# Patient Record
Sex: FEMALE | Race: WHITE | NOT HISPANIC OR LATINO | Employment: UNEMPLOYED | URBAN - METROPOLITAN AREA
[De-identification: names, ages, dates, MRNs, and addresses within clinical notes are randomized per-mention and may not be internally consistent; named-entity substitution may affect disease eponyms.]

---

## 2017-01-24 ENCOUNTER — RADIANT APPOINTMENT (OUTPATIENT)
Dept: GENERAL RADIOLOGY | Facility: CLINIC | Age: 57
End: 2017-01-24
Payer: OTHER MISCELLANEOUS

## 2017-01-24 ENCOUNTER — OFFICE VISIT (OUTPATIENT)
Dept: FAMILY MEDICINE | Facility: CLINIC | Age: 57
End: 2017-01-24
Payer: OTHER MISCELLANEOUS

## 2017-01-24 VITALS
SYSTOLIC BLOOD PRESSURE: 124 MMHG | OXYGEN SATURATION: 100 % | TEMPERATURE: 97.6 F | DIASTOLIC BLOOD PRESSURE: 80 MMHG | WEIGHT: 178 LBS | HEIGHT: 64 IN | HEART RATE: 78 BPM | BODY MASS INDEX: 30.39 KG/M2

## 2017-01-24 DIAGNOSIS — S89.91XA RIGHT KNEE INJURY, INITIAL ENCOUNTER: Primary | ICD-10-CM

## 2017-01-24 DIAGNOSIS — S89.91XA RIGHT KNEE INJURY, INITIAL ENCOUNTER: ICD-10-CM

## 2017-01-24 PROCEDURE — 99213 OFFICE O/P EST LOW 20 MIN: CPT | Performed by: PHYSICIAN ASSISTANT

## 2017-01-24 PROCEDURE — 73562 X-RAY EXAM OF KNEE 3: CPT | Mod: RT

## 2017-01-24 NOTE — Clinical Note
[address] [address]  [phone]    01/24/2017    Lilly Barnes,  1960  [address] [facility] [address]      Employer:  [employer]    Date of Treatment: 01/24/2017    Date of Incident/Injury: 01.24.2017    Diagnosis:   1. Right knee injury, initial encounter        Work Related:  yes      The employee is UNABLE to return to work until 01.31.2017      FOLLOW-UP  in 1 week           Bre Salinas PA-C

## 2017-01-24 NOTE — PROGRESS NOTES
"  SUBJECTIVE:                                                    Lilly Barnes is a 56 year old female who presents to clinic today for the following health issues:      Joint Pain     Onset: 8am today     Description:   Location: right knee - back  Character: Sharp    Intensity: 8/10 at worst -- sits with elevated 4/10    Progression of Symptoms: same    Accompanying Signs & Symptoms:  Other symptoms: radiation of pain down leg, weakness of leg and swelling   History:   Previous similar pain: no       Precipitating factors:   Trauma or overuse: YES - lifting shelves at work stepped backward with all weight, planted and turned at same time and twisted knee    Alleviating factors:  Improved by: elevated - did relieve some pain       Therapies Tried and outcome: above    Patient reports that she was moving a shelf at work and took the shelf and rotated to the side while keeping the foot planted and she felt instant, sudden sharp pain.      Problem list and histories reviewed & adjusted, as indicated.  Additional history: as documented      ROS:  Constitutional, HEENT, cardiovascular, pulmonary, GI, , musculoskeletal, neuro, skin, endocrine and psych systems are negative, except as otherwise noted.    OBJECTIVE:                                                    /80 mmHg  Pulse 78  Temp(Src) 97.6  F (36.4  C) (Oral)  Ht 5' 4.2\" (1.631 m)  Wt 178 lb (80.74 kg)  BMI 30.35 kg/m2  SpO2 100%  LMP 09/15/2011  Body mass index is 30.35 kg/(m^2).  GENERAL: healthy, alert and no distress  EYES: Eyes grossly normal to inspection, PERRL and conjunctivae and sclerae normal  MS: no gross musculoskeletal defects noted, no edema, Pain with weight bearing activity and varus and valgus stress   SKIN: no suspicious lesions or rashes  NEURO: Normal strength and tone, mentation intact and speech normal  PSYCH: mentation appears normal, affect normal/bright    Diagnostic Test Results:  Xray - Unremarkable "     ASSESSMENT/PLAN:                                                      [place] was seen today for musculoskeletal problem.    Diagnoses and all orders for this visit:    Right knee injury, initial encounter  -     XR Knee Right 3 Views; Future    - Xray was unremarkable.  - Knee immobilizer given to patient today, and patient advised to rest and stay out of work for one week.  She was instructed to followup in one week for re-assessment, or sooner if needed.   - Rest, elevation, ice and pain management of tylenol and ibuprofen advised today.     - Plan discussed with Dr. Calle today.      See Patient Instructions        Bre Salinas PA-C    [facility] [place]

## 2017-01-24 NOTE — NURSING NOTE
"Chief Complaint   Patient presents with     Musculoskeletal Problem       Initial /80 mmHg  Pulse 78  Temp(Src) 97.6  F (36.4  C) (Oral)  Ht 5' 4.2\" (1.631 m)  Wt 178 lb (80.74 kg)  BMI 30.35 kg/m2  SpO2 100%  LMP 09/15/2011 Estimated body mass index is 30.35 kg/(m^2) as calculated from the following:    Height as of this encounter: 5' 4.2\" (1.631 m).    Weight as of this encounter: 178 lb (80.74 kg).  BP completed using cuff size: large  Csaba Mlnarik CMA    "

## 2017-01-28 DIAGNOSIS — E03.9 ACQUIRED HYPOTHYROIDISM: Primary | ICD-10-CM

## 2017-01-30 ENCOUNTER — OFFICE VISIT (OUTPATIENT)
Dept: FAMILY MEDICINE | Facility: CLINIC | Age: 57
End: 2017-01-30
Payer: OTHER MISCELLANEOUS

## 2017-01-30 VITALS
SYSTOLIC BLOOD PRESSURE: 110 MMHG | TEMPERATURE: 97.8 F | DIASTOLIC BLOOD PRESSURE: 68 MMHG | HEIGHT: 64 IN | HEART RATE: 67 BPM | WEIGHT: 182 LBS | OXYGEN SATURATION: 100 % | BODY MASS INDEX: 31.07 KG/M2

## 2017-01-30 DIAGNOSIS — S89.91XD KNEE INJURY, RIGHT, SUBSEQUENT ENCOUNTER: Primary | ICD-10-CM

## 2017-01-30 PROCEDURE — 99213 OFFICE O/P EST LOW 20 MIN: CPT | Performed by: PHYSICIAN ASSISTANT

## 2017-01-30 RX ORDER — LEVOTHYROXINE SODIUM 75 UG/1
TABLET ORAL
Qty: 30 TABLET | Refills: 0 | Status: SHIPPED | OUTPATIENT
Start: 2017-01-30 | End: 2017-02-03

## 2017-01-30 NOTE — PROGRESS NOTES
"  SUBJECTIVE:                                                    Lilly Barnes is a 56 year old female who presents to clinic today for the following health issues:      Workers Comp - Recheck R knee injury on 01/24/2017. Still sharp pain worst is 6/10. More pain when locking knee - brace causes more pain. Pain in now outer side - heard a pop this morning - stepped forward with R leg and bared all weight.   Is better since last visit.  Tylenol - 2 extra strength,  takes edge off    Patient reports that the pain is better.  She reports that it is less often.  She still is having a hard time bearing weight.    She reports that the knee immobilizer makes symptoms a little worse.  She is not using the brace.      Problem list and histories reviewed & adjusted, as indicated.  Additional history: as documented      ROS:  Constitutional, HEENT, cardiovascular, pulmonary, GI, , musculoskeletal, neuro, skin, endocrine and psych systems are negative, except as otherwise noted.    OBJECTIVE:                                                    /68 mmHg  Pulse 67  Temp(Src) 97.8  F (36.6  C) (Oral)  Ht 5' 4.2\" (1.631 m)  Wt 182 lb (82.555 kg)  BMI 31.03 kg/m2  SpO2 100%  LMP 09/15/2011  Breastfeeding? No  Body mass index is 31.03 kg/(m^2).  GENERAL: healthy, alert and no distress  MS: no gross musculoskeletal defects noted, no edema  SKIN: no suspicious lesions or rashes  NEURO: Normal strength and tone, mentation intact and speech normal  PSYCH: mentation appears normal, affect normal/bright    Diagnostic Test Results:  none      ASSESSMENT/PLAN:                                                      [place] was seen today for recheck.    Diagnoses and all orders for this visit:    Knee injury, right, subsequent encounter  -     PHYSICAL THERAPY REFERRAL    - Work comp letter done today for patient to go back to work with restrictions.  She should be allowed to sit and stand as tolerated.     - Exam also " performed by Dr. Calle who agrees with assessment and plan.     - No further imaging advised at this time.     - Patient instructed to followup as needed.     See Patient Instructions        Bre Salinas PA-C    [facility] [place]

## 2017-01-30 NOTE — Clinical Note
[address] [address]  [phone]    01/30/2017    Lilly Barnes,  1960  [address] [facility] [address]      Employer:  [employer]    Date of Treatment: 01/30/2017    Date of Incident/Injury: 01.24.2017    Diagnosis:   1. Knee injury, right, subsequent encounter        Work Related:  yes      The employee is ABLE to return to work today.    When the patient returns to work, the following restrictions apply for the next 10 days:  *  Bend: Occasionally (1-3 hours)  *  Squat: Occasionally (1-3 hours)  *  Walk/Stand: Occasionally (1-3 hours)  *  Reach Above Shoulders: Occasionally (1-3 hours)  *  Lift, carry, push, and pull no more than:  0-10 lbs.    No lifting over 10-15 lbs.  Standing and sitting as tolerated.  Light movement.  Physical therapy recommended today.      FOLLOW-UP  With physical therapy and as needed in clinic.               Bre Salinas PA-C

## 2017-01-30 NOTE — TELEPHONE ENCOUNTER
levothyroxine (SYNTHROID, LEVOTHROID) 75 MCG tablet  Last Written Prescription Date: 3/3/2016  Last Quantity: 90, # refills: 3  Last Office Visit with G, P or [facility] prescribing provider: 1/30/2017   Next 5 appointments (look out 90 days)     Feb 03, 2017  8:00 AM   PHYSICAL with Shelton Castellanos MD   [facility] ([facility])    [address] [place] [id number] [phone]                   TSH   Date Value Ref Range Status   12/18/2015 2.25 0.40 - 4.00 mU/L Final     Due for an Office visit for further refills, only fill for 30 days     Delia Bowden RN, BSN  [facility]

## 2017-01-30 NOTE — NURSING NOTE
"Chief Complaint   Patient presents with     RECHECK     Work Comp - R knee injury       Initial /68 mmHg  Pulse 67  Temp(Src) 97.8  F (36.6  C) (Oral)  Ht 5' 4.2\" (1.631 m)  Wt 182 lb (82.555 kg)  BMI 31.03 kg/m2  SpO2 100%  LMP 09/15/2011  Breastfeeding? No Estimated body mass index is 31.03 kg/(m^2) as calculated from the following:    Height as of this encounter: 5' 4.2\" (1.631 m).    Weight as of this encounter: 182 lb (82.555 kg).  BP completed using cuff size: large  Csaba Mlnarik CMA    "

## 2017-01-31 ENCOUNTER — TELEPHONE (OUTPATIENT)
Dept: FAMILY MEDICINE | Facility: CLINIC | Age: 57
End: 2017-01-31

## 2017-01-31 NOTE — TELEPHONE ENCOUNTER
Date Forms was received: January 31, 2017    Forms received by: Fax    Last office visit: 01/30/2017    Purpose of Form:  [employer] Forms    When the form is due:  asap    How the form needs to be returned for patient:  Fax - [phone]    Form currently placed  [facility] -- Bre's Desk  Rah Mlnarik CMA

## 2017-02-01 ENCOUNTER — THERAPY VISIT (OUTPATIENT)
Dept: PHYSICAL THERAPY | Facility: CLINIC | Age: 57
End: 2017-02-01
Payer: OTHER MISCELLANEOUS

## 2017-02-01 DIAGNOSIS — M25.561 ACUTE PAIN OF RIGHT KNEE: Primary | ICD-10-CM

## 2017-02-01 PROCEDURE — 97110 THERAPEUTIC EXERCISES: CPT | Mod: GP | Performed by: PHYSICAL THERAPIST

## 2017-02-01 PROCEDURE — 97161 PT EVAL LOW COMPLEX 20 MIN: CPT | Mod: GP | Performed by: PHYSICAL THERAPIST

## 2017-02-01 ASSESSMENT — ACTIVITIES OF DAILY LIVING (ADL)
KNEE_ACTIVITY_OF_DAILY_LIVING_SUM: 39
HOW_WOULD_YOU_RATE_THE_OVERALL_FUNCTION_OF_YOUR_KNEE_DURING_YOUR_USUAL_DAILY_ACTIVITIES?: ABNORMAL
SIT WITH YOUR KNEE BENT: ACTIVITY IS SOMEWHAT DIFFICULT
KNEE_ACTIVITY_OF_DAILY_LIVING_SCORE: 55.71
GIVING WAY, BUCKLING OR SHIFTING OF KNEE: I DO NOT HAVE THE SYMPTOM
WEAKNESS: THE SYMPTOM AFFECTS MY ACTIVITY SLIGHTLY
PAIN: THE SYMPTOM AFFECTS MY ACTIVITY MODERATELY
RISE FROM A CHAIR: ACTIVITY IS SOMEWHAT DIFFICULT
SQUAT: ACTIVITY IS FAIRLY DIFFICULT
LIMPING: THE SYMPTOM AFFECTS MY ACTIVITY SEVERELY
GO UP STAIRS: ACTIVITY IS SOMEWHAT DIFFICULT
HOW_WOULD_YOU_RATE_THE_CURRENT_FUNCTION_OF_YOUR_KNEE_DURING_YOUR_USUAL_DAILY_ACTIVITIES_ON_A_SCALE_FROM_0_TO_100_WITH_100_BEING_YOUR_LEVEL_OF_KNEE_FUNCTION_PRIOR_TO_YOUR_INJURY_AND_0_BEING_THE_INABILITY_TO_PERFORM_ANY_OF_YOUR_USUAL_DAILY_ACTIVITIES?: 45
SWELLING: THE SYMPTOM AFFECTS MY ACTIVITY SLIGHTLY
RAW_SCORE: 39
STAND: ACTIVITY IS FAIRLY DIFFICULT
WALK: ACTIVITY IS SOMEWHAT DIFFICULT
KNEEL ON THE FRONT OF YOUR KNEE: ACTIVITY IS MINIMALLY DIFFICULT
GO DOWN STAIRS: ACTIVITY IS FAIRLY DIFFICULT
AS_A_RESULT_OF_YOUR_KNEE_INJURY,_HOW_WOULD_YOU_RATE_YOUR_CURRENT_LEVEL_OF_DAILY_ACTIVITY?: ABNORMAL
STIFFNESS: THE SYMPTOM AFFECTS MY ACTIVITY SLIGHTLY

## 2017-02-01 NOTE — PROGRESS NOTES
Subjective:    Lilly Barnes is a 56 year old female with a right knee condition.  Condition occurred with:  A twist.  Condition occurred: at work.  This is a new condition  Pt twisted her right knee as she was lifting and turning with a shelf.  This occurred on 1/24/17 at work.  Pt felt immediate sharp posterior knee pain.  Continues to have pain in the posterior knee and along the lateral knee.  Has improved, but is still limping slightly and has pain when pivoting on the right knee.  .    Patient reports pain:  Posterior and lateral.    Pain is described as sharp and stabbing and is intermittent and reported as 6/10.   Pain is the same all the time.  Symptoms are exacerbated by weight bearing, activity, ascending stairs, descending stairs and bending/squatting and relieved by rest.  Since onset symptoms are gradually improving.  Special tests:  X-ray (normal).      General health as reported by patient is good.                  Barriers include:  None as reported by the patient.    Red flags:  None as reported by the patient.                      Objective:      Gait:    Gait Type:  Antalgic                                                           Knee Evaluation:  ROM:  Strength:  Normal    PROM    Hyperextension: Left: Full    Right:  Slight loss with ERP    Flexion: Left: Full    Right:  Slight loss with ERP        Ligament Testing:  Normal                Special Tests:     Right knee positive for the following tests:  Meniscal  Palpation:      Right knee tenderness present at:  Medial Joint Line and Lateral Joint Line  Edema:  Edema of the knee: Slight edema of the right knee.            General     ROS    Assessment/Plan:      Patient is a 56 year old female with right side knee complaints.    Patient has the following significant findings with corresponding treatment plan.                Diagnosis 1:  Right knee sprain  Pain -  hot/cold therapy, education and home program  Decreased ROM/flexibility -  manual therapy, therapeutic exercise and home program  Decreased strength - therapeutic exercise, therapeutic activities and home program    Therapy Evaluation Codes:   1) History comprised of:   Personal factors that impact the plan of care:      None.    Comorbidity factors that impact the plan of care are:      None.     Medications impacting care: None.  2) Examination of Body Systems comprised of:   Body structures and functions that impact the plan of care:      Knee.   Activity limitations that impact the plan of care are:      Bending, Squatting/kneeling, Stairs and Walking.  3) Clinical presentation characteristics are:   Stable/Uncomplicated.  4) Decision-Making    Low complexity using standardized patient assessment instrument and/or measureable assessment of functional outcome.  Cumulative Therapy Evaluation is: Low complexity.    Previous and current functional limitations:  (See Goal Flow Sheet for this information)    Short term and Long term goals: (See Goal Flow Sheet for this information)     Communication ability:  Patient appears to be able to clearly communicate and understand verbal and written communication and follow directions correctly.  Treatment Explanation - The following has been discussed with the patient:   RX ordered/plan of care  Anticipated outcomes  Possible risks and side effects  This patient would benefit from PT intervention to resume normal activities.   Rehab potential is good.    Frequency:  2 X week, once daily  Duration:  for 2 weeks tapering to 1 X a week over 4 weeks  Discharge Plan:  Achieve all LTG.  Independent in home treatment program.  Reach maximal therapeutic benefit.    Please refer to the daily flowsheet for treatment today, total treatment time and time spent performing 1:1 timed codes.

## 2017-02-01 NOTE — TELEPHONE ENCOUNTER
Forms are a job description for pt -- sent to scan.    Faxed work status letter written on 01/30/2017 at pt's appt to [phone]    Rah Miller CMA

## 2017-02-02 NOTE — PROGRESS NOTES
Subjective:                                       Pertinent medical history includes:  High blood pressure and thyroid problems.      Current medications:  Thyroid medication and high blood pressure medication.  Current occupation is .    Primary job tasks include:  Prolonged standing, repetitive tasks, operating a machine and lifting (carrying, pushing, pulling).                              Objective:    System    Physical Exam    General     ROS    Assessment/Plan:

## 2017-02-03 ENCOUNTER — THERAPY VISIT (OUTPATIENT)
Dept: PHYSICAL THERAPY | Facility: CLINIC | Age: 57
End: 2017-02-03
Payer: OTHER MISCELLANEOUS

## 2017-02-03 ENCOUNTER — OFFICE VISIT (OUTPATIENT)
Dept: FAMILY MEDICINE | Facility: CLINIC | Age: 57
End: 2017-02-03
Payer: COMMERCIAL

## 2017-02-03 VITALS
HEIGHT: 64 IN | WEIGHT: 181 LBS | BODY MASS INDEX: 30.9 KG/M2 | OXYGEN SATURATION: 100 % | TEMPERATURE: 97.8 F | HEART RATE: 73 BPM | DIASTOLIC BLOOD PRESSURE: 68 MMHG | SYSTOLIC BLOOD PRESSURE: 112 MMHG

## 2017-02-03 DIAGNOSIS — N18.30 CKD (CHRONIC KIDNEY DISEASE) STAGE 3, GFR 30-59 ML/MIN (H): ICD-10-CM

## 2017-02-03 DIAGNOSIS — E78.5 HYPERLIPIDEMIA WITH TARGET LDL LESS THAN 130: ICD-10-CM

## 2017-02-03 DIAGNOSIS — M85.80 OSTEOPENIA: ICD-10-CM

## 2017-02-03 DIAGNOSIS — M25.561 ACUTE PAIN OF RIGHT KNEE: Primary | ICD-10-CM

## 2017-02-03 DIAGNOSIS — E03.9 ACQUIRED HYPOTHYROIDISM: ICD-10-CM

## 2017-02-03 DIAGNOSIS — Z23 NEED FOR PROPHYLACTIC VACCINATION AND INOCULATION AGAINST INFLUENZA: ICD-10-CM

## 2017-02-03 DIAGNOSIS — Z86.0100 HISTORY OF COLONIC POLYPS: ICD-10-CM

## 2017-02-03 DIAGNOSIS — I10 HYPERTENSION GOAL BP (BLOOD PRESSURE) < 140/90: ICD-10-CM

## 2017-02-03 DIAGNOSIS — N39.46 MIXED INCONTINENCE: ICD-10-CM

## 2017-02-03 DIAGNOSIS — Z11.59 NEED FOR HEPATITIS C SCREENING TEST: ICD-10-CM

## 2017-02-03 DIAGNOSIS — J31.0 CHRONIC RHINITIS: ICD-10-CM

## 2017-02-03 DIAGNOSIS — Z12.4 SCREENING FOR MALIGNANT NEOPLASM OF CERVIX: ICD-10-CM

## 2017-02-03 DIAGNOSIS — Z12.39 SCREENING FOR BREAST CANCER: ICD-10-CM

## 2017-02-03 DIAGNOSIS — Z51.81 MEDICATION MONITORING ENCOUNTER: ICD-10-CM

## 2017-02-03 DIAGNOSIS — Z23 NEED FOR PROPHYLACTIC VACCINATION AGAINST STREPTOCOCCUS PNEUMONIAE (PNEUMOCOCCUS): ICD-10-CM

## 2017-02-03 DIAGNOSIS — Z13.820 SCREENING FOR OSTEOPOROSIS: ICD-10-CM

## 2017-02-03 DIAGNOSIS — Z91.09 ENVIRONMENTAL ALLERGIES: ICD-10-CM

## 2017-02-03 DIAGNOSIS — Z12.11 SCREEN FOR COLON CANCER: ICD-10-CM

## 2017-02-03 DIAGNOSIS — Z00.00 ENCOUNTER FOR ROUTINE ADULT HEALTH EXAMINATION WITHOUT ABNORMAL FINDINGS: Primary | ICD-10-CM

## 2017-02-03 LAB
ALBUMIN UR-MCNC: NEGATIVE MG/DL
APPEARANCE UR: CLEAR
BILIRUB UR QL STRIP: NEGATIVE
COLOR UR AUTO: YELLOW
ERYTHROCYTE [DISTWIDTH] IN BLOOD BY AUTOMATED COUNT: 13.1 % (ref 10–15)
GLUCOSE UR STRIP-MCNC: NEGATIVE MG/DL
HCT VFR BLD AUTO: 37.3 % (ref 35–47)
HGB BLD-MCNC: 12.3 G/DL (ref 11.7–15.7)
HGB UR QL STRIP: NEGATIVE
KETONES UR STRIP-MCNC: NEGATIVE MG/DL
LEUKOCYTE ESTERASE UR QL STRIP: ABNORMAL
MCH RBC QN AUTO: 29.2 PG (ref 26.5–33)
MCHC RBC AUTO-ENTMCNC: 33 G/DL (ref 31.5–36.5)
MCV RBC AUTO: 89 FL (ref 78–100)
NITRATE UR QL: NEGATIVE
NON-SQ EPI CELLS #/AREA URNS LPF: NORMAL /LPF
PH UR STRIP: 6.5 PH (ref 5–7)
PLATELET # BLD AUTO: 156 10E9/L (ref 150–450)
RBC # BLD AUTO: 4.21 10E12/L (ref 3.8–5.2)
RBC #/AREA URNS AUTO: NORMAL /HPF (ref 0–2)
SP GR UR STRIP: 1.01 (ref 1–1.03)
URN SPEC COLLECT METH UR: ABNORMAL
UROBILINOGEN UR STRIP-ACNC: 0.2 EU/DL (ref 0.2–1)
WBC # BLD AUTO: 4.5 10E9/L (ref 4–11)
WBC #/AREA URNS AUTO: NORMAL /HPF (ref 0–2)

## 2017-02-03 PROCEDURE — 81001 URINALYSIS AUTO W/SCOPE: CPT | Performed by: FAMILY MEDICINE

## 2017-02-03 PROCEDURE — 97110 THERAPEUTIC EXERCISES: CPT | Mod: GP

## 2017-02-03 PROCEDURE — 80061 LIPID PANEL: CPT | Performed by: FAMILY MEDICINE

## 2017-02-03 PROCEDURE — 82550 ASSAY OF CK (CPK): CPT | Performed by: FAMILY MEDICINE

## 2017-02-03 PROCEDURE — 85027 COMPLETE CBC AUTOMATED: CPT | Performed by: FAMILY MEDICINE

## 2017-02-03 PROCEDURE — 99396 PREV VISIT EST AGE 40-64: CPT | Performed by: FAMILY MEDICINE

## 2017-02-03 PROCEDURE — 84443 ASSAY THYROID STIM HORMONE: CPT | Performed by: FAMILY MEDICINE

## 2017-02-03 PROCEDURE — 36415 COLL VENOUS BLD VENIPUNCTURE: CPT | Performed by: FAMILY MEDICINE

## 2017-02-03 PROCEDURE — 82043 UR ALBUMIN QUANTITATIVE: CPT | Performed by: FAMILY MEDICINE

## 2017-02-03 PROCEDURE — 80053 COMPREHEN METABOLIC PANEL: CPT | Performed by: FAMILY MEDICINE

## 2017-02-03 RX ORDER — LEVOTHYROXINE SODIUM 75 UG/1
75 TABLET ORAL DAILY
Qty: 90 TABLET | Refills: 3 | Status: SHIPPED | OUTPATIENT
Start: 2017-02-03 | End: 2018-01-27

## 2017-02-03 RX ORDER — LISINOPRIL AND HYDROCHLOROTHIAZIDE 12.5; 2 MG/1; MG/1
1 TABLET ORAL DAILY
Qty: 90 TABLET | Refills: 3 | Status: SHIPPED | OUTPATIENT
Start: 2017-02-03 | End: 2017-09-11

## 2017-02-03 RX ORDER — FLUTICASONE PROPIONATE 50 MCG
2 SPRAY, SUSPENSION (ML) NASAL DAILY
Qty: 48 G | Refills: 3 | Status: SHIPPED | OUTPATIENT
Start: 2017-02-03 | End: 2018-02-28

## 2017-02-03 NOTE — Clinical Note
[address] [address]                  [phone]   February 6, 2017    Lilly Barnes  [address] [facility] [address]      Dear [place],    Here is a summary of your recent test results:    Labs are overall quite good, except lipids are elevated.    Kidney function is overall stable (mildly decreased).     We advise:    Follow up to discuss treatment options.    For additional lab test information, [email or web address].org is an excellent reference.    Let us know if you have any questions or concerns.    Thank you for choosing us for your health care needs!    Your test results are enclosed.      Please contact me if you have any questions.    In addition, here is a list of due or overdue Health Maintenance reminders.    Health Maintenance Due   Topic Date Due     Pneumovax Vaccine  06/26/1961     Discuss Advance Directive Planning  06/26/1977     Hepatitis C Screening  06/26/1977     Colon Cancer Screening - FIT Test - yearly  09/05/2015       Please call us at [phone] (or use [employer] ([employer])) to address the above recommendations.            Thank you very much for trusting [facility]..     Healthy regards,        Shelton Castellanos M.D.        Results for orders placed or performed in visit on 02/03/17   Comprehensive metabolic panel   Result Value Ref Range    Sodium 143 133 - 144 mmol/L    Potassium 4.0 3.4 - 5.3 mmol/L    Chloride 107 94 - 109 mmol/L    Carbon Dioxide 26 20 - 32 mmol/L    Anion Gap 10 3 - 14 mmol/L    Glucose 75 70 - 99 mg/dL    Urea Nitrogen 31 (H) 7 - 30 mg/dL    Creatinine 1.28 (H) 0.52 - 1.04 mg/dL    GFR Estimate 43 (L) >60 mL/min/1.7m2    GFR Estimate If Black 52 (L) >60 mL/min/1.7m2    Calcium 9.1 8.5 - 10.1 mg/dL    Bilirubin Total 0.3 0.2 - 1.3 mg/dL    Albumin 3.7 3.4 - 5.0 g/dL    Protein Total 6.9 6.8 - 8.8 g/dL    Alkaline Phosphatase 78 40 - 150 U/L    ALT 22 0 - 50 U/L    AST 16 0 - 45  U/L   Lipid panel reflex to direct LDL   Result Value Ref Range    Cholesterol 244 (H) <200 mg/dL    Triglycerides 65 <150 mg/dL    HDL Cholesterol 74 >49 mg/dL    LDL Cholesterol Calculated 157 (H) <100 mg/dL    Non HDL Cholesterol 170 (H) <130 mg/dL   CK total   Result Value Ref Range    CK Total 69 30 - 225 U/L   CBC with platelets   Result Value Ref Range    WBC 4.5 4.0 - 11.0 10e9/L    RBC Count 4.21 3.8 - 5.2 10e12/L    Hemoglobin 12.3 11.7 - 15.7 g/dL    Hematocrit 37.3 35.0 - 47.0 %    MCV 89 78 - 100 fl    MCH 29.2 26.5 - 33.0 pg    MCHC 33.0 31.5 - 36.5 g/dL    RDW 13.1 10.0 - 15.0 %    Platelet Count 156 150 - 450 10e9/L   *UA reflex to Microscopic   Result Value Ref Range    Color Urine Yellow     Appearance Urine Clear     Glucose Urine Negative NEG mg/dL    Bilirubin Urine Negative NEG    Ketones Urine Negative NEG mg/dL    Specific Gravity Urine 1.015 1.003 - 1.035    Blood Urine Negative NEG    pH Urine 6.5 5.0 - 7.0 pH    Protein Albumin Urine Negative NEG mg/dL    Urobilinogen Urine 0.2 0.2 - 1.0 EU/dL    Nitrite Urine Negative NEG    Leukocyte Esterase Urine Trace (A) NEG    Source Midstream Urine    Albumin Random Urine Quantitative   Result Value Ref Range    Creatinine Urine 78 mg/dL    Albumin Urine mg/L <5 mg/L    Albumin Urine mg/g Cr Unable to calculate due to low value 0 - 25 mg/g Cr   TSH with free T4 reflex   Result Value Ref Range    TSH 3.77 0.40 - 4.00 mU/L   Urine Microscopic   Result Value Ref Range    WBC Urine O - 2 0 - 2 /HPF    RBC Urine O - 2 0 - 2 /HPF    Squamous Epithelial /LPF Urine Few FEW /LPF

## 2017-02-03 NOTE — MR AVS SNAPSHOT
After Visit Summary   2/3/2017    Lilly Barnes    MRN: [id number]           Patient Information     Date Of Birth          1960        Visit Information        Provider Department      2/3/2017 8:00 AM Shelton Castellanos MD [facility]        Today's Diagnoses     Encounter for routine adult health examination without abnormal findings    -  1     CKD (chronic kidney disease) stage 3, GFR 30-59 ml/min         Hypertension goal BP (blood pressure) < 140/90         Hyperlipidemia with target LDL less than 130         Acquired hypothyroidism         Mixed incontinence         Osteopenia         Chronic rhinitis         Environmental allergies         History of colonic polyps- 3 mm polyp.  needs every 3-5 yr surveillence- hyperplastic          Screening for breast cancer         Screening for malignant neoplasm of cervix         Screen for colon cancer         Screening for osteoporosis         Medication monitoring encounter         Need for hepatitis C screening test         Need for prophylactic vaccination and inoculation against influenza         Need for prophylactic vaccination against Streptococcus pneumoniae (pneumococcus)           Care Instructions    2/3/17    Levothyroxine-Refilled-use as directed   Flonase-Refilled-use as directed   lisinopril/hctz refilled    Mammogram 5/2016  DEXA 8/16  Pap 12/18    Check on colonoscopy      [facility]                        To reach your care team during and after hours:   [phone]  To reach our pharmacy:        [phone]    Clinic Hours                        Our clinic hours are:    Monday   7:30 am to 7:00 pm                  Tuesday through Friday 7:30 am to 5:00 pm                             Saturday   8:00 am to 12:00 pm      Sunday   Closed      Pharmacy Hours                        Our pharmacy hours are:    Monday   8:30 am to 7:00 pm       Tuesday to Friday  8:30 am to 6:00 pm                        Saturday    9:00 am to 1:00 pm              Sunday    Closed              There is also information available at our web site:  [email or web address]    If your provider ordered any lab tests and you do not receive the results within 10 business days, please call the clinic.    If you need a medication refill please contact your pharmacy.  Please allow 2-3 business days for your refill to be completed.    Our clinic offers telephone visits and e visits.  Please ask one of your team members to explain more.      Use [employer] (secure email communication and access to your chart) to send your primary care provider a message or make an appointment. Ask someone on your Team how to sign up for [employer].  Immunizations                      Immunization History   Administered Date(s) Administered     Influenza (IIV3) 01/28/2013, 10/01/2014, 09/15/2015, 02/03/2017     TDAP (BOOSTRIX AGES 10-64) 07/19/2012     Varicella 03/05/1997        Health Maintenance                         Health Maintenance Due   Topic Date Due     Pneumovax Vaccine  06/26/1961     Discuss Advance Directive Planning  06/26/1977     Hepatitis C Screening  06/26/1977     Colon Cancer Screening - FIT Test - yearly  09/05/2015     Flu Vaccine - yearly  09/01/2016     Thyroid Function Lab (TSH) - yearly  12/18/2016     Basic Metabolic Lab - yearly  12/18/2016     Hemoglobin Lab - yearly  12/18/2016     Cholesterol Lab - yearly  12/18/2016     Microalbumin Lab - yearly  12/18/2016         Preventive Health Recommendations  Female Ages 50 - 64    Yearly exam: See your health care provider every year in order to  o Review health changes.   o Discuss preventive care.    o Review your medicines if your doctor has prescribed any.      Get a Pap test every three years (unless you have an abnormal result and your provider advises testing more often).    If you get Pap tests with HPV test, you only need to test every 5 years, unless you have an abnormal result.     You  do not need a Pap test if your uterus was removed (hysterectomy) and you have not had cancer.    You should be tested each year for STDs (sexually transmitted diseases) if you're at risk.     Have a mammogram every 1 to 2 years.    Have a colonoscopy at age 50, or have a yearly FIT test (stool test). These exams screen for colon cancer.      Have a cholesterol test every 5 years, or more often if advised.    Have a diabetes test (fasting glucose) every three years. If you are at risk for diabetes, you should have this test more often.     If you are at risk for osteoporosis (brittle bone disease), think about having a bone density scan (DEXA).    Shots: Get a flu shot each year. Get a tetanus shot every 10 years.    Nutrition:     Eat at least 5 servings of fruits and vegetables each day.    Eat whole-grain bread, whole-wheat pasta and brown rice instead of white grains and rice.    Talk to your provider about Calcium and Vitamin D.     Lifestyle    Exercise at least 150 minutes a week (30 minutes a day, 5 days a week). This will help you control your weight and prevent disease.    Limit alcohol to one drink per day.    No smoking.     Wear sunscreen to prevent skin cancer.     See your dentist every six months for an exam and cleaning.    See your eye doctor every 1 to 2 years.          Follow-ups after your visit        Additional Services     UROLOGY ADULT REFERRAL       Your provider has referred you to: [facility]: [facility] [phone]   [email or web address]    Please be aware that coverage of these services is subject to the terms and limitations of your health insurance plan.  Call member services at your health plan with any benefit or coverage questions.      Please bring the following with you to your appointment:    (1) Any X-Rays, CTs or MRIs which have been performed.  Contact the facility where they were done to arrange for  prior  to your scheduled appointment.  Any new CT, MRI or other procedures ordered by your specialist must be performed at a [place] facility or coordinated by your clinic's referral office.  (2) List of current medications  (3) This referral request   (4) Any documents/labs given to you for this referral                  Your next 10 appointments already scheduled     Feb 03, 2017  5:20 PM   VIANEY Extremity with Destiney Hernandez [facility] ([facility])    [address] [address]   [phone]            Feb 07, 2017  3:20 PM   VIANEY Extremity with Paddy James [facility] ([facility])    [address] [address]   [phone]            Feb 09, 2017  5:20 PM   VIANEY Extremity with Paddy James [facility] ([facility])    [address] [address]   [phone]            Feb 14, 2017  3:20 PM   [facility] ([facility])    [address] [address]   [phone]              Future tests that were ordered for you today     Open Future Orders        Priority Expected Expires Ordered    Fecal colorectal cancer screen (FIT) Routine 2/24/2017 4/28/2017 2/3/2017            Who to contact     If you have questions or need follow up information about today's clinic visit or your schedule please contact [facility] directly at [phone].  Normal or non-critical lab and imaging results will be communicated to you by MyChart, letter or phone within 4 business days after the clinic has received the results. If you do not hear from us within 7 days, please contact the clinic through MyChart or phone. If you have a critical or abnormal lab result, we will notify you by phone as soon as possible.  Submit refill requests through [employer] or call your pharmacy and they will forward the  "refill request to us. Please allow 3 business days for your refill to be completed.          Additional Information About Your Visit        [employer] Information     [email or web address] gives you secure access to your electronic health record. If you see a primary care provider, you can also send messages to your care team and make appointments. If you have questions, please call your primary care clinic.  If you do not have a primary care provider, please call [phone] and they will assist you.        Care EveryWhere ID     This is your Care EveryWhere ID. This could be used by other organizations to access your [place] medical records  [id number]        Your Vitals Were     Pulse Temperature Height BMI (Body Mass Index) Pulse Oximetry Last Period    73 97.8  F (36.6  C) (Oral) 5' 4.2\" (1.631 m) 30.86 kg/m2 100% 09/15/2011       Blood Pressure from Last 3 Encounters:   02/03/17 112/68   01/30/17 110/68   01/24/17 124/80    Weight from Last 3 Encounters:   02/03/17 181 lb (82.101 kg)   01/30/17 182 lb (82.555 kg)   01/24/17 178 lb (80.74 kg)              We Performed the Following     *UA reflex to Microscopic     Albumin Random Urine Quantitative     CBC with platelets     CK total     Comprehensive metabolic panel     Hepatitis C Screen Reflex to HCV RNA Quant and Genotype     Lipid panel reflex to direct LDL     TSH with free T4 reflex     UROLOGY ADULT REFERRAL          Today's Medication Changes          These changes are accurate as of: 2/3/17  8:46 AM.  If you have any questions, ask your nurse or doctor.               These medicines have changed or have updated prescriptions.        Dose/Directions    fluticasone 50 MCG/ACT spray   Commonly known as:  FLONASE   This may have changed:  See the new instructions.   Used for:  Chronic rhinitis, Environmental allergies   Changed by:  Shelton Castellanos MD        Dose:  2 spray   Spray 2 sprays into both nostrils daily   Quantity:  48 g   Refills:  3       levothyroxine 75 " MCG tablet   Commonly known as:  SYNTHROID/LEVOTHROID   This may have changed:  See the new instructions.   Used for:  Acquired hypothyroidism   Changed by:  Shelton Castellanos MD        Dose:  75 mcg   Take 1 tablet (75 mcg) by mouth daily   Quantity:  90 tablet   Refills:  3            Where to get your medicines      These medications were sent to [facility] [address] [place] [address] [facility]  [address] [facility] [address]     Phone:  [phone]    - fluticasone 50 MCG/ACT spray  - levothyroxine 75 MCG tablet  - lisinopril-hydrochlorothiazide 20-12.5 MG per tablet             Primary Care Provider Office Phone # Fax #    Shelton Castellanos [phone]       [address] [address]        Thank you!     Thank you for choosing [facility]  for your care. Our goal is always to provide you with excellent care. Hearing back from our patients is one way we can continue to improve our services. Please take a few minutes to complete the written survey that you may receive in the mail after your visit with us. Thank you!             Your Updated Medication List - Protect others around you: Learn how to safely use, store and throw away your medicines at www.disposemymeds.org.          This list is accurate as of: 2/3/17  8:46 AM.  Always use your most recent med list.                   Brand Name Dispense Instructions for use    aspirin 81 MG tablet      Take 1 tablet by mouth daily.       calcium carbonate-vitamin D 600-400 MG-UNIT Chew     180 tablet    Take 1 chew tab by mouth 2 times daily       fluticasone 50 MCG/ACT spray    FLONASE    48 g    Spray 2 sprays into both nostrils daily       levothyroxine 75 MCG tablet    SYNTHROID/LEVOTHROID    90 tablet    Take 1 tablet (75 mcg) by mouth daily       lisinopril-hydrochlorothiazide 20-12.5 MG per tablet    PRINZIDE/ZESTORETIC    90 tablet    Take 1 tablet by mouth daily        Multi-vitamin Tabs tablet     100 tablet    Take 1 tablet by mouth daily

## 2017-02-03 NOTE — NURSING NOTE
"Chief Complaint   Patient presents with     Physical       Initial /68 mmHg  Pulse 73  Temp(Src) 97.8  F (36.6  C) (Oral)  Ht 5' 4.2\" (1.631 m)  Wt 181 lb (82.101 kg)  BMI 30.86 kg/m2  SpO2 100%  LMP 09/15/2011 Estimated body mass index is 30.86 kg/(m^2) as calculated from the following:    Height as of this encounter: 5' 4.2\" (1.631 m).    Weight as of this encounter: 181 lb (82.101 kg)..  BP completed using cuff size: bailey Barlow MA  "

## 2017-02-03 NOTE — PROGRESS NOTES
SUBJECTIVE:     CC: Lilly Barnes is an 56 year old woman who presents for preventive health visit.       Virginia presented to the clinic for a preventative health visit.   She is taking her medications regularly with no side effects.   She is concerned about the arthritis in her knee.  Her knee is getting progressively better.       Notes concern over urine incontinence - stress and urge, desires another referral to Urology       Physical  Annual:     Getting at least 3 servings of Calcium per day::  Yes    Bi-annual eye exam::  Yes    Dental care twice a year::  Yes    Sleep apnea or symptoms of sleep apnea::  None    Diet::  Low salt    Frequency of exercise::  2-3 days/week    Duration of exercise::  15-30 minutes    Taking medications regularly::  Yes    Medication side effects::  None    Additional concerns today::  YES (talk about arthritis in knee)      Hypertension Follow-up      Outpatient blood pressures are not being checked.    Low Salt Diet: low salt    BP Readings from Last 3 Encounters:   02/03/17 112/68   01/30/17 110/68   01/24/17 124/80        Recent Labs   Lab Test  12/18/15   1132  02/20/15   0912  08/01/14   0828   CHOL  237*  214*  214*   HDL  69  77  75   LDL  151*  128  119   TRIG  85  46  101   CHOLHDLRATIO   --   2.8  2.9     Today's PHQ-2 Score:   PHQ-2 ( 1999 [employer]) 2/3/2017   Q1: Little interest or pleasure in doing things 0   Q2: Feeling down, depressed or hopeless 0   PHQ-2 Score 0   Little interest or pleasure in doing things -   Feeling down, depressed or hopeless -   PHQ-2 Score -       Abuse: Current or Past(Physical, Sexual or Emotional)- No  Do you feel safe in your environment - Yes    Social History   Substance Use Topics     Smoking status: Never Smoker      Smokeless tobacco: Never Used     Alcohol Use: 0.0 - 0.6 oz/week     0-1 Standard drinks or equivalent per week      Comment: 2 drinks per month     The patient does not drink >3 drinks per day nor >7 drinks per  week.    Recent Labs   Lab Test  12/18/15   1132  02/20/15   0912  08/01/14   0828   CHOL  237*  214*  214*   HDL  69  77  75   LDL  151*  128  119   TRIG  85  46  101   CHOLHDLRATIO   --   2.8  2.9   NHDL  168*   --    --      Reviewed orders with patient.  Reviewed health maintenance and updated orders accordingly - Yes    Mammo Decision Support:  Patient over age 50, mutual decision to screen reflected in health maintenance.    Pertinent mammograms are reviewed under the imaging tab.  History of abnormal Pap smear: NO - age 30- 65 PAP every 3 years recommended  All Histories reviewed and [facility].     Health Maintenance     Colonoscopy:  5 years Last 12/13 Due 12/18   FIT:  Yearly last 9/14 Ordered               Mammogram:  1 year 5/16 Due 5/17              PAP:  3 years last 12/15 Due 12/18   DEXA:  3 years last 8/14 Due 8/17    Health Maintenance Due   Topic Date Due     PNEUMOVAX 1X HI RISK PATIENT < 65 (NO IB MSG)  06/26/1961     ADVANCE DIRECTIVE PLANNING Q5 YRS (NO INBASKET)  06/26/1977     HEPATITIS C SCREENING  06/26/1977     FIT Q1 YR (NO INBASKET)  09/05/2015     INFLUENZA VACCINE (SYSTEM ASSIGNED)  09/01/2016     TSH Q1 YEAR (NO INBASKET)  12/18/2016     BMP Q1 YR (NO INBASKET)  12/18/2016     HEMOGLOBIN Q1 YR (NO INBASKET)  12/18/2016     LIPID MONITORING Q1 YEAR( NO INBASKET)  12/18/2016     MICROALBUMIN Q1 YEAR( NO INBASKET)  12/18/2016       Current Problem List    Patient Active Problem List   Diagnosis     Hypothyroidism     Hypertension goal BP (blood pressure) < 140/90     CKD (chronic kidney disease) stage 3, GFR 30-59 ml/min     History of colonic polyps- 3 mm polyp.  needs every 3-5 yr surveillence- hyperplastic      Hyperlipidemia with target LDL less than 130     Chronic rhinitis     Osteopenia     Acute pain of right knee     Environmental allergies       Past Medical History    Past Medical History   Diagnosis Date     Hypothyroidism 2009     Hyperlipidemia LDL goal < 130       Hypertension goal BP (blood pressure) < 140/90 2009     Colon polyps 12/13     hyperplastic polyp - Dr Sheppard, due 3-5 yrs     CKD (chronic kidney disease) stage 3, GFR 30-59 ml/min 7/13     Osteopenia      Environmental allergies        Past Surgical History    Past Surgical History   Procedure Laterality Date     Surgical history of -   1997     laproscopic - left - for ectopic pregnancy     Colonoscopy  12/6/2013     hyperplastic polyp - Dr Sheppard - due 3-5 yrs       Current Medications    Current Outpatient Prescriptions   Medication Sig Dispense Refill     levothyroxine (SYNTHROID/LEVOTHROID) 75 MCG tablet Take 1 tablet (75 mcg) by mouth daily 90 tablet 3     lisinopril-hydrochlorothiazide (PRINZIDE/ZESTORETIC) 20-12.5 MG per tablet Take 1 tablet by mouth daily 90 tablet 3     fluticasone (FLONASE) 50 MCG/ACT spray Spray 2 sprays into both nostrils daily 48 g 3     multivitamin, therapeutic with minerals (MULTI-VITAMIN) TABS Take 1 tablet by mouth daily 100 tablet 3     calcium carbonate-vitamin D 600-400 MG-UNIT CHEW Take 1 chew tab by mouth 2 times daily 180 tablet 3     aspirin 81 MG tablet Take 1 tablet by mouth daily.  3     [DISCONTINUED] levothyroxine (SYNTHROID/LEVOTHROID) 75 MCG tablet Take 1 tablet by mouth  daily 30 tablet 0     [DISCONTINUED] lisinopril-hydrochlorothiazide (PRINZIDE/ZESTORETIC) 20-12.5 MG per tablet Take 1 tablet by mouth daily 90 tablet 0       Allergies    No Known Allergies    Immunizations    Immunization History   Administered Date(s) Administered     Influenza (IIV3) 01/28/2013, 10/01/2014, 09/15/2015, 02/03/2017     TDAP (BOOSTRIX AGES 10-64) 07/19/2012     Varicella 03/05/1997       Family History    Family History   Problem Relation Age of Onset     CANCER Sister 43     ovarian and uterine      CEREBROVASCULAR DISEASE Sister 55     DIABETES Mother      Lipids Other      multiple members including 1 degree      DIABETES Brother      DIABETES Brother      Depression Son       "resolved       Social History    Social History     Social History     Marital Status:      Spouse Name: Cullen     Number of Children: 1     Years of Education: 14     Occupational History           Social History Main Topics     Smoking status: Never Smoker      Smokeless tobacco: Never Used     Alcohol Use: 0.0 - 0.6 oz/week     0-1 Standard drinks or equivalent per week      Comment: 2 drinks per month     Drug Use: No     Sexual Activity: No     Other Topics Concern     Parent/Sibling W/ Cabg, Mi Or Angioplasty Before 65f 55m? No      Service No     Blood Transfusions No     Caffeine Concern Yes     0-1 energy drinks daily     Exercise Yes     walks 3-4 times per week, averages 10,000 steps daily     Seat Belt Yes     Social History Narrative     ROS:  Constitutional, HEENT, cardiovascular, pulmonary, GI, , musculoskeletal, neuro, skin, endocrine and psych systems are negative, except as in HPI or otherwise noted       Problem list, Medication list, Allergies, and Medical/Social/Surgical histories reviewed in [facility] and updated as appropriate.  OBJECTIVE:     /68 mmHg  Pulse 73  Temp(Src) 97.8  F (36.6  C) (Oral)  Ht 5' 4.2\" (1.631 m)  Wt 181 lb (82.101 kg)  BMI 30.86 kg/m2  SpO2 100%  LMP 09/15/2011  EXAM:  GENERAL: healthy, alert and no distress Overweight   EYES: Eyes grossly normal to inspection, PERRL and conjunctivae and sclerae normal  HENT: ear canals and TM's normal, nose and mouth without ulcers or lesions  NECK: no adenopathy, no asymmetry, masses, or scars and thyroid normal to palpation  RESP: lungs clear to auscultation - no rales, rhonchi or wheezes  BREAST: normal without masses, tenderness or nipple discharge and no palpable axillary masses or adenopathy  CV: regular rate and rhythm, normal S1 S2, no S3 or S4, no murmur, click or rub, no peripheral edema and peripheral pulses strong  ABDOMEN: soft, nontender, no hepatosplenomegaly, no masses and bowel sounds normal   " (female): normal female external genitalia, normal urethral meatus, vaginal mucosa pink, moist, well rugated, and normal cervix/adnexa/uterus without masses or discharge - moderate cystocele  RECTAL: normal sphincter tone, no rectal masses  MS: no gross musculoskeletal defects noted, no edema  SKIN: no suspicious lesions or rashes  NEURO: Normal strength and tone, mentation intact and speech normal  PSYCH: mentation appears normal, affect normal/bright    ASSESSMENT/PLAN:         ICD-10-CM    1. Encounter for routine adult health examination without abnormal findings Z00.00 Comprehensive metabolic panel     Lipid panel reflex to direct LDL     CK total     CBC with platelets     *UA reflex to Microscopic     Albumin Random Urine Quantitative     TSH with free T4 reflex     Fecal colorectal cancer screen (FIT)   2. CKD (chronic kidney disease) stage 3, GFR 30-59 ml/min N18.3 Comprehensive metabolic panel     CBC with platelets     *UA reflex to Microscopic     Albumin Random Urine Quantitative   3. Hypertension goal BP (blood pressure) < 140/90 I10 Comprehensive metabolic panel     *UA reflex to Microscopic     Albumin Random Urine Quantitative     lisinopril-hydrochlorothiazide (PRINZIDE/ZESTORETIC) 20-12.5 MG per tablet   4. Hyperlipidemia with target LDL less than 130 E78.5 Comprehensive metabolic panel     Lipid panel reflex to direct LDL     CK total   5. Acquired hypothyroidism E03.9 TSH with free T4 reflex     levothyroxine (SYNTHROID/LEVOTHROID) 75 MCG tablet   6. Mixed incontinence N39.46 UROLOGY ADULT REFERRAL   7. Osteopenia M85.80 Comprehensive metabolic panel   8. Chronic rhinitis J31.0 fluticasone (FLONASE) 50 MCG/ACT spray   9. Environmental allergies Z91.09 fluticasone (FLONASE) 50 MCG/ACT spray   10. History of colonic polyps- 3 mm polyp.  needs every 3-5 yr surveillence- hyperplastic  Z86.010 Fecal colorectal cancer screen (FIT)   11. Screening for breast cancer Z12.39    12. Screening for malignant  neoplasm of cervix Z12.4    13. Screen for colon cancer Z12.11 Fecal colorectal cancer screen (FIT)   14. Screening for osteoporosis Z13.820    15. Medication monitoring encounter Z51.81 Comprehensive metabolic panel     Lipid panel reflex to direct LDL     CK total     CBC with platelets     *UA reflex to Microscopic     Albumin Random Urine Quantitative     TSH with free T4 reflex   16. Need for hepatitis C screening test Z11.59 Hepatitis C Screen Reflex to HCV RNA Quant and Genotype   17. Need for prophylactic vaccination and inoculation against influenza Z23    18. Need for prophylactic vaccination against Streptococcus pneumoniae (pneumococcus) Z23      Discussed treatment/modality options, including risk and benefits, she desires advised alcohol consumption 1oz per day or less, advised aspirin 81 mg po daily, advised 1 multivitamin per day, advised calcium 7261-7435 mg/d and Vitamin D 800-1200 IU/d, advised dentist every 6 months, advised diet, exercise, and weight loss, advised opthalmologist every 1-2 years, advised self breast exam q month, further health care maintenance, further lab(s), medication refill(s) and observation. All diagnosis above reviewed and noted above, otherwise stable.  See Onsite Care orders for further details.  Follow up as needed - 4-6 months.    Health Maintenance Due   Topic Date Due     PNEUMOVAX 1X HI RISK PATIENT < 65 (NO IB MSG)  06/26/1961     ADVANCE DIRECTIVE PLANNING Q5 YRS (NO INBASKET)  06/26/1977     HEPATITIS C SCREENING  06/26/1977     FIT Q1 YR (NO INBASKET)  09/05/2015     INFLUENZA VACCINE (SYSTEM ASSIGNED)  09/01/2016     TSH Q1 YEAR (NO INBASKET)  12/18/2016     BMP Q1 YR (NO INBASKET)  12/18/2016     HEMOGLOBIN Q1 YR (NO INBASKET)  12/18/2016     LIPID MONITORING Q1 YEAR( NO INBASKET)  12/18/2016     MICROALBUMIN Q1 YEAR( NO INBASKET)  12/18/2016       COUNSELING:  Reviewed preventive health counseling, as reflected in patient instructions     reports that she has  "never smoked. She has never used smokeless tobacco.    Estimated body mass index is 30.86 kg/(m^2) as calculated from the following:    Height as of this encounter: 5' 4.2\" (1.631 m).    Weight as of this encounter: 181 lb (82.101 kg).   Weight management plan: Discussed healthy diet and exercise guidelines and patient will follow up in 12 months in clinic to re-evaluate.    Counseling Resources:  ATP IV Guidelines  Pooled Cohorts Equation Calculator  Breast Cancer Risk Calculator  FRAX Risk Assessment  ICSI Preventive Guidelines  Dietary Guidelines for Americans, 2010  USDA's MyPlate  ASA Prophylaxis  Lung CA Screening      Answers for HPI/ROS submitted by the patient on 1/28/2017   PHQ-2 Depressed: Not at all, Not at all  PHQ-2 Score: 0    This document serves as a record of the services and decisions personally performed and made by Shelton Castellanos MD [facility]. It was created on their behalf by Felice Sutton, a trained medical scribe. The creation of this document is based the provider's statements to the medical scribe.  Felice Sutton February 3, 2017 8:04 AM             Shelton Castellanos MD, FAAFP    [address]  [phone] Fax    "

## 2017-02-03 NOTE — PATIENT INSTRUCTIONS
2/3/17    Levothyroxine-Refilled-use as directed   Flonase-Refilled-use as directed   lisinopril/hctz refilled    Mammogram 5/2016  DEXA 8/16  Pap 12/18    Check on colonoscopy      [facility]                        To reach your care team during and after hours:   [phone]  To reach our pharmacy:        [phone]    Clinic Hours                        Our clinic hours are:    Monday   7:30 am to 7:00 pm                  Tuesday through Friday 7:30 am to 5:00 pm                             Saturday   8:00 am to 12:00 pm      Sunday   Closed      Pharmacy Hours                        Our pharmacy hours are:    Monday   8:30 am to 7:00 pm       Tuesday to Friday  8:30 am to 6:00 pm                       Saturday    9:00 am to 1:00 pm              Sunday    Closed              There is also information available at our web site:  [email or web address]    If your provider ordered any lab tests and you do not receive the results within 10 business days, please call the clinic.    If you need a medication refill please contact your pharmacy.  Please allow 2-3 business days for your refill to be completed.    Our clinic offers telephone visits and e visits.  Please ask one of your team members to explain more.      Use [employer] (secure email communication and access to your chart) to send your primary care provider a message or make an appointment. Ask someone on your Team how to sign up for [employer].  Immunizations                      Immunization History   Administered Date(s) Administered     Influenza (IIV3) 01/28/2013, 10/01/2014, 09/15/2015, 02/03/2017     TDAP (BOOSTRIX AGES 10-64) 07/19/2012     Varicella 03/05/1997        Health Maintenance                         Health Maintenance Due   Topic Date Due     Pneumovax Vaccine  06/26/1961     Discuss Advance Directive Planning  06/26/1977     Hepatitis C Screening  06/26/1977     Colon Cancer Screening - FIT Test - yearly  09/05/2015     Flu  Vaccine - yearly  09/01/2016     Thyroid Function Lab (TSH) - yearly  12/18/2016     Basic Metabolic Lab - yearly  12/18/2016     Hemoglobin Lab - yearly  12/18/2016     Cholesterol Lab - yearly  12/18/2016     Microalbumin Lab - yearly  12/18/2016         Preventive Health Recommendations  Female Ages 50 - 64    Yearly exam: See your health care provider every year in order to  o Review health changes.   o Discuss preventive care.    o Review your medicines if your doctor has prescribed any.      Get a Pap test every three years (unless you have an abnormal result and your provider advises testing more often).    If you get Pap tests with HPV test, you only need to test every 5 years, unless you have an abnormal result.     You do not need a Pap test if your uterus was removed (hysterectomy) and you have not had cancer.    You should be tested each year for STDs (sexually transmitted diseases) if you're at risk.     Have a mammogram every 1 to 2 years.    Have a colonoscopy at age 50, or have a yearly FIT test (stool test). These exams screen for colon cancer.      Have a cholesterol test every 5 years, or more often if advised.    Have a diabetes test (fasting glucose) every three years. If you are at risk for diabetes, you should have this test more often.     If you are at risk for osteoporosis (brittle bone disease), think about having a bone density scan (DEXA).    Shots: Get a flu shot each year. Get a tetanus shot every 10 years.    Nutrition:     Eat at least 5 servings of fruits and vegetables each day.    Eat whole-grain bread, whole-wheat pasta and brown rice instead of white grains and rice.    Talk to your provider about Calcium and Vitamin D.     Lifestyle    Exercise at least 150 minutes a week (30 minutes a day, 5 days a week). This will help you control your weight and prevent disease.    Limit alcohol to one drink per day.    No smoking.     Wear sunscreen to prevent skin cancer.     See your  dentist every six months for an exam and cleaning.    See your eye doctor every 1 to 2 years.

## 2017-02-04 LAB
ALBUMIN SERPL-MCNC: 3.7 G/DL (ref 3.4–5)
ALP SERPL-CCNC: 78 U/L (ref 40–150)
ALT SERPL W P-5'-P-CCNC: 22 U/L (ref 0–50)
ANION GAP SERPL CALCULATED.3IONS-SCNC: 10 MMOL/L (ref 3–14)
AST SERPL W P-5'-P-CCNC: 16 U/L (ref 0–45)
BILIRUB SERPL-MCNC: 0.3 MG/DL (ref 0.2–1.3)
BUN SERPL-MCNC: 31 MG/DL (ref 7–30)
CALCIUM SERPL-MCNC: 9.1 MG/DL (ref 8.5–10.1)
CHLORIDE SERPL-SCNC: 107 MMOL/L (ref 94–109)
CHOLEST SERPL-MCNC: 244 MG/DL
CK SERPL-CCNC: 69 U/L (ref 30–225)
CO2 SERPL-SCNC: 26 MMOL/L (ref 20–32)
CREAT SERPL-MCNC: 1.28 MG/DL (ref 0.52–1.04)
CREAT UR-MCNC: 78 MG/DL
GFR SERPL CREATININE-BSD FRML MDRD: 43 ML/MIN/1.7M2
GLUCOSE SERPL-MCNC: 75 MG/DL (ref 70–99)
HDLC SERPL-MCNC: 74 MG/DL
LDLC SERPL CALC-MCNC: 157 MG/DL
MICROALBUMIN UR-MCNC: <5 MG/L
MICROALBUMIN/CREAT UR: NORMAL MG/G CR (ref 0–25)
NONHDLC SERPL-MCNC: 170 MG/DL
POTASSIUM SERPL-SCNC: 4 MMOL/L (ref 3.4–5.3)
PROT SERPL-MCNC: 6.9 G/DL (ref 6.8–8.8)
SODIUM SERPL-SCNC: 143 MMOL/L (ref 133–144)
TRIGL SERPL-MCNC: 65 MG/DL
TSH SERPL DL<=0.005 MIU/L-ACNC: 3.77 MU/L (ref 0.4–4)

## 2017-02-07 ENCOUNTER — THERAPY VISIT (OUTPATIENT)
Dept: PHYSICAL THERAPY | Facility: CLINIC | Age: 57
End: 2017-02-07
Payer: OTHER MISCELLANEOUS

## 2017-02-07 DIAGNOSIS — M25.561 ACUTE PAIN OF RIGHT KNEE: Primary | ICD-10-CM

## 2017-02-07 PROCEDURE — 97110 THERAPEUTIC EXERCISES: CPT | Mod: GP | Performed by: PHYSICAL THERAPIST

## 2017-02-09 ENCOUNTER — THERAPY VISIT (OUTPATIENT)
Dept: PHYSICAL THERAPY | Facility: CLINIC | Age: 57
End: 2017-02-09
Payer: OTHER MISCELLANEOUS

## 2017-02-09 DIAGNOSIS — M25.561 ACUTE PAIN OF RIGHT KNEE: Primary | ICD-10-CM

## 2017-02-09 PROCEDURE — 97110 THERAPEUTIC EXERCISES: CPT | Mod: GP | Performed by: PHYSICAL THERAPIST

## 2017-02-14 ENCOUNTER — THERAPY VISIT (OUTPATIENT)
Dept: PHYSICAL THERAPY | Facility: CLINIC | Age: 57
End: 2017-02-14
Payer: OTHER MISCELLANEOUS

## 2017-02-14 DIAGNOSIS — M25.561 ACUTE PAIN OF RIGHT KNEE: ICD-10-CM

## 2017-02-14 PROCEDURE — 97110 THERAPEUTIC EXERCISES: CPT | Mod: GP | Performed by: PHYSICAL THERAPIST

## 2017-02-16 ENCOUNTER — THERAPY VISIT (OUTPATIENT)
Dept: PHYSICAL THERAPY | Facility: CLINIC | Age: 57
End: 2017-02-16
Payer: OTHER MISCELLANEOUS

## 2017-02-16 DIAGNOSIS — M25.561 ACUTE PAIN OF RIGHT KNEE: ICD-10-CM

## 2017-02-16 PROCEDURE — 97110 THERAPEUTIC EXERCISES: CPT | Mod: GP | Performed by: PHYSICAL THERAPIST

## 2017-02-17 ENCOUNTER — TELEPHONE (OUTPATIENT)
Dept: FAMILY MEDICINE | Facility: CLINIC | Age: 57
End: 2017-02-17

## 2017-02-17 NOTE — TELEPHONE ENCOUNTER
LOV 2/3/2017    Pt wants to know if she should follow up for her knee since she is doing PT and it is still bothering     Advised pt that if she is not improving with PT should be seen for an reevaluation     Patient stated an understanding and agreed with plan.    Delia Bowden RN, BSN  [facility]

## 2017-02-20 ENCOUNTER — THERAPY VISIT (OUTPATIENT)
Dept: PHYSICAL THERAPY | Facility: CLINIC | Age: 57
End: 2017-02-20
Payer: OTHER MISCELLANEOUS

## 2017-02-20 DIAGNOSIS — M25.561 ACUTE PAIN OF RIGHT KNEE: ICD-10-CM

## 2017-02-20 PROCEDURE — 97110 THERAPEUTIC EXERCISES: CPT | Mod: GP | Performed by: PHYSICAL THERAPIST

## 2017-02-20 NOTE — PROGRESS NOTES
"  SUBJECTIVE:                                                    Lilly Barnes is a 56 year old female who presents to clinic today for the following health issues:    Knee Pain:  The patient injured her knee on 1/24/2017 while working at [employer]. She was moving a shelf at work and rotated while keeping the foot planted. This resulted in a sharp pain in her right lateral knee. Since the injury she has been working on her feet for eight hours a day. Since her last visit she has been having increased pain. She has also been experiencing intermittent painful locking in her knee and swelling of her right knee. The patient is currently going to PT which has been providing mild initial relief. The patient is unable to kneel or squat. The patient is currently not on restrictions at work but would like to be placed on restriction.      Problem list and histories reviewed & adjusted, as indicated.  Additional history: as documented      ROS:  Constitutional, HEENT, cardiovascular, pulmonary, GI, , musculoskeletal, neuro, skin, endocrine and psych systems are negative, except as otherwise noted.    This document serves as a record of the services and decisions personally performed and made by Shar Calle MD. It was created on his behalf by Fanny Calderón, a trained medical scribe. The creation of this document is based on the provider's statements to the medical scribe.  Fanny Calderón 8:48 AM 2/21/2017  OBJECTIVE:                                                    /68  Pulse 99  Temp 97.8  F (36.6  C) (Oral)  Ht 1.626 m (5' 4\")  Wt 81.6 kg (180 lb)  LMP 09/15/2011  SpO2 100%  BMI 30.9 kg/m2 Body mass index is 30.9 kg/(m^2).   GENERAL: healthy, alert, well nourished, well hydrated, no distress  HENT: ear canals- normal; TMs- normal; Nose- normal; Mouth- no ulcers, no lesions  NECK: no tenderness, no adenopathy, no asymmetry, no masses, no stiffness; thyroid- normal to palpation  RESP: lungs clear to " auscultation - no rales, no rhonchi, no wheezes  CV: regular rates and rhythm, normal S1 S2, no S3 or S4 and no murmur, no click or rub -  ABDOMEN: soft, no tenderness, no  hepatosplenomegaly, no masses, normal bowel sounds  MS: right lateral knee pain, normal ROM, mild effusion on right knee, tenderness with palpation of the right lateral patella, tenderness with palpation of the lateral potion of the lateral right knee joint line, ligaments intact x4, negative ACL test, pain with stress on the lateral meniscus, posterior effusion, otherwise extremities- no gross deformities noted, no edema  SKIN: no suspicious lesions, no rashes    Diagnostic test results:  Pending MRI     ASSESSMENT/PLAN:       Virginia was seen today for recheck.    Diagnoses and all orders for this visit:    Acute pain of right knee - schedule MRI, followup with orthopedics, continue physical therapy as needed  Comments:  suspect lateral meniscus injury  Orders:  -     MR Knee Right w/o Contrast; Future  -     ORTHO  REFERRAL      Risks, benefits and alternatives of treatments discussed. Plan agreed on.      Followup: As needed    Will call, return to clinic, or go to ED if worsening or symptoms not improving as discussed.    See patient instructions.       Health Maintenance Topics with due status: Overdue       Topic Date Due    PNEUMOVAX 1X HI RISK PATIENT < 65 (NO IB MSG) 06/26/1961    ADVANCE DIRECTIVE PLANNING Q5 YRS (NO INBASKET) 06/26/1977    HEPATITIS C SCREENING 06/26/1977    FIT Q1 YR (NO INBASKET) 09/05/2015       Health maintenance reviewed/updated? Yes    The information in this document, created by a scribe for me, accurately reflects the services I personally performed and the decisions made by me. I have reviewed and approved this document for accuracy.      Tl Calle MD

## 2017-02-20 NOTE — PROGRESS NOTES
Subjective:    HPI                    Objective:    System    Physical Exam    General     ROS    Assessment/Plan:      PROGRESS  REPORT    Progress reporting period is from 2/1/17 to 2/20/17.       SUBJECTIVE  Subjective changes noted by patient:   Subjective: Virginia continues to have right knee pain.  She describes increased pain yesterday after doing a lot of squatting and kneeling.  She describes locking and catching of the knee.       Current pain level is 4/10  .     Previous pain level was  7/10  .   Changes in function:  Yes, walking better.  Adverse reaction to treatment or activity: None    OBJECTIVE  Changes noted in objective findings:    Objective: Xiomara's test was negative today.  Mild pain to palpation of the medial and lateral joint lines.  Slight pain at end range knee flexion.       ASSESSMENT/PLAN  Updated problem list and treatment plan: Diagnosis 1:  Right knee injury    STG/LTGs have been met or progress has been made towards goals:  Yes (See Goal flow sheet completed today.)  Assessment of Progress: Patient has improved, but continues to have pain and limitations with her knee.  Self Management Plans:  Patient has been instructed in a home treatment program.    Virginia continues to require the following intervention to meet STG and LTG's:  PT    Recommendations:  Pt would benefit from further evaluation of her knee.  Possible surgical candidate.    Please refer to the daily flowsheet for treatment today, total treatment time and time spent performing 1:1 timed codes.

## 2017-02-21 ENCOUNTER — OFFICE VISIT (OUTPATIENT)
Dept: FAMILY MEDICINE | Facility: CLINIC | Age: 57
End: 2017-02-21
Payer: OTHER MISCELLANEOUS

## 2017-02-21 VITALS
OXYGEN SATURATION: 100 % | DIASTOLIC BLOOD PRESSURE: 68 MMHG | HEART RATE: 99 BPM | WEIGHT: 180 LBS | SYSTOLIC BLOOD PRESSURE: 112 MMHG | HEIGHT: 64 IN | BODY MASS INDEX: 30.73 KG/M2 | TEMPERATURE: 97.8 F

## 2017-02-21 DIAGNOSIS — M25.561 ACUTE PAIN OF RIGHT KNEE: Primary | ICD-10-CM

## 2017-02-21 PROCEDURE — 99213 OFFICE O/P EST LOW 20 MIN: CPT | Performed by: FAMILY MEDICINE

## 2017-02-21 NOTE — PATIENT INSTRUCTIONS
Meniscal (Cartilage) Tear         What is a meniscal (cartilage) tear?   The meniscus is a piece of cartilage in the middle of your knee. Cartilage is tough, smooth, rubbery tissue that lines and cushions the surface of the joints. You have a meniscus on the inner side of your knee (the medial meniscus) and a meniscus on the outer side of the knee (the lateral meniscus). Each meniscus attaches to the top of the shinbone (tibia), makes contact with the thighbone (femur), and acts as a shock absorber during weight-bearing activities. If a meniscus tears, it can cause knee pain and can limit motion.   How does it occur?   A meniscal tear can occur when the knee is forcefully twisted or sometimes with minimal or no trauma, such as when you are squatting.   What are the symptoms?   Symptoms may include the following:   You have pain in your knee joint.   You have immediate swelling with fluid in the joint, called an effusion.   You can't fully bend or straighten your leg.   Your knee locks or gets stuck in one place.   You hear a snap or pop at the time of the injury.   A chronic (old) meniscal tear may give you pain on and off during activities, with or without swelling. Your knee may sometimes lock, and you may have stiffness in the knee.   How is it diagnosed?   Your healthcare provider will review your symptoms and how the injury occurred. He or she will ask about your medical history and examine your knee. Your provider will move your knee in several ways that may cause pain along the injured meniscal surface. You may have X-rays to see if the bones in your knee are injured, but a meniscal tear will not show on an X-ray. An MRI scan can help diagnose a meniscal tear.   How is it treated?   To treat this condition:   Put an ice pack, gel pack, or package of frozen vegetables, wrapped in a cloth on the area every 3 to 4 hours, for up to 20 minutes at a time.   Raise the knee on a pillow when you sit or  lie down.   Wrap an elastic bandage around your knee to keep the swelling from getting worse.   Wear a knee immobilizer or other brace as directed by your provider.   Use crutches to prevent further injury while the meniscus heals.   Take an anti-inflammatory medicine such as ibuprofen, or other medicine as directed by your provider. Nonsteroidal anti-inflammatory medicines (NSAIDs) may cause stomach bleeding and other problems. These risks increase with age. Read the label and take as directed. Unless recommended by your healthcare provider, do not take for more than 10 days.   While you are recovering from your injury, you will need to change your sport or activity to one that does not make your condition worse. For example, you may need to swim instead of run.   Arthroscopic surgery is needed to repair or remove large torn pieces of cartilage. The surgery usually takes about an hour. An arthroscope is a tube with a light on the end that projects an image of the inside of your knee onto a TV screen. By putting tools through the end of the arthroscope, the healthcare provider can usually repair or remove the damaged meniscus. Because the meniscus is a valuable shock absorber, the provider will leave as much of the healthy portion of the meniscus as possible during surgery.   You will go home the day of the surgery. You should keep your leg elevated. Take it easy for at least the next 2 to 3 days.   Do not take part in strenuous activities until your healthcare provider advises that you are ready.   How long will the effects last?   If you have a small tear that has not been repaired or removed, you may still be able to function well and be active. However, your knee may sometimes swell, lock, be stiff, or hurt during activities.   If you have surgery, you will need to spend time rehabilitating your knee. Everyone recovers at a different rate, depending on the severity of the injury and their general health. Many  people return to their previous level of activity within a month or so after surgery.   When can I return to my normal activities?   Everyone recovers from an injury at a different rate. Return to your activities depends on how soon your knee recovers, not by how many days or weeks it has been since your injury has occurred. The goal is to return you to your normal activities as soon as is safely possible. If you return too soon you may worsen your injury.   You may safely return to your normal activities when, starting from the top of the list and progressing to the end, each of the following is true:   your injured knee can be fully straightened and bent without pain   your knee and leg have regained normal strength compared to the uninjured knee and leg   your knee is not swollen   you are able to bend, squat, or walk without pain   How can a meniscal tear be prevented?   To help prevent knee cartilage injuries, it helps to have strong thigh and hamstring muscles, and to stretch your legs before and after exercising. In activities such as skiing, be sure your ski bindings are set correctly by a trained professional so that your skis will release when you fall.               Meniscal (Cartilage) Tear Rehabilitation Exercises                You may do the first 5 exercises right away. You may do the rest of the exercises when the pain in your knee has decreased.   Passive knee extension: Do this exercise if you are unable to fully extend your knee. While lying on your back, place a rolled-up towel underneath the heel of your injured leg so the heel is about 6 inches off the ground. Relax your leg muscles and let gravity slowly straighten your knee. You may feel some discomfort while doing this exercise. Try to hold this position for 2 minutes. Repeat 3 times. Do this exercise several times per day. This exercise can also be done while sitting in a chair with your heel on another chair or stool.   Heel slide: Sit on  a firm surface with your legs straight in front of you. Slowly slide the heel of your injured leg toward your buttock by pulling your knee toward your chest as you slide the heel. Return to the starting position. Do 3 sets of 10.   Standing calf stretch: Stand facing a wall with your hands on the wall at about eye level. Keep your injured leg back with your heel on the floor. Keep the other leg forward with the knee bent. Turn your back foot slightly inward (as if you were pigeon-toed). Slowly lean into the wall until you feel a stretch in the back of your calf. Hold the stretch for 15 to 30 seconds. Return to the starting position. Repeat 3 times. Do this exercise several times each day.   Hamstring stretch on wall: Lie on your back with your buttocks close to a doorway. Stretch your uninjured leg straight out in front of you on the floor through the doorway. Raise your injured leg and rest it against the wall next to the door frame. Keep your leg as straight as possible. You should feel a stretch in the back of your thigh. Hold this position for 15 to 30 seconds. Repeat 3 times.   Straight leg raise: Lie on your back with your legs straight out in front of you. Bend the knee on your uninjured side and place the foot flat on the floor. Tighten the thigh muscle on your injured side and lift your leg about 8 inches off the floor. Keep your leg straight and your thigh muscle tight. Slowly lower your leg back down to the floor. Do 3 sets of 10.   Wall squat with a ball: Stand with your back, shoulders, and head against a wall. Look straight ahead. Keep your shoulders relaxed and your feet 3 feet from the wall and shoulder's width apart. Place a soccer or basketball-sized ball behind your back. Keeping your back against the wall, slowly squat down to a 45-degree angle. Your thighs will not yet be parallel to the floor. Hold this position for 10 seconds and then slowly slide back up the wall. Repeat 10 times. Build up to  3 sets of 10.   Step-up: Stand with the foot of your injured leg on a support 3 to 5 inches high (like a small step or block of wood). Keep your other foot flat on the floor. Shift your weight onto the injured leg on the support. Straighten your injured leg as the other leg comes off the floor. Return to the starting position by bending your injured leg and slowly lowering your uninjured leg back to the floor. Do 3 sets of 10.   Knee stabilization: Wrap a piece of elastic tubing around the ankle of the uninjured leg. Tie a knot in the other end of the tubing and close it in a door.   0. Stand facing the door on the leg without tubing and bend your knee slightly, keeping your thigh muscles tight. While maintaining this position, move the leg with the tubing straight back behind you. Do 3 sets of 10.   0. Turn 90 degrees so the leg without tubing is closest to the door. Move the leg with tubing away from your body. Do 3 sets of 10.   0. Turn 90 degrees again so your back is to the door. Move the leg with tubing straight out in front of you. Do 3 sets of 10.   0. Turn your body 90 degrees again so the leg with tubing is closest to the door. Move the leg with tubing across your body. Do 3 sets of 10.   Hold onto a chair if you need help balancing. This exercise can be made even more challenging by standing on a pillow while you move the leg with tubing.   Resisted terminal knee extension: Make a loop from a piece of elastic tubing by tying a knot in both ends. Close both knots in a door. Step into the loop so the tubing is around the back of your injured leg. Lift the other foot off the ground. Hold onto a chair for balance, if needed. Bend the knee on the leg with tubing about 45 degrees. Slowly straighten your leg, keeping your thigh muscle tight as you do this. Do this 10 times. Do 3 sets. An easier way to do this is to stand on both legs for better support while you do the exercise.   Wobble board exercises:   0.  Stand on a wobble board with your feet shoulder width apart. Rock the board forwards and backwards 30 times, then side to side 30 times. Hold on to a chair if you need support.   0. Rotate the wobble board around so that the edge of the board is in contact with the floor at all times. Do this 30 times in a clockwise and then a counterclockwise direction.   0. Balance on the wobble board for as long as you can without letting the edges touch the floor. Try to do this for 2 minutes without touching the floor.   0. Rotate the wobble board in clockwise and counterclockwise circles, but do not allow the edge of the board to touch the floor.   0. When you have mastered exercises A through D, try repeating them while standing on just your injured leg. Once you can do these exercises on one leg, try to do them with your eyes closed. Make sure you have something nearby to support you in case you lose your balance.         Published by [employer].  This content is reviewed periodically and is subject to change as new health information becomes available. The information is intended to inform and educate and is not a replacement for medical evaluation, advice, diagnosis or treatment by a healthcare professional.   Written by Keri Blandon, MS, PT, and Lara Pittman PT, [facility], [facility], for [employer].   ? 2010 [employer] and/or its affiliates. All Rights Reserved.   Copyright   [employer] 2011

## 2017-02-21 NOTE — MR AVS SNAPSHOT
After Visit Summary   2/21/2017    Lilly Barnes    MRN: [id number]           Patient Information     Date Of Birth          1960        Visit Information        Provider Department      2/21/2017 10:40 AM Shar Calle MD [facility] [place]        Today's Diagnoses     Acute pain of right knee    -  1       Follow-ups after your visit        Additional Services     ORTHO  REFERRAL       [facility] is referring you to the Orthopedic  Services at [facility].       The  Representative will assist you in the coordination of your Orthopedic and Musculoskeletal Care as prescribed by your physician.    The  Representative will call you within 1 business day to help schedule your appointment, or you may contact the  Representative at:    All areas ~ [phone]     Type of Referral : Surgical / Specialist       Timeframe requested: Within 1-2 weeks    Coverage of these services is subject to the terms and limitations of your health insurance plan.  Please call member services at your health plan with any benefit or coverage questions.      If X-rays, CT or MRI's have been performed, please contact the facility where they were done to arrange for , prior to your scheduled appointment.  Please bring this referral request to your appointment and present it to your specialist.                  Your next 10 appointments already scheduled     Feb 22, 2017 12:50 PM CST   VIANEY Extremity with Paddy James PT   [facility] [place] ([facility])    [address] [address] [id number] [phone]              Future tests that were ordered for you today     Open Future Orders        Priority Expected Expires Ordered    MR Knee Right w/o Contrast Routine  2/21/2018 2/21/2017            Who to contact     If you have questions or need follow up information about today's clinic  "visit or your schedule please contact [facility] directly at [phone].  Normal or non-critical lab and imaging results will be communicated to you by MyChart, letter or phone within 4 business days after the clinic has received the results. If you do not hear from us within 7 days, please contact the clinic through [employer] or phone. If you have a critical or abnormal lab result, we will notify you by phone as soon as possible.  Submit refill requests through [employer] or call your pharmacy and they will forward the refill request to us. Please allow 3 business days for your refill to be completed.          Additional Information About Your Visit        [employer] Information     [employer] gives you secure access to your electronic health record. If you see a primary care provider, you can also send messages to your care team and make appointments. If you have questions, please call your primary care clinic.  If you do not have a primary care provider, please call [phone] and they will assist you.        Care EveryWhere ID     This is your Care EveryWhere ID. This could be used by other organizations to access your [facility] medical records  [id number]        Your Vitals Were     Pulse Temperature Height Last Period Pulse Oximetry BMI (Body Mass Index)    99 97.8  F (36.6  C) (Oral) 5' 4\" (1.626 m) 09/15/2011 100% 30.9 kg/m2       Blood Pressure from Last 3 Encounters:   02/21/17 112/68   02/03/17 112/68   01/30/17 110/68    Weight from Last 3 Encounters:   02/21/17 180 lb (81.6 kg)   02/03/17 181 lb (82.1 kg)   01/30/17 182 lb (82.6 kg)              We Performed the Following     ORTHO  REFERRAL        Primary Care Provider Office Phone # Fax #    Shelton Castellanos [phone]       [facility] [address] [place] [id number]        Thank you!     Thank you for choosing [facility]  for your care. Our goal is always to provide you with " excellent care. Hearing back from our patients is one way we can continue to improve our services. Please take a few minutes to complete the written survey that you may receive in the mail after your visit with us. Thank you!             Your Updated Medication List - Protect others around you: Learn how to safely use, store and throw away your medicines at www.disposemymeds.org.          This list is accurate as of: 2/21/17 11:15 AM.  Always use your most recent med list.                   Brand Name Dispense Instructions for use    aspirin 81 MG tablet      Take 1 tablet by mouth daily.       calcium carbonate-vitamin D 600-400 MG-UNIT Chew     180 tablet    Take 1 chew tab by mouth 2 times daily       fluticasone 50 MCG/ACT spray    FLONASE    48 g    Spray 2 sprays into both nostrils daily       levothyroxine 75 MCG tablet    SYNTHROID/LEVOTHROID    90 tablet    Take 1 tablet (75 mcg) by mouth daily       lisinopril-hydrochlorothiazide 20-12.5 MG per tablet    PRINZIDE/ZESTORETIC    90 tablet    Take 1 tablet by mouth daily       Multi-vitamin Tabs tablet     100 tablet    Take 1 tablet by mouth daily

## 2017-02-21 NOTE — NURSING NOTE
"Chief Complaint   Patient presents with     RECHECK     work comp       Initial /68  Pulse 99  Temp 97.8  F (36.6  C) (Oral)  Ht 5' 4\" (1.626 m)  Wt 180 lb (81.6 kg)  LMP 09/15/2011  SpO2 100%  BMI 30.9 kg/m2 Estimated body mass index is 30.9 kg/(m^2) as calculated from the following:    Height as of this encounter: 5' 4\" (1.626 m).    Weight as of this encounter: 180 lb (81.6 kg).  Medication Reconciliation: complete  "

## 2017-02-21 NOTE — LETTER
[address] [address]  [phone]    02/21/2017    [place]ia VERONICA Barnes,  1960  [address] [facility] [address]      Employer:  [employer]    Date of Treatment: 02/21/2017    Date of Incident/Injury: 1/24/17    Diagnosis:   1. Acute pain of right knee        Work Related:  yes      The employee is ABLE to return to work today.    When the patient returns to work, the following restrictions apply for 2 weeks:  *  Bend: Frequently (4-8 hours)  *  Squat: minimally (1 hour total per hours)  *  Walk/Stand: Frequently (4-8 hours)  *  Lift, carry, push, and pull no more than:  20 lbs.      FOLLOW-UP  With orthopedist             Tl Calle M.D.

## 2017-02-22 ENCOUNTER — HOSPITAL ENCOUNTER (OUTPATIENT)
Dept: MRI IMAGING | Facility: CLINIC | Age: 57
Discharge: HOME | End: 2017-02-22
Attending: FAMILY MEDICINE | Admitting: FAMILY MEDICINE
Payer: OTHER MISCELLANEOUS

## 2017-02-22 ENCOUNTER — THERAPY VISIT (OUTPATIENT)
Dept: PHYSICAL THERAPY | Facility: CLINIC | Age: 57
End: 2017-02-22
Payer: OTHER MISCELLANEOUS

## 2017-02-22 DIAGNOSIS — M25.561 ACUTE PAIN OF RIGHT KNEE: ICD-10-CM

## 2017-02-22 PROCEDURE — 97110 THERAPEUTIC EXERCISES: CPT | Mod: GP | Performed by: PHYSICAL THERAPIST

## 2017-02-22 PROCEDURE — 73721 MRI JNT OF LWR EXTRE W/O DYE: CPT | Mod: RT

## 2017-02-22 ASSESSMENT — ACTIVITIES OF DAILY LIVING (ADL)
STAND: ACTIVITY IS SOMEWHAT DIFFICULT
STIFFNESS: THE SYMPTOM AFFECTS MY ACTIVITY MODERATELY
SQUAT: I AM UNABLE TO DO THE ACTIVITY
KNEE_ACTIVITY_OF_DAILY_LIVING_SUM: 35
KNEE_ACTIVITY_OF_DAILY_LIVING_SCORE: 50
GO UP STAIRS: ACTIVITY IS SOMEWHAT DIFFICULT
LIMPING: I HAVE THE SYMPTOM BUT IT DOES NOT AFFECT MY ACTIVITY
WEAKNESS: I HAVE THE SYMPTOM BUT IT DOES NOT AFFECT MY ACTIVITY
AS_A_RESULT_OF_YOUR_KNEE_INJURY,_HOW_WOULD_YOU_RATE_YOUR_CURRENT_LEVEL_OF_DAILY_ACTIVITY?: ABNORMAL
HOW_WOULD_YOU_RATE_THE_OVERALL_FUNCTION_OF_YOUR_KNEE_DURING_YOUR_USUAL_DAILY_ACTIVITIES?: ABNORMAL
RAW_SCORE: 35
WALK: ACTIVITY IS SOMEWHAT DIFFICULT
RISE FROM A CHAIR: ACTIVITY IS SOMEWHAT DIFFICULT
KNEEL ON THE FRONT OF YOUR KNEE: I AM UNABLE TO DO THE ACTIVITY
SWELLING: THE SYMPTOM AFFECTS MY ACTIVITY SLIGHTLY
GO DOWN STAIRS: ACTIVITY IS FAIRLY DIFFICULT
GIVING WAY, BUCKLING OR SHIFTING OF KNEE: THE SYMPTOM AFFECTS MY ACTIVITY SLIGHTLY
SIT WITH YOUR KNEE BENT: ACTIVITY IS MINIMALLY DIFFICULT
PAIN: THE SYMPTOM AFFECTS MY ACTIVITY SEVERELY
HOW_WOULD_YOU_RATE_THE_CURRENT_FUNCTION_OF_YOUR_KNEE_DURING_YOUR_USUAL_DAILY_ACTIVITIES_ON_A_SCALE_FROM_0_TO_100_WITH_100_BEING_YOUR_LEVEL_OF_KNEE_FUNCTION_PRIOR_TO_YOUR_INJURY_AND_0_BEING_THE_INABILITY_TO_PERFORM_ANY_OF_YOUR_USUAL_DAILY_ACTIVITIES?: 40

## 2017-02-23 ENCOUNTER — OFFICE VISIT (OUTPATIENT)
Dept: ORTHOPEDICS | Facility: CLINIC | Age: 57
End: 2017-02-23
Payer: OTHER MISCELLANEOUS

## 2017-02-23 VITALS
SYSTOLIC BLOOD PRESSURE: 109 MMHG | DIASTOLIC BLOOD PRESSURE: 68 MMHG | WEIGHT: 180 LBS | BODY MASS INDEX: 30.73 KG/M2 | HEIGHT: 64 IN

## 2017-02-23 DIAGNOSIS — S83.241A ACUTE MEDIAL MENISCUS TEAR OF RIGHT KNEE, INITIAL ENCOUNTER: Primary | ICD-10-CM

## 2017-02-23 PROCEDURE — 99203 OFFICE O/P NEW LOW 30 MIN: CPT | Performed by: ORTHOPAEDIC SURGERY

## 2017-02-23 NOTE — PROGRESS NOTES
HISTORY OF PRESENT ILLNESS:    Lilly Barnes is a 56 year old female who is seen in consultation at the request of Shar Calle MD for right knee pain. She had an injury on 1/24/17 when she was moving some shelves at work, stepped back onto her right leg, twisted her leg with a planted foot. She had immediate sharp pain on the lateral side of knee into the posterior part of the knee    Present symptoms: immediate swelling after the injury. Pain is worse than now. She reports catching and locking, She cannot kneel on her knee, if she does she isn't able to get back up and has immediate swelling and pain.  Treatments tried to this point: immobilizer initially, 8 sessions of physical therapy. She completed an MRI yesterday, ice 2-3x/day, ibuprofen throughout the day as needed and at night to help her sleep  Orthopedic PMH: None    Past Medical History   Diagnosis Date     CKD (chronic kidney disease) stage 3, GFR 30-59 ml/min 7/13     Colon polyps 12/13     hyperplastic polyp - Dr Sheppard, due 3-5 yrs     Environmental allergies      Hyperlipidemia LDL goal < 130      Hypertension goal BP (blood pressure) < 140/90 2009     Hypothyroidism 2009     Osteopenia        Past Surgical History   Procedure Laterality Date     Surgical history of -   1997     laproscopic - left - for ectopic pregnancy     Colonoscopy  12/6/2013     hyperplastic polyp - Dr Sheppard - due 3-5 yrs       Family History   Problem Relation Age of Onset     DIABETES Mother      Depression Son      resolved     CANCER Sister 43     ovarian and uterine      CEREBROVASCULAR DISEASE Sister 55     Lipids Other      multiple members including 1 degree      DIABETES Brother      DIABETES Brother        Social History     Social History     Marital status:      Spouse name: Cullen     Number of children: 1     Years of education: 14     Occupational History      [employer]     Social History Main Topics     Smoking status: Never Smoker     Smokeless  tobacco: Never Used     Alcohol use 0.0 - 0.6 oz/week     0 - 1 Standard drinks or equivalent per week      Comment: 2 drinks per month     Drug use: No     Sexual activity: No     Other Topics Concern     Parent/Sibling W/ Cabg, Mi Or Angioplasty Before 65f 55m? No      Service No     Blood Transfusions No     Caffeine Concern Yes     0-1 energy drinks daily     Exercise Yes     walks 3-4 times per week, averages 10,000 steps daily     Seat Belt Yes     Social History Narrative       Current Outpatient Prescriptions   Medication Sig Dispense Refill     levothyroxine (SYNTHROID/LEVOTHROID) 75 MCG tablet Take 1 tablet (75 mcg) by mouth daily 90 tablet 3     lisinopril-hydrochlorothiazide (PRINZIDE/ZESTORETIC) 20-12.5 MG per tablet Take 1 tablet by mouth daily 90 tablet 3     fluticasone (FLONASE) 50 MCG/ACT spray Spray 2 sprays into both nostrils daily 48 g 3     multivitamin, therapeutic with minerals (MULTI-VITAMIN) TABS Take 1 tablet by mouth daily 100 tablet 3     calcium carbonate-vitamin D 600-400 MG-UNIT CHEW Take 1 chew tab by mouth 2 times daily 180 tablet 3     aspirin 81 MG tablet Take 1 tablet by mouth daily.  3       No Known Allergies    REVIEW OF SYSTEMS:  CONSTITUTIONAL:  NEGATIVE for fever, chills, change in weight  INTEGUMENTARY/SKIN:  NEGATIVE for worrisome rashes, moles or lesions  EYES:  NEGATIVE for vision changes or irritation  ENT/MOUTH:  NEGATIVE for ear, mouth and throat problems  RESP:  NEGATIVE for significant cough or SOB  BREAST:  NEGATIVE for masses, tenderness or discharge  CV:  NEGATIVE for chest pain, palpitations or peripheral edema  GI:  NEGATIVE for nausea, abdominal pain, heartburn, or change in bowel habits  :  Negative   MUSCULOSKELETAL:  See HPI above  NEURO:  NEGATIVE for weakness, dizziness or paresthesias  ENDOCRINE:  NEGATIVE for temperature intolerance, skin/hair changes  HEME/ALLERGY/IMMUNE:  NEGATIVE for bleeding problems  PSYCHIATRIC:  NEGATIVE for changes in  mood or affect      PHYSICAL EXAM:  [facility] 09/15/2011  There is no height or weight on file to calculate BMI.   GENERAL APPEARANCE: healthy, alert and no distress   SKIN: no suspicious lesions or rashes  NEURO: Normal strength and tone, mentation intact and speech normal  VASCULAR: Good pulses, and capillary refill   LYMPH: no lymphadenopathy   PSYCH:  mentation appears normal and affect normal/bright    MSK:  A&OX3, NAD  Neck supple, no lymphadenopathy  The patient ambulates without an antalgic gait.  The patient is able to get on and off the exam table without difficulty.      Examination of the spine reveals a normal lordosis to the cervical and lumbar spine, and a normal kyphosis to the thoracic spine.  There is no clinical evidence of scoliosis.    The pelvis is clinically level.  Trendelenberg is negative.  The patient is non-tender to palpation over the greater trochanteric bursal region or piriformis fossa.  With the hip flexed to 90 degrees, internal and external rotation is 30/60 respectively,  with no pain .      KNEE: Examination of the right knee reveals the lower extremity to have a Normal anatomic alignment.  There is no erythema, ecchymosis nor edema.  There is moderate effusion present clinically.  There is no significant warmth.  Range of motion is 0 to 110 degrees, without crepitus.  There is moderate tenderness to palpation at the medial joint line.  Xiomara's is positive without crepitus. The knee is ligamentously stable. Patello-femoral grind test is positive.      The calves and thighs are symmetric, without atrophy and non-tender to palpation. Jerod's sign is negative, bilaterally.   CMS is intact to the toes.        ASSESSMENT / PLAN: Right knee medial meniscus tear. I had a long discussion with Virginia regarding the pathology, prognosis and treatment options. She has elected to pursue arthroscopic examination to deal with the meniscus tear. She does have some evidence of chondromalacia of  the patella, which would not specifically be addressed by the arthroscopy. She understands this. She will schedule this at her convenience.    Imaging Interpretation:   IMPRESSION:   1. Full-thickness radial-type tear at the far posterior horn of the  medial meniscus.  2. Medial and patellofemoral compartment chondromalacia as described  above.  3. Joint space effusion.         Rip French MD  Department of Orthopedic Surgery

## 2017-02-23 NOTE — MR AVS SNAPSHOT
After Visit Summary   2/23/2017    Lilly Barnes    MRN: [id number]           Patient Information     Date Of Birth          1960        Visit Information        Provider Department      2/23/2017 3:20 PM Corbin French MD [facility] ORTHOPEDIC SURGERY        Today's Diagnoses     Acute medial meniscus tear of right knee, initial encounter    -  1       Follow-ups after your visit        Your next 10 appointments already scheduled     Mar 02, 2017 10:20 AM CST   Pre-Op physical with Shar Calle MD   [facility] ([facility]    [address] [place] [address]   [phone]            Mar 20, 2017   Procedure with Corbin French MD   [facility] PeriOp Services (--)    [address] [facility] [address]   [phone]            Apr 04, 2017  9:20 AM CDT   Return Visit with Arnoldo Hassan PA-C   [facility] ORTHOPEDIC SURGERY ([facility] [place])    [address] [address] [address]   [phone]              Who to contact     If you have questions or need follow up information about today's clinic visit or your schedule please contact [facility] ORTHOPEDIC SURGERY directly at [phone].  Normal or non-critical lab and imaging results will be communicated to you by MyChart, letter or phone within 4 business days after the clinic has received the results. If you do not hear from us within 7 days, please contact the clinic through MyChart or phone. If you have a critical or abnormal lab result, we will notify you by phone as soon as possible.  Submit refill requests through [employer] or call your pharmacy and they will forward the refill request to us. Please allow 3 business days for your refill to be completed.          Additional Information About Your Visit        [employer] Information     [employer] gives you secure access to your electronic health  "record. If you see a primary care provider, you can also send messages to your care team and make appointments. If you have questions, please call your primary care clinic.  If you do not have a primary care provider, please call [phone] and they will assist you.        Care EveryWhere ID     This is your Care EveryWhere ID. This could be used by other organizations to access your [place] medical records  [id number]        Your Vitals Were     Height Last Period BMI (Body Mass Index)             5' 4\" (1.626 m) 09/15/2011 30.9 kg/m2          Blood Pressure from Last 3 Encounters:   02/23/17 109/68   02/21/17 112/68   02/03/17 112/68    Weight from Last 3 Encounters:   02/23/17 180 lb (81.6 kg)   02/21/17 180 lb (81.6 kg)   02/03/17 181 lb (82.1 kg)              Today, you had the following     No orders found for display       Primary Care Provider Office Phone # Fax #    Shelton Castellanos [phone]       [address] [address]        Thank you!     Thank you for choosing [facility] ORTHOPEDIC SURGERY  for your care. Our goal is always to provide you with excellent care. Hearing back from our patients is one way we can continue to improve our services. Please take a few minutes to complete the written survey that you may receive in the mail after your visit with us. Thank you!             Your Updated Medication List - Protect others around you: Learn how to safely use, store and throw away your medicines at www.disposemymeds.org.          This list is accurate as of: 2/23/17 11:59 PM.  Always use your most recent med list.                   Brand Name Dispense Instructions for use    aspirin 81 MG tablet      Take 1 tablet by mouth daily.       calcium carbonate-vitamin D 600-400 MG-UNIT Chew     180 tablet    Take 1 chew tab by mouth 2 times daily       fluticasone 50 MCG/ACT spray    FLONASE    48 g    Spray 2 sprays into both nostrils daily "       levothyroxine 75 MCG tablet    SYNTHROID/LEVOTHROID    90 tablet    Take 1 tablet (75 mcg) by mouth daily       lisinopril-hydrochlorothiazide 20-12.5 MG per tablet    PRINZIDE/ZESTORETIC    90 tablet    Take 1 tablet by mouth daily       Multi-vitamin Tabs tablet     100 tablet    Take 1 tablet by mouth daily

## 2017-02-24 ENCOUNTER — TELEPHONE (OUTPATIENT)
Dept: ORTHOPEDICS | Facility: CLINIC | Age: 57
End: 2017-02-24

## 2017-02-24 NOTE — TELEPHONE ENCOUNTER
Work comp rep contacted. Office notes and imaging faxed to Bre Salinas @ [employer]. Requesting approval for Right knee Arthroscopy by Dr. French @ [facility] on 3/20/017.

## 2017-02-24 NOTE — TELEPHONE ENCOUNTER
Scheduled surgery for Right knee Arthroscopy on 3/20/2017 with Dr. French @ [facility] @ 11:40.  Surgery education packet provided to patient.

## 2017-03-02 ENCOUNTER — TELEPHONE (OUTPATIENT)
Dept: ORTHOPEDICS | Facility: CLINIC | Age: 57
End: 2017-03-02

## 2017-03-02 ENCOUNTER — OFFICE VISIT (OUTPATIENT)
Dept: FAMILY MEDICINE | Facility: CLINIC | Age: 57
End: 2017-03-02
Payer: OTHER MISCELLANEOUS

## 2017-03-02 VITALS
TEMPERATURE: 98.2 F | BODY MASS INDEX: 30.73 KG/M2 | SYSTOLIC BLOOD PRESSURE: 118 MMHG | HEART RATE: 82 BPM | OXYGEN SATURATION: 100 % | WEIGHT: 180 LBS | DIASTOLIC BLOOD PRESSURE: 78 MMHG | HEIGHT: 64 IN

## 2017-03-02 DIAGNOSIS — N18.30 CKD (CHRONIC KIDNEY DISEASE) STAGE 3, GFR 30-59 ML/MIN (H): ICD-10-CM

## 2017-03-02 DIAGNOSIS — Z71.89 ADVANCED DIRECTIVES, COUNSELING/DISCUSSION: ICD-10-CM

## 2017-03-02 DIAGNOSIS — I10 HYPERTENSION GOAL BP (BLOOD PRESSURE) < 140/90: ICD-10-CM

## 2017-03-02 DIAGNOSIS — E78.5 HYPERLIPIDEMIA LDL GOAL <100: ICD-10-CM

## 2017-03-02 DIAGNOSIS — E03.9 HYPOTHYROIDISM, UNSPECIFIED TYPE: ICD-10-CM

## 2017-03-02 DIAGNOSIS — Z01.818 PREOP GENERAL PHYSICAL EXAM: Primary | ICD-10-CM

## 2017-03-02 DIAGNOSIS — M25.561 ACUTE PAIN OF RIGHT KNEE: ICD-10-CM

## 2017-03-02 PROCEDURE — 93000 ELECTROCARDIOGRAM COMPLETE: CPT | Performed by: FAMILY MEDICINE

## 2017-03-02 PROCEDURE — 99214 OFFICE O/P EST MOD 30 MIN: CPT | Performed by: FAMILY MEDICINE

## 2017-03-02 NOTE — TELEPHONE ENCOUNTER
Patient left message with surgery scheduling wanting to know how long she will be off work.     Phone call to patient. She is a  at [employer]. She is on her feet 8 hrs plus per day. 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 Her post op visit is schedule for 4/4/17 with Huber Hassan PA-C. Informed it will depend on what is done in surgery and her recovery. Every person is different and can depend on job requirements. May go back to work as early as a few days to several weeks. Informed would not be able to take prescription pain medications while at work or while driving. Recommended she plan on being off work until post op visit, otherwise, if feeling like she could go back to work before then, she is to call and let us know.   She verbalized understanding.     MARILOU Bentley RN

## 2017-03-02 NOTE — NURSING NOTE
"Chief Complaint   Patient presents with     Pre-Op Exam       Initial /78  Pulse 82  Temp 98.2  F (36.8  C) (Oral)  Ht 5' 4\" (1.626 m)  Wt 180 lb (81.6 kg)  LMP 09/15/2011  SpO2 100%  BMI 30.9 kg/m2 Estimated body mass index is 30.9 kg/(m^2) as calculated from the following:    Height as of this encounter: 5' 4\" (1.626 m).    Weight as of this encounter: 180 lb (81.6 kg).  Medication Reconciliation: complete  "

## 2017-03-02 NOTE — MR AVS SNAPSHOT
After Visit Summary   3/2/2017    Lilly Barnes    MRN: [id number]           Patient Information     Date Of Birth          1960        Visit Information        Provider Department      3/2/2017 10:20 AM Shar Calle MD [facility]        Today's Diagnoses     Preop general physical exam    -  1    Hyperlipidemia LDL goal <100        Acute pain of right knee        CKD (chronic kidney disease) stage 3, GFR 30-59 ml/min        Hypertension goal BP (blood pressure) < 140/90        Hypothyroidism, unspecified type          Care Instructions      Before Your Surgery      Call your surgeon if there is any change in your health. This includes signs of a cold or flu (such as a sore throat, runny nose, cough, rash or fever).    Do not smoke, drink alcohol or take over the counter medicine (unless your surgeon or primary care doctor tells you to) for the 24 hours before and after surgery.    If you take prescribed drugs: Follow your doctor s orders about which medicines to take and which to stop until after surgery.    Eating and drinking prior to surgery: follow the instructions from your surgeon    Take a shower or bath the night before surgery. Use the soap your surgeon gave you to gently clean your skin. If you do not have soap from your surgeon, use your regular soap. Do not shave or scrub the surgery site.  Wear clean pajamas and have clean sheets on your bed.     Withhold from taking lisinopril-hydrochlorothiazide 24 hours before surgery and aspirin seven days before surgery. Take all other medications as prescribed.      What is Chronic Kidney Disease  Chronic kidney disease (CKD) is the permanent loss of kidney function.  When the kidneys are damaged, they do not remove wastes and extra water from the blood as well as they should.  The most common causes of CKD are high blood pressure and diabetes.   It can also run in families, so you may be at higher risk if you have a  blood relative with kidney failure.  CKD is a silent condition (having few or no symptoms) and develops so slowly that most people do not realize they are sick until the disease is advanced.  Left undetected and untreated CKD can eventually lead to further problems including hypertension, anemia, weak bones, poor nutrition, nerve damage, and in severe cases kidney failure (requiring dialysis or transplant).  CKD also increases a patient s risk for developing diseases of the heart and blood vessels.    We can diagnose CKD through blood and urine tests.  By detecting CKD in its early stages we can prevent or delay the complications of CKD, including kidney failure and heart disease.  Stages of CKD:  There are five stages of chronic kidney disease.  Your stage of kidney damage is based on the presence of kidney damage (often in the form of urinary proteins) and your glomerular filtration rate (GFR), which is a measure of your level of kidney function.  Things you can do to slow down or avoid kidney failure:    If you have diabetes, control your blood sugar.  Studies show that keeping tight control of blood sugar can delay or prevent kidney failure    If you have high blood pressure, it is important to lower your blood pressure.  Medications called ACE (angiotensin-converting enzyme) inhibitors or ARBs (angiotensin receptor blockers) are used to keep your kidney disease from getting worse.    Be active by including exercise in your daily routine.    Eat a well balanced diet.   We may have you watch the amount of protein you eat.  Too much protein can make the kidneys work too hard.    Quit smoking.  Smoking damages the kidneys.  It also raises blood pressure.    Discuss all of your medications, including over-the-counter medications, with your doctor.  You should  avoid certain medications, including popular medications such as Advil, Motrin, Aleve (and other  NSAIDs ) which can further damage your kidneys.  For more  information on Chronic Kidney Disease, please see the National Kidney Foundation www.kidney.org.          Follow-ups after your visit        Your next 10 appointments already scheduled     Mar 20, 2017   Procedure with Corbin French MD   [facility] PeriOp Services (--)    [address]  [facility] [address]   [phone]            Apr 04, 2017  9:20 AM CDT   Return Visit with Arnoldo Hassan PA-C   [facility] ORTHOPEDIC SURGERY ([facility])    [address]  [address]  [facility] [address]   [phone]              Who to contact     If you have questions or need follow up information about today's clinic visit or your schedule please contact [facility] directly at [phone].  Normal or non-critical lab and imaging results will be communicated to you by MyChart, letter or phone within 4 business days after the clinic has received the results. If you do not hear from us within 7 days, please contact the clinic through MyChart or phone. If you have a critical or abnormal lab result, we will notify you by phone as soon as possible.  Submit refill requests through [employer] or call your pharmacy and they will forward the refill request to us. Please allow 3 business days for your refill to be completed.          Additional Information About Your Visit        [employer] Information     [employer] gives you secure access to your electronic health record. If you see a primary care provider, you can also send messages to your care team and make appointments. If you have questions, please call your primary care clinic.  If you do not have a primary care provider, please call [phone] and they will assist you.        Care EveryWhere ID     This is your Care EveryWhere ID. This could be used by other organizations to access your [place] medical records  [id number]        Your Vitals Were     Pulse Temperature Height Last Period Pulse Oximetry  "BMI (Body Mass Index)    82 98.2  F (36.8  C) (Oral) 5' 4\" (1.626 m) 09/15/2011 100% 30.9 kg/m2       Blood Pressure from Last 3 Encounters:   03/02/17 118/78   02/23/17 109/68   02/21/17 112/68    Weight from Last 3 Encounters:   03/02/17 180 lb (81.6 kg)   02/23/17 180 lb (81.6 kg)   02/21/17 180 lb (81.6 kg)              We Performed the Following     EKG 12-lead complete w/read - Clinics        Primary Care Provider Office Phone # Fax #    Shelton Castellanos [phone]       [facility] [address] [place] [address]        Thank you!     Thank you for choosing [facility]  for your care. Our goal is always to provide you with excellent care. Hearing back from our patients is one way we can continue to improve our services. Please take a few minutes to complete the written survey that you may receive in the mail after your visit with us. Thank you!             Your Updated Medication List - Protect others around you: Learn how to safely use, store and throw away your medicines at www.disposemymeds.org.          This list is accurate as of: 3/2/17 11:02 AM.  Always use your most recent med list.                   Brand Name Dispense Instructions for use    aspirin 81 MG tablet      Take 1 tablet by mouth daily.       calcium carbonate-vitamin D 600-400 MG-UNIT Chew     180 tablet    Take 1 chew tab by mouth 2 times daily       fluticasone 50 MCG/ACT spray    FLONASE    48 g    Spray 2 sprays into both nostrils daily       levothyroxine 75 MCG tablet    SYNTHROID/LEVOTHROID    90 tablet    Take 1 tablet (75 mcg) by mouth daily       lisinopril-hydrochlorothiazide 20-12.5 MG per tablet    PRINZIDE/ZESTORETIC    90 tablet    Take 1 tablet by mouth daily       Multi-vitamin Tabs tablet     100 tablet    Take 1 tablet by mouth daily         "

## 2017-03-02 NOTE — PATIENT INSTRUCTIONS
Before Your Surgery      Call your surgeon if there is any change in your health. This includes signs of a cold or flu (such as a sore throat, runny nose, cough, rash or fever).    Do not smoke, drink alcohol or take over the counter medicine (unless your surgeon or primary care doctor tells you to) for the 24 hours before and after surgery.    If you take prescribed drugs: Follow your doctor s orders about which medicines to take and which to stop until after surgery.    Eating and drinking prior to surgery: follow the instructions from your surgeon    Take a shower or bath the night before surgery. Use the soap your surgeon gave you to gently clean your skin. If you do not have soap from your surgeon, use your regular soap. Do not shave or scrub the surgery site.  Wear clean pajamas and have clean sheets on your bed.     Withhold from taking lisinopril-hydrochlorothiazide 24 hours before surgery and aspirin seven days before surgery. Take all other medications as prescribed.      What is Chronic Kidney Disease  Chronic kidney disease (CKD) is the permanent loss of kidney function.  When the kidneys are damaged, they do not remove wastes and extra water from the blood as well as they should.  The most common causes of CKD are high blood pressure and diabetes.   It can also run in families, so you may be at higher risk if you have a blood relative with kidney failure.  CKD is a silent condition (having few or no symptoms) and develops so slowly that most people do not realize they are sick until the disease is advanced.  Left undetected and untreated CKD can eventually lead to further problems including hypertension, anemia, weak bones, poor nutrition, nerve damage, and in severe cases kidney failure (requiring dialysis or transplant).  CKD also increases a patient s risk for developing diseases of the heart and blood vessels.    We can diagnose CKD through blood and urine tests.  By detecting CKD in its early  stages we can prevent or delay the complications of CKD, including kidney failure and heart disease.  Stages of CKD:  There are five stages of chronic kidney disease.  Your stage of kidney damage is based on the presence of kidney damage (often in the form of urinary proteins) and your glomerular filtration rate (GFR), which is a measure of your level of kidney function.  Things you can do to slow down or avoid kidney failure:    If you have diabetes, control your blood sugar.  Studies show that keeping tight control of blood sugar can delay or prevent kidney failure    If you have high blood pressure, it is important to lower your blood pressure.  Medications called ACE (angiotensin-converting enzyme) inhibitors or ARBs (angiotensin receptor blockers) are used to keep your kidney disease from getting worse.    Be active by including exercise in your daily routine.    Eat a well balanced diet.   We may have you watch the amount of protein you eat.  Too much protein can make the kidneys work too hard.    Quit smoking.  Smoking damages the kidneys.  It also raises blood pressure.    Discuss all of your medications, including over-the-counter medications, with your doctor.  You should  avoid certain medications, including popular medications such as Advil, Motrin, Aleve (and other  NSAIDs ) which can further damage your kidneys.  For more information on Chronic Kidney Disease, please see the National Kidney Foundation www.kidney.org.

## 2017-03-02 NOTE — PROGRESS NOTES
[address] [address]  [phone]  Dept: [phone]    PRE-OP EVALUATION:  Today's date: 3/2/2017    Lilly Barnes (: 1960) presents for pre-operative evaluation assessment as requested by Dr. French.  She requires evaluation and anesthesia risk assessment prior to undergoing surgery/procedure for treatment of right knee pain .  Proposed procedure: Right knee Arthroscopy     Date of Surgery/ Procedure: 2017  Time of Surgery/ Procedure: 11:30 AM  Hospital/Surgical Facility: [facility]  Primary Physician: Shelton Castellanos  Type of Anesthesia Anticipated: Choice    Patient has a Health Care Directive or Living Will:  NO    Preop Questions 2017   1.  Do you have a history of heart attack, stroke, stent, bypass or surgery on an artery in the head, neck, heart or legs? No   2.  Do you ever have any pain or discomfort in your chest? No   3.  Do you have a history of  Heart Failure? No   4.   Are you troubled by shortness of breath when:  walking on a level surface, or up a slight hill, or at night? No   5.  Do you currently have a cold, bronchitis or other respiratory infection? No   6.  Do you have a cough, shortness of breath, or wheezing? No   7.  Do you sometimes get pains in the calves of your legs when you walk? No   8. Do you or anyone in your family have previous history of blood clots? YES - sister had a stroke several years ago   9.  Do you or does anyone in your family have a serious bleeding problem such as prolonged bleeding following surgeries or cuts? No   10. Have you ever had problems with anemia or been told to take iron pills? No   11. Have you had any abnormal blood loss such as black, tarry or bloody stools, or abnormal vaginal bleeding? No   12. Have you ever had a blood transfusion? No   13. Have you or any of your relatives ever had problems with anesthesia? No   14. Do you have sleep apnea, excessive snoring  or daytime drowsiness? No   15. Do you have any prosthetic heart valves? No   16. Do you have prosthetic joints? No   17. Is there any chance that you may be pregnant? No         HPI:                                                      Brief HPI related to upcoming procedure: The patient injured her right knee on 1/24/2017 while moving shelves at work. She has a sharp pain on the lateral and posterior art of the knee. She had an MRI done and it was found that she has a medial meniscus tear and chondromalacia of the patella. She elected to have an arthroscopic examination for treatment of the meniscus tear.      HYPERTENSION - Patient has longstanding history of mod-severe HTN , currently denies any symptoms referable to elevated blood pressure. Specifically denies chest pain, palpitations, dyspnea, orthopnea, PND or peripheral edema. Blood pressure readings have been in normal range. Current medication regimen is as listed below. Patient denies any side effects of medication.                                                                                                                                                                                          .  HYPERLIPIDEMIA - Patient has a long history of significant Hyperlipidemia requiring medication for treatment with recent good control. Patient reports no problems or side effects with the medication.                                                                                                                                                       .  HYPOTHYROIDISM - Patient has a longstanding history of chronic Hypothyroidism. Patient has been doing well, noting no tremor, insomnia, hair loss or changes in skin texture. Last TSH value of 3.77. Continues to take medications as directed, without adverse reactions or side effects.                                                                                                                                                       MEDICAL HISTORY:                                                      Patient Active Problem List    Diagnosis Date Noted     CKD (chronic kidney disease) stage 3, GFR 30-59 ml/min 07/22/2013     Priority: High     Hypothyroidism 05/03/2012     Priority: High     Hypertension goal BP (blood pressure) < 140/90 05/03/2012     Priority: High     Environmental allergies      Priority: Medium     Acute pain of right knee 02/01/2017     Priority: Medium     History of colonic polyps- 3 mm polyp.  needs every 3-5 yr surveillence- hyperplastic  12/06/2013     Priority: Medium     Osteopenia      Chronic rhinitis 08/01/2014     Hyperlipidemia with target LDL less than 130      Diagnosis updated by automated process. Provider to review and confirm.        Past Medical History   Diagnosis Date     CKD (chronic kidney disease) stage 3, GFR 30-59 ml/min 7/13     Colon polyps 12/13     hyperplastic polyp - Dr Sheppard, due 3-5 yrs     Environmental allergies      Hyperlipidemia LDL goal < 130      Hypertension goal BP (blood pressure) < 140/90 2009     Hypothyroidism 2009     Osteopenia      Past Surgical History   Procedure Laterality Date     Surgical history of -   1997     laproscopic - left - for ectopic pregnancy     Colonoscopy  12/6/2013     hyperplastic polyp - Dr Sheppard - due 3-5 yrs     Current Outpatient Prescriptions   Medication Sig Dispense Refill     levothyroxine (SYNTHROID/LEVOTHROID) 75 MCG tablet Take 1 tablet (75 mcg) by mouth daily 90 tablet 3     lisinopril-hydrochlorothiazide (PRINZIDE/ZESTORETIC) 20-12.5 MG per tablet Take 1 tablet by mouth daily 90 tablet 3     fluticasone (FLONASE) 50 MCG/ACT spray Spray 2 sprays into both nostrils daily 48 g 3     multivitamin, therapeutic with minerals (MULTI-VITAMIN) TABS Take 1 tablet by mouth daily 100 tablet 3     calcium carbonate-vitamin D 600-400 MG-UNIT CHEW Take 1 chew tab by mouth 2 times daily 180 tablet 3     aspirin 81 MG tablet Take 1  "tablet by mouth daily.  3     OTC products: Aspirin     No Known Allergies   Latex Allergy: NO    Social History   Substance Use Topics     Smoking status: Never Smoker     Smokeless tobacco: Never Used     Alcohol use 0.0 - 0.6 oz/week      Comment: 2 drinks per month     History   Drug Use No       REVIEW OF SYSTEMS:                                                    Constitutional, neuro, ENT, endocrine, pulmonary, cardiac, gastrointestinal, genitourinary, musculoskeletal, integument and psychiatric systems are negative, except as otherwise noted.    This document serves as a record of the services and decisions personally performed and made by Shar Calle MD. It was created on his behalf by Fanny Calderón, a trained medical scribe. The creation of this document is based on the provider's statements to the medical scribe.  Fanny Calderón 8:07 AM 3/2/2017  EXAM:                                                    /78  Pulse 82  Temp 98.2  F (36.8  C) (Oral)  Ht 1.626 m (5' 4\")  Wt 81.6 kg (180 lb)  LMP 09/15/2011  SpO2 100%  BMI 30.9 kg/m2    GENERAL APPEARANCE: healthy, alert and no distress     EYES: EOMI, PERRL     HENT: ear canals and TM's normal and nose and mouth without ulcers or lesions     NECK: no adenopathy, no asymmetry, masses, or scars and thyroid normal to palpation     RESP: lungs clear to auscultation - no rales, rhonchi or wheezes     CV: regular rates and rhythm, normal S1 S2, no S3 or S4 and no murmur, click or rub     ABDOMEN:  soft, nontender, no HSM or masses and bowel sounds normal     MS: extremities normal- no gross deformities noted, no evidence of inflammation in joints, FROM in all extremities.     SKIN: no suspicious lesions or rashes     NEURO: Normal strength and tone, sensory exam grossly normal, mentation intact and speech normal     PSYCH: mentation appears normal. and affect normal/bright     LYMPHATICS: No axillary, cervical, or supraclavicular nodes    DIAGNOSTICS: "                                                    EKG:   Sinus  Rhythm  -With rate variation     Recent Labs   Lab Test  02/03/17   0855  12/18/15   1132   HGB  12.3  12.9   PLT  156  192   NA  143  141   POTASSIUM  4.0  4.1   CR  1.28*  1.14*      TSH   Date Value Ref Range Status   02/03/2017 3.77 0.40 - 4.00 mU/L Final       IMPRESSION:                                                    Reason for surgery/procedure: The patient will be having a right knee arthroscopy for treatment of a medial meniscus tear.    The proposed surgical procedure is considered INTERMEDIATE risk.    REVISED CARDIAC RISK INDEX  The patient has the following serious cardiovascular risks for perioperative complications such as (MI, PE, VFib and 3  AV Block):  No serious cardiac risks  INTERPRETATION: 0 risks: Class I (very low risk - 0.4% complication rate)    The patient has the following additional risks for perioperative complications:  No identified additional risks      ICD-10-CM    1. Preop general physical exam Z01.818 EKG 12-lead complete w/read - Clinics   2. Hyperlipidemia LDL goal <100 E78.5    3. Acute pain of right knee M25.561    4. CKD (chronic kidney disease) stage 3, GFR 30-59 ml/min N18.3    5. Hypertension goal BP (blood pressure) < 140/90 I10    6. Hypothyroidism, unspecified type E03.9        RECOMMENDATIONS:                                                      --Patient is to take all scheduled medications on the day of surgery EXCEPT for modifications listed below.    Anticoagulant or Antiplatelet Medication Use  ASPIRIN: Discontinue ASA 7-10 days prior to procedure to reduce bleeding risk.  It should be resumed post-operatively.        ACE Inhibitor or Angiotensin Receptor Blocker (ARB) Use  Ace inhibitor or Angiotensin Receptor Blocker (ARB) and should HOLD this medication for the 24 hours prior to surgery.      APPROVAL GIVEN to proceed with proposed procedure, without further diagnostic evaluation     The  information in this document, created by a scribe for me, accurately reflects the services I personally performed and the decisions made by me. I have reviewed and approved this document for accuracy.    Signed Electronically by: Shar Calle MD    Copy of this evaluation report is provided to requesting physician.    [facility] Preop Guidelines

## 2017-03-03 ENCOUNTER — TELEPHONE (OUTPATIENT)
Dept: ORTHOPEDICS | Facility: CLINIC | Age: 57
End: 2017-03-03

## 2017-03-03 NOTE — TELEPHONE ENCOUNTER
Our goal is to complete and return forms within 5-7 business days of receiving the request.    Type of form Requested: Surgery Treatment Plan  Form requested by (include company):   Jacqueline ESPOSITO    Phone number for requestor: [phone] ext. [phone]  Date form is requested by: not listed    How will form be returned?:  fax to [phone]  Has the patient signed a consent form for release of information (may be included with form)? NA  Additional comments: pt scheduled for surgery on 3/20    Form was started and place in brown [facility] file for provider review/ completion at [facility].

## 2017-03-03 NOTE — LETTER
[facility]  [address]  [place] [address]  [phone]        March 20, 2017    RE:Lilly Barnes  [address] [place] [address]    1960                To whom it may concern:      Lilly Barnes is under my professional care for right knee surgery.   Patient is to be off work from 3/20/2017 - 4/4/2017.  Patient's return to work status will be re-evaluated at follow up visit on 4/4/2017.      Sincerely,        Corbin French MD

## 2017-03-03 NOTE — TELEPHONE ENCOUNTER
Jacqueline with [employer] called to verify if patient was scheduled for surgery with Dr. French. I returned call and LVM that surgery is scheduled for 3/20/2017. I requested she call back to clinic if she needs further information.     Mary Richards, ATC

## 2017-03-17 NOTE — INTERVAL H&P NOTE
This note is for the purpose of making the H&P performed in clinic within the last 30 days available in the hospital surgical encounter.

## 2017-03-17 NOTE — TELEPHONE ENCOUNTER
Form faxed to attn: Jacqueline Levin [place] at [phone]. Copy sent to pool.     MARILOU Bentley RN

## 2017-03-17 NOTE — H&P (VIEW-ONLY)
[address] [address]  [phone]  Dept: [phone]    PRE-OP EVALUATION:  Today's date: 3/2/2017    Lilly Barnes (: 1960) presents for pre-operative evaluation assessment as requested by Dr. French.  She requires evaluation and anesthesia risk assessment prior to undergoing surgery/procedure for treatment of right knee pain .  Proposed procedure: Right knee Arthroscopy     Date of Surgery/ Procedure: 2017  Time of Surgery/ Procedure: 11:30 AM  Hospital/Surgical Facility: [facility]  Primary Physician: Shelton Castellanos  Type of Anesthesia Anticipated: Choice    Patient has a Health Care Directive or Living Will:  NO    Preop Questions 2017   1.  Do you have a history of heart attack, stroke, stent, bypass or surgery on an artery in the head, neck, heart or legs? No   2.  Do you ever have any pain or discomfort in your chest? No   3.  Do you have a history of  Heart Failure? No   4.   Are you troubled by shortness of breath when:  walking on a level surface, or up a slight hill, or at night? No   5.  Do you currently have a cold, bronchitis or other respiratory infection? No   6.  Do you have a cough, shortness of breath, or wheezing? No   7.  Do you sometimes get pains in the calves of your legs when you walk? No   8. Do you or anyone in your family have previous history of blood clots? YES - sister had a stroke several years ago   9.  Do you or does anyone in your family have a serious bleeding problem such as prolonged bleeding following surgeries or cuts? No   10. Have you ever had problems with anemia or been told to take iron pills? No   11. Have you had any abnormal blood loss such as black, tarry or bloody stools, or abnormal vaginal bleeding? No   12. Have you ever had a blood transfusion? No   13. Have you or any of your relatives ever had problems with anesthesia? No   14. Do you have sleep apnea, excessive snoring  or daytime drowsiness? No   15. Do you have any prosthetic heart valves? No   16. Do you have prosthetic joints? No   17. Is there any chance that you may be pregnant? No         HPI:                                                      Brief HPI related to upcoming procedure: The patient injured her right knee on 1/24/2017 while moving shelves at work. She has a sharp pain on the lateral and posterior art of the knee. She had an MRI done and it was found that she has a medial meniscus tear and chondromalacia of the patella. She elected to have an arthroscopic examination for treatment of the meniscus tear.      HYPERTENSION - Patient has longstanding history of mod-severe HTN , currently denies any symptoms referable to elevated blood pressure. Specifically denies chest pain, palpitations, dyspnea, orthopnea, PND or peripheral edema. Blood pressure readings have been in normal range. Current medication regimen is as listed below. Patient denies any side effects of medication.                                                                                                                                                                                          .  HYPERLIPIDEMIA - Patient has a long history of significant Hyperlipidemia requiring medication for treatment with recent good control. Patient reports no problems or side effects with the medication.                                                                                                                                                       .  HYPOTHYROIDISM - Patient has a longstanding history of chronic Hypothyroidism. Patient has been doing well, noting no tremor, insomnia, hair loss or changes in skin texture. Last TSH value of 3.77. Continues to take medications as directed, without adverse reactions or side effects.                                                                                                                                                       MEDICAL HISTORY:                                                      Patient Active Problem List    Diagnosis Date Noted     CKD (chronic kidney disease) stage 3, GFR 30-59 ml/min 07/22/2013     Priority: High     Hypothyroidism 05/03/2012     Priority: High     Hypertension goal BP (blood pressure) < 140/90 05/03/2012     Priority: High     Environmental allergies      Priority: Medium     Acute pain of right knee 02/01/2017     Priority: Medium     History of colonic polyps- 3 mm polyp.  needs every 3-5 yr surveillence- hyperplastic  12/06/2013     Priority: Medium     Osteopenia      Chronic rhinitis 08/01/2014     Hyperlipidemia with target LDL less than 130      Diagnosis updated by automated process. Provider to review and confirm.        Past Medical History   Diagnosis Date     CKD (chronic kidney disease) stage 3, GFR 30-59 ml/min 7/13     Colon polyps 12/13     hyperplastic polyp - Dr Sheppard, due 3-5 yrs     Environmental allergies      Hyperlipidemia LDL goal < 130      Hypertension goal BP (blood pressure) < 140/90 2009     Hypothyroidism 2009     Osteopenia      Past Surgical History   Procedure Laterality Date     Surgical history of -   1997     laproscopic - left - for ectopic pregnancy     Colonoscopy  12/6/2013     hyperplastic polyp - Dr Sheppard - due 3-5 yrs     Current Outpatient Prescriptions   Medication Sig Dispense Refill     levothyroxine (SYNTHROID/LEVOTHROID) 75 MCG tablet Take 1 tablet (75 mcg) by mouth daily 90 tablet 3     lisinopril-hydrochlorothiazide (PRINZIDE/ZESTORETIC) 20-12.5 MG per tablet Take 1 tablet by mouth daily 90 tablet 3     fluticasone (FLONASE) 50 MCG/ACT spray Spray 2 sprays into both nostrils daily 48 g 3     multivitamin, therapeutic with minerals (MULTI-VITAMIN) TABS Take 1 tablet by mouth daily 100 tablet 3     calcium carbonate-vitamin D 600-400 MG-UNIT CHEW Take 1 chew tab by mouth 2 times daily 180 tablet 3     aspirin 81 MG tablet Take 1  "tablet by mouth daily.  3     OTC products: Aspirin     No Known Allergies   Latex Allergy: NO    Social History   Substance Use Topics     Smoking status: Never Smoker     Smokeless tobacco: Never Used     Alcohol use 0.0 - 0.6 oz/week      Comment: 2 drinks per month     History   Drug Use No       REVIEW OF SYSTEMS:                                                    Constitutional, neuro, ENT, endocrine, pulmonary, cardiac, gastrointestinal, genitourinary, musculoskeletal, integument and psychiatric systems are negative, except as otherwise noted.    This document serves as a record of the services and decisions personally performed and made by Shar Calle MD. It was created on his behalf by Fanny Calderón, a trained medical scribe. The creation of this document is based on the provider's statements to the medical scribe.  Fanny Calderón 8:07 AM 3/2/2017  EXAM:                                                    /78  Pulse 82  Temp 98.2  F (36.8  C) (Oral)  Ht 1.626 m (5' 4\")  Wt 81.6 kg (180 lb)  LMP 09/15/2011  SpO2 100%  BMI 30.9 kg/m2    GENERAL APPEARANCE: healthy, alert and no distress     EYES: EOMI, PERRL     HENT: ear canals and TM's normal and nose and mouth without ulcers or lesions     NECK: no adenopathy, no asymmetry, masses, or scars and thyroid normal to palpation     RESP: lungs clear to auscultation - no rales, rhonchi or wheezes     CV: regular rates and rhythm, normal S1 S2, no S3 or S4 and no murmur, click or rub     ABDOMEN:  soft, nontender, no HSM or masses and bowel sounds normal     MS: extremities normal- no gross deformities noted, no evidence of inflammation in joints, FROM in all extremities.     SKIN: no suspicious lesions or rashes     NEURO: Normal strength and tone, sensory exam grossly normal, mentation intact and speech normal     PSYCH: mentation appears normal. and affect normal/bright     LYMPHATICS: No axillary, cervical, or supraclavicular nodes    DIAGNOSTICS: "                                                    EKG:   Sinus  Rhythm  -With rate variation     Recent Labs   Lab Test  02/03/17   0855  12/18/15   1132   HGB  12.3  12.9   PLT  156  192   NA  143  141   POTASSIUM  4.0  4.1   CR  1.28*  1.14*      TSH   Date Value Ref Range Status   02/03/2017 3.77 0.40 - 4.00 mU/L Final       IMPRESSION:                                                    Reason for surgery/procedure: The patient will be having a right knee arthroscopy for treatment of a medial meniscus tear.    The proposed surgical procedure is considered INTERMEDIATE risk.    REVISED CARDIAC RISK INDEX  The patient has the following serious cardiovascular risks for perioperative complications such as (MI, PE, VFib and 3  AV Block):  No serious cardiac risks  INTERPRETATION: 0 risks: Class I (very low risk - 0.4% complication rate)    The patient has the following additional risks for perioperative complications:  No identified additional risks      ICD-10-CM    1. Preop general physical exam Z01.818 EKG 12-lead complete w/read - Clinics   2. Hyperlipidemia LDL goal <100 E78.5    3. Acute pain of right knee M25.561    4. CKD (chronic kidney disease) stage 3, GFR 30-59 ml/min N18.3    5. Hypertension goal BP (blood pressure) < 140/90 I10    6. Hypothyroidism, unspecified type E03.9        RECOMMENDATIONS:                                                      --Patient is to take all scheduled medications on the day of surgery EXCEPT for modifications listed below.    Anticoagulant or Antiplatelet Medication Use  ASPIRIN: Discontinue ASA 7-10 days prior to procedure to reduce bleeding risk.  It should be resumed post-operatively.        ACE Inhibitor or Angiotensin Receptor Blocker (ARB) Use  Ace inhibitor or Angiotensin Receptor Blocker (ARB) and should HOLD this medication for the 24 hours prior to surgery.      APPROVAL GIVEN to proceed with proposed procedure, without further diagnostic evaluation     The  information in this document, created by a scribe for me, accurately reflects the services I personally performed and the decisions made by me. I have reviewed and approved this document for accuracy.    Signed Electronically by: Shar Calle MD    Copy of this evaluation report is provided to requesting physician.    [facility] Preop Guidelines

## 2017-03-20 ENCOUNTER — ANESTHESIA (OUTPATIENT)
Dept: SURGERY | Facility: CLINIC | Age: 57
End: 2017-03-20
Payer: OTHER MISCELLANEOUS

## 2017-03-20 ENCOUNTER — ANESTHESIA EVENT (OUTPATIENT)
Dept: SURGERY | Facility: CLINIC | Age: 57
End: 2017-03-20
Payer: OTHER MISCELLANEOUS

## 2017-03-20 ENCOUNTER — HOSPITAL ENCOUNTER (OUTPATIENT)
Facility: CLINIC | Age: 57
Discharge: HOME | End: 2017-03-20
Attending: ORTHOPAEDIC SURGERY | Admitting: ORTHOPAEDIC SURGERY
Payer: OTHER MISCELLANEOUS

## 2017-03-20 VITALS
RESPIRATION RATE: 16 BRPM | BODY MASS INDEX: 30.05 KG/M2 | DIASTOLIC BLOOD PRESSURE: 65 MMHG | TEMPERATURE: 98.4 F | HEIGHT: 64 IN | SYSTOLIC BLOOD PRESSURE: 109 MMHG | WEIGHT: 176 LBS | OXYGEN SATURATION: 100 %

## 2017-03-20 DIAGNOSIS — S83.8X1D ACUTE MEDIAL MENISCAL INJURY OF RIGHT KNEE, SUBSEQUENT ENCOUNTER: Primary | ICD-10-CM

## 2017-03-20 LAB
CREAT SERPL-MCNC: 1.32 MG/DL (ref 0.52–1.04)
GFR SERPL CREATININE-BSD FRML MDRD: 41 ML/MIN/1.7M2
POTASSIUM SERPL-SCNC: 4.4 MMOL/L (ref 3.4–5.3)

## 2017-03-20 PROCEDURE — 82565 ASSAY OF CREATININE: CPT | Performed by: ANESTHESIOLOGY

## 2017-03-20 PROCEDURE — 29881 ARTHRS KNE SRG MNISECTMY M/L: CPT | Mod: RT | Performed by: ORTHOPAEDIC SURGERY

## 2017-03-20 PROCEDURE — 25000128 H RX IP 250 OP 636: Performed by: ORTHOPAEDIC SURGERY

## 2017-03-20 PROCEDURE — 37000008 ZZH ANESTHESIA TECHNICAL FEE, 1ST 30 MIN: Performed by: ORTHOPAEDIC SURGERY

## 2017-03-20 PROCEDURE — 36000058 ZZH SURGERY LEVEL 3 EA 15 ADDTL MIN: Performed by: ORTHOPAEDIC SURGERY

## 2017-03-20 PROCEDURE — 25000566 ZZH SEVOFLURANE, EA 15 MIN: Performed by: ORTHOPAEDIC SURGERY

## 2017-03-20 PROCEDURE — 37000009 ZZH ANESTHESIA TECHNICAL FEE, EACH ADDTL 15 MIN: Performed by: ORTHOPAEDIC SURGERY

## 2017-03-20 PROCEDURE — 25000132 ZZH RX MED GY IP 250 OP 250 PS 637: Performed by: ANESTHESIOLOGY

## 2017-03-20 PROCEDURE — 40000306 ZZH STATISTIC PRE PROC ASSESS II: Performed by: ORTHOPAEDIC SURGERY

## 2017-03-20 PROCEDURE — 36415 COLL VENOUS BLD VENIPUNCTURE: CPT | Performed by: ANESTHESIOLOGY

## 2017-03-20 PROCEDURE — 36000056 ZZH SURGERY LEVEL 3 1ST 30 MIN: Performed by: ORTHOPAEDIC SURGERY

## 2017-03-20 PROCEDURE — 25800025 ZZH RX 258: Performed by: ANESTHESIOLOGY

## 2017-03-20 PROCEDURE — 25800025 ZZH RX 258: Performed by: ORTHOPAEDIC SURGERY

## 2017-03-20 PROCEDURE — 71000013 ZZH RECOVERY PHASE 1 LEVEL 1 EA ADDTL HR: Performed by: ORTHOPAEDIC SURGERY

## 2017-03-20 PROCEDURE — 71000027 ZZH RECOVERY PHASE 2 EACH 15 MINS: Performed by: ORTHOPAEDIC SURGERY

## 2017-03-20 PROCEDURE — 71000012 ZZH RECOVERY PHASE 1 LEVEL 1 FIRST HR: Performed by: ORTHOPAEDIC SURGERY

## 2017-03-20 PROCEDURE — 25000125 ZZHC RX 250: Performed by: NURSE ANESTHETIST, CERTIFIED REGISTERED

## 2017-03-20 PROCEDURE — 27210794 ZZH OR GENERAL SUPPLY STERILE: Performed by: ORTHOPAEDIC SURGERY

## 2017-03-20 PROCEDURE — 25000128 H RX IP 250 OP 636: Performed by: NURSE ANESTHETIST, CERTIFIED REGISTERED

## 2017-03-20 PROCEDURE — 25000125 ZZHC RX 250: Performed by: ANESTHESIOLOGY

## 2017-03-20 PROCEDURE — 84132 ASSAY OF SERUM POTASSIUM: CPT | Performed by: ANESTHESIOLOGY

## 2017-03-20 RX ORDER — ONDANSETRON 2 MG/ML
INJECTION INTRAMUSCULAR; INTRAVENOUS PRN
Status: DISCONTINUED | OUTPATIENT
Start: 2017-03-20 | End: 2017-03-20

## 2017-03-20 RX ORDER — HYDROMORPHONE HYDROCHLORIDE 1 MG/ML
.3-.5 INJECTION, SOLUTION INTRAMUSCULAR; INTRAVENOUS; SUBCUTANEOUS EVERY 5 MIN PRN
Status: DISCONTINUED | OUTPATIENT
Start: 2017-03-20 | End: 2017-03-20 | Stop reason: HOSPADM

## 2017-03-20 RX ORDER — METHYLPREDNISOLONE ACETATE 40 MG/ML
INJECTION, SUSPENSION INTRA-ARTICULAR; INTRALESIONAL; INTRAMUSCULAR; SOFT TISSUE PRN
Status: DISCONTINUED | OUTPATIENT
Start: 2017-03-20 | End: 2017-03-20 | Stop reason: HOSPADM

## 2017-03-20 RX ORDER — GLYCOPYRROLATE 0.2 MG/ML
INJECTION, SOLUTION INTRAMUSCULAR; INTRAVENOUS PRN
Status: DISCONTINUED | OUTPATIENT
Start: 2017-03-20 | End: 2017-03-20

## 2017-03-20 RX ORDER — SODIUM CHLORIDE, SODIUM LACTATE, POTASSIUM CHLORIDE, CALCIUM CHLORIDE 600; 310; 30; 20 MG/100ML; MG/100ML; MG/100ML; MG/100ML
INJECTION, SOLUTION INTRAVENOUS CONTINUOUS
Status: DISCONTINUED | OUTPATIENT
Start: 2017-03-20 | End: 2017-03-20 | Stop reason: HOSPADM

## 2017-03-20 RX ORDER — ACETAMINOPHEN 10 MG/ML
1000 INJECTION, SOLUTION INTRAVENOUS ONCE
Status: DISCONTINUED | OUTPATIENT
Start: 2017-03-20 | End: 2017-03-20 | Stop reason: HOSPADM

## 2017-03-20 RX ORDER — HYDROCODONE BITARTRATE AND ACETAMINOPHEN 5; 325 MG/1; MG/1
1-2 TABLET ORAL EVERY 4 HOURS PRN
Qty: 40 TABLET | Refills: 0 | Status: SHIPPED | OUTPATIENT
Start: 2017-03-20 | End: 2017-11-08

## 2017-03-20 RX ORDER — CEFAZOLIN SODIUM 2 G/100ML
2 INJECTION, SOLUTION INTRAVENOUS
Status: COMPLETED | OUTPATIENT
Start: 2017-03-20 | End: 2017-03-20

## 2017-03-20 RX ORDER — FENTANYL CITRATE 50 UG/ML
INJECTION, SOLUTION INTRAMUSCULAR; INTRAVENOUS PRN
Status: DISCONTINUED | OUTPATIENT
Start: 2017-03-20 | End: 2017-03-20

## 2017-03-20 RX ORDER — ONDANSETRON 2 MG/ML
4 INJECTION INTRAMUSCULAR; INTRAVENOUS EVERY 30 MIN PRN
Status: DISCONTINUED | OUTPATIENT
Start: 2017-03-20 | End: 2017-03-20 | Stop reason: HOSPADM

## 2017-03-20 RX ORDER — CEFAZOLIN SODIUM 1 G/3ML
1 INJECTION, POWDER, FOR SOLUTION INTRAMUSCULAR; INTRAVENOUS SEE ADMIN INSTRUCTIONS
Status: DISCONTINUED | OUTPATIENT
Start: 2017-03-20 | End: 2017-03-20 | Stop reason: HOSPADM

## 2017-03-20 RX ORDER — LABETALOL HYDROCHLORIDE 5 MG/ML
10 INJECTION, SOLUTION INTRAVENOUS
Status: DISCONTINUED | OUTPATIENT
Start: 2017-03-20 | End: 2017-03-20 | Stop reason: HOSPADM

## 2017-03-20 RX ORDER — LIDOCAINE 40 MG/G
CREAM TOPICAL
Status: DISCONTINUED | OUTPATIENT
Start: 2017-03-20 | End: 2017-03-20 | Stop reason: HOSPADM

## 2017-03-20 RX ORDER — HYDRALAZINE HYDROCHLORIDE 20 MG/ML
2.5-5 INJECTION INTRAMUSCULAR; INTRAVENOUS EVERY 10 MIN PRN
Status: DISCONTINUED | OUTPATIENT
Start: 2017-03-20 | End: 2017-03-20 | Stop reason: HOSPADM

## 2017-03-20 RX ORDER — ONDANSETRON 4 MG/1
4 TABLET, ORALLY DISINTEGRATING ORAL EVERY 30 MIN PRN
Status: DISCONTINUED | OUTPATIENT
Start: 2017-03-20 | End: 2017-03-20 | Stop reason: HOSPADM

## 2017-03-20 RX ORDER — DIMENHYDRINATE 50 MG/ML
25 INJECTION, SOLUTION INTRAMUSCULAR; INTRAVENOUS
Status: DISCONTINUED | OUTPATIENT
Start: 2017-03-20 | End: 2017-03-20 | Stop reason: HOSPADM

## 2017-03-20 RX ORDER — FENTANYL CITRATE 50 UG/ML
25-50 INJECTION, SOLUTION INTRAMUSCULAR; INTRAVENOUS
Status: DISCONTINUED | OUTPATIENT
Start: 2017-03-20 | End: 2017-03-20 | Stop reason: HOSPADM

## 2017-03-20 RX ORDER — HYDROCODONE BITARTRATE AND ACETAMINOPHEN 5; 325 MG/1; MG/1
1-2 TABLET ORAL EVERY 4 HOURS PRN
Status: COMPLETED | OUTPATIENT
Start: 2017-03-20 | End: 2017-03-20

## 2017-03-20 RX ORDER — PROPOFOL 10 MG/ML
INJECTION, EMULSION INTRAVENOUS PRN
Status: DISCONTINUED | OUTPATIENT
Start: 2017-03-20 | End: 2017-03-20

## 2017-03-20 RX ORDER — EPHEDRINE SULFATE 50 MG/ML
INJECTION, SOLUTION INTRAVENOUS PRN
Status: DISCONTINUED | OUTPATIENT
Start: 2017-03-20 | End: 2017-03-20

## 2017-03-20 RX ORDER — ROPIVACAINE HYDROCHLORIDE 7.5 MG/ML
INJECTION, SOLUTION EPIDURAL; PERINEURAL PRN
Status: DISCONTINUED | OUTPATIENT
Start: 2017-03-20 | End: 2017-03-20 | Stop reason: HOSPADM

## 2017-03-20 RX ORDER — DEXAMETHASONE SODIUM PHOSPHATE 4 MG/ML
INJECTION, SOLUTION INTRA-ARTICULAR; INTRALESIONAL; INTRAMUSCULAR; INTRAVENOUS; SOFT TISSUE PRN
Status: DISCONTINUED | OUTPATIENT
Start: 2017-03-20 | End: 2017-03-20

## 2017-03-20 RX ORDER — LIDOCAINE HYDROCHLORIDE 10 MG/ML
INJECTION, SOLUTION INFILTRATION; PERINEURAL PRN
Status: DISCONTINUED | OUTPATIENT
Start: 2017-03-20 | End: 2017-03-20

## 2017-03-20 RX ADMIN — EPHEDRINE SULFATE 5 MG: 50 INJECTION, SOLUTION INTRAMUSCULAR; INTRAVENOUS; SUBCUTANEOUS at 10:07

## 2017-03-20 RX ADMIN — CEFAZOLIN SODIUM 2 G: 2 INJECTION, SOLUTION INTRAVENOUS at 10:10

## 2017-03-20 RX ADMIN — FENTANYL CITRATE 50 MCG: 50 INJECTION INTRAMUSCULAR; INTRAVENOUS at 10:50

## 2017-03-20 RX ADMIN — GLYCOPYRROLATE 0.1 MG: 0.2 INJECTION, SOLUTION INTRAMUSCULAR; INTRAVENOUS at 10:00

## 2017-03-20 RX ADMIN — SODIUM CHLORIDE, POTASSIUM CHLORIDE, SODIUM LACTATE AND CALCIUM CHLORIDE: 600; 310; 30; 20 INJECTION, SOLUTION INTRAVENOUS at 09:55

## 2017-03-20 RX ADMIN — FENTANYL CITRATE 50 MCG: 50 INJECTION, SOLUTION INTRAMUSCULAR; INTRAVENOUS at 10:14

## 2017-03-20 RX ADMIN — LIDOCAINE HYDROCHLORIDE 50 MG: 10 INJECTION, SOLUTION INFILTRATION; PERINEURAL at 10:00

## 2017-03-20 RX ADMIN — MIDAZOLAM HYDROCHLORIDE 2 MG: 1 INJECTION, SOLUTION INTRAMUSCULAR; INTRAVENOUS at 09:55

## 2017-03-20 RX ADMIN — HYDROCODONE BITARTRATE AND ACETAMINOPHEN 1 TABLET: 5; 325 TABLET ORAL at 12:20

## 2017-03-20 RX ADMIN — FENTANYL CITRATE 50 MCG: 50 INJECTION INTRAMUSCULAR; INTRAVENOUS at 12:23

## 2017-03-20 RX ADMIN — PROPOFOL 200 MG: 10 INJECTION, EMULSION INTRAVENOUS at 10:00

## 2017-03-20 RX ADMIN — FENTANYL CITRATE 100 MCG: 50 INJECTION, SOLUTION INTRAMUSCULAR; INTRAVENOUS at 10:00

## 2017-03-20 RX ADMIN — DEXAMETHASONE SODIUM PHOSPHATE 4 MG: 4 INJECTION, SOLUTION INTRAMUSCULAR; INTRAVENOUS at 10:00

## 2017-03-20 RX ADMIN — EPHEDRINE SULFATE 5 MG: 50 INJECTION, SOLUTION INTRAMUSCULAR; INTRAVENOUS; SUBCUTANEOUS at 10:10

## 2017-03-20 RX ADMIN — FENTANYL CITRATE 50 MCG: 50 INJECTION INTRAMUSCULAR; INTRAVENOUS at 12:35

## 2017-03-20 RX ADMIN — HYDROCODONE BITARTRATE AND ACETAMINOPHEN 1 TABLET: 5; 325 TABLET ORAL at 14:53

## 2017-03-20 RX ADMIN — FENTANYL CITRATE 50 MCG: 50 INJECTION INTRAMUSCULAR; INTRAVENOUS at 11:07

## 2017-03-20 RX ADMIN — ONDANSETRON 4 MG: 2 INJECTION INTRAMUSCULAR; INTRAVENOUS at 10:00

## 2017-03-20 ASSESSMENT — LIFESTYLE VARIABLES: TOBACCO_USE: 0

## 2017-03-20 ASSESSMENT — COPD QUESTIONNAIRES: COPD: 0

## 2017-03-20 ASSESSMENT — ENCOUNTER SYMPTOMS
SEIZURES: 0
DYSRHYTHMIAS: 0
STRIDOR: 0

## 2017-03-20 NOTE — ANESTHESIA CARE TRANSFER NOTE
Patient: Lilly Barnes    Procedure(s):  Right knee Arthroscopy, Partial Medial Meniscectomy  - Wound Class: I-Clean    Diagnosis: Meniscal Tear   Diagnosis Additional Information: No value filed.    Anesthesia Type:   General, LMA     Note:  Airway :Face Mask  Patient transferred to:PACU  Comments: VSS. Report to RN      Vitals: (Last set prior to Anesthesia Care Transfer)    CRNA VITALS  3/20/2017 1009 - 3/20/2017 1043      3/20/2017             Pulse: 98    SpO2: 99 %    Resp Rate (observed): (!)  7                Electronically Signed By: KIM Barraza CRNA  March 20, 2017  10:43 AM

## 2017-03-20 NOTE — BRIEF OP NOTE
[facility]  Orthopedics Brief Operative Note    Pre-operative diagnosis: Meniscal Tear    Post-operative diagnosis Same   Procedure: Procedure(s):  Right knee Arthroscopy   Surgeon Request Chocie Anesthesia  - Wound Class: I-Clean   Surgeon: Corbin French MD   Assistants(s): Surgeon(s) and Role:     * Corbin French MD - Primary     * Arnoldo Hassan PA-C - Assisting   Anesthesia: Other    Estimated blood loss: * No values recorded between 3/20/2017 10:14 AM and 3/20/2017 10:28 AM *    Total IV fluids: See anesthesia record   Blood transfusion: None       Drains: None   Specimens: * No specimens in log *   Implants: None   Findings: Dictated   Complications: None   Condition: Stable   Comments: See Dictated Operative report for full details

## 2017-03-20 NOTE — DISCHARGE INSTRUCTIONS
KNEE ARTHROSCOPY DISCHARGE INSTRUCTIONS  JOSE MCKENNA AND NATE PANIAGUA  [phone]    MEDICATION  Usually Ibuprofen or Naproxen will be sufficient for pain.  If not, you can take prescribed narcotic medication along with Ibuprofen/Naproxen for the first day or two.  It is best to wean off narcotic pain medication as soon as possible to minimize side effects such as nausea, constipation and reaction.    ACTIVITY  Unless specified after the surgery, you can put all your weight (or as much as you can tolerate) on the surgical leg.  Crutches should be used until you feel safe without them.    DRESSING  Occasionally, there can be a significant amount of oozing of bloody fluid.  Even if that happens, please do not get alarmed.  Please apply extra material on top of the surgical dressing (such as additional gauze or towel) along with additional ace wrap.  Showering can start on post-operative day 2.  Put a band-aid over each portal hole until the holes are sealed up.  No soaking in the bathtub until instructed.  If the knee is swollen, apply the ace wrap after band-aids.    EXERCISES  Start range of motion exercises for the knee as soon as you can along with ankle pumping.  Initially, elevation of the leg for the first 2-4 days would help with pain and swelling.    DRIVING  You can drive as soon as you have control of the operated lower extremity and you are off the narcotic pain medication.    FOLLOW-UP  The post-operative visit should have been scheduled at the time you scheduled the surgery.  If it was not done, please call my office tomorrow.  For other urgent concerns, call [phone].    GENERAL ANESTHESIA OR SEDATION ADULT DISCHARGE INSTRUCTIONS   SPECIAL PRECAUTIONS FOR 24 HOURS AFTER SURGERY    IT IS NOT UNUSUAL TO FEEL LIGHT-HEADED OR FAINT, UP TO 24 HOURS AFTER SURGERY OR WHILE TAKING PAIN MEDICATION.  IF YOU HAVE THESE SYMPTOMS; SIT FOR A FEW MINUTES BEFORE STANDING AND HAVE SOMEONE ASSIST YOU WHEN YOU  GET UP TO WALK OR USE THE BATHROOM.    YOU SHOULD REST AND RELAX FOR THE NEXT 24 HOURS AND YOU MUST MAKE ARRANGEMENTS TO HAVE SOMEONE STAY WITH YOU FOR AT LEAST 24 HOURS AFTER YOUR DISCHARGE.  AVOID HAZARDOUS AND STRENUOUS ACTIVITIES.  DO NOT MAKE IMPORTANT DECISIONS FOR 24 HOURS.    DO NOT DRIVE ANY VEHICLE OR OPERATE MECHANICAL EQUIPMENT FOR 24 HOURS FOLLOWING THE END OF YOUR SURGERY.  EVEN THOUGH YOU MAY FEEL NORMAL, YOUR REACTIONS MAY BE AFFECTED BY THE MEDICATION YOU HAVE RECEIVED.    DO NOT DRINK ALCOHOLIC BEVERAGES FOR 24 HOURS FOLLOWING YOUR SURGERY.    DRINK CLEAR LIQUIDS (APPLE JUICE, GINGER ALE, 7-UP, BROTH, ETC.).  PROGRESS TO YOUR REGULAR DIET AS YOU FEEL ABLE.    YOU MAY HAVE A DRY MOUTH, A SORE THROAT, MUSCLES ACHES OR TROUBLE SLEEPING.  THESE SHOULD GO AWAY AFTER 24 HOURS.    CALL YOUR DOCTOR FOR ANY OF THE FOLLOWING:  SIGNS OF INFECTION (FEVER, GROWING TENDERNESS AT THE SURGERY SITE, A LARGE AMOUNT OF DRAINAGE OR BLEEDING, SEVERE PAIN, FOUL-SMELLING DRAINAGE, REDNESS OR SWELLING.    IT HAS BEEN OVER 8 TO 10 HOURS SINCE SURGERY AND YOU ARE STILL NOT ABLE TO URINATE (PASS WATER).       Medications Received:  *1 Norco given at 12:20pm  *Maximum acetaminophen (Tylenol) dose from all sources should not exceed 4 grams (4000 mg) per day. You received 1000 mg Tylenol at 1:05 PM.

## 2017-03-20 NOTE — IP AVS SNAPSHOT
MRN:[id number]                      After Visit Summary   3/20/2017    Lilly Barnes    MRN: [id number]           Thank you!     Thank you for choosing [facility] for your care. Our goal is always to provide you with excellent care. Hearing back from our patients is one way we can continue to improve our services. Please take a few minutes to complete the written survey that you may receive in the mail after you visit. If you would like to speak to someone directly about your visit please contact Patient Relations at [phone]. Thank you!          Patient Information     Date Of Birth          1960        About your hospital stay     You were admitted on:  March 20, 2017 You last received care in the:  [facility]    You were discharged on:  March 20, 2017        Reason for your hospital stay       Right knee arthroscopy, uneventful                  Who to Call     For medical emergencies, please call 911.  For non-urgent questions about your medical care, please call your primary care provider or clinic, [phone]  For questions related to your surgery, please call your surgery clinic        Attending Provider     Provider Specialty    Corbin French MD Orthopedics       Primary Care Provider Office Phone # Fax #    Shelton Castellanos [phone]       [address] [address]        After Care Instructions     Activity       Your activity upon discharge: activity as tolerated            Diet       Follow this diet upon discharge: regualar            Wound care and dressings       Instructions to care for your wound at home: daily dressing changes.                  Follow-up Appointments     Follow-up and recommended labs and tests        10-14 days                  Your next 10 appointments already scheduled     Apr 04, 2017  9:20 AM CDT   Return Visit with Arnoldo Hassan PA-C    [facility] ([facility])    [address]  [address]  [facility] [address]   [phone]              Further instructions from your care team       KNEE ARTHROSCOPY DISCHARGE INSTRUCTIONS  JOSE MCKENNA AND NATE PANIAGUA  [phone]    MEDICATION  Usually Ibuprofen or Naproxen will be sufficient for pain.  If not, you can take prescribed narcotic medication along with Ibuprofen/Naproxen for the first day or two.  It is best to wean off narcotic pain medication as soon as possible to minimize side effects such as nausea, constipation and reaction.    ACTIVITY  Unless specified after the surgery, you can put all your weight (or as much as you can tolerate) on the surgical leg.  Crutches should be used until you feel safe without them.    DRESSING  Occasionally, there can be a significant amount of oozing of bloody fluid.  Even if that happens, please do not get alarmed.  Please apply extra material on top of the surgical dressing (such as additional gauze or towel) along with additional ace wrap.  Showering can start on post-operative day 2.  Put a band-aid over each portal hole until the holes are sealed up.  No soaking in the bathtub until instructed.  If the knee is swollen, apply the ace wrap after band-aids.    EXERCISES  Start range of motion exercises for the knee as soon as you can along with ankle pumping.  Initially, elevation of the leg for the first 2-4 days would help with pain and swelling.    DRIVING  You can drive as soon as you have control of the operated lower extremity and you are off the narcotic pain medication.    FOLLOW-UP  The post-operative visit should have been scheduled at the time you scheduled the surgery.  If it was not done, please call my office tomorrow.  For other urgent concerns, call [phone].    GENERAL ANESTHESIA OR SEDATION ADULT DISCHARGE INSTRUCTIONS   SPECIAL PRECAUTIONS FOR 24 HOURS AFTER SURGERY    IT IS NOT  "UNUSUAL TO FEEL LIGHT-HEADED OR FAINT, UP TO 24 HOURS AFTER SURGERY OR WHILE TAKING PAIN MEDICATION.  IF YOU HAVE THESE SYMPTOMS; SIT FOR A FEW MINUTES BEFORE STANDING AND HAVE SOMEONE ASSIST YOU WHEN YOU GET UP TO WALK OR USE THE BATHROOM.    YOU SHOULD REST AND RELAX FOR THE NEXT 24 HOURS AND YOU MUST MAKE ARRANGEMENTS TO HAVE SOMEONE STAY WITH YOU FOR AT LEAST 24 HOURS AFTER YOUR DISCHARGE.  AVOID HAZARDOUS AND STRENUOUS ACTIVITIES.  DO NOT MAKE IMPORTANT DECISIONS FOR 24 HOURS.    DO NOT DRIVE ANY VEHICLE OR OPERATE MECHANICAL EQUIPMENT FOR 24 HOURS FOLLOWING THE END OF YOUR SURGERY.  EVEN THOUGH YOU MAY FEEL NORMAL, YOUR REACTIONS MAY BE AFFECTED BY THE MEDICATION YOU HAVE RECEIVED.    DO NOT DRINK ALCOHOLIC BEVERAGES FOR 24 HOURS FOLLOWING YOUR SURGERY.    DRINK CLEAR LIQUIDS (APPLE JUICE, GINGER ALE, 7-UP, BROTH, ETC.).  PROGRESS TO YOUR REGULAR DIET AS YOU FEEL ABLE.    YOU MAY HAVE A DRY MOUTH, A SORE THROAT, MUSCLES ACHES OR TROUBLE SLEEPING.  THESE SHOULD GO AWAY AFTER 24 HOURS.    CALL YOUR DOCTOR FOR ANY OF THE FOLLOWING:  SIGNS OF INFECTION (FEVER, GROWING TENDERNESS AT THE SURGERY SITE, A LARGE AMOUNT OF DRAINAGE OR BLEEDING, SEVERE PAIN, FOUL-SMELLING DRAINAGE, REDNESS OR SWELLING.    IT HAS BEEN OVER 8 TO 10 HOURS SINCE SURGERY AND YOU ARE STILL NOT ABLE TO URINATE (PASS WATER).       Medications Received:  *1 Norco given at 12:20pm  *Maximum acetaminophen (Tylenol) dose from all sources should not exceed 4 grams (4000 mg) per day. You received 1000 mg Tylenol at 1:05 PM.                   Pending Results     No orders found from 3/18/2017 to 3/21/2017.            Admission Information     Date & Time Provider Department Dept. Phone    3/20/2017 Corbin French MD [facility] [phone]      Your Vitals Were     Blood Pressure Temperature Respirations Height Weight Last Period    121/69 97  F (36.1  C) (Temporal) 14 1.626 m (5' 4.02\") 79.8 kg (176 lb) 09/15/2011    " Pulse Oximetry BMI (Body Mass Index)                98% 30.2 kg/m2          [employer] Information     [employer] gives you secure access to your electronic health record. If you see a primary care provider, you can also send messages to your care team and make appointments. If you have questions, please call your primary care clinic.  If you do not have a primary care provider, please call [phone] and they will assist you.        Care EveryWhere ID     This is your Care EveryWhere ID. This could be used by other organizations to access your [place] medical records  [id number]           Review of your medicines      START taking        Dose / Directions    HYDROcodone-acetaminophen 5-325 MG per tablet   Commonly known as:  NORCO   Used for:  Acute medial meniscal injury of right knee, subsequent encounter        Dose:  1-2 tablet   Take 1-2 tablets by mouth every 4 hours as needed for moderate to severe pain maximum 8 tablet(s) per day   Quantity:  40 tablet   Refills:  0         CONTINUE these medicines which may have CHANGED, or have new prescriptions. If we are uncertain of the size of tablets/capsules you have at home, strength may be listed as something that might have changed.        Dose / Directions    fluticasone 50 MCG/ACT spray   Commonly known as:  FLONASE   This may have changed:    - when to take this  - reasons to take this   Used for:  Chronic rhinitis, Environmental allergies        Dose:  2 spray   Spray 2 sprays into both nostrils daily   Quantity:  48 g   Refills:  3         CONTINUE these medicines which have NOT CHANGED        Dose / Directions    aspirin 81 MG tablet        Dose:  81 mg   Take 81 mg by mouth daily   Refills:  3       calcium carbonate-vitamin D 600-400 MG-UNIT Chew        Dose:  1 chew tab   Take 1 chew tab by mouth 2 times daily   Quantity:  180 tablet   Refills:  3       levothyroxine 75 MCG tablet   Commonly known as:  SYNTHROID/LEVOTHROID   Used for:  Acquired  hypothyroidism        Dose:  75 mcg   Take 1 tablet (75 mcg) by mouth daily   Quantity:  90 tablet   Refills:  3       lisinopril-hydrochlorothiazide 20-12.5 MG per tablet   Commonly known as:  PRINZIDE/ZESTORETIC   Used for:  Hypertension goal BP (blood pressure) < 140/90        Dose:  1 tablet   Take 1 tablet by mouth daily   Quantity:  90 tablet   Refills:  3       Multi-vitamin Tabs tablet        Dose:  1 tablet   Take 1 tablet by mouth daily   Quantity:  100 tablet   Refills:  3            Where to get your medicines      Some of these will need a paper prescription and others can be bought over the counter. Ask your nurse if you have questions.     Bring a paper prescription for each of these medications     HYDROcodone-acetaminophen 5-325 MG per tablet                Protect others around you: Learn how to safely use, store and throw away your medicines at www.disposemymeds.org.             Medication List: This is a list of all your medications and when to take them. Check marks below indicate your daily home schedule. Keep this list as a reference.      Medications           Morning Afternoon Evening Bedtime As Needed    aspirin 81 MG tablet   Take 81 mg by mouth daily                                calcium carbonate-vitamin D 600-400 MG-UNIT Chew   Take 1 chew tab by mouth 2 times daily                                fluticasone 50 MCG/ACT spray   Commonly known as:  FLONASE   Spray 2 sprays into both nostrils daily                                HYDROcodone-acetaminophen 5-325 MG per tablet   Commonly known as:  NORCO   Take 1-2 tablets by mouth every 4 hours as needed for moderate to severe pain maximum 8 tablet(s) per day   Last time this was given:  1 tablet on 3/20/2017 12:20 PM                                levothyroxine 75 MCG tablet   Commonly known as:  SYNTHROID/LEVOTHROID   Take 1 tablet (75 mcg) by mouth daily                                lisinopril-hydrochlorothiazide 20-12.5 MG per tablet    Commonly known as:  PRINZIDE/ZESTORETIC   Take 1 tablet by mouth daily                                Multi-vitamin Tabs tablet   Take 1 tablet by mouth daily

## 2017-03-20 NOTE — ANESTHESIA PREPROCEDURE EVALUATION
Anesthesia Evaluation     . Pt has had prior anesthetic. Type: General    No history of anesthetic complications     ROS/MED HX    ENT/Pulmonary:     (+)allergic rhinitis, , . .   (-) tobacco use, asthma, COPD and recent URI   Neurologic:  - neg neurologic ROS    (-) seizures and CVA   Cardiovascular:  - neg cardiovascular ROS   (+) Dyslipidemia, hypertension----. : . . . :. . Previous cardiac testing date:results:date: results:ECG reviewed date:3/17 results:NSR date: results:         (-) CAD, arrhythmias and valvular problems/murmurs   METS/Exercise Tolerance:     Hematologic: Comments: Lab Test        02/03/17     12/18/15     02/20/15                       0855          1132          0912          WBC          4.5          5.8          3.0*          HGB          12.3         12.9         12.8          MCV          89           88           91            PLT          156          192          140*           Lab Test        02/03/17     12/18/15     02/20/15                       0855          1132          0912          NA           143          141          140           POTASSIUM    4.0          4.1          4.0           CHLORIDE     107          107          107           CO2          26           25           27            BUN          31*          26           24            CR           1.28*        1.14*        1.19*         ANIONGAP     10           9            6             SYLVESTER          9.1          8.7          9.0           GLC          75           66*          78           - neg hematologic  ROS       Musculoskeletal:  - neg musculoskeletal ROS       GI/Hepatic:  - neg GI/hepatic ROS      (-) GERD, hepatitis and liver disease   Renal/Genitourinary:     (+) chronic renal disease, type: CRI, Pt does not require dialysis, Pt has no history of transplant,       Endo:     (+) thyroid problem hypothyroidism, Obesity, .   (-) Type I DM, Type II DM and chronic steroid usage   Psychiatric:  - neg  psychiatric ROS       Infectious Disease:  - neg infectious disease ROS       Malignancy:         Other:    - neg other ROS           Physical Exam  Normal systems: cardiovascular, pulmonary and dental    Airway   Mallampati: II  TM distance: >3 FB  Neck ROM: full    Dental     Cardiovascular   Rhythm and rate: regular and normal  (-) no friction rub, no systolic click and no murmur    Pulmonary    breath sounds clear to auscultation(-) no rhonchi, no decreased breath sounds, no wheezes, no rales and no stridor                    Anesthesia Plan      History & Physical Review  History and physical reviewed and following examination; no interval change.    ASA Status:  2 .    NPO Status:  > 8 hours    Plan for General and LMA with Intravenous induction. Maintenance will be Balanced.    PONV prophylaxis:  Ondansetron (or other 5HT-3) and Dexamethasone or Solumedrol       Postoperative Care  Postoperative pain management:  IV analgesics.      Consents  Anesthetic plan, risks, benefits and alternatives discussed with:  Patient or representative, Patient and Spouse..                          .

## 2017-03-20 NOTE — IP AVS SNAPSHOT
[facility]    [address]    [facility] [address]    Phone:  [phone]    Fax:  [phone]                                       After Visit Summary   3/20/2017    Lilly Barnes    MRN: [id number]           After Visit Summary Signature Page     I have received my discharge instructions, and my questions have been answered. I have discussed any challenges I see with this plan with the nurse or doctor.    ..........................................................................................................................................  Patient/Patient Representative Signature      ..........................................................................................................................................  Patient Representative Print Name and Relationship to Patient    ..................................................               ................................................  Date                                            Time    ..........................................................................................................................................  Reviewed by Signature/Title    ...................................................              ..............................................  Date                                                            Time

## 2017-03-20 NOTE — ANESTHESIA POSTPROCEDURE EVALUATION
Patient: Lilly Barnes    Procedure(s):  Right knee Arthroscopy, Partial Medial Meniscectomy  - Wound Class: I-Clean    Diagnosis:Meniscal Tear   Diagnosis Additional Information: Meniscal Tear     Anesthesia Type:  General, LMA    Note:  Anesthesia Post Evaluation    Patient location during evaluation: PACU  Patient participation: Able to fully participate in evaluation  Level of consciousness: awake  Pain management: adequate  Airway patency: patent  Cardiovascular status: acceptable  Respiratory status: acceptable  Hydration status: acceptable  PONV: controlled     Anesthetic complications: None          Last vitals:  Vitals:    03/20/17 1105 03/20/17 1107 03/20/17 1115   BP: 126/70  114/63   Resp: 16  12   Temp:      SpO2: 100% 95%          Electronically Signed By: Nicolas Church MD  March 20, 2017  11:49 AM

## 2017-03-20 NOTE — PROVIDER NOTIFICATION
Dr. Fitch, [facility], notified of difficulty controlling pain. Orders received for Ofirmev 1000 mg x1.

## 2017-03-22 ENCOUNTER — TELEPHONE (OUTPATIENT)
Dept: ORTHOPEDICS | Facility: CLINIC | Age: 57
End: 2017-03-22

## 2017-03-22 NOTE — TELEPHONE ENCOUNTER
NA / LVM - Surgical follow up call: Left non descript, confirming follow up and left phone number for questions.    Arnoldo Hassan PA-C  [facility] - Surgery

## 2017-03-22 NOTE — TELEPHONE ENCOUNTER
Theodora called and LVM looking for verification of surgery completion. She LVM that if it was done to confirm with Jacqueline Levin and fax the notes over. I called and LVM with Jacqueline that it was completed and to fax us a request for the op report.    Delmar Appiah, ATC

## 2017-03-24 ENCOUNTER — TELEPHONE (OUTPATIENT)
Dept: ORTHOPEDICS | Facility: CLINIC | Age: 57
End: 2017-03-24

## 2017-03-24 NOTE — TELEPHONE ENCOUNTER
Discussed with Abi Dia ATC. No specific exercises except patient to flex/bend knee and increase activities as tolerated.     Phone call to patient and informed of the above. Recommended she flex/bend hourly while awake in addition to regular activities. Discussed that her knee was structurally sound to be able to bear weight and go up and down stairs at this point. Suggested increasing tylenol to 1000mg three times daily and to continue to ice/elevate. Encouraged her to increase bearing weight and stairs. She verbalized understanding.     MARILOU Bentley RN

## 2017-03-24 NOTE — TELEPHONE ENCOUNTER
Patient calls stating she had R knee scope and medial meniscectomy on 3/20/17 by Dr. French.   Not planning on going back to work until after 4/4/17 follow up appointment. 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   Has not been able to find her discharge instructions and is not sure of what activities/exercises she is supposed to be doing. No longer taking Norco and takes tylenol prn only. Icing and elevating.   Incisions look good for the most part. One incision is reddened and irritated, but denies drainage or swelling. No redness of skin around incision. Keeping covered with bandaids.   Is using a walker and has been toe touching only without bearing weight fully on right foot.   Has been avoiding stairs due to pain. Unable to fully extend leg. Had severe pain with stairs prior to surgery also.     Instructed to start bearing weight as tolerated. Will discuss with provider and get back with her.     MARILOU Bentley RN

## 2017-03-25 NOTE — OP NOTE
DATE OF PROCEDURE:  03/20/2017      SURGEON:  Corbin French MD      ASSISTANT:  Huber Hassan PA-C      ANESTHESIA:  General.      PREOPERATIVE DIAGNOSIS:  Medial meniscus tear, right knee.      POSTOPERATIVE DIAGNOSIS:  Medial meniscus tear, right knee.      PROCEDURE PERFORMED:  Arthroscopic partial medial meniscectomy.      INDICATIONS FOR PROCEDURE:  Lilly Barnes is a 56-year-old woman with a symptomatic medial meniscus tear who elected to undergo the above-stated procedure, fully understanding the potential risks as well as benefits of this procedure.      OPERATIVE NOTE:  Patient was brought to the operating room, placed in a supine position on the operating table, where general anesthesia was administered without difficulty.  No tourniquet was utilized.  Her right lower extremity was scrubbed and prepped in the usual sterile fashion and draped sterilely.      Using standard parapatellar tendon and supramedial arthroscopic portals, the right knee was entered with the arthroscope and instrumentation.  There was minimal synovial fluid, but abundant synovitis.  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  Lateral compartment was relatively pristine.  There was generalized synovitis in the notch and it was unclear whether the ACL was intact with the femur.      In the medial compartment, she had a posterior horn degenerative complex meniscus tear which was debrided back to a stable edge.  This corresponded with some grade I-II chondromalacia of the femoral condyle.  Following this partial medial meniscectomy, the knee was examined throughout for any further pathologies and none were noted.  The arthroscopic instrumentation was removed and the portals were closed with nylon suture.  Following closure and prior to a sterile dressing, 40 mg of Kenalog and 10 mL of 1% ropivacaine was instilled into her knee.  Patient tolerated the procedure well and left the operating room in satisfactory and  stable condition.      ESTIMATED BLOOD LOSS:  10 mL.      SPONGE AND NEEDLE COUNT:  Correct x2.         NATE PANIAGUA MD             D: 2017 14:57   T: 2017 01:49   MT: CHRISTIN#[id number]      Name:     JOSE J SIBLEY   MRN:      [id number]        Account:        [id number]   :      1960           Procedure Date: 2017      Document: [id number]

## 2017-04-01 PROCEDURE — 82274 ASSAY TEST FOR BLOOD FECAL: CPT | Performed by: FAMILY MEDICINE

## 2017-04-04 ENCOUNTER — OFFICE VISIT (OUTPATIENT)
Dept: ORTHOPEDICS | Facility: CLINIC | Age: 57
End: 2017-04-04
Payer: COMMERCIAL

## 2017-04-04 DIAGNOSIS — Z47.89 ORTHOPEDIC AFTERCARE: Primary | ICD-10-CM

## 2017-04-04 PROCEDURE — 99024 POSTOP FOLLOW-UP VISIT: CPT | Performed by: PHYSICIAN ASSISTANT

## 2017-04-04 NOTE — LETTER
4/4/2017       RE: Lilly Barnes  [address] [facility] [address]           Dear Colleague,    Thank you for referring your patient, Lilly Barnes, to the [facility] ORTHOPEDIC SURGERY. Please see a copy of my visit note below.    HISTORY OF PRESENT ILLNESS:    Lilly Barnes is a 56 year old female who is seen in follow up for Rt knee arthroscopy, DOS 3/20/27, Dr. French.  Present symptoms: Pt reports swelling, stiffness, pain and decreased activity level. .  Treatments include RICE. .  Using nothing for ambulation.  No new injuries.  No new complaints.  Denies Chest pain, Calve pain, Fever, Chills.    PHYSICAL EXAM:  LMP 09/15/2011  There is no height or weight on file to calculate BMI.   GENERAL APPEARANCE: healthy, alert and no distress   PSYCH:  mentation appears normal and affect normal/bright    MSK:  Right:  Knee.  Ambulates: Slight limp on right. .  Incision clean and dry, sutures present, healing.  Appropriate incisional erythema.   No Ecchymosis.  No calve pain on palpation.  Edema moderate effusion at knee .  CMS: ezekiel incisional numbness, otherwise grossly intact.  AROM Flexion WNL.    IMAGING INTERPRETATION:  None today.     ASSESSMENT:  Lilly Barnes is a 56 year old female S/P Rt knee arthroscopy, DOS 3/20/27, Dr. French..  We discussed CM in setting of Knee scope and cortisone.    PLAN:  - Surgery discussed, images reviewed if applicable, and all questions were answered at this time.  - Suturres removed with sterile technique, steri-strips applied in usual fashion, care instructions given and verbally acknowledged.  - Medications: as current. Recommend alleve.  - Physical Therapy: As directed at discharge.  - AAT.    Return to clinic PRN, consider cortisone at 6 weeks post op if needed.     Arnoldo Hassan PA-C    Dept. Orthopedic Surgery  [facility]   4/5/2017          Again, thank you for allowing me to participate in the care of your patient.         Sincerely,              Arnoldo Hassan PA-C

## 2017-04-04 NOTE — PROGRESS NOTES
HISTORY OF PRESENT ILLNESS:    Lilly Barnes is a 56 year old female who is seen in follow up for Rt knee arthroscopy, DOS 3/20/27, Dr. French.  Present symptoms: Pt reports swelling, stiffness, pain and decreased activity level. .  Treatments include RICE. .  Using nothing for ambulation.  No new injuries.  No new complaints.  Denies Chest pain, Calve pain, Fever, Chills.    PHYSICAL EXAM:  LMP 09/15/2011  There is no height or weight on file to calculate BMI.   GENERAL APPEARANCE: healthy, alert and no distress   PSYCH:  mentation appears normal and affect normal/bright    MSK:  Right:  Knee.  Ambulates: Slight limp on right. .  Incision clean and dry, sutures present, healing.  Appropriate incisional erythema.   No Ecchymosis.  No calve pain on palpation.  Edema moderate effusion at knee .  CMS: ezekiel incisional numbness, otherwise grossly intact.  AROM Flexion WNL.    IMAGING INTERPRETATION:  None today.     ASSESSMENT:  Lilly Barnes is a 56 year old female S/P Rt knee arthroscopy, DOS 3/20/27, Dr. French..  We discussed CM in setting of Knee scope and cortisone.    PLAN:  - Surgery discussed, images reviewed if applicable, and all questions were answered at this time.  - Suturres removed with sterile technique, steri-strips applied in usual fashion, care instructions given and verbally acknowledged.  - Medications: as current. Recommend alleve.  - Physical Therapy: As directed at discharge.  - AAT.    Return to clinic PRN, consider cortisone at 6 weeks post op if needed.     Arnoldo Hassan PA-C    Dept. Orthopedic Surgery  [facility]   4/5/2017

## 2017-04-04 NOTE — PATIENT INSTRUCTIONS
Incision Care:  Sutures were removed and Steri-Strips applied in usual fashion.  Keep dry 24-48 hours.  Showering ok after that time, however no soaking or scrubbing of incision for 1 weeks.  Steri-strips will most likely fall off on their own, however they may be removed after 1 weeks with rubbing alcohol if they have not.    Gradually increase your activities as you can tolerated them, starting at a level well below what you would normally do.     Given time, symptoms should resolve.  You may ice, elevate and take NSAIDs such as Naproxen 440 mg three times daily for 7 days.  If not resolving follow up in 4 weeks to consider cortisone injection.    Follow up as needed in clinic.

## 2017-04-04 NOTE — LETTER
[facility]  [address] [address] [address]  [phone]          April 4, 2017    RE:  Lilly Barnes                                                                                                                                                       [address] [facility] [address]            To whom it may concern:    Lilly Barnes is under my professional care for Right knee surgery.  Based on her current recovery, and returning to full job duties, we estimate a return to work 5/1/2017.    Sincerely,        Arnoldo Hassan PA-C  [facility] - Surgery

## 2017-04-04 NOTE — MR AVS SNAPSHOT
After Visit Summary   4/4/2017    Lilly Barnes    MRN: [id number]           Patient Information     Date Of Birth          1960        Visit Information        Provider Department      4/4/2017 9:20 AM Arnoldo Hassan PA-C [facility] ORTHOPEDIC SURGERY        Care Instructions      Incision Care:  Sutures were removed and Steri-Strips applied in usual fashion.  Keep dry 24-48 hours.  Showering ok after that time, however no soaking or scrubbing of incision for 1 weeks.  Steri-strips will most likely fall off on their own, however they may be removed after 1 weeks with rubbing alcohol if they have not.    Gradually increase your activities as you can tolerated them, starting at a level well below what you would normally do.     Given time, symptoms should resolve.  You may ice, elevate and take NSAIDs such as Naproxen 440 mg three times daily for 7 days.  If not resolving follow up in 4 weeks to consider cortisone injection.    Follow up as needed in clinic.          Follow-ups after your visit        Who to contact     If you have questions or need follow up information about today's clinic visit or your schedule please contact [facility] ORTHOPEDIC SURGERY directly at [phone].  Normal or non-critical lab and imaging results will be communicated to you by MyChart, letter or phone within 4 business days after the clinic has received the results. If you do not hear from us within 7 days, please contact the clinic through Interactive Networkshart or phone. If you have a critical or abnormal lab result, we will notify you by phone as soon as possible.  Submit refill requests through Dr. Jerry's Smooth Move or call your pharmacy and they will forward the refill request to us. Please allow 3 business days for your refill to be completed.          Additional Information About Your Visit        MyChart Information     Dr[employer] Smooth Move gives you secure access to your electronic health record. If you see a primary care  provider, you can also send messages to your care team and make appointments. If you have questions, please call your primary care clinic.  If you do not have a primary care provider, please call [phone] and they will assist you.        Care EveryWhere ID     This is your Care EveryWhere ID. This could be used by other organizations to access your [place] medical records  [id number]        Your Vitals Were     Last Period                   09/15/2011            Blood Pressure from Last 3 Encounters:   03/20/17 109/65   03/02/17 118/78   02/23/17 109/68    Weight from Last 3 Encounters:   03/20/17 176 lb (79.8 kg)   03/02/17 180 lb (81.6 kg)   02/23/17 180 lb (81.6 kg)              Today, you had the following     No orders found for display         Today's Medication Changes          These changes are accurate as of: 4/4/17 10:08 AM.  If you have any questions, ask your nurse or doctor.               These medicines have changed or have updated prescriptions.        Dose/Directions    fluticasone 50 MCG/ACT spray   Commonly known as:  FLONASE   This may have changed:    - when to take this  - reasons to take this   Used for:  Chronic rhinitis, Environmental allergies        Dose:  2 spray   Spray 2 sprays into both nostrils daily   Quantity:  48 g   Refills:  3                Primary Care Provider Office Phone # Fax #    Shelton Castellanos [phone]       [address] [address]        Thank you!     Thank you for choosing [facility] ORTHOPEDIC SURGERY  for your care. Our goal is always to provide you with excellent care. Hearing back from our patients is one way we can continue to improve our services. Please take a few minutes to complete the written survey that you may receive in the mail after your visit with us. Thank you!             Your Updated Medication List - Protect others around you: Learn how to safely use, store and throw away your  medicines at www.disposemymeds.org.          This list is accurate as of: 4/4/17 10:08 AM.  Always use your most recent med list.                   Brand Name Dispense Instructions for use    aspirin 81 MG tablet      Take 81 mg by mouth daily       calcium carbonate-vitamin D 600-400 MG-UNIT Chew     180 tablet    Take 1 chew tab by mouth 2 times daily       fluticasone 50 MCG/ACT spray    FLONASE    48 g    Spray 2 sprays into both nostrils daily       HYDROcodone-acetaminophen 5-325 MG per tablet    NORCO    40 tablet    Take 1-2 tablets by mouth every 4 hours as needed for moderate to severe pain maximum 8 tablet(s) per day       levothyroxine 75 MCG tablet    SYNTHROID/LEVOTHROID    90 tablet    Take 1 tablet (75 mcg) by mouth daily       lisinopril-hydrochlorothiazide 20-12.5 MG per tablet    PRINZIDE/ZESTORETIC    90 tablet    Take 1 tablet by mouth daily       Multi-vitamin Tabs tablet     100 tablet    Take 1 tablet by mouth daily

## 2017-04-05 ENCOUNTER — TELEPHONE (OUTPATIENT)
Dept: ORTHOPEDICS | Facility: CLINIC | Age: 57
End: 2017-04-05

## 2017-04-05 DIAGNOSIS — Z12.11 SCREEN FOR COLON CANCER: ICD-10-CM

## 2017-04-05 DIAGNOSIS — Z00.00 ENCOUNTER FOR ROUTINE ADULT HEALTH EXAMINATION WITHOUT ABNORMAL FINDINGS: ICD-10-CM

## 2017-04-05 DIAGNOSIS — Z86.0100 HISTORY OF COLONIC POLYPS: ICD-10-CM

## 2017-04-05 LAB — HEMOCCULT STL QL IA: NEGATIVE

## 2017-04-05 NOTE — TELEPHONE ENCOUNTER
CATE called requesting updated OV notes and work restrictions form OV with Huber yesterday. Records should be faxed to [phone] once they are completed.     Nate Nance ATC

## 2017-04-06 NOTE — TELEPHONE ENCOUNTER
Written request was scanned asking for 4/4/17 office visit notes. Notes and work letter faxed to attn: Jacqueline Levin RN: [phone].     MARILOU Bentley RN

## 2017-04-07 ENCOUNTER — HOSPITAL ENCOUNTER (OUTPATIENT)
Dept: MAMMOGRAPHY | Facility: CLINIC | Age: 57
Discharge: HOME | End: 2017-04-07
Attending: FAMILY MEDICINE | Admitting: FAMILY MEDICINE
Payer: COMMERCIAL

## 2017-04-07 DIAGNOSIS — Z12.31 VISIT FOR SCREENING MAMMOGRAM: ICD-10-CM

## 2017-04-07 PROCEDURE — 77063 BREAST TOMOSYNTHESIS BI: CPT

## 2017-04-07 NOTE — TELEPHONE ENCOUNTER
Received voicemail from Jacqueline Levin, , work comp. She states she received the office visit notes. Is questioning why patient is not returning to full duty until 5/1/17. Asking if patient is to be off work until then or released with modified duty?  She can be reached at [phone]. Ok to leave message or to return call on Monday.     Please advise.     MARILOU Bentley RN

## 2017-04-11 ENCOUNTER — TELEPHONE (OUTPATIENT)
Dept: ORTHOPEDICS | Facility: CLINIC | Age: 57
End: 2017-04-11

## 2017-04-11 ENCOUNTER — TRANSFERRED RECORDS (OUTPATIENT)
Dept: HEALTH INFORMATION MANAGEMENT | Facility: CLINIC | Age: 57
End: 2017-04-11

## 2017-04-11 DIAGNOSIS — M25.561 ARTHRALGIA OF RIGHT LOWER LEG: Primary | ICD-10-CM

## 2017-04-11 NOTE — TELEPHONE ENCOUNTER
Pt states she was seen last week for her follow up and was recommended to try Naproxen / Aleve for her continued swelling and pain.  Pt states this has upset her stomach and is inquiring about alternatives?  Pt states she continues to have residual swelling and motion restrictions.

## 2017-04-12 RX ORDER — MELOXICAM 7.5 MG/1
7.5 TABLET ORAL DAILY
Qty: 30 TABLET | Refills: 1 | Status: SHIPPED | OUTPATIENT
Start: 2017-04-12 | End: 2018-01-15 | Stop reason: SINTOL

## 2017-04-12 NOTE — TELEPHONE ENCOUNTER
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  We are currently attempting to treat this but it has been difficult due to medication allergies.    Arnoldo Hassan PA-C  [facility] - Surgery

## 2017-04-12 NOTE — TELEPHONE ENCOUNTER
Will try Meloxicam once daily.  Escribed and pt notified.    Arnoldo Hassan PA-C  [facility] - Surgery

## 2017-04-20 ENCOUNTER — TELEPHONE (OUTPATIENT)
Dept: ORTHOPEDICS | Facility: CLINIC | Age: 57
End: 2017-04-20

## 2017-04-20 NOTE — TELEPHONE ENCOUNTER
Received voicemail from Angely Ambriz, Work comp wanting to confirm next follow up appointment with Dr. French. Please call Jacqueline Levin, nurse  :[phone], [phone].     Left voicemail for Jacqueline that next appointment scheduled is 4/27/17. If further information is needed, asked that they fax written request and fax number given.     MARILOU Bentley RN

## 2017-04-21 NOTE — TELEPHONE ENCOUNTER
Katina from [place] calls asking for same information that was already given. Informed voicemail was left yesterday that upcoming appointment is 4/27/17 with Huber Hassan PA-C and patient's last office visit was 4/4/17.     MARILOU Bentley RN

## 2017-04-27 ENCOUNTER — OFFICE VISIT (OUTPATIENT)
Dept: ORTHOPEDICS | Facility: CLINIC | Age: 57
End: 2017-04-27
Payer: OTHER MISCELLANEOUS

## 2017-04-27 DIAGNOSIS — Z47.89 ORTHOPEDIC AFTERCARE: Primary | ICD-10-CM

## 2017-04-27 DIAGNOSIS — M17.11 PRIMARY LOCALIZED OSTEOARTHROSIS, LOWER LEG, RIGHT: ICD-10-CM

## 2017-04-27 DIAGNOSIS — M17.11 ARTHRITIS OF KNEE, RIGHT: ICD-10-CM

## 2017-04-27 PROCEDURE — 20610 DRAIN/INJ JOINT/BURSA W/O US: CPT | Mod: 58 | Performed by: PHYSICIAN ASSISTANT

## 2017-04-27 PROCEDURE — 99207 ZZC NO CHARGE LOS: CPT | Mod: 25 | Performed by: PHYSICIAN ASSISTANT

## 2017-04-27 NOTE — LETTER
4/27/2017       RE: Lilly Barnes  [address] [facility] [address]           Dear Colleague,    Thank you for referring your patient, Lilly Barnes, to the [facility] ORTHOPEDIC SURGERY. Please see a copy of my visit note below.    HISTORY OF PRESENT ILLNESS:    Lilly Barnes is a 56 year old female who is seen in follow up for Rt knee arthroscopy, DOS 3/20/27, Dr. French..  Present symptoms: Pt reports having lots of pain and swelling still.  Limps on knee occasionally.  Treatments include RICE.  Using nothing for ambulation.  Trying to return to work.  No new complaints.  Denies Chest pain, Calve pain, Fever, Chills.    PHYSICAL EXAM:  LMP 09/15/2011  There is no height or weight on file to calculate BMI.   GENERAL APPEARANCE: healthy, alert and no distress   PSYCH:  mentation appears normal and affect normal/bright    MSK:  Right:  Knee.  Ambulates: slight limp on right first steps..  Incision clean and dry, well healed.   NO incisional erythema.   No Ecchymosis.  No calve pain on palpation.  Edema moderate at knee though difficult to assess due to BH.  CMS: ezekiel incisional numbness, otherwise grossly intact.  AROM Flexion 5-90.    IMAGING INTERPRETATION:  None today.    PROCEDURE: After discussion of risk, including but not limited to reaction, infection and Blood glucose elevation, vs benefits we decided to go ahead with a cortisone injection to the Right knee.  After proper skin prep, 8 ml of 1% Lidocaine, 40 mg Depomedrol and 4 mg dexamethasone were injected intra articularly to the right knee using a lateral infrapatella approach.  Pt tolerated procedure well and left office in stable condition       ASSESSMENT:  Lilly Barnes is a 56 year old female S/P Rt knee arthroscopy, DOS 3/20/27, Dr. French..  Inflammation post op with CM.    PLAN:  - Cortisone injection right knee  - care instructions given and verbally acknowledged.  - Medications: NSAIDS PRN.  - Physical Therapy: As  directed at discharge.  - AAT.    Return to clinic PRN    Arnoldo Hassan PA-C    Dept. Orthopedic Surgery  [facility]   5/1/2017          Again, thank you for allowing me to participate in the care of your patient.        Sincerely,              Arnoldo Hassan PA-C

## 2017-04-27 NOTE — MR AVS SNAPSHOT
After Visit Summary   4/27/2017    Lilly Barnes    MRN: [id number]           Patient Information     Date Of Birth          1960        Visit Information        Provider Department      4/27/2017 10:40 AM Arnoldo Hassan PA-C [facility] ORTHOPEDIC SURGERY        Today's Diagnoses     Orthopedic aftercare    -  1    Arthritis of knee, right        Primary localized osteoarthrosis, lower leg, right           Follow-ups after your visit        Follow-up notes from your care team     Return if symptoms worsen or fail to improve.      Who to contact     If you have questions or need follow up information about today's clinic visit or your schedule please contact [facility] ORTHOPEDIC SURGERY directly at [phone].  Normal or non-critical lab and imaging results will be communicated to you by [employer]ix[employer]hart, letter or phone within 4 business days after the clinic has received the results. If you do not hear from us within 7 days, please contact the clinic through [employer] or phone. If you have a critical or abnormal lab result, we will notify you by phone as soon as possible.  Submit refill requests through [employer] or call your pharmacy and they will forward the refill request to us. Please allow 3 business days for your refill to be completed.          Additional Information About Your Visit        MyChart Information     [employer] gives you secure access to your electronic health record. If you see a primary care provider, you can also send messages to your care team and make appointments. If you have questions, please call your primary care clinic.  If you do not have a primary care provider, please call [phone] and they will assist you.        Care EveryWhere ID     This is your Care EveryWhere ID. This could be used by other organizations to access your [place] medical records  [id number]        Your Vitals Were     Last Period                   09/15/2011            Blood  Pressure from Last 3 Encounters:   03/20/17 109/65   03/02/17 118/78   02/23/17 109/68    Weight from Last 3 Encounters:   03/20/17 176 lb (79.8 kg)   03/02/17 180 lb (81.6 kg)   02/23/17 180 lb (81.6 kg)              We Performed the Following     Depo Medrol 10 MG,  20 MG , or 40 MG    [] (Same charge for all MG)     DEXAMETHASONE 1 MG/ML     Lidocaine 1% or 2%     []          Today's Medication Changes          These changes are accurate as of: 4/27/17 11:59 PM.  If you have any questions, ask your nurse or doctor.               Start taking these medicines.        Dose/Directions    dexamethasone 4 MG/ML injection   Commonly known as:  DECADRON   Used for:  Arthritis of knee, right        Dose:  4 mg   Inject 1 mL (4 mg) as directed once for 1 dose Use 4 mg or dose determined by provider for iontophoresis.   Quantity:  1 mL   Refills:  0       lidocaine 1% with EPINEPHrine 1:100,000 1 %-1:921346 injection   Used for:  Arthritis of knee, right        Dose:  8 mL   Inject 8 mLs as directed once for 1 dose   Quantity:  8 mL   Refills:  0       methylPREDNISolone acetate 40 MG/ML injection   Commonly known as:  DEPO-MEDROL   Used for:  Arthritis of knee, right        Dose:  40 mg   1 mL (40 mg) by INTRA-ARTICULAR route once for 1 dose   Quantity:  1 mL   Refills:  0         These medicines have changed or have updated prescriptions.        Dose/Directions    fluticasone 50 MCG/ACT spray   Commonly known as:  FLONASE   This may have changed:    - when to take this  - reasons to take this   Used for:  Chronic rhinitis, Environmental allergies        Dose:  2 spray   Spray 2 sprays into both nostrils daily   Quantity:  48 g   Refills:  3            Where to get your medicines      Some of these will need a paper prescription and others can be bought over the counter.  Ask your nurse if you have questions.     You don't need a prescription for these medications     dexamethasone 4 MG/ML injection    lidocaine  1% with EPINEPHrine 1:100,000 1 %-1:261155 injection    methylPREDNISolone acetate 40 MG/ML injection                Primary Care Provider Office Phone # Fax #    Shelton Castellanos [phone]       [facility] [address] [facility] [address]        Thank you!     Thank you for choosing [facility] ORTHOPEDIC SURGERY  for your care. Our goal is always to provide you with excellent care. Hearing back from our patients is one way we can continue to improve our services. Please take a few minutes to complete the written survey that you may receive in the mail after your visit with us. Thank you!             Your Updated Medication List - Protect others around you: Learn how to safely use, store and throw away your medicines at www.disposemymeds.org.          This list is accurate as of: 4/27/17 11:59 PM.  Always use your most recent med list.                   Brand Name Dispense Instructions for use    aspirin 81 MG tablet      Take 81 mg by mouth daily       calcium carbonate-vitamin D 600-400 MG-UNIT Chew     180 tablet    Take 1 chew tab by mouth 2 times daily       dexamethasone 4 MG/ML injection    DECADRON    1 mL    Inject 1 mL (4 mg) as directed once for 1 dose Use 4 mg or dose determined by provider for iontophoresis.       fluticasone 50 MCG/ACT spray    FLONASE    48 g    Spray 2 sprays into both nostrils daily       HYDROcodone-acetaminophen 5-325 MG per tablet    NORCO    40 tablet    Take 1-2 tablets by mouth every 4 hours as needed for moderate to severe pain maximum 8 tablet(s) per day       levothyroxine 75 MCG tablet    SYNTHROID/LEVOTHROID    90 tablet    Take 1 tablet (75 mcg) by mouth daily       lidocaine 1% with EPINEPHrine 1:100,000 1 %-1:157619 injection     8 mL    Inject 8 mLs as directed once for 1 dose       lisinopril-hydrochlorothiazide 20-12.5 MG per tablet    PRINZIDE/ZESTORETIC    90 tablet    Take 1 tablet by mouth daily       meloxicam 7.5 MG  tablet    MOBIC    30 tablet    Take 1 tablet (7.5 mg) by mouth daily       methylPREDNISolone acetate 40 MG/ML injection    DEPO-MEDROL    1 mL    1 mL (40 mg) by INTRA-ARTICULAR route once for 1 dose       Multi-vitamin Tabs tablet     100 tablet    Take 1 tablet by mouth daily

## 2017-04-27 NOTE — LETTER
[facility]  [address] [facility] [address]  [facility] [address]  [phone]          April 27, 2017    RE:  Lilly Barnes                                                                                                                                                       [address] [facility] [address]            To whom it may concern:    Lilly Barnes is under my professional care for Right knee surgery.  She  may return to work with the following: The employee is UNABLE to return to work until 5/4/2017.    When the patient returns to work, the following applies.    Patient may work up to 5 hours per day.  Light duty:  Patient may lift up to 25 lbs    Restrictions will be in force through 5/19/2017.    Sincerely,        Arnoldo Hassan PA-C  [facility] - Surgery

## 2017-04-28 ENCOUNTER — TELEPHONE (OUTPATIENT)
Dept: ORTHOPEDICS | Facility: CLINIC | Age: 57
End: 2017-04-28

## 2017-05-01 NOTE — PROGRESS NOTES
HISTORY OF PRESENT ILLNESS:    Lilly Barnes is a 56 year old female who is seen in follow up for Rt knee arthroscopy, DOS 3/20/27, Dr. French..  Present symptoms: Pt reports having lots of pain and swelling still.  Limps on knee occasionally.  Treatments include RICE.  Using nothing for ambulation.  Trying to return to work.  No new complaints.  Denies Chest pain, Calve pain, Fever, Chills.    PHYSICAL EXAM:  LMP 09/15/2011  There is no height or weight on file to calculate BMI.   GENERAL APPEARANCE: healthy, alert and no distress   PSYCH:  mentation appears normal and affect normal/bright    MSK:  Right:  Knee.  Ambulates: slight limp on right first steps..  Incision clean and dry, well healed.   NO incisional erythema.   No Ecchymosis.  No calve pain on palpation.  Edema moderate at knee though difficult to assess due to BH.  CMS: ezekiel incisional numbness, otherwise grossly intact.  AROM Flexion 5-90.    IMAGING INTERPRETATION:  None today.    PROCEDURE: After discussion of risk, including but not limited to reaction, infection and Blood glucose elevation, vs benefits we decided to go ahead with a cortisone injection to the Right knee.  After proper skin prep, 8 ml of 1% Lidocaine, 40 mg Depomedrol and 4 mg dexamethasone were injected intra articularly to the right knee using a lateral infrapatella approach.  Pt tolerated procedure well and left office in stable condition       ASSESSMENT:  Lilly Barnes is a 56 year old female S/P Rt knee arthroscopy, DOS 3/20/27, Dr. French..  Inflammation post op with CM.    PLAN:  - Cortisone injection right knee  - care instructions given and verbally acknowledged.  - Medications: NSAIDS PRN.  - Physical Therapy: As directed at discharge.  - AAT.    Return to clinic PRN    Arnoldo Hassan PA-C    Dept. Orthopedic Surgery  [facility]   5/1/2017

## 2017-05-02 RX ORDER — DEXAMETHASONE SODIUM PHOSPHATE 4 MG/ML
4 INJECTION, SOLUTION INTRA-ARTICULAR; INTRALESIONAL; INTRAMUSCULAR; INTRAVENOUS; SOFT TISSUE ONCE
Qty: 1 ML | Refills: 0 | OUTPATIENT
Start: 2017-05-02 | End: 2017-07-12

## 2017-05-02 RX ORDER — METHYLPREDNISOLONE ACETATE 40 MG/ML
40 INJECTION, SUSPENSION INTRA-ARTICULAR; INTRALESIONAL; INTRAMUSCULAR; SOFT TISSUE ONCE
Qty: 1 ML | Refills: 0 | OUTPATIENT
Start: 2017-05-02 | End: 2017-05-02

## 2017-05-02 RX ORDER — LIDOCAINE HYDROCHLORIDE AND EPINEPHRINE 10; 10 MG/ML; UG/ML
8 INJECTION, SOLUTION INFILTRATION; PERINEURAL ONCE
Qty: 8 ML | Refills: 0 | OUTPATIENT
Start: 2017-05-02 | End: 2017-05-02

## 2017-05-03 PROBLEM — M25.561 ACUTE PAIN OF RIGHT KNEE: Status: RESOLVED | Noted: 2017-02-01 | Resolved: 2017-05-03

## 2017-05-19 ENCOUNTER — OFFICE VISIT (OUTPATIENT)
Dept: ORTHOPEDICS | Facility: CLINIC | Age: 57
End: 2017-05-19
Payer: OTHER MISCELLANEOUS

## 2017-05-19 DIAGNOSIS — Z47.89 ORTHOPEDIC AFTERCARE: Primary | ICD-10-CM

## 2017-05-19 DIAGNOSIS — M25.561 ARTHRALGIA OF RIGHT LOWER LEG: ICD-10-CM

## 2017-05-19 DIAGNOSIS — M25.561 RIGHT KNEE PAIN, UNSPECIFIED CHRONICITY: ICD-10-CM

## 2017-05-19 PROCEDURE — 99024 POSTOP FOLLOW-UP VISIT: CPT | Performed by: PHYSICIAN ASSISTANT

## 2017-05-19 NOTE — PROGRESS NOTES
HISTORY OF PRESENT ILLNESS:    Lilly Barnes is a 56 year old female who is seen in follow up for Rt knee arthroscopy, DOS 3/20/27, Dr. French (partial medial meniscectomy with CM PF and Med FC.  Present symptoms: Pt reports that there was in improvement in pain following last visit, following Cortisone injection, but after returned to work having pain again.  Notes increased pain and stiffness as day goes on, improves with rest.  Work is difficult, currently on reduced hours and lifting restrictions.  Treatments include RICE.  Using nothing for ambulation.  No new injuries..  No new complaints.  Denies Chest pain, Calve pain, Fever, Chills.    PHYSICAL EXAM:  LMP 09/15/2011  There is no height or weight on file to calculate BMI.   GENERAL APPEARANCE: healthy, alert and no distress   PSYCH:  mentation appears normal and affect normal/bright    MSK:  Right:  Knee.  Ambulates: limps on left.  Incision clean and dry, well healed.   No erythema.  Knee is warm to touch.  No calve pain on palpation.  Edema moderate effusion at knee none below knee.  CMS: ezekiel incisional numbness, otherwise grossly intact.  AROM Flexion 5-100 with discomfort at end flexion.    IMAGING INTERPRETATION:  None today.     ASSESSMENT:  Lilly Barnes is a 56 year old female S/P Rt knee arthroscopy, DOS 3/20/27, Dr. French...  Knee inflammation, no overt infection.  Aggravated by RTW.    PLAN:  - after discussion pt is not interested in NSAIDs or injection at this time.  Will consider Hyaluronic acid vs cortisone in 2 weeks.  - Knee sleeve fitted and dispensed for activities/support.  - RTW letter with continued restrictions.    Return to clinic 2-4 weeks.    Arnoldo Hassan PA-C    Dept. Orthopedic Surgery  [facility]   5/19/2017

## 2017-05-19 NOTE — PATIENT INSTRUCTIONS
Rest the affected painful area as much as possible.  Apply ice for 15-20 minutes intermittently as needed and especially after any offending activity. Daily stretching.  As pain recedes, begin normal activities slowly as tolerated.     May return in 2 weeks for injection.    Knee sleeve for activities.    Follow up in 2-4 weeks.

## 2017-05-19 NOTE — LETTER
5/19/2017       RE: Lilly Barnes  [address] [facility] [address]           Dear Colleague,    [facility] you [facility] Barnes[facility]. Please see a copy of my visit note below.    HISTORY OF PRESENT ILLNESS:    Lilly Barnes is a 56 year old female who is seen in follow up for Rt knee arthroscopy, DOS 3/20/27, Dr. French (partial medial meniscectomy with CM PF and Med FC.  Present symptoms: Pt reports that there was in improvement in pain following last visit, following Cortisone injection, but after returned to work having pain again.  Notes increased pain and stiffness as day goes on, improves with rest.  Work is difficult, currently on reduced hours and lifting restrictions.  Treatments include RICE.  Using nothing for ambulation.  No new injuries..  No new complaints.  Denies Chest pain, Calve pain, Fever, Chills.    PHYSICAL EXAM:  LMP 09/15/2011  There is no height or weight on file to calculate BMI.   GENERAL APPEARANCE: healthy, alert and no distress   PSYCH:  mentation appears normal and affect normal/bright    MSK:  Right:  Knee.  Ambulates: limps on left.  Incision clean and dry, well healed.   No erythema.  Knee is warm to touch.  No calve pain on palpation.  Edema moderate effusion at knee none below knee.  CMS: ezekiel incisional numbness, otherwise grossly intact.  AROM Flexion 5-100 with discomfort at end flexion.    IMAGING INTERPRETATION:  None today.     ASSESSMENT:  Lilly Barnes is a 56 year old female S/P Rt knee arthroscopy, DOS 3/20/27, Dr. French...  Knee inflammation, no overt infection.  Aggravated by RTW.    PLAN:  - after discussion pt is not interested in NSAIDs or injection at this time.  Will consider Hyaluronic acid vs cortisone in 2 weeks.  - Knee sleeve fitted and dispensed for activities/support.  - RTW letter with continued restrictions.    Return to clinic 2-4 weeks.    Arnoldo Hassan PA-C    Dept.  Orthopedic Surgery  [facility]   5/19/2017          Again, thank you for allowing me to participate in the care of your patient.        Sincerely,              Arnoldo Hassan PA-C

## 2017-05-19 NOTE — MR AVS SNAPSHOT
After Visit Summary   5/19/2017    Lilly Barnes    MRN: [id number]           Patient Information     Date Of Birth          1960        Visit Information        Provider Department      5/19/2017 11:00 AM Arnoldo Hassan PA-C [facility] ORTHOPEDIC SURGERY        Today's Diagnoses     Orthopedic aftercare    -  1    Arthralgia of right lower leg        Right knee pain, unspecified chronicity          Care Instructions    Rest the affected painful area as much as possible.  Apply ice for 15-20 minutes intermittently as needed and especially after any offending activity. Daily stretching.  As pain recedes, begin normal activities slowly as tolerated.     May return in 2 weeks for injection.    Knee sleeve for activities.    Follow up in 2-4 weeks.           Follow-ups after your visit        Follow-up notes from your care team     Return in about 4 weeks (around 6/16/2017).      Who to contact     If you have questions or need follow up information about today's clinic visit or your schedule please contact [facility] ORTHOPEDIC SURGERY directly at [phone].  Normal or non-critical lab and imaging results will be communicated to you by Protect[employer]isehart, letter or phone within 4 business days after the clinic has received the results. If you do not hear from us within 7 days, please contact the clinic through ASYM IIIt or phone. If you have a critical or abnormal lab result, we will notify you by phone as soon as possible.  Submit refill requests through [employer] or call your pharmacy and they will forward the refill request to us. Please allow 3 business days for your refill to be completed.          Additional Information About Your Visit        ProtectWisehart Information     [employer] gives you secure access to your electronic health record. If you see a primary care provider, you can also send messages to your care team and make appointments. If you have questions, please call your primary care  clinic.  If you do not have a primary care provider, please call [phone] and they will assist you.        Care EveryWhere ID     This is your Care EveryWhere ID. This could be used by other organizations to access your [facility] medical records  [id number]        Your Vitals Were     Last Period                   09/15/2011            Blood Pressure from Last 3 Encounters:   03/20/17 109/65   03/02/17 118/78   02/23/17 109/68    Weight from Last 3 Encounters:   03/20/17 176 lb (79.8 kg)   03/02/17 180 lb (81.6 kg)   02/23/17 180 lb (81.6 kg)              Today, you had the following     No orders found for display         Today's Medication Changes          These changes are accurate as of: 5/19/17  2:18 PM.  If you have any questions, ask your nurse or doctor.               Start taking these medicines.        Dose/Directions    order for DME   Used for:  Right knee pain, unspecified chronicity        Knee sleeve, large, open patella. D [id number].    Quantity:  1 Units   Refills:  0         These medicines have changed or have updated prescriptions.        Dose/Directions    fluticasone 50 MCG/ACT spray   Commonly known as:  FLONASE   This may have changed:    - when to take this  - reasons to take this   Used for:  Chronic rhinitis, Environmental allergies        Dose:  2 spray   Spray 2 sprays into both nostrils daily   Quantity:  48 g   Refills:  3            Where to get your medicines      Some of these will need a paper prescription and others can be bought over the counter.  Ask your nurse if you have questions.     Bring a paper prescription for each of these medications     order for DME                Primary Care Provider Office Phone # Fax #    Shelton Castellanos [phone]       [facility] [address] [place] [address]        Thank you!     Thank you for choosing [facility] ORTHOPEDIC SURGERY  for your care. Our goal is always to provide you with  excellent care. Hearing back from our patients is one way we can continue to improve our services. Please take a few minutes to complete the written survey that you may receive in the mail after your visit with us. Thank you!             Your Updated Medication List - Protect others around you: Learn how to safely use, store and throw away your medicines at www.disposemymeds.org.          This list is accurate as of: 5/19/17  2:18 PM.  Always use your most recent med list.                   Brand Name Dispense Instructions for use    aspirin 81 MG tablet      Take 81 mg by mouth daily       calcium carbonate-vitamin D 600-400 MG-UNIT Chew     180 tablet    Take 1 chew tab by mouth 2 times daily       dexamethasone 4 MG/ML injection    DECADRON    1 mL    Inject 1 mL (4 mg) as directed once for 1 dose Use 4 mg or dose determined by provider for iontophoresis.       fluticasone 50 MCG/ACT spray    FLONASE    48 g    Spray 2 sprays into both nostrils daily       HYDROcodone-acetaminophen 5-325 MG per tablet    NORCO    40 tablet    Take 1-2 tablets by mouth every 4 hours as needed for moderate to severe pain maximum 8 tablet(s) per day       levothyroxine 75 MCG tablet    SYNTHROID/LEVOTHROID    90 tablet    Take 1 tablet (75 mcg) by mouth daily       lisinopril-hydrochlorothiazide 20-12.5 MG per tablet    PRINZIDE/ZESTORETIC    90 tablet    Take 1 tablet by mouth daily       meloxicam 7.5 MG tablet    MOBIC    30 tablet    Take 1 tablet (7.5 mg) by mouth daily       Multi-vitamin Tabs tablet     100 tablet    Take 1 tablet by mouth daily       order for DME     1 Units    Knee sleeve, large, open patella. D [id number].

## 2017-05-19 NOTE — LETTER
[facility]ED[facility] SURG[facility]  [address] [facility] [address]  [facility] [address]  [phone]          May 19, 2017    RE:  Lilly Barnes                                                                                                                                                       [address] [facility] [address]            To whom it may concern:    Lilly Barnes is under my professional care for Right knee surgery.  She  may return to work with the following: The employee is able to work.    When the patient returns to work, the following applies.    Patient may work up to 5 hours per day.  Light duty:  Patient may lift up to 25 lbs    Restrictions will be in force through 6/19/2017.    Sincerely,        Arnoldo Hassan PA-C

## 2017-05-22 ENCOUNTER — TELEPHONE (OUTPATIENT)
Dept: ORTHOPEDICS | Facility: CLINIC | Age: 57
End: 2017-05-22

## 2017-05-22 NOTE — TELEPHONE ENCOUNTER
Martha from [employer], work comp calls asking if we received written request faxed last week regarding office visit dated 5/19/17. Informed those requests are handled by medical records and phone number given to contact them. She verbalized understanding.     MARILOU Bentley RN

## 2017-05-26 NOTE — TELEPHONE ENCOUNTER
Shannon called and left voicemail that she is looking for the office visit notes from 5/1917. I explained that the request was received and processed.    Delmar Appiah, ATC

## 2017-06-02 ENCOUNTER — TELEPHONE (OUTPATIENT)
Dept: ORTHOPEDICS | Facility: CLINIC | Age: 57
End: 2017-06-02

## 2017-06-02 NOTE — TELEPHONE ENCOUNTER
Patient left voicemail stating she continues to have knee stiffness, pain, limited ROM, and difficulty going up and down stairs. She is scheduled for a cortisone injection next week. Wanting to know what to do to manage pain in the meantime.     Patient had R knee scope 3/20/17, Dr. French.     Phone call to patient. Pain is increasing 24/7 since she went back to work. Nothing she takes touches it. Has to go down stairs one at a time. Does wear her knee brace until it becomes too painful. Has been icing, elevating and taking some ibuprofen. Has to limit ibuprofen due to kidney problems. She states the  outside of her knee and below the knee cap is swollen.   Discussed that she is doing all the right things. Recommended following up at appointment next week. She verbalized understanding.     MARILOU Bentley RN

## 2017-06-08 ENCOUNTER — OFFICE VISIT (OUTPATIENT)
Dept: ORTHOPEDICS | Facility: CLINIC | Age: 57
End: 2017-06-08
Payer: OTHER MISCELLANEOUS

## 2017-06-08 DIAGNOSIS — Z47.89 ORTHOPEDIC AFTERCARE: Primary | ICD-10-CM

## 2017-06-08 PROCEDURE — 99024 POSTOP FOLLOW-UP VISIT: CPT | Performed by: PHYSICIAN ASSISTANT

## 2017-06-08 NOTE — PROGRESS NOTES
HISTORY OF PRESENT ILLNESS:    Lilly Barnes is a 56 year old female who is seen in follow up for Rt knee arthroscopy, DOS 3/20/27, Dr. French (partial medial meniscectomy with CM grade 2 at PF and Med FC).  Present symptoms: Pt reports still painful.  Working 5 hours per day.  Stairs, getting out of care tough.  Swelling and stiffness.  Main pain is on lateral side, some instability but no locking or true give out.  Quick movement, especially pivot aggravates.  Treatments include RICE, Ibuprofen, knee brace.  Knee brace is not overly helpful, using for work, somewhat supportive and better as a reminder.  Using no assistive devices for ambulation.  Overall disappointed in progress, feels locking and catching is gone from before surgery and was having pain and swelling but intermittently preop where now pain and swelling is constant and inhibitting.  No new complaints.  Denies Chest pain, Calve pain, Fever, Chills.    PHYSICAL EXAM:  LMP 09/15/2011  There is no height or weight on file to calculate BMI.   GENERAL APPEARANCE: healthy, alert and no distress   PSYCH:  mentation appears normal and affect normal/bright    MSK:  Right: Knee.  Ambulates: limps on left first steps.  Incisions clean and dry, well healed.  NO erythema.   No Ecchymosis.  No calve pain on palpation.  Edema moderate effusion at knee.  Mild warmth to touch.  CMS: ezekiel incisional numbness, otherwise grossly intact.  AROM Flexion 0-100. With severe non catching crepitus at the PF.      IMAGING INTERPRETATION:  None today.     ASSESSMENT:  Lilly Barnes is a 56 year old female S/P Rt knee arthroscopy, DOS 3/20/27, Dr. French.  Ongoing inflammation and pain.    PLAN:  - RICE and brace as needed.  - NSAIDs  - Cortisone injection.  - RTW letter with restrictions.    Return to clinic after 4-6 weeks with Dr. French if no improvement or short lived improvement.    Arnoldo Hassan PA-C    Dept. Orthopedic Surgery  [facility]    6/8/2017

## 2017-06-08 NOTE — PATIENT INSTRUCTIONS
NSAIDS and icing for symptoms.    Return to clinic after 4-6 weeks with Dr. French if no improvement or short lived improvement.

## 2017-06-08 NOTE — MR AVS SNAPSHOT
After Visit Summary   6/8/2017    Lilly Barnes    MRN: [id number]           Patient Information     Date Of Birth          1960        Visit Information        Provider Department      6/8/2017 8:20 AM Arnoldo Hassan PA-C [facility] ORTHOPEDIC SURGERY        Today's Diagnoses     Orthopedic aftercare    -  1      Care Instructions    NSAIDS and icing for symptoms.    Return to clinic after 4-6 weeks with Dr. French if no improvement or short lived improvement.          Follow-ups after your visit        Follow-up notes from your care team     Return if symptoms worsen or fail to improve.      Your next 10 appointments already scheduled     Jul 12, 2017  2:00 PM CDT   Return Visit with Corbin French MD   [facility] ORTHOPEDIC SURGERY ([facility])    [address] [address]   [phone]              Who to contact     If you have questions or need follow up information about today's clinic visit or your schedule please contact [facility] ORTHOPEDIC SURGERY directly at [phone].  Normal or non-critical lab and imaging results will be communicated to you by MyChart, letter or phone within 4 business days after the clinic has received the results. If you do not hear from us within 7 days, please contact the clinic through Medialetshart or phone. If you have a critical or abnormal lab result, we will notify you by phone as soon as possible.  Submit refill requests through [employer] or call your pharmacy and they will forward the refill request to us. Please allow 3 business days for your refill to be completed.          Additional Information About Your Visit        MyChart Information     [employer] gives you secure access to your electronic health record. If you see a primary care provider, you can also send messages to your care team and make appointments. If you have questions, please call your primary care clinic.  If  you do not have a primary care provider, please call [phone] and they will assist you.        Care EveryWhere ID     This is your Care EveryWhere ID. This could be used by other organizations to access your [place] [facility] medical records  [id number]        Your Vitals Were     Last Period                   09/15/2011            Blood Pressure from Last 3 Encounters:   03/20/17 109/65   03/02/17 118/78   02/23/17 109/68    Weight from Last 3 Encounters:   03/20/17 176 lb (79.8 kg)   03/02/17 180 lb (81.6 kg)   02/23/17 180 lb (81.6 kg)              Today, you had the following     No orders found for display         Today's Medication Changes          These changes are accurate as of: 6/8/17  9:01 AM.  If you have any questions, ask your nurse or doctor.               These medicines have changed or have updated prescriptions.        Dose/Directions    fluticasone 50 MCG/ACT spray   Commonly known as:  FLONASE   This may have changed:    - when to take this  - reasons to take this   Used for:  Chronic rhinitis, Environmental allergies        Dose:  2 spray   Spray 2 sprays into both nostrils daily   Quantity:  48 g   Refills:  3                Primary Care Provider Office Phone # Fax #    Shelton Castellanos [phone]       [address] [address]        Thank you!     Thank you for choosing [facility] ORTHOPEDIC SURGERY  for your care. Our goal is always to provide you with excellent care. Hearing back from our patients is one way we can continue to improve our services. Please take a few minutes to complete the written survey that you may receive in the mail after your visit with us. Thank you!             Your Updated Medication List - Protect others around you: Learn how to safely use, store and throw away your medicines at www.disposemymeds.org.          This list is accurate as of: 6/8/17  9:01 AM.  Always use your most recent med list.                    Brand Name Dispense Instructions for use    aspirin 81 MG tablet      Take 81 mg by mouth daily       calcium carbonate-vitamin D 600-400 MG-UNIT Chew     180 tablet    Take 1 chew tab by mouth 2 times daily       dexamethasone 4 MG/ML injection    DECADRON    1 mL    Inject 1 mL (4 mg) as directed once for 1 dose Use 4 mg or dose determined by provider for iontophoresis.       fluticasone 50 MCG/ACT spray    FLONASE    48 g    Spray 2 sprays into both nostrils daily       HYDROcodone-acetaminophen 5-325 MG per tablet    NORCO    40 tablet    Take 1-2 tablets by mouth every 4 hours as needed for moderate to severe pain maximum 8 tablet(s) per day       levothyroxine 75 MCG tablet    SYNTHROID/LEVOTHROID    90 tablet    Take 1 tablet (75 mcg) by mouth daily       lisinopril-hydrochlorothiazide 20-12.5 MG per tablet    PRINZIDE/ZESTORETIC    90 tablet    Take 1 tablet by mouth daily       meloxicam 7.5 MG tablet    MOBIC    30 tablet    Take 1 tablet (7.5 mg) by mouth daily       Multi-vitamin Tabs tablet     100 tablet    Take 1 tablet by mouth daily       order for DME     1 Units    Knee sleeve, large, open patella. D [id number]. [id number]

## 2017-06-08 NOTE — LETTER
[facility]  [address] [address] [address]  [phone]          June 8, 2017    RE:  Lilly Barnes                                                                                                                                                       [address] [facility] [address]            To whom it may concern:    Lilly Barnes is under my professional care for Right knee surgery.  She  may return to work with the following: The employee is able to work.    When the patient returns to work, the following applies.    Patient may work up to 5 hours per day.  Light duty:  Patient may lift up to 25 lbs    Restrictions will be in force through 7/13/2017.    Sincerely,        Arnoldo Hassan PA-C

## 2017-07-12 ENCOUNTER — OFFICE VISIT (OUTPATIENT)
Dept: ORTHOPEDICS | Facility: CLINIC | Age: 57
End: 2017-07-12
Payer: OTHER MISCELLANEOUS

## 2017-07-12 VITALS
SYSTOLIC BLOOD PRESSURE: 106 MMHG | WEIGHT: 182 LBS | DIASTOLIC BLOOD PRESSURE: 68 MMHG | HEIGHT: 65 IN | BODY MASS INDEX: 30.32 KG/M2

## 2017-07-12 DIAGNOSIS — M22.41 CHONDROMALACIA OF PATELLA, RIGHT: Primary | ICD-10-CM

## 2017-07-12 PROCEDURE — 20610 DRAIN/INJ JOINT/BURSA W/O US: CPT | Mod: RT | Performed by: ORTHOPAEDIC SURGERY

## 2017-07-12 NOTE — PROGRESS NOTES
"HISTORY OF PRESENT ILLNESS:    Lilly Barnes is a 57 year old female who is seen in follow up for Rt knee arthroscopy, DOS 3/20/27, Dr. French (partial medial meniscectomy with CM PF and Med FC..  Present symptoms: Pt states she continues to have pain in the knee, mostly on the lateral and anterior aspect of the knee, states at times she will have pain in the posterior knee as well.  Pt states knee will get swollen and painful.  Pt reports having catching and locking with walking, pt states she can not kneel, going down stairs is difficult, pt states sit to stand transitions are difficult after sitting for extended periods.  Pt felt she had about 2 weeks relief from the injection she had with [facility].  Pt had cortisone injection on 6/8/2017 w/ Huber Hassan.    PHYSICAL EXAM:  /68 (BP Location: Right arm, Patient Position: Sitting, Cuff Size: Adult Regular)  Ht 5' 4.8\" (1.646 m)  Wt 182 lb (82.6 kg)  LMP 09/15/2011  BMI 30.47 kg/m2  Body mass index is 30.47 kg/(m^2).   GENERAL APPEARANCE: healthy, alert and no distress   SKIN: no suspicious lesions or rashes  NEURO: Normal strength and tone, mentation intact and speech normal  VASCULAR:  good pulses, and cappillary refill   LYMPH: no lymphadenopathy   PSYCH:  mentation appears normal and affect normal/bright    MSK:  Examination of her right kne reveals the surgical wounds to be well-healed. It's unclear  How much of an effusion she has. There is nowarmth and erythema. She has full extension and flexes to 130 . She is ligamentously stable.    IMAGING INTERPRETATION:       ASSESSMENT / PLAN:  Early primary osteoarthritis of the right knee. I offered her a series of Synvisc injections.  Procedure Note:  After risks and benefits were explained and questions answered, pt agreed to undergo a knee viscosupplementation injection. Using aseptic technique and a inferolateral parapatellar tendon approach, the joint space was infiltrated with 2ml of Synvisc.  There " were no complications and the patient tolerated the procedure well.        Return to clinic 1 week for injection 2 of 3.    Rip French MD  Dept. Orthopedic Surgery  [facility]     Disclaimer: This note consists of symbols derived from keyboarding, dictation and/or voice recognition software. As a result, there may be errors in the script that have gone undetected. Please consider this when interpreting information found in this chart.

## 2017-07-12 NOTE — NURSING NOTE
"Chief Complaint   Patient presents with     Surgical Followup     Rt knee arthroscopy, DOS 3/20/27, Dr. French (partial medial meniscectomy with CM PF and Med FC.       Initial /68 (BP Location: Right arm, Patient Position: Sitting, Cuff Size: Adult Regular)  Ht 5' 4.8\" (1.646 m)  Wt 182 lb (82.6 kg)  LMP 09/15/2011  BMI 30.47 kg/m2 Estimated body mass index is 30.47 kg/(m^2) as calculated from the following:    Height as of this encounter: 5' 4.8\" (1.646 m).    Weight as of this encounter: 182 lb (82.6 kg).  Medication Reconciliation: complete    "

## 2017-07-12 NOTE — MR AVS SNAPSHOT
"              After Visit Summary   7/12/2017    Lilly Barnes    MRN: [id number]           Patient Information     Date Of Birth          1960        Visit Information        Provider Department      7/12/2017 2:00 PM Corbin French MD [facility] ORTHOPEDIC SURGERY        Today's Diagnoses     Chondromalacia of patella, right    -  1       Follow-ups after your visit        Who to contact     If you have questions or need follow up information about today's clinic visit or your schedule please contact [facility] ORTHOPED[facility] SURGERY directly at [phone].  Normal or non-critical lab and imaging results will be communicated to you by [employer], letter or phone within 4 business days after the clinic has received the results. If you do not hear from us within 7 days, please contact the clinic through [employer] or phone. If you have a critical or abnormal lab result, we will notify you by phone as soon as possible.  Submit refill requests through [employer] or call your pharmacy and they will forward the refill request to us. Please allow 3 business days for your refill to be completed.          Additional Information About Your Visit        MyChart Information     [employer] gives you secure access to your electronic health record. If you see a primary care provider, you can also send messages to your care team and make appointments. If you have questions, please call your primary care clinic.  If you do not have a primary care provider, please call [phone] and they will assist you.        Care EveryWhere ID     This is your Care EveryWhere ID. This could be used by other organizations to access your [place] medical records  [id number]        Your Vitals Were     Height Last Period BMI (Body Mass Index)             5' 4.8\" (1.646 m) 09/15/2011 30.47 kg/m2          Blood Pressure from Last 3 Encounters:   08/02/17 110/64   07/26/17 100/66   07/12/17 106/68    Weight from Last 3 Encounters: "   08/02/17 182 lb (82.6 kg)   07/26/17 182 lb (82.6 kg)   07/12/17 182 lb (82.6 kg)              We Performed the Following     C SYNVISC PER 1 MG     DRAIN/INJECT LARGE JOINT/BURSA          Today's Medication Changes          These changes are accurate as of: 7/12/17 11:59 PM.  If you have any questions, ask your nurse or doctor.               Start taking these medicines.        Dose/Directions    Hylan 16 MG/2ML Sosy   Used for:  Chondromalacia of patella, right   Started by:  Corbin French MD        Dose:  2 mL   2 mLs by INTRA-ARTICULAR route once for 1 dose   Quantity:  2 mL   Refills:  0         These medicines have changed or have updated prescriptions.        Dose/Directions    fluticasone 50 MCG/ACT spray   Commonly known as:  FLONASE   This may have changed:    - when to take this  - reasons to take this   Used for:  Chronic rhinitis, Environmental allergies        Dose:  2 spray   Spray 2 sprays into both nostrils daily   Quantity:  48 g   Refills:  3            Where to get your medicines      Some of these will need a paper prescription and others can be bought over the counter.  Ask your nurse if you have questions.     You don't need a prescription for these medications     Hylan 16 MG/2ML Sosy                Primary Care Provider Office Phone # Fax #    Shelton Castellanos [phone]       [place] [address]        Equal Access to Services     [facility]: Hadii dustin villavicencio hadasho Soluci, waaxda luqadaha, qaybta kaalmada adeegyada, dagoberto maddox . So [facility] [phone].    ATENCIÓN: Si habla español, tiene a garcia disposición servicios gratuitos de asistencia lingüística. Llame al [phone].    We comply with applicable federal civil rights laws and [place] laws. We do not discriminate on the basis of race, color, national origin, age, disability sex, sexual orientation or gender identity.             Thank you!     Thank you for choosing [facility] ORTHOPEDIC SURGERY  for your care. Our goal is always to provide you with excellent care. Hearing back from our patients is one way we can continue to improve our services. Please take a few minutes to complete the written survey that you may receive in the mail after your visit with us. Thank you!             Your Updated Medication List - Protect others around you: Learn how to safely use, store and throw away your medicines at www.disposemymeds.org.          This list is accurate as of: 7/12/17 11:59 PM.  Always use your most recent med list.                   Brand Name Dispense Instructions for use Diagnosis    aspirin 81 MG tablet      Take 81 mg by mouth daily        calcium carbonate-vitamin D 600-400 MG-UNIT Chew     180 tablet    Take 1 chew tab by mouth 2 times daily        fluticasone 50 MCG/ACT spray    FLONASE    48 g    Spray 2 sprays into both nostrils daily    Chronic rhinitis, Environmental allergies       HYDROcodone-acetaminophen 5-325 MG per tablet    NORCO    40 tablet    Take 1-2 tablets by mouth every 4 hours as needed for moderate to severe pain maximum 8 tablet(s) per day    Acute medial meniscal injury of right knee, subsequent encounter       Hylan 16 MG/2ML Sosy     2 mL    2 mLs by INTRA-ARTICULAR route once for 1 dose    Chondromalacia of patella, right       levothyroxine 75 MCG tablet    SYNTHROID/LEVOTHROID    90 tablet    Take 1 tablet (75 mcg) by mouth daily    Acquired hypothyroidism       lisinopril-hydrochlorothiazide 20-12.5 MG per tablet    PRINZIDE/ZESTORETIC    90 tablet    Take 1 tablet by mouth daily    Hypertension goal BP (blood pressure) < 140/90       meloxicam 7.5 MG tablet    MOBIC    30 tablet    Take 1 tablet (7.5 mg) by mouth daily    Arthralgia of right lower leg       Multi-vitamin Tabs tablet     100 tablet    Take 1 tablet by mouth daily        order for DME     1 Units    Knee sleeve, large, open  patella. D [id number].     Right knee pain, unspecified chronicity

## 2017-07-12 NOTE — LETTER
[facility]  [address]  [place] [address]  [phone]        2017    RE:Lilly Barnes  [address] [address] [address]    1960              To whom it may concern:    Lilly Barnes is under my professional care for Chondromalacia of patella, right.   She may return to work on or about 2017 with the followin. Limit squatting and kneeling activities.  2. 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

## 2017-07-12 NOTE — LETTER
[facility]  [address]  [place] [address]  [phone]        2017    RE:Lilly Barnes  [address] [facility] [address]    1960              To whom it may concern:    Lilly Barnes is under my professional care for Post-traumatic osteoarthritis of right knee.   She may return to work on or about 2017 with the followin. Limit squatting and kneeling activities.  2. 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

## 2017-07-24 ENCOUNTER — TELEPHONE (OUTPATIENT)
Dept: ORTHOPEDICS | Facility: CLINIC | Age: 57
End: 2017-07-24

## 2017-07-24 NOTE — TELEPHONE ENCOUNTER
Patient left voicemail asking if Dr. French is able to prescribe something for knee pain as ibuprofen is not helping. Rates pain as +6-7/10 for several hours at a time. Has an appointment on 7/26/17 with Dr. French.     Patient's appointment on 7/26/17 is for #2/3 of Synvisc.     Takes Ibuprofen 400mg , twice daily only due to decreased kidney function.   Has also been icing and elevating when home from work.   Recently went back to work 8 hrs and on her feet the whole time.   Denies redness but reports some slight warmth to the touch. Also reports swelling that is about the same.   Informed that Dr. French does not usually recommend anything else for pain beside tylenol and ibuprofen until the Synvisc takes effect.   Recommended she take Tylenol 1000mg three times daily in addition to ibuprofen and follow up at appointment on 7/27/17.   She verbalized understanding.     MARILOU Bentley RN

## 2017-07-26 ENCOUNTER — OFFICE VISIT (OUTPATIENT)
Dept: ORTHOPEDICS | Facility: CLINIC | Age: 57
End: 2017-07-26
Payer: OTHER MISCELLANEOUS

## 2017-07-26 VITALS
BODY MASS INDEX: 31.07 KG/M2 | WEIGHT: 182 LBS | HEIGHT: 64 IN | DIASTOLIC BLOOD PRESSURE: 66 MMHG | SYSTOLIC BLOOD PRESSURE: 100 MMHG

## 2017-07-26 DIAGNOSIS — M22.41 CHONDROMALACIA OF PATELLA, RIGHT: Primary | ICD-10-CM

## 2017-07-26 PROCEDURE — 20610 DRAIN/INJ JOINT/BURSA W/O US: CPT | Mod: RT | Performed by: ORTHOPAEDIC SURGERY

## 2017-07-26 NOTE — MR AVS SNAPSHOT
"              After Visit Summary   7/26/2017    Lilly Barnes    MRN: [id number]           Patient Information     Date Of Birth          1960        Visit Information        Provider Department      7/26/2017 3:00 PM Corbin French MD [facility] ORTHOPEDIC SURGERY        Today's Diagnoses     Chondromalacia of patella, right    -  1       Follow-ups after your visit        Who to contact     If you have questions or need follow up information about today's clinic visit or your schedule please contact [facility] ORTHOPEDIC SURGERY directly at [phone].  Normal or non-critical lab and imaging results will be communicated to you by [employer], letter or phone within 4 business days after the clinic has received the results. If you do not hear from us within 7 days, please contact the clinic through [employer] or phone. If you have a critical or abnormal lab result, we will notify you by phone as soon as possible.  Submit refill requests through [employer] or call your pharmacy and they will forward the refill request to us. Please allow 3 business days for your refill to be completed.          Additional Information About Your Visit        MyChart Information     [employer] gives you secure access to your electronic health record. If you see a primary care provider, you can also send messages to your care team and make appointments. If you have questions, please call your primary care clinic.  If you do not have a primary care provider, please call [phone] and they will assist you.        Care EveryWhere ID     This is your Care EveryWhere ID. This could be used by other organizations to access your [place] medical records  [id number]        Your Vitals Were     Height Last Period BMI (Body Mass Index)             5' 4\" (1.626 m) 09/15/2011 31.24 kg/m2          Blood Pressure from Last 3 Encounters:   08/02/17 110/64   07/26/17 100/66   07/12/17 106/68    Weight from Last 3 Encounters: "   08/02/17 182 lb (82.6 kg)   07/26/17 182 lb (82.6 kg)   07/12/17 182 lb (82.6 kg)              We Performed the Following     C SYNVISC PER 1 MG     DRAIN/INJECT LARGE JOINT/BURSA          Today's Medication Changes          These changes are accurate as of: 7/26/17 11:59 PM.  If you have any questions, ask your nurse or doctor.               These medicines have changed or have updated prescriptions.        Dose/Directions    fluticasone 50 MCG/ACT spray   Commonly known as:  FLONASE   This may have changed:    - when to take this  - reasons to take this   Used for:  Chronic rhinitis, Environmental allergies        Dose:  2 spray   Spray 2 sprays into both nostrils daily   Quantity:  48 g   Refills:  3       * Hylan 16 MG/2ML Sosy   This may have changed:  Another medication with the same name was added. Make sure you understand how and when to take each.   Used for:  Chondromalacia of patella, right   Changed by:  Corbin French MD        Dose:  2 mL   2 mLs by INTRA-ARTICULAR route once for 1 dose   Quantity:  2 mL   Refills:  0       * Hylan 16 MG/2ML Sosy   This may have changed:  You were already taking a medication with the same name, and this prescription was added. Make sure you understand how and when to take each.   Used for:  Chondromalacia of patella, right   Changed by:  Corbin French MD        Dose:  2 mL   2 mLs by INTRA-ARTICULAR route once for 1 dose   Quantity:  2 mL   Refills:  0       * Notice:  This list has 2 medication(s) that are the same as other medications prescribed for you. Read the directions carefully, and ask your doctor or other care provider to review them with you.         Where to get your medicines      Some of these will need a paper prescription and others can be bought over the counter.  Ask your nurse if you have questions.     You don't need a prescription for these medications     Hylan 16 MG/2ML Sosy                Primary Care Provider Office Phone #  Fax #    Shelton Castellanos [phone] [address] [address]        Equal Access to Services     COURTNEY LUGO : Hadii aad ku hadper Diaz, waloulouda arletqkelly, qalulita kamoniqueda onel, dagoberto jonnyin hayaan kamalaravinder xiong varsha wolfe. So [facility] [phone].    ATENCIÓN: Si habla español, tiene a garcia disposición servicios gratuitos de asistencia lingüística. Llame al [phone].    We comply with applicable federal civil rights laws and [place] laws. We do not discriminate on the basis of race, color, national origin, age, disability sex, sexual orientation or gender identity.            Thank you!     Thank you for choosing [facility] ORTHOPEDIC SURGERY  for your care. Our goal is always to provide you with excellent care. Hearing back from our patients is one way we can continue to improve our services. Please take a few minutes to complete the written survey that you may receive in the mail after your visit with us. Thank you!             Your Updated Medication List - Protect others around you: Learn how to safely use, store and throw away your medicines at www.disposemymeds.org.          This list is accurate as of: 7/26/17 11:59 PM.  Always use your most recent med list.                   Brand Name Dispense Instructions for use Diagnosis    aspirin 81 MG tablet      Take 81 mg by mouth daily        calcium carbonate-vitamin D 600-400 MG-UNIT Chew     180 tablet    Take 1 chew tab by mouth 2 times daily        fluticasone 50 MCG/ACT spray    FLONASE    48 g    Spray 2 sprays into both nostrils daily    Chronic rhinitis, Environmental allergies       HYDROcodone-acetaminophen 5-325 MG per tablet    NORCO    40 tablet    Take 1-2 tablets by mouth every 4 hours as needed for moderate to severe pain maximum 8 tablet(s) per day    Acute medial meniscal injury of right knee, subsequent encounter       * Hylan 16 MG/2ML Sosy     2 mL    2 mLs by INTRA-ARTICULAR route once for 1  dose    Chondromalacia of patella, right       * Hylan 16 MG/2ML Sosy     2 mL    2 mLs by INTRA-ARTICULAR route once for 1 dose    Chondromalacia of patella, right       levothyroxine 75 MCG tablet    SYNTHROID/LEVOTHROID    90 tablet    Take 1 tablet (75 mcg) by mouth daily    Acquired hypothyroidism       lisinopril-hydrochlorothiazide 20-12.5 MG per tablet    PRINZIDE/ZESTORETIC    90 tablet    Take 1 tablet by mouth daily    Hypertension goal BP (blood pressure) < 140/90       meloxicam 7.5 MG tablet    MOBIC    30 tablet    Take 1 tablet (7.5 mg) by mouth daily    Arthralgia of right lower leg       Multi-vitamin Tabs tablet     100 tablet    Take 1 tablet by mouth daily        order for DME     1 Units    Knee sleeve, large, open patella. D [id number].     Right knee pain, unspecified chronicity       * Notice:  This list has 2 medication(s) that are the same as other medications prescribed for you. Read the directions carefully, and ask your doctor or other care provider to review them with you.

## 2017-07-26 NOTE — NURSING NOTE
"Chief Complaint   Patient presents with     RECHECK     Right knee, synvisc injection 2 of 3       Initial /66 (BP Location: Right arm, Patient Position: Sitting, Cuff Size: Adult Regular)  Ht 5' 4\" (1.626 m)  Wt 182 lb (82.6 kg)  LMP 09/15/2011  BMI 31.24 kg/m2 Estimated body mass index is 31.24 kg/(m^2) as calculated from the following:    Height as of this encounter: 5' 4\" (1.626 m).    Weight as of this encounter: 182 lb (82.6 kg).  Medication Reconciliation: complete    "

## 2017-07-26 NOTE — PROGRESS NOTES
"HISTORY OF PRESENT ILLNESS:    Lilly Barnes is a 57 year old female who is seen in follow up for right knee, synvisc injection 2 of 3.  Present symptoms: Pt states knee is very painful, swollen.  Pt felt she had minimal changes after the first injection.  Treatments tried to this point: synvisc injection, surgery    PHYSICAL EXAM:  /66 (BP Location: Right arm, Patient Position: Sitting, Cuff Size: Adult Regular)  Ht 5' 4\" (1.626 m)  Wt 182 lb (82.6 kg)  LMP 09/15/2011  BMI 31.24 kg/m2  Body mass index is 31.24 kg/(m^2).   GENERAL APPEARANCE: healthy, alert and no distress   SKIN: no suspicious lesions or rashes  NEURO: Normal strength and tone, mentation intact and speech normal  VASCULAR:  good pulses, and cappillary refill   LYMPH: no lymphadenopathy   PSYCH:  mentation appears normal and affect normal/bright    MSK:  The patient ambulates without an antalgic gait.  The patient is able to get on and off the exam table without difficulty.      Examination of the spine reveals a normal lordosis to the cervical and lumbar spine, and a normal kyphosis to the thoracic spine.  There is no clinical evidence of scoliosis.    The pelvis is clinically level.  Trendelenberg is negative.  The patient is non-tender to palpation over the greater trochanteric bursal region or piriformis fossa.  With the hip flexed to 90 degrees, internal and external rotation is 30/60 respectively,  with no pain .      KNEE: Examination of the right knee reveals the lower extremity to have a Normal anatomic alignment.  There is no erythema, ecchymosis nor edema.  There is an unclear effusion present clinically.  There is no significant warmth.  Range of motion is 0 to 120 degrees, without crepitus.  There is mild tenderness to palpation at the medial joint line.  Xiomara's is negative without crepitus. The knee is ligamentously stable. Patello-femoral grind test is positive.      The calves and thighs are symmetric, without atrophy " and non-tender to palpation. Jerod's sign is negative, bilaterally.   CMS is intact to the toes.       IMAGING INTERPRETATION:       ASSESSMENT / PLAN:    ICD-10-CM    1. Chondromalacia of patella, right M22.41 DRAIN/INJECT LARGE JOINT/BURSA     Hylan 16 MG/2ML SOSY     C SYNVISC PER 1 MG       Procedure Note: Right knee  After risks and benefits were explained and questions answered, pt agreed to undergo a knee viscosupplementation injection. Using aseptic technique and a inferolateral parapatellar tendon approach, the joint space was infiltrated with 2ml of Synvisc.  There were no complications and the patient tolerated the procedure well.        Return to clinic 1 week for Synvisc injection 3 of 3.    Rip French MD  Dept. Orthopedic Surgery  [facility]       Disclaimer: This note consists of symbols derived from keyboarding, dictation and/or voice recognition software. As a result, there may be errors in the script that have gone undetected. Please consider this when interpreting information found in this chart.

## 2017-08-02 ENCOUNTER — OFFICE VISIT (OUTPATIENT)
Dept: ORTHOPEDICS | Facility: CLINIC | Age: 57
End: 2017-08-02
Payer: OTHER MISCELLANEOUS

## 2017-08-02 VITALS
BODY MASS INDEX: 31.07 KG/M2 | SYSTOLIC BLOOD PRESSURE: 110 MMHG | DIASTOLIC BLOOD PRESSURE: 64 MMHG | HEIGHT: 64 IN | WEIGHT: 182 LBS

## 2017-08-02 DIAGNOSIS — M22.41 CHONDROMALACIA OF PATELLA, RIGHT: Primary | ICD-10-CM

## 2017-08-02 PROCEDURE — 20610 DRAIN/INJ JOINT/BURSA W/O US: CPT | Mod: RT | Performed by: ORTHOPAEDIC SURGERY

## 2017-08-02 NOTE — MR AVS SNAPSHOT
"              After Visit Summary   8/2/2017    Lilly Barnes    MRN: [id number]           Patient Information     Date Of Birth          1960        Visit Information        Provider Department      8/2/2017 3:00 PM Corbin French MD [facility] ORTHOPEDIC SURGERY        Today's Diagnoses     Chondromalacia of patella, right    -  1       Follow-ups after your visit        Who to contact     If you have questions or need follow up information about today's clinic visit or your schedule please contact [facility] ORTHOPEDIC SURGERY directly at [phone].  Normal or non-critical lab and imaging results will be communicated to you by PredPolhart, letter or phone within 4 business days after the clinic has received the results. If you do not hear from us within 7 days, please contact the clinic through Zenboxt or phone. If you have a critical or abnormal lab result, we will notify you by phone as soon as possible.  Submit refill requests through [employer] or call your pharmacy and they will forward the refill request to us. Please allow 3 business days for your refill to be completed.          Additional Information About Your Visit        MyChart Information     [employer] gives you secure access to your electronic health record. If you see a primary care provider, you can also send messages to your care team and make appointments. If you have questions, please call your primary care clinic.  If you do not have a primary care provider, please call [phone] and they will assist you.        Care EveryWhere ID     This is your Care EveryWhere ID. This could be used by other organizations to access your [place] medical records  [id number]        Your Vitals Were     Height Last Period BMI (Body Mass Index)             5' 4\" (1.626 m) 09/15/2011 31.24 kg/m2          Blood Pressure from Last 3 Encounters:   08/02/17 110/64   07/26/17 100/66   07/12/17 106/68    Weight from Last 3 Encounters: "   08/02/17 182 lb (82.6 kg)   07/26/17 182 lb (82.6 kg)   07/12/17 182 lb (82.6 kg)              We Performed the Following     C SYNVISC PER 1 MG     DRAIN/INJECT LARGE JOINT/BURSA          Today's Medication Changes          These changes are accurate as of: 8/2/17 11:59 PM.  If you have any questions, ask your nurse or doctor.               These medicines have changed or have updated prescriptions.        Dose/Directions    fluticasone 50 MCG/ACT spray   Commonly known as:  FLONASE   This may have changed:    - when to take this  - reasons to take this   Used for:  Chronic rhinitis, Environmental allergies        Dose:  2 spray   Spray 2 sprays into both nostrils daily   Quantity:  48 g   Refills:  3       * Hylan 16 MG/2ML Sosy   This may have changed:  Another medication with the same name was added. Make sure you understand how and when to take each.   Used for:  Chondromalacia of patella, right   Changed by:  Corbin French MD        Dose:  2 mL   2 mLs by INTRA-ARTICULAR route once for 1 dose   Quantity:  2 mL   Refills:  0       * Hylan 16 MG/2ML Sosy   This may have changed:  Another medication with the same name was added. Make sure you understand how and when to take each.   Used for:  Chondromalacia of patella, right   Changed by:  Corbin French MD        Dose:  2 mL   2 mLs by INTRA-ARTICULAR route once for 1 dose   Quantity:  2 mL   Refills:  0       * Hylan 16 MG/2ML Sosy   This may have changed:  You were already taking a medication with the same name, and this prescription was added. Make sure you understand how and when to take each.   Used for:  Chondromalacia of patella, right   Changed by:  Corbin French MD        Dose:  2 mL   2 mLs by INTRA-ARTICULAR route once for 1 dose   Quantity:  2 mL   Refills:  0       * Notice:  This list has 3 medication(s) that are the same as other medications prescribed for you. Read the directions carefully, and ask your doctor or  other care provider to review them with you.         Where to get your medicines      Some of these will need a paper prescription and others can be bought over the counter.  Ask your nurse if you have questions.     You don't need a prescription for these medications     Hylan 16 MG/2ML Yanely                Primary Care Provider Office Phone # Fax #    Shelton Castellanos [phone]       [address] [place] [address]        Equal Access to Services     ANURADHA [facility] : Hadii aad ku hadasho Soomaali, waaxda luqadaha, qaybta kaalmada adeegyada, waxay idiin hayaan adeeg kharash laabdiel . So [facility] [phone].    ATENCIÓN: Si priscila butterfield, tiene a garcia disposición servicios gratuitos de asistencia lingüística. [facility] [phone].    We comply with applicable federal civil rights laws and [place] laws. We do not discriminate on the basis of race, color, national origin, age, disability sex, sexual orientation or gender identity.            Thank you!     Thank you for choosing [facility] ORTHOPEDIC SURGERY  for your care. Our goal is always to provide you with excellent care. Hearing back from our patients is one way we can continue to improve our services. Please take a few minutes to complete the written survey that you may receive in the mail after your visit with us. Thank you!             Your Updated Medication List - Protect others around you: Learn how to safely use, store and throw away your medicines at www.disposemymeds.org.          This list is accurate as of: 8/2/17 11:59 PM.  Always use your most recent med list.                   Brand Name Dispense Instructions for use Diagnosis    aspirin 81 MG tablet      Take 81 mg by mouth daily        calcium carbonate-vitamin D 600-400 MG-UNIT Chew     180 tablet    Take 1 chew tab by mouth 2 times daily        fluticasone 50 MCG/ACT spray    FLONASE    48 g    Spray 2 sprays into both nostrils daily    Chronic rhinitis, Environmental  allergies       HYDROcodone-acetaminophen 5-325 MG per tablet    NORCO    40 tablet    Take 1-2 tablets by mouth every 4 hours as needed for moderate to severe pain maximum 8 tablet(s) per day    Acute medial meniscal injury of right knee, subsequent encounter       * Hylan 16 MG/2ML Sosy     2 mL    2 mLs by INTRA-ARTICULAR route once for 1 dose    Chondromalacia of patella, right       * Hylan 16 MG/2ML Sosy     2 mL    2 mLs by INTRA-ARTICULAR route once for 1 dose    Chondromalacia of patella, right       * Hylan 16 MG/2ML Sosy     2 mL    2 mLs by INTRA-ARTICULAR route once for 1 dose    Chondromalacia of patella, right       levothyroxine 75 MCG tablet    SYNTHROID/LEVOTHROID    90 tablet    Take 1 tablet (75 mcg) by mouth daily    Acquired hypothyroidism       lisinopril-hydrochlorothiazide 20-12.5 MG per tablet    PRINZIDE/ZESTORETIC    90 tablet    Take 1 tablet by mouth daily    Hypertension goal BP (blood pressure) < 140/90       meloxicam 7.5 MG tablet    MOBIC    30 tablet    Take 1 tablet (7.5 mg) by mouth daily    Arthralgia of right lower leg       Multi-vitamin Tabs tablet     100 tablet    Take 1 tablet by mouth daily        order for DME     1 Units    Knee sleeve, large, open patella. D [id number].     Right knee pain, unspecified chronicity       * Notice:  This list has 3 medication(s) that are the same as other medications prescribed for you. Read the directions carefully, and ask your doctor or other care provider to review them with you.

## 2017-08-02 NOTE — PROGRESS NOTES
"HISTORY OF PRESENT ILLNESS:    Lilly Barnes is a 57 year old female who is seen in follow up for right knee, synvisc injection 3 of 3.  Present symptoms: Pt states she has not seen much change in the knee after injection #2, states still can't go down stairs, kneel w/o pain.  Treatments tried to this point: synvisc injections, surgery    PHYSICAL EXAM:  /64 (BP Location: Right arm, Patient Position: Sitting, Cuff Size: Adult Regular)  Ht 5' 4\" (1.626 m)  Wt 182 lb (82.6 kg)  LMP 09/15/2011  BMI 31.24 kg/m2  Body mass index is 31.24 kg/(m^2).   GENERAL APPEARANCE: healthy, alert and no distress   SKIN: no suspicious lesions or rashes  NEURO: Normal strength and tone, mentation intact and speech normal  VASCULAR:  good pulses, and cappillary refill   LYMPH: no lymphadenopathy   PSYCH:  mentation appears normal and affect normal/bright    MSK:  The patient ambulates without an antalgic gait.  The patient is able to get on and off the exam table without difficulty.       Examination of the spine reveals a normal lordosis to the cervical and lumbar spine, and a normal kyphosis to the thoracic spine.  There is no clinical evidence of scoliosis.     The pelvis is clinically level.  Trendelenberg is negative.  The patient is non-tender to palpation over the greater trochanteric bursal region or piriformis fossa.  With the hip flexed to 90 degrees, internal and external rotation is 30/60 respectively,  with no pain .       KNEE: Examination of the right knee reveals the lower extremity to have a Normal anatomic alignment.  There is no erythema, ecchymosis nor edema.  There is an unclear effusion present clinically.  There is no significant warmth.  Range of motion is 0 to 120 degrees, without crepitus.  There is mild tenderness to palpation at the medial joint line.  Xiomara's is negative without crepitus. The knee is ligamentously stable. Patello-femoral grind test is positive.       The calves and thighs are " symmetric, without atrophy and non-tender to palpation. Jerod's sign is negative, bilaterally.   CMS is intact to the toes.       IMAGING INTERPRETATION:  No imaging ordered today     ASSESSMENT / PLAN:    ICD-10-CM    1. Chondromalacia of patella, right M22.41 DRAIN/INJECT LARGE JOINT/BURSA     Hylan 16 MG/2ML SOSY     C SYNVISC PER 1 MG         Procedure Note:  After risks and benefits were explained and questions answered, pt agreed to undergo a right knee viscosupplementation injection. Using aseptic technique and a inferolateral parapatellar tendon approach, the joint space was infiltrated with 2ml of Synvisc.  There were no complications and the patient tolerated the procedure well.    Return to clinic GURPREET French MD  Dept. Orthopedic Surgery  [facility]       Disclaimer: This note consists of symbols derived from keyboarding, dictation and/or voice recognition software. As a result, there may be errors in the script that have gone undetected. Please consider this when interpreting information found in this chart.

## 2017-08-03 ENCOUNTER — TELEPHONE (OUTPATIENT)
Dept: ORTHOPEDICS | Facility: CLINIC | Age: 57
End: 2017-08-03

## 2017-08-04 NOTE — TELEPHONE ENCOUNTER
Reason for Call: [employer] calls requesting updated work status, next follow up visit and any restrictions. This information can be faxed to [phone] or call returned to FAIZA Rodgers case manager at [phone].    Last OV date: 8/2/2017  Mary Richards ATC

## 2017-08-07 NOTE — TELEPHONE ENCOUNTER
Received voicemail from Angely Cabral, nurse  for work comp requesting if any future appointments have been scheduled. Also if any office visit notes are available and if so, to fax to: [phone], or call [phone].     Waiting for provider to complete note.     MARILOU Bentley RN

## 2017-08-10 NOTE — TELEPHONE ENCOUNTER
Completed OV notes faxed to [place]. No future appts at this time.    No action required.  For your information only.    Nate Nance ATC, ATR

## 2017-08-28 ENCOUNTER — OFFICE VISIT (OUTPATIENT)
Dept: ORTHOPEDICS | Facility: CLINIC | Age: 57
End: 2017-08-28
Payer: OTHER MISCELLANEOUS

## 2017-08-28 VITALS
SYSTOLIC BLOOD PRESSURE: 115 MMHG | BODY MASS INDEX: 31.07 KG/M2 | WEIGHT: 182 LBS | DIASTOLIC BLOOD PRESSURE: 70 MMHG | HEIGHT: 64 IN

## 2017-08-28 DIAGNOSIS — M17.11 PRIMARY LOCALIZED OSTEOARTHROSIS, LOWER LEG, RIGHT: Primary | ICD-10-CM

## 2017-08-28 DIAGNOSIS — M25.561 ARTHRALGIA OF RIGHT LOWER LEG: ICD-10-CM

## 2017-08-28 PROCEDURE — 99213 OFFICE O/P EST LOW 20 MIN: CPT | Performed by: ORTHOPAEDIC SURGERY

## 2017-08-28 NOTE — LETTER
[facility]THOPEDIC SURGERY  [address] [address] [address]  [phone]          August 28, 2017    RE:  Lilly Barnes                                                                                                                                                       [address] [facility] [address]            To whom it may concern:    Lilly Barnes is under my professional care for Chondromalacia patella. She  may return to work with the following: The employee is UNABLE to return to work until 8/29/17.    When the patient returns to work, the following restrictions apply until 12/31/17:  A) Bend: Frequently (4-8 hours)  B) Squat: Not at all (0 hours)  C) Walk/Stand: Frequently (4-8 hours)  D) Reach Above Shoulders: Frequently (4-8 hours)  E) Lift, carry, push, and pull no more than:  21-30 lbs.       Sincerely,        Corbin French MD

## 2017-08-28 NOTE — MR AVS SNAPSHOT
After Visit Summary   8/28/2017    Lilly Barnes    MRN: [id number]           Patient Information     Date Of Birth          1960        Visit Information        Provider Department      8/28/2017 3:00 PM Corbin French MD [facility] ORTHOPEDIC SURGERY        Today's Diagnoses     Primary localized osteoarthrosis, lower leg, right    -  1    Arthralgia of right lower leg           Follow-ups after your visit        Additional Services     [facility] PT, HAND, AND CHIROPRACTIC REFERRAL       **This order will print in the [facility] Scheduling Office**    Physical Therapy, Hand Therapy and Chiropractic Care are available through:    *[facility]  *[facility]  *[facility]    Call one number to schedule at any of the above locations: [phone].    Your provider has referred you to: Physical Therapy at [facility] or [facility]    Indication/Reason for Referral: Knee Pain  Onset of Illness: Chronic  Therapy Orders: Evaluate and Treat  Special Programs: None  Special Request: None    Kendall Argueta      Additional Comments for the Therapist or Chiropractor:     Please be aware that coverage of these services is subject to the terms and limitations of your health insurance plan.  Call member services at your health plan with any benefit or coverage questions.      Please bring the following to your appointment:    *Your personal calendar for scheduling future appointments  *Comfortable clothing                  Your next 10 appointments already scheduled     Sep 15, 2017  4:10 PM CDT   VIANEY Extremity with Valeri Torres, PT   [facility] ([facility]  )    [address] [address] [address]   [phone]            Sep 22, 2017  4:10 PM CDT   [facility] Extremity with Valeri Torres PT   [facility] ([facility]  )    [address] [address]   [phone]              Who to contact     If  "you have questions or need follow up information about today's clinic visit or your schedule please contact [facility] ORTHOPEDIC SURGERY directly at [phone].  Normal or non-critical lab and imaging results will be communicated to you by MyChart, letter or phone within 4 business days after the clinic has received the results. If you do not hear from us within 7 days, please contact the clinic through Maag[employer] or phone. If you have a critical or abnormal lab result, we will notify you by phone as soon as possible.  Submit refill requests through [employer] or call your pharmacy and they will forward the refill request to us. Please allow 3 business days for your refill to be completed.          Additional Information About Your Visit        [employer] Information     [employer] gives you secure access to your electronic health record. If you see a primary care provider, you can also send messages to your care team and make appointments. If you have questions, please call your primary care clinic.  If you do not have a primary care provider, please call [phone] and they will assist you.        Care EveryWhere ID     This is your Care EveryWhere ID. This could be used by other organizations to access your [place] medical records  [id number]        Your Vitals Were     Height Last Period BMI (Body Mass Index)             5' 4\" (1.626 m) 09/15/2011 31.24 kg/m2          Blood Pressure from Last 3 Encounters:   08/29/17 114/66   08/28/17 115/70   08/02/17 110/64    Weight from Last 3 Encounters:   08/29/17 182 lb 3.2 oz (82.6 kg)   08/28/17 182 lb (82.6 kg)   08/02/17 182 lb (82.6 kg)              We Performed the Following     [facility] PT, HAND, AND CHIROPRACTIC REFERRAL          Today's Medication Changes          These changes are accurate as of: 8/28/17 11:59 PM.  If you have any questions, ask your nurse or doctor.               These medicines have changed or have updated prescriptions.        Dose/Directions    " fluticasone 50 MCG/ACT spray   Commonly known as:  FLONASE   This may have changed:    - when to take this  - reasons to take this   Used for:  Chronic rhinitis, Environmental allergies        Dose:  2 spray   Spray 2 sprays into both nostrils daily   Quantity:  48 g   Refills:  3                Primary Care Provider Office Phone # Fax #    Shelton Castellanos [phone]       [address] [place] [address]        Equal Access to Services     [facility] : Hadii aad ku hadasho Soomaali, waaxda luqadaha, qaybta kaalmada adeegyada, waxay idiin hayaan adeeg kirstensteffjazmine maddox . So [facility] [phone].    ATENCIÓN: Si habla español, tiene a garcia disposición servicios gratuitos de asistencia lingüística. [facility] [phone].    We comply with applicable federal civil rights laws and [place] laws. We do not discriminate on the basis of race, color, national origin, age, disability sex, sexual orientation or gender identity.            Thank you!     Thank you for choosing [facility] ORTHOPEDIC SURGERY  for your care. Our goal is always to provide you with excellent care. Hearing back from our patients is one way we can continue to improve our services. Please take a few minutes to complete the written survey that you may receive in the mail after your visit with us. Thank you!             Your Updated Medication List - Protect others around you: Learn how to safely use, store and throw away your medicines at www.disposemymeds.org.          This list is accurate as of: 8/28/17 11:59 PM.  Always use your most recent med list.                   Brand Name Dispense Instructions for use Diagnosis    aspirin 81 MG tablet      Take 81 mg by mouth daily        calcium carbonate-vitamin D 600-400 MG-UNIT Chew     180 tablet    Take 1 chew tab by mouth 2 times daily        fluticasone 50 MCG/ACT spray    FLONASE    48 g    Spray 2 sprays into both nostrils daily    Chronic rhinitis, Environmental allergies        HYDROcodone-acetaminophen 5-325 MG per tablet    NORCO    40 tablet    Take 1-2 tablets by mouth every 4 hours as needed for moderate to severe pain maximum 8 tablet(s) per day    Acute medial meniscal injury of right knee, subsequent encounter       levothyroxine 75 MCG tablet    SYNTHROID/LEVOTHROID    90 tablet    Take 1 tablet (75 mcg) by mouth daily    Acquired hypothyroidism       meloxicam 7.5 MG tablet    MOBIC    30 tablet    Take 1 tablet (7.5 mg) by mouth daily    Arthralgia of right lower leg       Multi-vitamin Tabs tablet     100 tablet    Take 1 tablet by mouth daily        order for DME     1 Units    Knee sleeve, large, open patella. D [id number].     Right knee pain, unspecified chronicity

## 2017-08-28 NOTE — PROGRESS NOTES
"HISTORY OF PRESENT ILLNESS:    Lilly Barnes is a 57 year old female who is seen in follow up for right knee pain.  Patient reports that she is having minimal following synvisc series that was completed on 8/2/17.  Treatments tried to this point: arthroscopy on 4/11, steroid injections, synvisc injections    PHYSICAL EXAM:  /70  Ht 5' 4\" (1.626 m)  Wt 182 lb (82.6 kg)  LMP 09/15/2011  BMI 31.24 kg/m2  Body mass index is 31.24 kg/(m^2).   GENERAL APPEARANCE: healthy, alert and no distress   SKIN: no suspicious lesions or rashes  NEURO: Normal strength and tone, mentation intact and speech normal  VASCULAR:  good pulses, and cappillary refill   LYMPH: no lymphadenopathy   PSYCH:  mentation appears normal and affect normal/bright    MSK:  The patient ambulates without an antalgic gait.  The patient is able to get on and off the exam table without difficulty.      Examination of the spine reveals a normal lordosis to the cervical and lumbar spine, and a normal kyphosis to the thoracic spine.  There is no clinical evidence of scoliosis.    The pelvis is clinically level.  Trendelenberg is negative.  The patient is non-tender to palpation over the greater trochanteric bursal region or piriformis fossa.  With the hip flexed to 90 degrees, internal and external rotation is 30/60 respectively,  with no pain .      KNEE: Examination of the right knee reveals the lower extremity to have a Normal anatomic alignment.  There is no erythema, ecchymosis nor edema.  There is minimal effusion present clinically.  There is no significant warmth.  Range of motion is 0 to 130 degrees, without crepitus.  There is no tenderness to palpation at the both medial and lateral joint line.  Xiomara's is negative without crepitus. The knee is ligamentously stable. Patello-femoral grind test is positive.      The calves and thighs are symmetric, without atrophy and non-tender to palpation. Jerod's sign is negative, bilaterally.   CMS " is intact to the toes.       IMAGING INTERPRETATION:       ASSESSMENT / PLAN: Primary osteoarthritis of the right knee particularly of the patellofemoral joint. We had a long discussion today with regard to the treatment algorithm for osteoarthritis. She's not getting significant symptomatic relief from intra-articular injections. The arthroscopy did not give her significant improvement either. We discussed the potential risks as well as benefits of a total knee arthroplasty although I encouraged her to wait as long as possible. She'll make a decision sometime in the future.    Return to clinic GURPREET French MD  Dept. Orthopedic Surgery  [facility]       Disclaimer: This note consists of symbols derived from keyboarding, dictation and/or voice recognition software. As a result, there may be errors in the script that have gone undetected. Please consider this when interpreting information found in this chart.

## 2017-08-28 NOTE — NURSING NOTE
"Chief Complaint   Patient presents with     Knee Pain     f/u right knee pain       Initial /70  Ht 5' 4\" (1.626 m)  Wt 182 lb (82.6 kg)  LMP 09/15/2011  BMI 31.24 kg/m2 Estimated body mass index is 31.24 kg/(m^2) as calculated from the following:    Height as of this encounter: 5' 4\" (1.626 m).    Weight as of this encounter: 182 lb (82.6 kg).  Medication Reconciliation: complete     Kale Lynn ATC  "

## 2017-08-29 ENCOUNTER — OFFICE VISIT (OUTPATIENT)
Dept: FAMILY MEDICINE | Facility: CLINIC | Age: 57
End: 2017-08-29
Payer: OTHER MISCELLANEOUS

## 2017-08-29 VITALS
HEART RATE: 83 BPM | OXYGEN SATURATION: 98 % | DIASTOLIC BLOOD PRESSURE: 66 MMHG | TEMPERATURE: 98.1 F | BODY MASS INDEX: 31.1 KG/M2 | SYSTOLIC BLOOD PRESSURE: 114 MMHG | WEIGHT: 182.2 LBS | HEIGHT: 64 IN

## 2017-08-29 DIAGNOSIS — M17.11 PRIMARY OSTEOARTHRITIS OF RIGHT KNEE: ICD-10-CM

## 2017-08-29 DIAGNOSIS — M70.62 GREATER TROCHANTERIC BURSITIS OF LEFT HIP: Primary | ICD-10-CM

## 2017-08-29 PROCEDURE — 99213 OFFICE O/P EST LOW 20 MIN: CPT | Mod: 25 | Performed by: PHYSICIAN ASSISTANT

## 2017-08-29 PROCEDURE — 20610 DRAIN/INJ JOINT/BURSA W/O US: CPT | Mod: LT | Performed by: PHYSICIAN ASSISTANT

## 2017-08-29 NOTE — LETTER
[address] [address]                                                                                                       [phone]    August 29, 2017    Lilly Barnes  [address] [facility] [address]      To Whom it May Concern:    The above patient is unable to attend work through 9/3/2017 due to a medical issue that was secondary to her right knee workman's compensation issue.  Please contact me with questions or concerns.      Sincerely,    Maura Hopper PA-C

## 2017-08-29 NOTE — NURSING NOTE
"Chief Complaint   Patient presents with     Musculoskeletal Problem       Initial /66 (BP Location: Right arm, Patient Position: Chair, Cuff Size: Adult Regular)  Pulse 83  Temp 98.1  F (36.7  C) (Tympanic)  Ht 5' 4\" (1.626 m)  Wt 182 lb 3.2 oz (82.6 kg)  LMP 09/15/2011  SpO2 98%  Breastfeeding? No  BMI 31.27 kg/m2 Estimated body mass index is 31.27 kg/(m^2) as calculated from the following:    Height as of this encounter: 5' 4\" (1.626 m).    Weight as of this encounter: 182 lb 3.2 oz (82.6 kg).  Medication Reconciliation: complete     Dania Lucia MA     "

## 2017-08-29 NOTE — PROGRESS NOTES
SUBJECTIVE:   Lilly Barnes is a 57 year old female who presents to clinic today for the following health issues:    Joint Pain    Onset: x 10 days     Description:   Location: left hip  Character: Sharp - intermittent, but triggered when she stepped off of a curb today and has been constant since.   Currently, there is no difference in her pain between standing and sitting.    Intensity: 5/10    Progression of Symptoms: worse    Accompanying Signs & Symptoms:  Other symptoms: none    History:   Previous similar pain: no       Precipitating factors:   Trauma or overuse: YES - overuse - has a bad right knee     Alleviating factors:  Improved by: nothing    Therapies Tried and outcome: Nothing (she uses arthritis strength Tylenol for her knee and this helps a bit for this issue)    She has a PMH of right knee surgery (in March 2017) for which she thinks she's overcompensating on the left leg and might have led to her hip pain. Additionally, patient notes that her work requires her to stand for prolonged periods of time and this has exacerbated her pain.    Knee pain:  Cortisone injections have not been helpful for her in the past for her knee. She starts physical therapy this Friday (9/1) for this issue. Her knee pain has been a Worker's Comp issue and she states her workplace ([employer]) has not been helpful in working with her and being amenable to her work restrictions.     Problem list and histories reviewed & adjusted, as indicated.  Additional history: as documented    Patient Active Problem List   Diagnosis     Hypertension goal BP (blood pressure) < 140/90     CKD (chronic kidney disease) stage 3, GFR 30-59 ml/min     History of colonic polyps- 3 mm polyp.  needs every 3-5 yr surveillence- hyperplastic      Hyperlipidemia LDL goal <100     Chronic rhinitis     Osteopenia     Environmental allergies     Hypothyroidism, unspecified type     Advanced directives, counseling/discussion     Past Surgical History:  "  Procedure Laterality Date     ARTHROSCOPY KNEE WITH MEDIAL MENISCECTOMY Right 3/20/2017    ARTHROSCOPY KNEE WITH MEDIAL MENISCECTOMY - Dr French     COLONOSCOPY  12/6/2013    hyperplastic polyp - Dr Sheppard - due 3-5 yrs     SURGICAL HISTORY OF -   1997    laproscopic - left - for ectopic pregnancy       Social History   Substance Use Topics     Smoking status: Never Smoker     Smokeless tobacco: Never Used     Alcohol use 0.0 - 0.6 oz/week      Comment: 2 drinks per month     Family History   Problem Relation Age of Onset     DIABETES Mother      Depression Son      resolved     CANCER Sister 43     ovarian and uterine      CEREBROVASCULAR DISEASE Sister 55     Lipids Other      multiple members including 1 degree      DIABETES Brother      DIABETES Brother            Reviewed and updated as needed this visit by clinical staff  Tobacco  Allergies  Meds  Problems  Med Hx  Surg Hx  Fam Hx  Soc Hx        Reviewed and updated as needed this visit by Provider  Tobacco  Allergies  Meds  Problems  Med Hx  Surg Hx  Fam Hx  Soc Hx        ROS:  Constitutional, HEENT, cardiovascular, pulmonary, GI, , musculoskeletal, neuro, skin, endocrine and psych systems are negative, except as otherwise noted.    This document serves as a record of the services and decisions personally performed and made by Maura Hopper PA-C. It was created on her behalf by Prashanth Bhardwaj, a trained medical scribe. The creation of this document is based the provider's statements to the medical scribe. Prashanth Bhardwaj 8/29/2017 2:15 PM   OBJECTIVE:   /66 (BP Location: Right arm, Patient Position: Chair, Cuff Size: Adult Regular)  Pulse 83  Temp 98.1  F (36.7  C) (Tympanic)  Ht 5' 4\" (1.626 m)  Wt 182 lb 3.2 oz (82.6 kg)  LMP 09/15/2011  SpO2 98%  Breastfeeding? No  BMI 31.27 kg/m2  Body mass index is 31.27 kg/(m^2).    GENERAL: healthy, alert and no distress  EYES: Eyes grossly normal to inspection, PERRL, EOMI, " sclerae white and conjunctivae normal  MS: no gross musculoskeletal defects noted, no edema. Tenderness to the left ASIS, left IT band, and left greater trochanter.  SKIN: no suspicious lesions or rashes  NEURO: Normal strength and tone, sensory exam grossly normal, mentation intact, oriented times 3 and cranial nerves 2-12 intact  PSYCH: mentation appears normal, affect normal/bright     PROCEDURE:   After discussing the risks, benefits and alternatives to a left greater trochanteric cortisone injection the patient elected to proceed with the injection.  The lateral aspect of the left hip was prepped with a betadine solution.  Using a sterile technique and a 20 gauge spinal needle, 1.5 cc's of 1% lidocaine and 20 mg in Kenalog were injected into the left greater trochanteric bursa.  The patient tolerated the procedure well and there were no immediate adverse effects.       ASSESSMENT/PLAN:     [place] was seen today for musculoskeletal problem.    Diagnoses and all orders for this visit:    Greater trochanteric bursitis of left hip  Likely compensatory pain secondary to her osteoarthritis of the right knee. She has not had significant success with accomodations from employer for stool.  Is crouching often.  Note given to keep her off work until Sunday.  information on [facility] for Worker's Comp given as well as a work note (please see the note filed in the Letters section.)  -     [facility] PT, HAND, AND CHIROPRACTIC REFERRAL  -     Medium Joint/Bursa injection and/or drainage (Elbow, TM, Wrist, Ankle)  -     Kenalog 20 MG  []  -     triamcinolone acetonide (KENALOG) 10 MG/ML injection; 2 mLs (20 mg) by INTRA-ARTICULAR route once for 1 dose    Primary osteoarthritis of right knee  Work injury, had R knee arthroscopy on 3/20/17. She starts PT for this issue on 9/1.          The information in this document, created by the medical scribe for me, accurately reflects the services I personally performed and the decisions  made by me. I have reviewed and approved this document for accuracy prior to leaving the patient care area. August 29, 2017 2:17 PM        Maura Hopper PA-C  [facility] [place]

## 2017-08-29 NOTE — MR AVS SNAPSHOT
After Visit Summary   8/29/2017    Lilly Barnes    MRN: [id number]           Patient Information     Date Of Birth          1960        Visit Information        Provider Department      8/29/2017 1:40 PM Maura Hopper PA-C [facility]        Today's Diagnoses     Greater trochanteric bursitis of left hip    -  1    Primary osteoarthritis of right knee          Care Instructions                   Trochanteric Bursitis           What is trochanteric bursitis?   Trochanteric bursitis is irritation or inflammation of the trochanteric bursa. A bursa is a fluid-filled sac that acts as a cushion between tendons, bones, and skin. The trochanteric bursa is located on the upper, outer area of the thigh. There is a bump on the outer side of the upper part of the thigh bone (femur) called the greater trochanter. The trochanteric bursa is located over the greater trochanter.   How does it occur?   The trochanteric bursa may be inflamed by a group of muscles or tendons rubbing over the bursa and causing friction against the thigh bone. This injury can occur with running, walking, or bicycling, especially when the bicycle seat is too high.   What are the symptoms?   You have pain on the upper outer area of your thigh or in your hip. The pain is worse when you walk, bicycle, or go up or down stairs. You have pain when you move your thigh bone and feel tenderness in the area over the greater trochanter.   How is it diagnosed?   Your healthcare provider will ask about your symptoms and examine your hip and thigh.   How is it treated?   To treat this condition:   Put an ice pack, gel pack, or package of frozen vegetables, wrapped in a cloth on the area every 3 to 4 hours, for up to 20 minutes at a time.   Take an anti-inflammatory medicine such as ibuprofen, or other medicine as directed by your provider. Nonsteroidal anti-inflammatory medicines (NSAIDs) may cause stomach bleeding and other  problems. These risks increase with age. Read the label and take as directed. Unless recommended by your healthcare provider, do not take for more than 10 days.   Your provider may give you an injection of a corticosteroid medicine.   While you are recovering from your injury you will need to change your sport or activity to one that does not make your condition worse. For example, you may need to swim instead of running or bicycling. If you are bicycling, you may need to lower your bicycle seat.   How long do the effects last?   The length of recovery depends on many factors such as your age, health, and if you have had a previous injury. Recovery time also depends on the severity of the injury. A bursa that is only mildly inflamed and has just started to hurt may improve within a few weeks. A bursa that is significantly inflamed and has been painful for a long time may take up to a few months to improve. You need to stop doing the activities that cause pain until your bursa has healed. If you continue doing activities that cause pain, your symptoms will return and it will take longer to recover.   When can I return to my normal activities?   Everyone recovers from an injury at a different rate. Return to your activities depends on how soon your leg recovers, not by how many days or weeks it has been since your injury has occurred. In general, the longer you have symptoms before you start treatment, the longer it will take to get better. The goal of rehabilitation is to return to your normal activities as soon as is safely possible. If you return too soon you may worsen your injury.   You may safely return to your normal activities when, starting from the top of the list and progressing to the end, each of the following is true:   You have full range of motion in the injured leg compared to the uninjured leg.   You have full strength of the injured leg compared to the uninjured leg.   You can walk straight ahead  without pain or limping.   How can I prevent trochanteric bursitis?   Trochanteric bursitis is best prevented by warming up properly and stretching the muscles on the outer side of your upper thigh.     Published by [employer].  This content is reviewed periodically and is subject to change as new health information becomes available. The information is intended to inform and educate and is not a replacement for medical evaluation, advice, diagnosis or treatment by a healthcare professional.   Written by Remington Ardon MD, for [employer].   ? 2010 [facility] and/or its affiliates. All Rights Reserved.   Copyright   Clinical Reference Systems 2011         Trochanteric Bursitis Rehabilitation Exercises   You can begin stretching the muscles that run along the outside of your hip using the first two exercise. You can do the strengthening exercises when the sharp pain lessens.   Stretching exercises     Piriformis stretch: Lying on your back with both knees bent, rest the ankle of your injured leg over the knee of your uninjured leg. Grasp the thigh of your uninjured leg and pull that knee toward your chest. You will feel a stretch along the buttocks and possibly along the outside of your hip on the injured side. Hold this for 15 to 30 seconds. Repeat 3 times.     Iliotibial band stretch (standing): Cross your uninjured leg in front of your injured leg and bend down and touch your toes. You can move your hands across the floor toward the uninjured side and you will feel more stretch on the outside of your thigh on the injured side. Hold this position for 15 to 30 seconds. Return to the starting position. Repeat 3 times.   Strengthening exercises     Straight leg raise: Lie on your back with your legs straight out in front of you. Tighten up the top of your thigh muscle on the injured leg and lift that leg about 8 inches off the floor, keeping the thigh muscle tight throughout. Slowly lower your leg back down to the  floor. Do 3 sets of 10.     Wall squat with a ball: Stand with your back, shoulders, and head against a wall and look straight ahead. Keep your shoulders relaxed and your feet 1 foot away from the wall and a shoulder's width apart. Place a rolled up pillow or a soccer-sized ball between your thighs. Keeping your head against the wall, slowly squat while squeezing the pillow or ball at the same time. Squat down until you are almost in a sitting position. Your thighs will not yet be parallel to the floor. Hold this position for 10 seconds and then slowly slide back up the wall. Make sure you keep squeezing the pillow or ball throughout this exercise. Repeat 10 times. Build up to 3 sets of 10.     Prone hip extension: Lie on your stomach with your legs straight out behind you. Tighten up your buttocks muscles and lift one leg off the floor about 8 inches. Keep your knee straight. Hold for 5 seconds. Then lower your leg and relax. Do 3 sets of 10.   Written by Keri Blandon M.S., P.T., for [employer].   Published by [employer].   This content is reviewed periodically and is subject to change as new health information becomes available. The information is intended to inform and educate and is not a replacement for medical evaluation, advice, diagnosis or treatment by a healthcare professional.   Sports Medicine Advisor 2003.1 Index  Sports Medicine Advisor 2003.1 Credits   Copyright   2003 [employer]. All rights reserved.   Page footer image                        Follow-ups after your visit        Additional Services     [facility] PT, HAND, AND CHIROPRACTIC REFERRAL       **This order will print in the [facility] Scheduling Office**    Physical Therapy, Hand Therapy and Chiropractic Care are available through:    *[facility]  *[facility]  *[facility]    Call one number to schedule at any of the above locations: [phone].    Your provider has referred you to: Physical Therapy at [facility] or [facility]    Indication/Reason for Referral: Left hip pain  Onset of Illness: 10 days  Therapy Orders: Evaluate and Treat  Special Programs: None  Special Request: None    Kendall Argueta      Additional Comments for the Therapist or Chiropractor: none    Please be aware that coverage of these services is subject to the terms and limitations of your health insurance plan.  Call member services at your health plan with any benefit or coverage questions.      Please bring the following to your appointment:    *Your personal calendar for scheduling future appointments  *Comfortable clothing                  Your next 10 appointments already scheduled     Sep 01, 2017  2:10 PM CDT   (Arrive by 1:55 PM)   [facility] Extremity with Valeri R Schull, PT   [facility] ([facility] [place]  )    [id number] [place] [address]   [phone]            Sep 08, 2017  4:50 PM CDT   [facility] Extremity with Valeri R Schull, PT   [facility] ([facility] [place]  )    [id number] [place] [address]   [phone]            Sep 15, 2017  4:10 PM CDT   [facility] Extremity with Valeri R Schull, PT   [facility] ([facility] [place]  )    [id number] [place] [address]   [phone]            Sep 22, 2017  4:10 PM CDT   [facility] Extremity with Valeri R Schull, PT   [facility] ([facility] [place]  )    [address] [place] [address]   [phone]              Who to contact     If you have questions or need follow up information about today's clinic visit or your schedule please contact [facility] directly at [phone].  Normal or non-critical lab and imaging results will be communicated to you by MyChart, letter or phone within 4 business days after the clinic has received the results. If you do not hear from us within 7 days, please contact the clinic through  "MyChart or phone. If you have a critical or abnormal lab result, we will notify you by phone as soon as possible.  Submit refill requests through [employer] or call your pharmacy and they will forward the refill request to us. Please allow 3 business days for your refill to be completed.          Additional Information About Your Visit        [employer] Information     [employer] gives you secure access to your electronic health record. If you see a primary care provider, you can also send messages to your care team and make appointments. If you have questions, please call your primary care clinic.  If you do not have a primary care provider, please call [phone] and they will assist you.        Care EveryWhere ID     This is your Care EveryWhere ID. This could be used by other organizations to access your [facility] medical records  [id number]        Your Vitals Were     Pulse Temperature Height Last Period Pulse Oximetry Breastfeeding?    83 98.1  F (36.7  C) (Tympanic) 5' 4\" (1.626 m) 09/15/2011 98% No    BMI (Body Mass Index)                   31.27 kg/m2            Blood Pressure from Last 3 Encounters:   08/29/17 114/66   08/28/17 115/70   08/02/17 110/64    Weight from Last 3 Encounters:   08/29/17 182 lb 3.2 oz (82.6 kg)   08/28/17 182 lb (82.6 kg)   08/02/17 182 lb (82.6 kg)              We Performed the Following     [facility] PT, HAND, AND CHIROPRACTIC REFERRAL     Kenalog 20 MG  []     Medium Joint/Bursa injection and/or drainage (Elbow, TM, Wrist, Ankle)          Today's Medication Changes          These changes are accurate as of: 8/29/17  2:29 PM.  If you have any questions, ask your nurse or doctor.               Start taking these medicines.        Dose/Directions    triamcinolone acetonide 10 MG/ML injection   Commonly known as:  KENALOG   Used for:  Greater trochanteric bursitis of left hip   Started by:  Maura Hopper PA-C        Dose:  20 mg   2 mLs (20 mg) by INTRA-ARTICULAR route once for 1 " dose   Quantity:  2 mL   Refills:  0         These medicines have changed or have updated prescriptions.        Dose/Directions    fluticasone 50 MCG/ACT spray   Commonly known as:  FLONASE   This may have changed:    - when to take this  - reasons to take this   Used for:  Chronic rhinitis, Environmental allergies        Dose:  2 spray   Spray 2 sprays into both nostrils daily   Quantity:  48 g   Refills:  3            Where to get your medicines      Some of these will need a paper prescription and others can be bought over the counter.  Ask your nurse if you have questions.     You don't need a prescription for these medications     triamcinolone acetonide 10 MG/ML injection                Primary Care Provider Office Phone # Fax #    Shelton Castellanos [phone]       [address] [place] [address]        Equal Access to Services     COURTNEY LUGO : Jeanette hernandez Soluci, waaxda luqadaha, qaybta kaalmada adeegyada, dagoberto maddox . [facility] [phone].    ATENCIÓN: Si habla español, tiene a garcia disposición servicios gratuitos de asistencia lingüística. [facility] [phone].    We comply with applicable federal civil rights laws and [place] laws. We do not discriminate on the basis of race, color, national origin, age, disability sex, sexual orientation or gender identity.            Thank you!     Thank you for choosing [facility]  for your care. Our goal is always to provide you with excellent care. Hearing back from our patients is one way we can continue to improve our services. Please take a few minutes to complete the written survey that you may receive in the mail after your visit with us. Thank you!             Your Updated Medication List - Protect others around you: Learn how to safely use, store and throw away your medicines at www.disposemymeds.org.          This list is accurate as of: 8/29/17  2:29 PM.  Always use your most  recent med list.                   Brand Name Dispense Instructions for use Diagnosis    aspirin 81 MG tablet      Take 81 mg by mouth daily        calcium carbonate-vitamin D 600-400 MG-UNIT Chew     180 tablet    Take 1 chew tab by mouth 2 times daily        fluticasone 50 MCG/ACT spray    FLONASE    48 g    Spray 2 sprays into both nostrils daily    Chronic rhinitis, Environmental allergies       HYDROcodone-acetaminophen 5-325 MG per tablet    NORCO    40 tablet    Take 1-2 tablets by mouth every 4 hours as needed for moderate to severe pain maximum 8 tablet(s) per day    Acute medial meniscal injury of right knee, subsequent encounter       levothyroxine 75 MCG tablet    SYNTHROID/LEVOTHROID    90 tablet    Take 1 tablet (75 mcg) by mouth daily    Acquired hypothyroidism       lisinopril-hydrochlorothiazide 20-12.5 MG per tablet    PRINZIDE/ZESTORETIC    90 tablet    Take 1 tablet by mouth daily    Hypertension goal BP (blood pressure) < 140/90       meloxicam 7.5 MG tablet    MOBIC    30 tablet    Take 1 tablet (7.5 mg) by mouth daily    Arthralgia of right lower leg       Multi-vitamin Tabs tablet     100 tablet    Take 1 tablet by mouth daily        order for DME     1 Units    Knee sleeve, large, open patella. D [id number].     Right knee pain, unspecified chronicity       triamcinolone acetonide 10 MG/ML injection    KENALOG    2 mL    2 mLs (20 mg) by INTRA-ARTICULAR route once for 1 dose    Greater trochanteric bursitis of left hip

## 2017-09-01 ENCOUNTER — THERAPY VISIT (OUTPATIENT)
Dept: PHYSICAL THERAPY | Facility: CLINIC | Age: 57
End: 2017-09-01
Payer: OTHER MISCELLANEOUS

## 2017-09-01 DIAGNOSIS — M25.561 KNEE PAIN, RIGHT: Primary | ICD-10-CM

## 2017-09-01 PROCEDURE — 97110 THERAPEUTIC EXERCISES: CPT | Mod: GP | Performed by: PHYSICAL THERAPIST

## 2017-09-01 PROCEDURE — 97161 PT EVAL LOW COMPLEX 20 MIN: CPT | Mod: GP | Performed by: PHYSICAL THERAPIST

## 2017-09-01 ASSESSMENT — ACTIVITIES OF DAILY LIVING (ADL)
PAIN: THE SYMPTOM AFFECTS MY ACTIVITY MODERATELY
SWELLING: THE SYMPTOM AFFECTS MY ACTIVITY SLIGHTLY
KNEEL ON THE FRONT OF YOUR KNEE: I AM UNABLE TO DO THE ACTIVITY
HOW_WOULD_YOU_RATE_THE_OVERALL_FUNCTION_OF_YOUR_KNEE_DURING_YOUR_USUAL_DAILY_ACTIVITIES?: ABNORMAL
AS_A_RESULT_OF_YOUR_KNEE_INJURY,_HOW_WOULD_YOU_RATE_YOUR_CURRENT_LEVEL_OF_DAILY_ACTIVITY?: ABNORMAL
HOW_WOULD_YOU_RATE_THE_CURRENT_FUNCTION_OF_YOUR_KNEE_DURING_YOUR_USUAL_DAILY_ACTIVITIES_ON_A_SCALE_FROM_0_TO_100_WITH_100_BEING_YOUR_LEVEL_OF_KNEE_FUNCTION_PRIOR_TO_YOUR_INJURY_AND_0_BEING_THE_INABILITY_TO_PERFORM_ANY_OF_YOUR_USUAL_DAILY_ACTIVITIES?: 40
WEAKNESS: THE SYMPTOM AFFECTS MY ACTIVITY MODERATELY
RISE FROM A CHAIR: ACTIVITY IS SOMEWHAT DIFFICULT
KNEE_ACTIVITY_OF_DAILY_LIVING_SUM: 28
STAND: ACTIVITY IS FAIRLY DIFFICULT
GO DOWN STAIRS: ACTIVITY IS VERY DIFFICULT
RAW_SCORE: 28
KNEE_ACTIVITY_OF_DAILY_LIVING_SCORE: 40
SIT WITH YOUR KNEE BENT: ACTIVITY IS SOMEWHAT DIFFICULT
LIMPING: THE SYMPTOM AFFECTS MY ACTIVITY MODERATELY
GIVING WAY, BUCKLING OR SHIFTING OF KNEE: THE SYMPTOM AFFECTS MY ACTIVITY SEVERELY
GO UP STAIRS: ACTIVITY IS SOMEWHAT DIFFICULT
SQUAT: I AM UNABLE TO DO THE ACTIVITY
WALK: ACTIVITY IS SOMEWHAT DIFFICULT
STIFFNESS: THE SYMPTOM AFFECTS MY ACTIVITY SLIGHTLY

## 2017-09-01 NOTE — MR AVS SNAPSHOT
After Visit Summary   9/1/2017    Lilly Barnes    MRN: [id number]           Patient Information     Date Of Birth          1960        Visit Information        Provider Department      9/1/2017 2:10 PM Valeri Torres, PT [facility]        Today's Diagnoses     Knee pain, right    -  1       Follow-ups after your visit        Your next 10 appointments already scheduled     Sep 08, 2017  4:50 PM CDT   [facility] Extremity with Valeri Torres, PT   [facility] ([facility] [place]  )    [id number] [place] [address]  [address] [place] [address]   [phone]            Sep 15, 2017  4:10 PM CDT   [facility] Extremity with Valeri R Addisonl, PT   [facility] ([facility] [place]  )    [address] [facility]  [address] [place] [address]   [phone]            Sep 22, 2017  4:10 PM CDT   [facility] Extremity with Valeri Torres, PT   [facility] ([facility] [place]  )    [address] [address] [address]   [phone]              Who to contact     If you have questions or need follow up information about today's clinic visit or your schedule please contact [facility] directly at [phone].  Normal or non-critical lab and imaging results will be communicated to you by itravelhart, letter or phone within 4 business days after the clinic has received the results. If you do not hear from us within 7 days, please contact the clinic through itravelhart or phone. If you have a critical or abnormal lab result, we will notify you by phone as soon as possible.  Submit refill requests through [employer] or call your pharmacy and they will forward the refill request to us. Please allow 3 business days for your refill to be completed.          Additional Information About Your Visit        [employer] Information     [employer] gives you secure access to your electronic health record. If you see a primary care provider, you can also send messages to your care team and make appointments. If you  have questions, please call your primary care clinic.  If you do not have a primary care provider, please call [phone] and they will assist you.        Care EveryWhere ID     This is your Care EveryWhere ID. This could be used by other organizations to access your [facility] medical records  [id number]        Your Vitals Were     Last Period                   09/15/2011            Blood Pressure from Last 3 Encounters:   08/29/17 114/66   08/28/17 115/70   08/02/17 110/64    Weight from Last 3 Encounters:   08/29/17 82.6 kg (182 lb 3.2 oz)   08/28/17 82.6 kg (182 lb)   08/02/17 82.6 kg (182 lb)              We Performed the Following     HC PT EVAL, LOW COMPLEXITY     [facility] INITIAL EVAL REPORT     THERAPEUTIC EXERCISES          Today's Medication Changes          These changes are accurate as of: 9/1/17  4:47 PM.  If you have any questions, ask your nurse or doctor.               These medicines have changed or have updated prescriptions.        Dose/Directions    fluticasone 50 MCG/ACT spray   Commonly known as:  FLONASE   This may have changed:    - when to take this  - reasons to take this   Used for:  Chronic rhinitis, Environmental allergies        Dose:  2 spray   Spray 2 sprays into both nostrils daily   Quantity:  48 g   Refills:  3                Primary Care Provider Office Phone # Fax #    Shelton Castellanos [phone]       [address] [place] [address]        Equal Access to Services     [facility]: Hadii dustin Diaz, waaxda luqadaha, qaybta kaalmadagoberto husain. So [facility] [phone].    ATENCIÓN: Si habla español, tiene a garcia disposición servicios gratuitos de asistencia lingüística. Llame al [phone].    We comply with applicable federal civil rights laws and [place] laws. We do not discriminate on the basis of race, color, national origin, age, disability sex, sexual orientation or gender identity.             Thank you!     Thank you for choosing [facility]  for your care. Our goal is always to provide you with excellent care. Hearing back from our patients is one way we can continue to improve our services. Please take a few minutes to complete the written survey that you may receive in the mail after your visit with us. Thank you!             Your Updated Medication List - Protect others around you: Learn how to safely use, store and throw away your medicines at www.disposemymeds.org.          This list is accurate as of: 9/1/17  4:47 PM.  Always use your most recent med list.                   Brand Name Dispense Instructions for use Diagnosis    aspirin 81 MG tablet      Take 81 mg by mouth daily        calcium carbonate-vitamin D 600-400 MG-UNIT Chew     180 tablet    Take 1 chew tab by mouth 2 times daily        fluticasone 50 MCG/ACT spray    FLONASE    48 g    Spray 2 sprays into both nostrils daily    Chronic rhinitis, Environmental allergies       HYDROcodone-acetaminophen 5-325 MG per tablet    NORCO    40 tablet    Take 1-2 tablets by mouth every 4 hours as needed for moderate to severe pain maximum 8 tablet(s) per day    Acute medial meniscal injury of right knee, subsequent encounter       levothyroxine 75 MCG tablet    SYNTHROID/LEVOTHROID    90 tablet    Take 1 tablet (75 mcg) by mouth daily    Acquired hypothyroidism       lisinopril-hydrochlorothiazide 20-12.5 MG per tablet    PRINZIDE/ZESTORETIC    90 tablet    Take 1 tablet by mouth daily    Hypertension goal BP (blood pressure) < 140/90       meloxicam 7.5 MG tablet    MOBIC    30 tablet    Take 1 tablet (7.5 mg) by mouth daily    Arthralgia of right lower leg       Multi-vitamin Tabs tablet     100 tablet    Take 1 tablet by mouth daily        order for DME     1 Units    Knee sleeve, large, open patella. D [id number].     Right knee pain, unspecified chronicity

## 2017-09-01 NOTE — PROGRESS NOTES
Subjective:    LENIN  Lilly Barnes is a 57 year old female referred to PT for evaluation and treatment of right knee pain, left hip pain. Primary symptom location is right knee around the patella and over the L greater trochanter without radiation. The pain is described as sharp and is intermittant. Patient denies any red flags including painful cough/sneeze, saddle anaesthesia, severe night pain, peripheral motor deficit, recent bowel/bladder change, recent vision change, ringing in the ears or pain with swallowing. Patient states that knee pain symptoms began on March 2017 following surgery on the R knee (meniscus). The hip pain started approximately 10 days ago, pt believes this is due to compensation due to the knee pain. She received an injection in the L hip on 8/29/17 and states that hip pain has started to improve since then. Since onset, pain in the knee has been staying the same. Aggravating activities include walking, squatting, kneeling, descending stairs, with pain at worse getting to a 8/10. Relieving activities include ice and elevating, with pain at best a 2-3/10. Current pain rating is 5/10.    Past Medical History: knee surgery March 2017  Patient reports that general health is good.  Regular exercise routine: would like to return to walking her dog  Current Occupation: retail at [employer]  Previous Functional Status: no limitations  Pt goals for PT: return to flea market shopping and walking dog                  Objective:    KNEE:    PROM:   L  R   Hyperextension 1  --   Extension -- 0   Flexion 128 111     Strength:   L R   HIP     Flex 5/5 4/5   Ext 3+/5 3+/5   Abd 3/5 unable   KNEE     Flex 5/5 4/5   Ext 5/5 5/5     Hip PROM:     L R   IR wnl wnl   ER wnl wnl   Angle's na na   Julio César na na     Palpation: tender to palpation posterior joint line R knee, but this is not the pt's pain    Patellar tracking: decreased patellar mobility in all planes    Functional: SLS L 20 seconds R 4  seconds    Gait: Pt with decreased gait speed and limping.    HIP:    PROM:   L  R   Flexion na na   Extension na na   Abduction na na   IR wnl  wnl   ER wnl wnl       System    Physical Exam    General     ROS    Assessment/Plan:      Patient is a 57 year old female with left side hip and right side knee complaints.    Patient has the following significant findings with corresponding treatment plan.                Diagnosis 1:  Right knee pain s/p menisectomy  Pain -  hot/cold therapy, US, electric stimulation, manual therapy, self management, education and home program  Decreased ROM/flexibility - manual therapy, therapeutic exercise, therapeutic activity and home program  Decreased strength - therapeutic exercise, therapeutic activities and home program  Diagnosis 2:  Left hip pain   Pain -  hot/cold therapy, US, electric stimulation, manual therapy, self management, education and home program  Decreased strength - therapeutic exercise, therapeutic activities and home program    Therapy Evaluation Codes:   1) History comprised of:   Personal factors that impact the plan of care:      None.    Comorbidity factors that impact the plan of care are:      None.     Medications impacting care: None.  2) Examination of Body Systems comprised of:   Body structures and functions that impact the plan of care:      Hip and Knee.   Activity limitations that impact the plan of care are:      Squatting/kneeling, Stairs, Walking and Working.  3) Clinical presentation characteristics are:   Stable/Uncomplicated.  4) Decision-Making    Low complexity using standardized patient assessment instrument and/or measureable assessment of functional outcome.  Cumulative Therapy Evaluation is: Low complexity.    Previous and current functional limitations:  (See Goal Flow Sheet for this information)    Short term and Long term goals: (See Goal Flow Sheet for this information)     Communication ability:  Patient appears to be able to  clearly communicate and understand verbal and written communication and follow directions correctly.  Treatment Explanation - The following has been discussed with the patient:   RX ordered/plan of care  Anticipated outcomes  Possible risks and side effects  This patient would benefit from PT intervention to resume normal activities.   Rehab potential is good.    Frequency:  1 X week, once daily  Duration:  for 8-10 weeks  Discharge Plan:  Achieve all LTG.  Independent in home treatment program.  Reach maximal therapeutic benefit.    Please refer to the daily flowsheet for treatment today, total treatment time and time spent performing 1:1 timed codes.

## 2017-09-08 ENCOUNTER — THERAPY VISIT (OUTPATIENT)
Dept: PHYSICAL THERAPY | Facility: CLINIC | Age: 57
End: 2017-09-08
Payer: OTHER MISCELLANEOUS

## 2017-09-08 DIAGNOSIS — M25.561 KNEE PAIN, RIGHT: ICD-10-CM

## 2017-09-08 PROCEDURE — 97112 NEUROMUSCULAR REEDUCATION: CPT | Mod: GP | Performed by: PHYSICAL THERAPIST

## 2017-09-08 PROCEDURE — 97140 MANUAL THERAPY 1/> REGIONS: CPT | Mod: GP | Performed by: PHYSICAL THERAPIST

## 2017-09-08 PROCEDURE — 97110 THERAPEUTIC EXERCISES: CPT | Mod: GP | Performed by: PHYSICAL THERAPIST

## 2017-09-11 DIAGNOSIS — I10 HYPERTENSION GOAL BP (BLOOD PRESSURE) < 140/90: ICD-10-CM

## 2017-09-11 RX ORDER — LISINOPRIL AND HYDROCHLOROTHIAZIDE 12.5; 2 MG/1; MG/1
1 TABLET ORAL DAILY
Qty: 90 TABLET | Refills: 1 | Status: SHIPPED | OUTPATIENT
Start: 2017-09-11 | End: 2018-02-28

## 2017-09-11 NOTE — TELEPHONE ENCOUNTER
lisinopril    Was at [facility] pharmacy - pharmacy change  Last Written Prescription Date: 2/3/2017  Last Fill Quantity: 90, # refills: 3  Last Office Visit with [facility], [facility] or [facility] prescribing provider: 8/29/2017       Potassium   Date Value Ref Range Status   03/20/2017 4.4 3.4 - 5.3 mmol/L Final     Creatinine   Date Value Ref Range Status   03/20/2017 1.32 (H) 0.52 - 1.04 mg/dL Final     BP Readings from Last 3 Encounters:   08/29/17 114/66   08/28/17 115/70   08/02/17 110/64     Prescription approved per [facility] Refill Protocol.  Theodora Schafer RN  [facility]

## 2017-09-15 ENCOUNTER — THERAPY VISIT (OUTPATIENT)
Dept: PHYSICAL THERAPY | Facility: CLINIC | Age: 57
End: 2017-09-15
Payer: OTHER MISCELLANEOUS

## 2017-09-15 DIAGNOSIS — M25.561 KNEE PAIN, RIGHT: ICD-10-CM

## 2017-09-15 PROCEDURE — 97112 NEUROMUSCULAR REEDUCATION: CPT | Mod: GP | Performed by: PHYSICAL THERAPIST

## 2017-09-15 PROCEDURE — 97110 THERAPEUTIC EXERCISES: CPT | Mod: GP | Performed by: PHYSICAL THERAPIST

## 2017-09-22 ENCOUNTER — THERAPY VISIT (OUTPATIENT)
Dept: PHYSICAL THERAPY | Facility: CLINIC | Age: 57
End: 2017-09-22
Payer: OTHER MISCELLANEOUS

## 2017-09-22 DIAGNOSIS — M25.561 KNEE PAIN, RIGHT: ICD-10-CM

## 2017-09-22 PROCEDURE — 97140 MANUAL THERAPY 1/> REGIONS: CPT | Mod: GP | Performed by: PHYSICAL THERAPIST

## 2017-09-22 PROCEDURE — 97110 THERAPEUTIC EXERCISES: CPT | Mod: GP | Performed by: PHYSICAL THERAPIST

## 2017-09-28 ENCOUNTER — THERAPY VISIT (OUTPATIENT)
Dept: PHYSICAL THERAPY | Facility: CLINIC | Age: 57
End: 2017-09-28
Payer: OTHER MISCELLANEOUS

## 2017-09-28 DIAGNOSIS — M25.561 KNEE PAIN, RIGHT: ICD-10-CM

## 2017-09-28 PROCEDURE — 97530 THERAPEUTIC ACTIVITIES: CPT | Mod: GP | Performed by: PHYSICAL THERAPIST

## 2017-09-28 PROCEDURE — 97140 MANUAL THERAPY 1/> REGIONS: CPT | Mod: GP | Performed by: PHYSICAL THERAPIST

## 2017-09-28 PROCEDURE — 97110 THERAPEUTIC EXERCISES: CPT | Mod: GP | Performed by: PHYSICAL THERAPIST

## 2017-10-05 ENCOUNTER — THERAPY VISIT (OUTPATIENT)
Dept: PHYSICAL THERAPY | Facility: CLINIC | Age: 57
End: 2017-10-05
Payer: OTHER MISCELLANEOUS

## 2017-10-05 DIAGNOSIS — M25.561 KNEE PAIN, RIGHT: ICD-10-CM

## 2017-10-05 PROCEDURE — 97110 THERAPEUTIC EXERCISES: CPT | Mod: GP | Performed by: PHYSICAL THERAPIST

## 2017-10-05 PROCEDURE — 97035 APP MDLTY 1+ULTRASOUND EA 15: CPT | Mod: GP | Performed by: PHYSICAL THERAPIST

## 2017-10-05 PROCEDURE — 97140 MANUAL THERAPY 1/> REGIONS: CPT | Mod: GP | Performed by: PHYSICAL THERAPIST

## 2017-10-12 ENCOUNTER — THERAPY VISIT (OUTPATIENT)
Dept: PHYSICAL THERAPY | Facility: CLINIC | Age: 57
End: 2017-10-12
Payer: OTHER MISCELLANEOUS

## 2017-10-12 DIAGNOSIS — M25.561 KNEE PAIN, RIGHT: ICD-10-CM

## 2017-10-12 PROCEDURE — 97110 THERAPEUTIC EXERCISES: CPT | Mod: GP | Performed by: PHYSICAL THERAPIST

## 2017-10-12 PROCEDURE — 97035 APP MDLTY 1+ULTRASOUND EA 15: CPT | Mod: GP | Performed by: PHYSICAL THERAPIST

## 2017-10-19 ENCOUNTER — THERAPY VISIT (OUTPATIENT)
Dept: PHYSICAL THERAPY | Facility: CLINIC | Age: 57
End: 2017-10-19
Payer: OTHER MISCELLANEOUS

## 2017-10-19 DIAGNOSIS — M25.561 KNEE PAIN, RIGHT: ICD-10-CM

## 2017-10-19 PROCEDURE — 97110 THERAPEUTIC EXERCISES: CPT | Mod: GP | Performed by: PHYSICAL THERAPIST

## 2017-10-19 PROCEDURE — 97035 APP MDLTY 1+ULTRASOUND EA 15: CPT | Mod: GP | Performed by: PHYSICAL THERAPIST

## 2017-10-26 ENCOUNTER — THERAPY VISIT (OUTPATIENT)
Dept: PHYSICAL THERAPY | Facility: CLINIC | Age: 57
End: 2017-10-26
Payer: OTHER MISCELLANEOUS

## 2017-10-26 DIAGNOSIS — M25.561 KNEE PAIN, RIGHT: ICD-10-CM

## 2017-10-26 PROCEDURE — 97110 THERAPEUTIC EXERCISES: CPT | Mod: GP | Performed by: PHYSICAL THERAPIST

## 2017-10-26 PROCEDURE — 97035 APP MDLTY 1+ULTRASOUND EA 15: CPT | Mod: GP | Performed by: PHYSICAL THERAPIST

## 2017-11-03 ENCOUNTER — THERAPY VISIT (OUTPATIENT)
Dept: PHYSICAL THERAPY | Facility: CLINIC | Age: 57
End: 2017-11-03
Payer: OTHER MISCELLANEOUS

## 2017-11-03 DIAGNOSIS — M25.561 KNEE PAIN, RIGHT: ICD-10-CM

## 2017-11-03 PROCEDURE — 97035 APP MDLTY 1+ULTRASOUND EA 15: CPT | Mod: GP | Performed by: PHYSICAL THERAPIST

## 2017-11-03 PROCEDURE — 97110 THERAPEUTIC EXERCISES: CPT | Mod: GP | Performed by: PHYSICAL THERAPIST

## 2017-11-03 NOTE — PROGRESS NOTES
Subjective:    HPI                    Objective:    System    Physical Exam    General     ROS    Assessment/Plan:      DISCHARGE REPORT    Progress reporting period is to 11/3/17.       SUBJECTIVE  Patient reports feeling 30% better since starting PT. The knee feels more stable and strong, but she still has the same amount of pain. Functional difficulties with kneeling, squatting, standing, and stairs.   Current pain level is 3/10. Feeling less pain today as she did not work.      Previous pain level was  5/10  .   Changes in function:  Improvement with stairs; less pain, can descend reciprocally.  Adverse reaction to treatment or activity: None    OBJECTIVE    KNEE:    PROM:   L  R   Hyperextension  Springy end feel   Extension  0   Flexion  120 w/ pain at end range     Strength:   L R   HIP     Flex 5/5 5/5   Ext     Abd     KNEE     Flex 5/5 5/5   Ext 5/5 5/5       ASSESSMENT/PLAN  Updated problem list and treatment plan: Diagnosis 1:  Left knee pain  Pain -  home program  Decreased ROM/flexibility - home program  STG/LTGs have been met or progress has been made towards goals:  Yes (See Goal flow sheet completed today.)  Assessment of Progress: The patient's progress has plateaued.  Self Management Plans:  Patient has been instructed in a home treatment program.  Patient is independent in a home treatment program.  I have re-evaluated this patient and find that the nature, scope, duration and intensity of the therapy is appropriate for the medical condition of the patient.  [place] continues to require the following intervention to meet STG and LTG's:  PT intervention is no longer required to meet STG/LTG.    Recommendations:  This patient is ready to be discharged from therapy and continue their home treatment program.  This patient would benefit from further evaluation.    Please refer to the daily flowsheet for treatment today, total treatment time and time spent performing 1:1 timed codes.

## 2017-11-08 ENCOUNTER — OFFICE VISIT (OUTPATIENT)
Dept: ORTHOPEDICS | Facility: CLINIC | Age: 57
End: 2017-11-08
Payer: OTHER MISCELLANEOUS

## 2017-11-08 VITALS
WEIGHT: 178 LBS | BODY MASS INDEX: 29.66 KG/M2 | SYSTOLIC BLOOD PRESSURE: 116 MMHG | HEIGHT: 65 IN | DIASTOLIC BLOOD PRESSURE: 66 MMHG

## 2017-11-08 DIAGNOSIS — M22.41 CHONDROMALACIA OF RIGHT PATELLA: Primary | ICD-10-CM

## 2017-11-08 PROCEDURE — 99213 OFFICE O/P EST LOW 20 MIN: CPT | Mod: 25 | Performed by: ORTHOPAEDIC SURGERY

## 2017-11-08 PROCEDURE — 20610 DRAIN/INJ JOINT/BURSA W/O US: CPT | Mod: RT | Performed by: ORTHOPAEDIC SURGERY

## 2017-11-08 NOTE — NURSING NOTE
"Chief Complaint   Patient presents with     RECHECK     Right knee       Initial /66 (BP Location: Right arm, Patient Position: Sitting, Cuff Size: Adult Regular)  Ht 5' 4.8\" (1.646 m)  Wt 178 lb (80.7 kg)  LMP 09/15/2011  BMI 29.8 kg/m2 Estimated body mass index is 29.8 kg/(m^2) as calculated from the following:    Height as of this encounter: 5' 4.8\" (1.646 m).    Weight as of this encounter: 178 lb (80.7 kg).  Medication Reconciliation: complete    "

## 2017-11-08 NOTE — PROGRESS NOTES
"HISTORY OF PRESENT ILLNESS:    Lilly Barnes is a 57 year old female who is seen in follow up for right knee pain.  Present symptoms: Pt felt she saw some improvement with strength and ROM with physical therapy but feels it did nothing to lessen her knee pain.  Pt states pain is over the anterior knee, surrounds the patella.  Treatments tried to this point: arthroscopy on 4/11, steroid injections, synvisc injections, physical therapy    PHYSICAL EXAM:  /66 (BP Location: Right arm, Patient Position: Sitting, Cuff Size: Adult Regular)  Ht 5' 4.8\" (1.646 m)  Wt 178 lb (80.7 kg)  LMP 09/15/2011  BMI 29.8 kg/m2  Body mass index is 29.8 kg/(m^2).   GENERAL APPEARANCE: healthy, alert and no distress   SKIN: no suspicious lesions or rashes  NEURO: Normal strength and tone, mentation intact and speech normal  VASCULAR:  good pulses, and cappillary refill   LYMPH: no lymphadenopathy   PSYCH:  mentation appears normal and affect normal/bright    MSK:  The patient ambulates without an antalgic gait.  The patient is able to get on and off the exam table without difficulty.      Examination of the spine reveals a normal lordosis to the cervical and lumbar spine, and a normal kyphosis to the thoracic spine.  There is no clinical evidence of scoliosis.    The pelvis is clinically level.  Trendelenberg is negative.  The patient is non-tender to palpation over the greater trochanteric bursal region or piriformis fossa.  With the hip flexed to 90 degrees, internal and external rotation is 30/60 respectively,  with no pain .      KNEE: Examination of the right knee reveals the lower extremity to have a Normal anatomic alignment.  There is no erythema, ecchymosis nor edema.  There is no effusion present clinically.  There is no significant warmth.  Range of motion is 0 to 125 degrees, without crepitus.  There is no tenderness to palpation at the both medial and lateral joint line.  Xiomara's is negative without crepitus. " The knee is ligamentously stable. Patello-femoral grind test is positive.      The calves and thighs are symmetric, without atrophy and non-tender to palpation. Jerod's sign is negative, bilaterally.   CMS is intact to the toes.       IMAGING INTERPRETATION:       ASSESSMENT / PLAN: Chondromalacia patella right knee. I offered her another corticosteroid injection, which she accepted.    Procedure Note:  After risks and benefits were explained and questions answered, pt agreed to undergo a right knee injection. Using aseptic technique and a inferolateral parapatellar tendon approach, the joint space was infiltrated with 4 mg of Dexamethasone, 40 mg of Depo Medrol, and 3 cc of 1% Lidocaine.  There were no complications and the patient tolerated the procedure well.    Return to clinic p.r.n.    Rip French MD  Dept. Orthopedic Surgery  [facility]       Disclaimer: This note consists of symbols derived from keyboarding, dictation and/or voice recognition software. As a result, there may be errors in the script that have gone undetected. Please consider this when interpreting information found in this chart.

## 2017-11-08 NOTE — MR AVS SNAPSHOT
"              After Visit Summary   11/8/2017    Lilly Banres    MRN: [id number]           Patient Information     Date Of Birth          1960        Visit Information        Provider Department      11/8/2017 3:40 PM Corbin French MD [facility] ORTHOPEDIC SURGERY        Today's Diagnoses     Chondromalacia of right patella    -  1       Follow-ups after your visit        Who to contact     If you have questions or need follow up information about today's clinic visit or your schedule please contact [facility] ORTHOPEDIC SURGERY directly at [phone].  Normal or non-critical lab and imaging results will be communicated to you by [employer], letter or phone within 4 business days after the clinic has received the results. If you do not hear from us within 7 days, please contact the clinic through [employer] or phone. If you have a critical or abnormal lab result, we will notify you by phone as soon as possible.  Submit refill requests through [employer] or call your pharmacy and they will forward the refill request to us. Please allow 3 business days for your refill to be completed.          Additional Information About Your Visit        MyChart Information     [employer] gives you secure access to your electronic health record. If you see a primary care provider, you can also send messages to your care team and make appointments. If you have questions, please call your primary care clinic.  If you do not have a primary care provider, please call [phone] and they will assist you.        Care EveryWhere ID     This is your Care EveryWhere ID. This could be used by other organizations to access your [facility] medical records  [id number]        Your Vitals Were     Height Last Period BMI (Body Mass Index)             5' 4.8\" (1.646 m) 09/15/2011 29.8 kg/m2          Blood Pressure from Last 3 Encounters:   11/08/17 116/66   08/29/17 114/66   08/28/17 115/70    Weight from Last 3 Encounters: "   11/08/17 178 lb (80.7 kg)   08/29/17 182 lb 3.2 oz (82.6 kg)   08/28/17 182 lb (82.6 kg)              We Performed the Following     DEXAMETHASONE SODIUM PHOS PER 1 MG     DRAIN/INJECT LARGE JOINT/BURSA     METHYLPREDNISOLONE 40 MG INJ          Today's Medication Changes          These changes are accurate as of: 11/8/17 11:59 PM.  If you have any questions, ask your nurse or doctor.               Start taking these medicines.        Dose/Directions    dexamethasone 4 MG/ML injection   Commonly known as:  DECADRON   Used for:  Chondromalacia of right patella   Started by:  Corbin French MD        Dose:  4 mg   Inject 1 mL (4 mg) as directed once for 1 dose   Quantity:  1 mL   Refills:  0       lidocaine 1 % injection   Used for:  Chondromalacia of right patella   Started by:  Corbin French MD        Dose:  3 mL   3 mLs by INTRA-ARTICULAR route once for 1 dose   Quantity:  3 mL   Refills:  0       methylPREDNISolone acetate 40 MG/ML injection   Commonly known as:  DEPO-MEDROL   Used for:  Chondromalacia of right patella   Started by:  Corbin French MD        Dose:  40 mg   1 mL (40 mg) by INTRA-ARTICULAR route once for 1 dose   Quantity:  1 mL   Refills:  0            Where to get your medicines      Some of these will need a paper prescription and others can be bought over the counter.  Ask your nurse if you have questions.     You don't need a prescription for these medications     dexamethasone 4 MG/ML injection    lidocaine 1 % injection    methylPREDNISolone acetate 40 MG/ML injection                Primary Care Provider Office Phone # Fax #    Shelton Castellanos [phone]       [address] [place] [address]        Equal Access to Services     [facility] AH: Haddejon Diaz, waloulouda luqadaha, qaybta kaaldagoberto chapin. So [facility] [phone].    ATENCIÓN: Si josie pena a garcia disposición  servicios gratuitos de asistencia lingüística. Fareed kebede [phone].    We comply with applicable federal civil rights laws and [place] laws. We do not discriminate on the basis of race, color, national origin, age, disability, sex, sexual orientation, or gender identity.            Thank you!     Thank you for choosing [facility] ORTHOPEDIC SURGERY  for your care. Our goal is always to provide you with excellent care. Hearing back from our patients is one way we can continue to improve our services. Please take a few minutes to complete the written survey that you may receive in the mail after your visit with us. Thank you!             Your Updated Medication List - Protect others around you: Learn how to safely use, store and throw away your medicines at www.disposemymeds.org.          This list is accurate as of: 11/8/17 11:59 PM.  Always use your most recent med list.                   Brand Name Dispense Instructions for use Diagnosis    aspirin 81 MG tablet      Take 81 mg by mouth daily        calcium carbonate-vitamin D 600-400 MG-UNIT Chew     180 tablet    Take 1 chew tab by mouth 2 times daily        dexamethasone 4 MG/ML injection    DECADRON    1 mL    Inject 1 mL (4 mg) as directed once for 1 dose    Chondromalacia of right patella       fluticasone 50 MCG/ACT spray    FLONASE    48 g    Spray 2 sprays into both nostrils daily    Chronic rhinitis, Environmental allergies       levothyroxine 75 MCG tablet    SYNTHROID/LEVOTHROID    90 tablet    Take 1 tablet (75 mcg) by mouth daily    Acquired hypothyroidism       lidocaine 1 % injection     3 mL    3 mLs by INTRA-ARTICULAR route once for 1 dose    Chondromalacia of right patella       lisinopril-hydrochlorothiazide 20-12.5 MG per tablet    PRINZIDE/ZESTORETIC    90 tablet    Take 1 tablet by mouth daily    Hypertension goal BP (blood pressure) < 140/90       meloxicam 7.5 MG tablet    MOBIC    30 tablet    Take 1 tablet (7.5 mg) by mouth daily     Arthralgia of right lower leg       methylPREDNISolone acetate 40 MG/ML injection    DEPO-MEDROL    1 mL    1 mL (40 mg) by INTRA-ARTICULAR route once for 1 dose    Chondromalacia of right patella       Multi-vitamin Tabs tablet     100 tablet    Take 1 tablet by mouth daily        order for DME     1 Units    Knee sleeve, large, open patella. D [id number].     Right knee pain, unspecified chronicity

## 2017-11-08 NOTE — LETTER
"    11/8/2017         RE: Lilly Barnes  [address] [facility] [address]        Dear Colleague,    Thank you for referring your patient, Lilly Barnes, to the [facility] ORTHOPEDIC SURGERY. Please see a copy of my visit note below.    HISTORY OF PRESENT ILLNESS:    Lilly Barnes is a 57 year old female who is seen in follow up for right knee pain.  Present symptoms: Pt felt she saw some improvement with strength and ROM with physical therapy but feels it did nothing to lessen her knee pain.  Pt states pain is over the anterior knee, surrounds the patella.  Treatments tried to this point: arthroscopy on 4/11, steroid injections, synvisc injections, physical therapy    PHYSICAL EXAM:  /66 (BP Location: Right arm, Patient Position: Sitting, Cuff Size: Adult Regular)  Ht 5' 4.8\" (1.646 m)  Wt 178 lb (80.7 kg)  LMP 09/15/2011  BMI 29.8 kg/m2  Body mass index is 29.8 kg/(m^2).   GENERAL APPEARANCE: healthy, alert and no distress   SKIN: no suspicious lesions or rashes  NEURO: Normal strength and tone, mentation intact and speech normal  VASCULAR:  good pulses, and cappillary refill   LYMPH: no lymphadenopathy   PSYCH:  mentation appears normal and affect normal/bright    MSK:  The patient ambulates without an antalgic gait.  The patient is able to get on and off the exam table without difficulty.      Examination of the spine reveals a normal lordosis to the cervical and lumbar spine, and a normal kyphosis to the thoracic spine.  There is no clinical evidence of scoliosis.    The pelvis is clinically level.  Trendelenberg is negative.  The patient is non-tender to palpation over the greater trochanteric bursal region or piriformis fossa.  With the hip flexed to 90 degrees, internal and external rotation is 30/60 respectively,  with no pain .      KNEE: Examination of the right knee reveals the lower extremity to have a Normal anatomic alignment.  There is no erythema, ecchymosis nor edema.  " There is no effusion present clinically.  There is no significant warmth.  Range of motion is 0 to 125 degrees, without crepitus.  There is no tenderness to palpation at the both medial and lateral joint line.  Xiomara's is negative without crepitus. The knee is ligamentously stable. Patello-femoral grind test is positive.      The calves and thighs are symmetric, without atrophy and non-tender to palpation. Jerod's sign is negative, bilaterally.   CMS is intact to the toes.       IMAGING INTERPRETATION:       ASSESSMENT / PLAN: Chondromalacia patella right knee. I offered her another corticosteroid injection, which she accepted.    Procedure Note:  After risks and benefits were explained and questions answered, pt agreed to undergo a right knee injection. Using aseptic technique and a inferolateral parapatellar tendon approach, the joint space was infiltrated with 4 mg of Dexamethasone, 40 mg of Depo Medrol, and 3 cc of 1% Lidocaine.  There were no complications and the patient tolerated the procedure well.    Return to clinic p.r.n.    Rip French MD  Dept. Orthopedic Surgery  [facility]       Disclaimer: This note consists of symbols derived from keyboarding, dictation and/or voice recognition software. As a result, there may be errors in the script that have gone undetected. Please consider this when interpreting information found in this chart.        Again, thank you for allowing me to participate in the care of your patient.        Sincerely,        Corbin French MD

## 2017-11-17 RX ORDER — METHYLPREDNISOLONE ACETATE 40 MG/ML
40 INJECTION, SUSPENSION INTRA-ARTICULAR; INTRALESIONAL; INTRAMUSCULAR; SOFT TISSUE ONCE
Qty: 1 ML | Refills: 0 | OUTPATIENT
Start: 2017-11-17 | End: 2017-11-17

## 2017-11-17 RX ORDER — DEXAMETHASONE SODIUM PHOSPHATE 4 MG/ML
4 INJECTION, SOLUTION INTRA-ARTICULAR; INTRALESIONAL; INTRAMUSCULAR; INTRAVENOUS; SOFT TISSUE ONCE
Qty: 1 ML | Refills: 0 | OUTPATIENT
Start: 2017-11-17 | End: 2017-11-17

## 2017-11-17 RX ORDER — LIDOCAINE HYDROCHLORIDE 10 MG/ML
3 INJECTION, SOLUTION INFILTRATION; PERINEURAL ONCE
Qty: 3 ML | Refills: 0 | OUTPATIENT
Start: 2017-11-17 | End: 2017-11-17

## 2017-12-06 ENCOUNTER — TELEPHONE (OUTPATIENT)
Dept: FAMILY MEDICINE | Facility: CLINIC | Age: 57
End: 2017-12-06

## 2017-12-06 NOTE — TELEPHONE ENCOUNTER
Work comp calling, Dave.    [phone], Isael.    Wanting to know pts work restrictions since the sports medicine specialist saw the pt last.    Routing to that provider.    Theodora Schafer RN  [facility]

## 2017-12-06 NOTE — TELEPHONE ENCOUNTER
Date Forms was received: December 6, 2017    Forms received by: Fax    Last office visit: 08/29/2017    Purpose of Form:  [facility] [phone]    When the form is due:  ASAP    How the form needs to be returned for patient:  Fax    Form currently placed  [employer].  Jennifer Pop LPN

## 2017-12-11 ENCOUNTER — TELEPHONE (OUTPATIENT)
Dept: ORTHOPEDICS | Facility: CLINIC | Age: 57
End: 2017-12-11

## 2017-12-11 NOTE — TELEPHONE ENCOUNTER
Our goal is to complete and return forms within 5-7 business days of receiving the request.    Type of form Requested: letter with additional information request  Form requested by (include company):   Patrick Acosta    Phone number for requestor: [phone]  Date form is requested by: not listed / ASAP    How will form be returned?:  fax to [phone]  Has the patient signed a consent form for release of information (may be included with form)? NA  Additional comments: pt was last seen in clinic on 11/8/2017    Form was started and place in brown accordion file for provider review/ completion at [facility].

## 2017-12-28 PROBLEM — M25.561 KNEE PAIN, RIGHT: Status: RESOLVED | Noted: 2017-09-01 | Resolved: 2017-12-28

## 2018-01-11 ENCOUNTER — TELEPHONE (OUTPATIENT)
Dept: FAMILY MEDICINE | Facility: CLINIC | Age: 58
End: 2018-01-11

## 2018-01-11 NOTE — TELEPHONE ENCOUNTER
Appointment For: JOSE J SIBLEY ([id number])      Visit Type: MYC OPEN SCHEDULING NEW (1836)          1/15/2018    4:20 PM  20 mins.  Maura Hopper PA-C  [facility]          Patient Comments:     Injury/pain in right heel & foot, difficulty walking     Please triage    Lucretia Encarnacion

## 2018-01-11 NOTE — TELEPHONE ENCOUNTER
Called [phone]      Joint Pain - L Heel    Onset: several weeks    Description:   Location: L Heel  Character:  Dull ache and sharp sometimes    Intensity: mild    Progression of Symptoms: worse    Accompanying Signs & Symptoms:  Other symptoms: None    History:   Previous similar pain: no       Precipitating factors:   Trauma or overuse: no     Alleviating factors:  Improved by: rest/inactivity, ice and shoe inserts/supports    Therapies Tried and outcome: see above (tries to avoid taking OTC pain relievers due to liver disease)    DENIES: radiation of pain, numbness, tingling, weakness, warmth, swelling and redness    Patient states she had surgery on her R knee for meniscus tear - as a result, that knee is stiff and doesn't work well. Patient favors that leg and uses her L leg more. Over the past few weeks, the L leg has been bothering her more and more. No pain in the morning, but patient is on her feet 10-12 hrs per day and it starts throbbing with some sharp pain during the day.   After sitting/resting and ice, pain ceases.     Pt OK to wait until 01/15/2018.     Advised patient that if new or worsening symptoms appear (reviewed new & worsening symptoms) to call the clinic - Patient stated an understanding and agreed with plan.    Radha Howell RN  [facility]

## 2018-01-15 ENCOUNTER — OFFICE VISIT (OUTPATIENT)
Dept: FAMILY MEDICINE | Facility: CLINIC | Age: 58
End: 2018-01-15
Payer: COMMERCIAL

## 2018-01-15 ENCOUNTER — RADIANT APPOINTMENT (OUTPATIENT)
Dept: GENERAL RADIOLOGY | Facility: CLINIC | Age: 58
End: 2018-01-15
Attending: PHYSICIAN ASSISTANT
Payer: COMMERCIAL

## 2018-01-15 VITALS
OXYGEN SATURATION: 100 % | SYSTOLIC BLOOD PRESSURE: 106 MMHG | HEIGHT: 65 IN | BODY MASS INDEX: 29.66 KG/M2 | TEMPERATURE: 97 F | WEIGHT: 178 LBS | HEART RATE: 97 BPM | DIASTOLIC BLOOD PRESSURE: 64 MMHG

## 2018-01-15 DIAGNOSIS — M79.672 LEFT FOOT PAIN: ICD-10-CM

## 2018-01-15 DIAGNOSIS — M72.2 PLANTAR FASCIITIS: Primary | ICD-10-CM

## 2018-01-15 PROCEDURE — 99214 OFFICE O/P EST MOD 30 MIN: CPT | Performed by: PHYSICIAN ASSISTANT

## 2018-01-15 PROCEDURE — 73630 X-RAY EXAM OF FOOT: CPT | Mod: LT

## 2018-01-15 RX ORDER — PREDNISONE 20 MG/1
TABLET ORAL
Qty: 20 TABLET | Refills: 0 | Status: SHIPPED | OUTPATIENT
Start: 2018-01-15 | End: 2018-02-28

## 2018-01-15 NOTE — MR AVS SNAPSHOT
After Visit Summary   1/15/2018    Lilly Barnes    MRN: [id number]           Patient Information     Date Of Birth          1960        Visit Information        Provider Department      1/15/2018 4:20 PM Maura Hopper PA-C [facility]        Today's Diagnoses     Plantar fasciitis    -  1    Left foot pain          Care Instructions                           Plantar Fasciitis   What is plantar fasciitis?   Plantar fasciitis is a painful inflammation of the bottom of the foot between the ball of the foot and the heel.   How does it occur?   There are several possible causes of plantar fasciitis, including:     wearing high heels     gaining weight     increased walking, standing, or stair-climbing   If you wear high-heeled shoes, including western-style boots, for long periods of time, the tough, tendonlike tissue of the bottom of your foot can become shorter. This layer of tissue is called fascia. Pain occurs when you stretch fascia that has shortened. This painful stretching might happen, for example, when you walk barefoot after getting out of bed in the morning.   If you gain weight, you might be more likely to have plantar fasciitis, especially if you walk a lot or  shoes with poor heel cushioning. Normally there is a pad of fatty tissue under your heel bone. Weight gain might break down this fat pad and cause heel pain.   Runners may get plantar fasciitis when they change their workout and increase their mileage or frequency of workouts. It can also occur with a change in exercise surface or terrain, or if your shoes are worn out and don't provide enough cushion for your heels.   If the arches of your foot are abnormally high or low, you are more likely to develop plantar fasciitis than if your arches are normal.   What are the symptoms?   The main symptom of plantar fasciitis is heel pain when you walk. You may also feel pain when you stand and possibly even  when you are resting. This pain typically occurs first thing in the morning after you get out of bed, when your foot is placed flat on the floor. The pain occurs because you are stretching the plantar fascia. The pain usually lessens with more walking, but you may have it again after periods of rest.   You may feel no pain when you are sleeping because the position of your feet during rest allows the fascia to shorten and relax.   How is it diagnosed?   Your healthcare provider will ask about your symptoms. He or she will ask if the bottom of your heel is tender and if you have pain when you stretch the bottom of your foot. An X-ray of your heel may be done.   How is it treated?     Give your painful heel lots of rest. You may need to stay completely off your foot for several days when the pain is severe.     Your healthcare provider may recommend or prescribe anti-inflammatory medicines, such as aspirin or ibuprofen. These drugs decrease pain and inflammation. Adults aged 65 years and older should not take non-steroidal anti-inflammatory medicine for more than 7 days without their healthcare provider's approval. Resting your heel on an ice pack for a few minutes several times a day can also help.     Try to cushion your foot. You can do this by wearing athletic shoes, even at work, for awhile. Heel cushions can also be used. The cushions should be worn in both shoes. They are most helpful if you are overweight or an older adult.     Your provider may recommend special arch supports or inserts for your shoes called orthotics, either custom-made or off the shelf. These supports can be particularly helpful if you have flat feet or high arches.     Your provider may recommend an injection of a cortisone-like medicine.     Lose weight if needed.     A night splint may be recommended. This will keep the plantar fascia stretched while you are sleeping.     Physical therapy for additional treatments may be recommended      Surgery is rarely needed.   How long will the effects last?   You may find that the pain is sometimes worse and sometimes better over time. If you get treatment soon after you notice the pain, the symptoms should stop after several weeks. If, however, you have had plantar fasciitis for a long time, it may take many weeks to months for the pain to go away.   When can I return to my normal activities?   Everyone recovers from an injury at a different rate. Return to your activities will be determined by how soon your foot recovers, not by how many days or weeks it has been since your injury has occurred. In general, the longer you have symptoms before you start treatment, the longer it will take to get better. The goal of rehabilitation is to return you to your normal activities as soon as is safely possible. If you return too soon you may worsen your injury.   You may safely return to your activities when, starting from the top of the list and progressing to the end, each of the following is true:     You have full range of motion in the injured foot compared with the uninjured foot.     You have full strength of the injured foot compared with the uninjured foot.     You can walk straight ahead without significant pain or limping.   How can I prevent plantar fasciitis?   The best way to prevent plantar fasciitis is to wear shoes that are well made and fit your feet. This is especially important when you exercise or walk a lot or stand for a long time on hard surfaces. Get new athletic shoes before your old shoes stop supporting and cushioning your feet.   You should also:     Avoid repeated jarring to the heel.     Keep a healthy weight.     Do your leg and foot stretching exercises regularly.   Developed by [employer].   Published by [employer].   Last modified: 2008-08-11   Last reviewed: 2008-07-07   This content is reviewed periodically and is subject to change as new health information becomes available.  The information is intended to inform and educate and is not a replacement for medical evaluation, advice, diagnosis or treatment by a healthcare professional.   Sports Medicine Advisor 2009.1 Index  Sports Medicine Advisor 2009.1 Credits     2009 [facility] and/or its affiliates. All Rights Reserved.               Plantar Fasciitis Rehabilitation Exercises   You may begin exercising the muscles of your foot right away by gently stretching them as follows:     Prone hip extension: Lie on your stomach with your legs straight out behind you. Tighten the buttocks and thigh muscles of your injured leg and lift it off the floor about 8 inches. Keep your knee straight. Hold for 5 seconds. Then lower your leg and relax. Do 3 sets of 10.     Towel stretch: Sit on a hard surface with one leg stretched out in front of you. Loop a towel around your toes and the ball of your foot and pull the towel toward your body keeping your knee straight. Hold this position for 15 to 30 seconds then relax. Repeat 3 times.   When the towel stretch becomes too easy, you may begin doing the standing calf stretch.     Standing calf stretch: Facing a wall, put your hands against the wall at about eye level. Keep one leg back with the heel on the floor, and the other leg forward. Turn your back foot slightly inward (as if you were pigeon-toed) as you slowly lean into the wall until you feel a stretch in the back of your calf. Hold for 15 to 30 seconds. Repeat 3 times and then switch the position of your legs and repeat the exercise 3 times. Do this exercise several times each day.     Sitting plantar fascia stretch: Sit in a chair and cross one foot over your other knee. Grab the base of your toes and pull them back toward your leg until you feel a comfortable stretch. Hold 15 seconds and repeat 3 times.   When you can stand comfortably on your injured foot, you can begin standing to stretch the bottom of your foot using the plantar fascia  stretch.     Achilles stretch: Stand with the ball of one foot on a stair. Reach for the bottom step with your heel until you feel a stretch in the arch of your foot. Hold this position for 15 to 30 seconds and then relax. Repeat 3 times.   After you have stretched the bottom muscles of your foot, you can begin strengthening the top muscles of your foot.     Frozen can roll: Roll your bare injured foot back and forth from your heel to your mid-arch over a frozen juice can. Repeat for 3 to 5 minutes. This exercise is particularly helpful if done first thing in the morning.     Towel pickup: With your heel on the ground,  a towel with your toes. Release. Repeat 10 to 20 times. When this gets easy, add more resistance by placing a book or small weight on the towel.     Balance and reach exercises   Stand upright next to a chair with your injured leg farthest from the chair. This will provide you with support if you need it. Stand just on the foot of your injured leg. Try to raise the arch of this foot while keeping your toes on the floor.   A. Keep your foot in this position and reach forward in front of you with the hand farthest away from the chair, allowing your knee to bend. Repeat this 10 times while maintaining the arch height. This exercise can be made more difficult by reaching farther in front of you. Do 2 sets.   B.  the same position as above. While maintaining your arch height, reach the hand farthest away from the chair across your body toward the chair. The farther you reach, the more challenging the exercise. Do 2 sets of 10.     Heel raise: Balance yourself while standing behind a chair or counter. Using the chair to help you, raise your body up onto your toes and hold for 5 seconds. Then slowly lower yourself down without holding onto the chair. Hold onto the chair or counter if you need to. When this exercise becomes less painful, try lowering on one leg only. Repeat 10 times. Do 3 sets  of 10.     Side-lying leg lift: Lying on your uninjured side, tighten the front thigh muscles on your top leg and lift that leg 8 to 10 inches away from the other leg. Keep the leg straight and lower slowly. Do 3 sets of 10.   Written by Keri Blandon, MS, PT, and Lara Pittman PT, [facility], for [employer].   Published by [employer].   Last modified: 2009-02-09   Last reviewed: 2008-07-07   This content is reviewed periodically and is subject to change as new health information becomes available. The information is intended to inform and educate and is not a replacement for medical evaluation, advice, diagnosis or treatment by a healthcare professional.   Sports Medicine Advisor 2009.1 Index                        Follow-ups after your visit        Who to contact     If you have questions or need follow up information about today's clinic visit or your schedule please contact [facility] directly at [phone].  Normal or non-critical lab and imaging results will be communicated to you by [email or web address], letter or phone within 4 business days after the clinic has received the results. If you do not hear from us within 7 days, please contact the clinic through [employer] or phone. If you have a critical or abnormal lab result, we will notify you by phone as soon as possible.  Submit refill requests through [employer] or call your pharmacy and they will forward the refill request to us. Please allow 3 business days for your refill to be completed.          Additional Information About Your Visit        [email or web address] Information     [employer] gives you secure access to your electronic health record. If you see a primary care provider, you can also send messages to your care team and make appointments. If you have questions, please call your primary care clinic.  If you do not have a primary care provider, please call [phone] and they will assist you.        Care EveryWhere ID     This is your Care EveryWhere ID.  "This could be used by other organizations to access your [facility] medical records  [id number]        Your Vitals Were     Pulse Temperature Height Last Period Pulse Oximetry BMI (Body Mass Index)    97 97  F (36.1  C) (Tympanic) 5' 4.8\" (1.646 m) 09/15/2011 100% 29.8 kg/m2       Blood Pressure from Last 3 Encounters:   01/15/18 106/64   11/08/17 116/66   08/29/17 114/66    Weight from Last 3 Encounters:   01/15/18 178 lb (80.7 kg)   11/08/17 178 lb (80.7 kg)   08/29/17 182 lb 3.2 oz (82.6 kg)                 Today's Medication Changes          These changes are accurate as of: 1/15/18  4:57 PM.  If you have any questions, ask your nurse or doctor.               Start taking these medicines.        Dose/Directions    predniSONE 20 MG tablet   Commonly known as:  DELTASONE   Used for:  Left foot pain, Plantar fasciitis   Started by:  Maura Hopper PA-C        Take 60 mg daily for 3 days, then 40 mg daily for 3 days, then 20 mg daily for 3 days, then 10 mg for 4 days   Quantity:  20 tablet   Refills:  0         Stop taking these medicines if you haven't already. Please contact your care team if you have questions.     meloxicam 7.5 MG tablet   Commonly known as:  MOBIC   Stopped by:  Maura Hopper PA-C                Where to get your medicines      These medications were sent to [facility] [address] [address]     Phone:  [phone]     predniSONE 20 MG tablet                Primary Care Provider Office Phone # Fax #    Shelton Castellanos [phone]       [address] [place] [address]        Equal Access to Services     ANURADHA LUGO AH: Jeanette hernandez Soluci, waaxda luqadaha, qaybta kaalmada onel, dagoberto wolfe. So [facility] [phone].    ATENCIÓN: Si habla español, tiene a garcia disposición servicios gratuitos de asistencia lingüística. Llame al " [phone].    We comply with applicable federal civil rights laws and [place] laws. We do not discriminate on the basis of race, color, national origin, age, disability, sex, sexual orientation, or gender identity.            Thank you!     Thank you for choosing [facility]  for your care. Our goal is always to provide you with excellent care. Hearing back from our patients is one way we can continue to improve our services. Please take a few minutes to complete the written survey that you may receive in the mail after your visit with us. Thank you!             Your Updated Medication List - Protect others around you: Learn how to safely use, store and throw away your medicines at www.disposemymeds.org.          This list is accurate as of: 1/15/18  4:57 PM.  Always use your most recent med list.                   Brand Name Dispense Instructions for use Diagnosis    aspirin 81 MG tablet      Take 81 mg by mouth daily        calcium carbonate-vitamin D 600-400 MG-UNIT Chew     180 tablet    Take 1 chew tab by mouth 2 times daily        fluticasone 50 MCG/ACT spray    FLONASE    48 g    Spray 2 sprays into both nostrils daily    Chronic rhinitis, Environmental allergies       levothyroxine 75 MCG tablet    SYNTHROID/LEVOTHROID    90 tablet    Take 1 tablet (75 mcg) by mouth daily    Acquired hypothyroidism       lisinopril-hydrochlorothiazide 20-12.5 MG per tablet    PRINZIDE/ZESTORETIC    90 tablet    Take 1 tablet by mouth daily    Hypertension goal BP (blood pressure) < 140/90       Multi-vitamin Tabs tablet     100 tablet    Take 1 tablet by mouth daily        order for DME     1 Units    Knee sleeve, large, open patella. D [id number].     Right knee pain, unspecified chronicity       predniSONE 20 MG tablet    DELTASONE    20 tablet    Take 60 mg daily for 3 days, then 40 mg daily for 3 days, then 20 mg daily for 3 days, then 10 mg for 4 days    Left foot pain, Plantar fasciitis

## 2018-01-15 NOTE — NURSING NOTE
"Chief Complaint   Patient presents with     Foot Pain     left foot pain ~4 weeks no known injury        Initial /64  Pulse 97  Temp 97  F (36.1  C) (Tympanic)  Ht 5' 4.8\" (1.646 m)  Wt 178 lb (80.7 kg)  LMP 09/15/2011  SpO2 100%  BMI 29.8 kg/m2 Estimated body mass index is 29.8 kg/(m^2) as calculated from the following:    Height as of this encounter: 5' 4.8\" (1.646 m).    Weight as of this encounter: 178 lb (80.7 kg).  Medication Reconciliation: complete      "

## 2018-01-15 NOTE — PROGRESS NOTES
SUBJECTIVE:   Lilly Barnes is a 57 year old female who presents to clinic today for the following health issues:    Foot Pain  Virginia present to clinic reporting left sided foot pain that has bee present for approximately 4 weeks. She describes the pain as being localized to the lateral aspect of her left heel. Virginia had a right knee surgery on 03/20/17 and reports that she has been favoring her left leg since then. Her pain has been worsening over the last few weeks and is aggravated with long walks or long periods of standing. The pain she describes is a throbbing and shooting pain. It does not wake her from sleep but makes it difficult to get to sleep at times. She denies any injuries/trauma, history of gout, and changes in footwear. She has been treating the pain with ice and ibuprofen/tylenol which helped initially but is no longer effective.    Problem list and histories reviewed & adjusted, as indicated.  Additional history: as documented    Patient Active Problem List   Diagnosis     Hypertension goal BP (blood pressure) < 140/90     CKD (chronic kidney disease) stage 3, GFR 30-59 ml/min     History of colonic polyps- 3 mm polyp.  needs every 3-5 yr surveillence- hyperplastic      Hyperlipidemia LDL goal <100     Chronic rhinitis     Osteopenia     Environmental allergies     Hypothyroidism, unspecified type     Advanced directives, counseling/discussion     Past Surgical History:   Procedure Laterality Date     ARTHROSCOPY KNEE WITH MEDIAL MENISCECTOMY Right 3/20/2017    ARTHROSCOPY KNEE WITH MEDIAL MENISCECTOMY - Dr French     COLONOSCOPY  12/6/2013    hyperplastic polyp - Dr Sheppard - due 3-5 yrs     SURGICAL HISTORY OF -   1997    laproscopic - left - for ectopic pregnancy       Social History   Substance Use Topics     Smoking status: Never Smoker     Smokeless tobacco: Never Used     Alcohol use 0.0 - 0.6 oz/week      Comment: 2 drinks per month     Family History   Problem Relation Age of  Onset     DIABETES Mother      Depression Son      resolved     CANCER Sister 43     ovarian and uterine      CEREBROVASCULAR DISEASE Sister 55     Lipids Other      multiple members including 1 degree      DIABETES Brother      DIABETES Brother          Current Outpatient Prescriptions   Medication Sig Dispense Refill     predniSONE (DELTASONE) 20 MG tablet Take 60 mg daily for 3 days, then 40 mg daily for 3 days, then 20 mg daily for 3 days, then 10 mg for 4 days 20 tablet 0     lisinopril-hydrochlorothiazide (PRINZIDE/ZESTORETIC) 20-12.5 MG per tablet Take 1 tablet by mouth daily 90 tablet 1     order for DME Knee sleeve, large, open patella. D [id number].  1 Units 0     levothyroxine (SYNTHROID/LEVOTHROID) 75 MCG tablet Take 1 tablet (75 mcg) by mouth daily 90 tablet 3     fluticasone (FLONASE) 50 MCG/ACT spray Spray 2 sprays into both nostrils daily 48 g 3     multivitamin, therapeutic with minerals (MULTI-VITAMIN) TABS Take 1 tablet by mouth daily 100 tablet 3     calcium carbonate-vitamin D 600-400 MG-UNIT CHEW Take 1 chew tab by mouth 2 times daily 180 tablet 3     aspirin 81 MG tablet Take 81 mg by mouth daily   3     Allergies   Allergen Reactions     Ibuprofen      Should avoid due to Chronic Kidney Disease     Reviewed and updated as needed this visit by clinical staff  Tobacco  Allergies  Meds  Problems  Med Hx  Surg Hx  Fam Hx  Soc Hx        Reviewed and updated as needed this visit by Provider  Tobacco  Allergies  Meds  Problems  Med Hx  Surg Hx  Fam Hx  Soc Hx        ROS:  Constitutional, HEENT, cardiovascular, pulmonary, GI, , musculoskeletal, neuro, skin, endocrine and psych systems are negative, except as otherwise noted.    This document serves as a record of the services and decisions personally performed and made by Maura Hopper PA-C. It was created on her behalf by Mauricio Cueto, a trained medical scribe. The creation of this document is based on the provider's  "statements to the medical scribe.  Mauricio Cueto 4:27 PM January 15, 2018    OBJECTIVE:   /64  Pulse 97  Temp 97  F (36.1  C) (Tympanic)  Ht 5' 4.8\" (1.646 m)  Wt 178 lb (80.7 kg)  LMP 09/15/2011  SpO2 100%  BMI 29.8 kg/m2  Body mass index is 29.8 kg/(m^2).  GENERAL: healthy, alert and no distress  MS: LEFT FOOT: tip of calcaneous tenderness to insertion of plantar fascia significantly worse on lateral aspect, otherwise no gross musculoskeletal defects noted, no edema  SKIN: no suspicious lesions or rashes  PSYCH: mentation appears normal, affect normal/bright    Diagnostic Test Results:  Recent Results (from the past 744 hour(s))   XR Foot Left G/E 3 Views    Narrative    LEFT FOOT THREE OR MORE VIEWS  1/15/2018 4:45 PM      HISTORY: Left foot pain.    COMPARISON: None.      Impression    IMPRESSION: No acute fracture or dislocation.       ASSESSMENT/PLAN:   Virginia was seen today for foot pain.    Diagnoses and all orders for this visit:    Plantar fasciitis, Left foot pain  XR interpretation negative for acute injury. Start prednisone to treat left foot pain. Provided patient information of proper usage and possible side effects of medication. Also recommended gentle stretching and icing of the affected area, patient voiced understanding. Follow up if symptoms persist or worsen for podiatry referral.  -     XR Foot Left G/E 3 Views; Future  -     predniSONE (DELTASONE) 20 MG tablet; Take 60 mg daily for 3 days, then 40 mg daily for 3 days, then 20 mg daily for 3 days, then 10 mg for 4 days    The information in this document, created by the medical scribe for me, accurately reflects the services I personally performed and the decisions made by me. I have reviewed and approved this document for accuracy prior to leaving the patient care area.  January 15, 2018 4:27 PM    Maura Hopper PA-C  [facility]  [facility]    "

## 2018-01-15 NOTE — PATIENT INSTRUCTIONS
Plantar Fasciitis   What is plantar fasciitis?   Plantar fasciitis is a painful inflammation of the bottom of the foot between the ball of the foot and the heel.   How does it occur?   There are several possible causes of plantar fasciitis, including:     wearing high heels     gaining weight     increased walking, standing, or stair-climbing   If you wear high-heeled shoes, including western-style boots, for long periods of time, the tough, tendonlike tissue of the bottom of your foot can become shorter. This layer of tissue is called fascia. Pain occurs when you stretch fascia that has shortened. This painful stretching might happen, for example, when you walk barefoot after getting out of bed in the morning.   If you gain weight, you might be more likely to have plantar fasciitis, especially if you walk a lot or  shoes with poor heel cushioning. Normally there is a pad of fatty tissue under your heel bone. Weight gain might break down this fat pad and cause heel pain.   Runners may get plantar fasciitis when they change their workout and increase their mileage or frequency of workouts. It can also occur with a change in exercise surface or terrain, or if your shoes are worn out and don't provide enough cushion for your heels.   If the arches of your foot are abnormally high or low, you are more likely to develop plantar fasciitis than if your arches are normal.   What are the symptoms?   The main symptom of plantar fasciitis is heel pain when you walk. You may also feel pain when you stand and possibly even when you are resting. This pain typically occurs first thing in the morning after you get out of bed, when your foot is placed flat on the floor. The pain occurs because you are stretching the plantar fascia. The pain usually lessens with more walking, but you may have it again after periods of rest.   You may feel no pain when you are sleeping because the position of your feet  during rest allows the fascia to shorten and relax.   How is it diagnosed?   Your healthcare provider will ask about your symptoms. He or she will ask if the bottom of your heel is tender and if you have pain when you stretch the bottom of your foot. An X-ray of your heel may be done.   How is it treated?     Give your painful heel lots of rest. You may need to stay completely off your foot for several days when the pain is severe.     Your healthcare provider may recommend or prescribe anti-inflammatory medicines, such as aspirin or ibuprofen. These drugs decrease pain and inflammation. Adults aged 65 years and older should not take non-steroidal anti-inflammatory medicine for more than 7 days without their healthcare provider's approval. Resting your heel on an ice pack for a few minutes several times a day can also help.     Try to cushion your foot. You can do this by wearing athletic shoes, even at work, for awhile. Heel cushions can also be used. The cushions should be worn in both shoes. They are most helpful if you are overweight or an older adult.     Your provider may recommend special arch supports or inserts for your shoes called orthotics, either custom-made or off the shelf. These supports can be particularly helpful if you have flat feet or high arches.     Your provider may recommend an injection of a cortisone-like medicine.     Lose weight if needed.     A night splint may be recommended. This will keep the plantar fascia stretched while you are sleeping.     Physical therapy for additional treatments may be recommended     Surgery is rarely needed.   How long will the effects last?   You may find that the pain is sometimes worse and sometimes better over time. If you get treatment soon after you notice the pain, the symptoms should stop after several weeks. If, however, you have had plantar fasciitis for a long time, it may take many weeks to months for the pain to go away.   When can I return to  my normal activities?   Everyone recovers from an injury at a different rate. Return to your activities will be determined by how soon your foot recovers, not by how many days or weeks it has been since your injury has occurred. In general, the longer you have symptoms before you start treatment, the longer it will take to get better. The goal of rehabilitation is to return you to your normal activities as soon as is safely possible. If you return too soon you may worsen your injury.   You may safely return to your activities when, starting from the top of the list and progressing to the end, each of the following is true:     You have full range of motion in the injured foot compared with the uninjured foot.     You have full strength of the injured foot compared with the uninjured foot.     You can walk straight ahead without significant pain or limping.   How can I prevent plantar fasciitis?   The best way to prevent plantar fasciitis is to wear shoes that are well made and fit your feet. This is especially important when you exercise or walk a lot or stand for a long time on hard surfaces. Get new athletic shoes before your old shoes stop supporting and cushioning your feet.   You should also:     Avoid repeated jarring to the heel.     Keep a healthy weight.     Do your leg and foot stretching exercises regularly.   Developed by [employer].   Published by [employer].   Last modified: 2008-08-11   Last reviewed: 2008-07-07   This content is reviewed periodically and is subject to change as new health information becomes available. The information is intended to inform and educate and is not a replacement for medical evaluation, advice, diagnosis or treatment by a healthcare professional.   Sports Medicine Advisor 2009.1 Index  Sports Medicine Advisor 2009.1 Credits     2009 [employer] and/or its affiliates. All Rights Reserved.               Plantar Fasciitis Rehabilitation Exercises   You may begin  exercising the muscles of your foot right away by gently stretching them as follows:     Prone hip extension: Lie on your stomach with your legs straight out behind you. Tighten the buttocks and thigh muscles of your injured leg and lift it off the floor about 8 inches. Keep your knee straight. Hold for 5 seconds. Then lower your leg and relax. Do 3 sets of 10.     Towel stretch: Sit on a hard surface with one leg stretched out in front of you. Loop a towel around your toes and the ball of your foot and pull the towel toward your body keeping your knee straight. Hold this position for 15 to 30 seconds then relax. Repeat 3 times.   When the towel stretch becomes too easy, you may begin doing the standing calf stretch.     Standing calf stretch: Facing a wall, put your hands against the wall at about eye level. Keep one leg back with the heel on the floor, and the other leg forward. Turn your back foot slightly inward (as if you were pigeon-toed) as you slowly lean into the wall until you feel a stretch in the back of your calf. Hold for 15 to 30 seconds. Repeat 3 times and then switch the position of your legs and repeat the exercise 3 times. Do this exercise several times each day.     Sitting plantar fascia stretch: Sit in a chair and cross one foot over your other knee. Grab the base of your toes and pull them back toward your leg until you feel a comfortable stretch. Hold 15 seconds and repeat 3 times.   When you can stand comfortably on your injured foot, you can begin standing to stretch the bottom of your foot using the plantar fascia stretch.     Achilles stretch: Stand with the ball of one foot on a stair. Reach for the bottom step with your heel until you feel a stretch in the arch of your foot. Hold this position for 15 to 30 seconds and then relax. Repeat 3 times.   After you have stretched the bottom muscles of your foot, you can begin strengthening the top muscles of your foot.     Frozen can roll: Roll  your bare injured foot back and forth from your heel to your mid-arch over a frozen juice can. Repeat for 3 to 5 minutes. This exercise is particularly helpful if done first thing in the morning.     Towel pickup: With your heel on the ground,  a towel with your toes. Release. Repeat 10 to 20 times. When this gets easy, add more resistance by placing a book or small weight on the towel.     Balance and reach exercises   Stand upright next to a chair with your injured leg farthest from the chair. This will provide you with support if you need it. Stand just on the foot of your injured leg. Try to raise the arch of this foot while keeping your toes on the floor.   A. Keep your foot in this position and reach forward in front of you with the hand farthest away from the chair, allowing your knee to bend. Repeat this 10 times while maintaining the arch height. This exercise can be made more difficult by reaching farther in front of you. Do 2 sets.   B.  the same position as above. While maintaining your arch height, reach the hand farthest away from the chair across your body toward the chair. The farther you reach, the more challenging the exercise. Do 2 sets of 10.     Heel raise: Balance yourself while standing behind a chair or counter. Using the chair to help you, raise your body up onto your toes and hold for 5 seconds. Then slowly lower yourself down without holding onto the chair. Hold onto the chair or counter if you need to. When this exercise becomes less painful, try lowering on one leg only. Repeat 10 times. Do 3 sets of 10.     Side-lying leg lift: Lying on your uninjured side, tighten the front thigh muscles on your top leg and lift that leg 8 to 10 inches away from the other leg. Keep the leg straight and lower slowly. Do 3 sets of 10.   Written by Keri Blandon, MS, PT, and Lara Pittman PT, [facility], OCS, for [employer].   Published by [employer].   Last modified: 2009-02-09   Last  reviewed: 2008-07-07   This content is reviewed periodically and is subject to change as new health information becomes available. The information is intended to inform and educate and is not a replacement for medical evaluation, advice, diagnosis or treatment by a healthcare professional.   Sports Medicine Advisor 2009.1 Index

## 2018-01-16 NOTE — PROGRESS NOTES
Results discussed directly with patient while patient was present. Any further details documented in the note.   Maura Hopper PA-C

## 2018-01-27 ENCOUNTER — MYC REFILL (OUTPATIENT)
Dept: FAMILY MEDICINE | Facility: CLINIC | Age: 58
End: 2018-01-27

## 2018-01-27 DIAGNOSIS — E03.9 ACQUIRED HYPOTHYROIDISM: ICD-10-CM

## 2018-01-29 RX ORDER — LEVOTHYROXINE SODIUM 75 UG/1
75 TABLET ORAL DAILY
Qty: 30 TABLET | Refills: 0 | Status: SHIPPED | OUTPATIENT
Start: 2018-01-29 | End: 2018-02-28

## 2018-01-29 NOTE — TELEPHONE ENCOUNTER
Message from [employer]:  Original authorizing provider: Shelton Castellanos MD    Lilly Barnes would like a refill of the following medications:  levothyroxine (SYNTHROID/LEVOTHROID) 75 MCG tablet [Shelton Castellanos MD]    Preferred pharmacy: Other - [facility], [address] [place] [address]    Comment:  The pharmacy I usually use no longer accepts my insurance plan and will not transfer the remaining refills (on my current prescription) to another pharmacy. Can I get a 90 day refill on this medication sent to my new pharmacy? [facility] [id number] [place] [address] Thank-You

## 2018-01-29 NOTE — TELEPHONE ENCOUNTER
lisinopril-hydrochlorothiazide (PRINZIDE/ZESTORETIC) 20-12.5 MG per tablet 90 tablet 1 9/11/2017  No   Sig: Take 1 tablet by mouth daily       Last Office Visit with G, P or [facility] prescribing provider: 1/15/2018        TSH   Date Value Ref Range Status   02/03/2017 3.77 0.40 - 4.00 mU/L Final     Due for fasting px for further refills        Delia Bowedn RN, BSN  [facility]

## 2018-02-20 ENCOUNTER — TELEPHONE (OUTPATIENT)
Dept: ORTHOPEDICS | Facility: CLINIC | Age: 58
End: 2018-02-20

## 2018-02-20 NOTE — TELEPHONE ENCOUNTER
Patient called wondering if she could get a cortisone injection for her right knee pain.    Patient had Rt knee arthroscopy, DOS 3/20/17, Dr. French.     Please advise.

## 2018-02-20 NOTE — TELEPHONE ENCOUNTER
Pt was last seen in clinic on 11/8 for cortisone injection in the right knee.  It has been > 3 months, she may schedule at her convenience for repeat injection.    Called patient to inform.

## 2018-02-22 ENCOUNTER — OFFICE VISIT (OUTPATIENT)
Dept: ORTHOPEDICS | Facility: CLINIC | Age: 58
End: 2018-02-22
Payer: OTHER MISCELLANEOUS

## 2018-02-22 VITALS
DIASTOLIC BLOOD PRESSURE: 64 MMHG | SYSTOLIC BLOOD PRESSURE: 108 MMHG | BODY MASS INDEX: 29.66 KG/M2 | WEIGHT: 178 LBS | HEIGHT: 65 IN

## 2018-02-22 DIAGNOSIS — M22.41 CHONDROMALACIA OF RIGHT PATELLA: Primary | ICD-10-CM

## 2018-02-22 PROCEDURE — 99213 OFFICE O/P EST LOW 20 MIN: CPT | Performed by: ORTHOPAEDIC SURGERY

## 2018-02-22 NOTE — PROGRESS NOTES
"HISTORY OF PRESENT ILLNESS:    Lilly Barnes is a 57 year old female who is seen in follow up for right knee pain.  She was last seen 11/8/17 and received right knee cortisone injection.  She reports that she received 30-40% relief for approximately 6 weeks duration from her previous injection.  Present symptom: She reports recurring right anterior knee pain.  No new injury to the knee.  No mechanical symptoms reported.  Treatments tried to this point: None    PHYSICAL EXAM:  /64  Ht 5' 5\" (1.651 m)  Wt 178 lb (80.7 kg)  LMP 09/15/2011  BMI 29.62 kg/m2  Body mass index is 29.62 kg/(m^2).   GENERAL APPEARANCE: healthy, alert and no distress   SKIN: no suspicious lesions or rashes  NEURO: Normal strength and tone, mentation intact and speech normal  VASCULAR:  good pulses, and cappillary refill   LYMPH: no lymphadenopathy   PSYCH:  mentation appears normal and affect normal/bright    MSK:  The patient ambulates without an antalgic gait.  The patient is able to get on and off the exam table without difficulty.        KNEE: Examination of the right knee reveals the lower extremity to have a Normal anatomic alignment.  There is no erythema, ecchymosis nor edema.  There is no effusion present clinically.  There is no significant warmth.  Range of motion is 0 to 125 degrees, without crepitus.  There is mild tenderness to palpation at the medial joint line.  Xiomara's is negative without crepitus. The knee is ligamentously stable. Patello-femoral grind test is positive.      The calves and thighs are symmetric, without atrophy and non-tender to palpation. Jerod's sign is negative, bilaterally.   CMS is intact to the toes.       IMAGING INTERPRETATION:       ASSESSMENT / PLAN: Primary osteoarthritis involving mostly the patellofemoral joint of the right knee.  We discussed the treatment algorithm for osteoarthritis and she is going to decide if she wants to undergo more corticosteroid injections or look into " more invasive treatment modalities.  At this point we will can treat it symptomatically.      Rip French MD  Dept. Orthopedic Surgery  [facility]

## 2018-02-28 ENCOUNTER — TELEPHONE (OUTPATIENT)
Dept: ORTHOPEDICS | Facility: CLINIC | Age: 58
End: 2018-02-28

## 2018-02-28 ENCOUNTER — OFFICE VISIT (OUTPATIENT)
Dept: FAMILY MEDICINE | Facility: CLINIC | Age: 58
End: 2018-02-28
Payer: COMMERCIAL

## 2018-02-28 VITALS
HEIGHT: 65 IN | WEIGHT: 179 LBS | TEMPERATURE: 98.2 F | HEART RATE: 73 BPM | SYSTOLIC BLOOD PRESSURE: 106 MMHG | DIASTOLIC BLOOD PRESSURE: 74 MMHG | BODY MASS INDEX: 29.82 KG/M2 | OXYGEN SATURATION: 100 %

## 2018-02-28 DIAGNOSIS — Z11.59 NEED FOR HEPATITIS C SCREENING TEST: ICD-10-CM

## 2018-02-28 DIAGNOSIS — Z51.81 MEDICATION MONITORING ENCOUNTER: ICD-10-CM

## 2018-02-28 DIAGNOSIS — Z86.0100 HISTORY OF COLONIC POLYPS: ICD-10-CM

## 2018-02-28 DIAGNOSIS — H61.21 IMPACTED CERUMEN OF RIGHT EAR: ICD-10-CM

## 2018-02-28 DIAGNOSIS — J30.0 CHRONIC VASOMOTOR RHINITIS: ICD-10-CM

## 2018-02-28 DIAGNOSIS — Z00.00 ENCOUNTER FOR ROUTINE ADULT HEALTH EXAMINATION WITHOUT ABNORMAL FINDINGS: Primary | ICD-10-CM

## 2018-02-28 DIAGNOSIS — N18.30 CKD (CHRONIC KIDNEY DISEASE) STAGE 3, GFR 30-59 ML/MIN (H): ICD-10-CM

## 2018-02-28 DIAGNOSIS — Z91.09 ENVIRONMENTAL ALLERGIES: ICD-10-CM

## 2018-02-28 DIAGNOSIS — E03.9 ACQUIRED HYPOTHYROIDISM: ICD-10-CM

## 2018-02-28 DIAGNOSIS — E55.9 VITAMIN D DEFICIENCY: ICD-10-CM

## 2018-02-28 DIAGNOSIS — M85.89 OSTEOPENIA OF MULTIPLE SITES: ICD-10-CM

## 2018-02-28 DIAGNOSIS — Z12.39 SCREENING FOR BREAST CANCER: ICD-10-CM

## 2018-02-28 DIAGNOSIS — J31.0 OTHER CHRONIC RHINITIS: ICD-10-CM

## 2018-02-28 DIAGNOSIS — R31.9 HEMATURIA: ICD-10-CM

## 2018-02-28 DIAGNOSIS — Z13.820 SCREENING FOR OSTEOPOROSIS: ICD-10-CM

## 2018-02-28 DIAGNOSIS — Z12.4 SCREENING FOR MALIGNANT NEOPLASM OF CERVIX: ICD-10-CM

## 2018-02-28 DIAGNOSIS — E78.5 HYPERLIPIDEMIA LDL GOAL <100: ICD-10-CM

## 2018-02-28 DIAGNOSIS — Z12.11 SCREEN FOR COLON CANCER: ICD-10-CM

## 2018-02-28 DIAGNOSIS — I10 HYPERTENSION GOAL BP (BLOOD PRESSURE) < 140/90: ICD-10-CM

## 2018-02-28 LAB
ALBUMIN SERPL-MCNC: 3.8 G/DL (ref 3.4–5)
ALBUMIN UR-MCNC: NEGATIVE MG/DL
ALP SERPL-CCNC: 79 U/L (ref 40–150)
ALT SERPL W P-5'-P-CCNC: 18 U/L (ref 0–50)
ANION GAP SERPL CALCULATED.3IONS-SCNC: 8 MMOL/L (ref 3–14)
APPEARANCE UR: CLEAR
AST SERPL W P-5'-P-CCNC: 18 U/L (ref 0–45)
BILIRUB SERPL-MCNC: 0.4 MG/DL (ref 0.2–1.3)
BILIRUB UR QL STRIP: NEGATIVE
BUN SERPL-MCNC: 23 MG/DL (ref 7–30)
CALCIUM SERPL-MCNC: 9.2 MG/DL (ref 8.5–10.1)
CHLORIDE SERPL-SCNC: 106 MMOL/L (ref 94–109)
CHOLEST SERPL-MCNC: 234 MG/DL
CK SERPL-CCNC: 43 U/L (ref 30–225)
CO2 SERPL-SCNC: 27 MMOL/L (ref 20–32)
COLOR UR AUTO: YELLOW
CREAT SERPL-MCNC: 1.17 MG/DL (ref 0.52–1.04)
CREAT UR-MCNC: 101 MG/DL
ERYTHROCYTE [DISTWIDTH] IN BLOOD BY AUTOMATED COUNT: 13.2 % (ref 10–15)
GFR SERPL CREATININE-BSD FRML MDRD: 47 ML/MIN/1.7M2
GLUCOSE SERPL-MCNC: 78 MG/DL (ref 70–99)
GLUCOSE UR STRIP-MCNC: NEGATIVE MG/DL
HCT VFR BLD AUTO: 38.6 % (ref 35–47)
HDLC SERPL-MCNC: 70 MG/DL
HGB BLD-MCNC: 12.6 G/DL (ref 11.7–15.7)
HGB UR QL STRIP: ABNORMAL
KETONES UR STRIP-MCNC: NEGATIVE MG/DL
LDLC SERPL CALC-MCNC: 144 MG/DL
LEUKOCYTE ESTERASE UR QL STRIP: ABNORMAL
MCH RBC QN AUTO: 29.1 PG (ref 26.5–33)
MCHC RBC AUTO-ENTMCNC: 32.6 G/DL (ref 31.5–36.5)
MCV RBC AUTO: 89 FL (ref 78–100)
MICROALBUMIN UR-MCNC: 8 MG/L
MICROALBUMIN/CREAT UR: 8.11 MG/G CR (ref 0–25)
NITRATE UR QL: NEGATIVE
NON-SQ EPI CELLS #/AREA URNS LPF: ABNORMAL /LPF
NONHDLC SERPL-MCNC: 164 MG/DL
PH UR STRIP: 5.5 PH (ref 5–7)
PLATELET # BLD AUTO: 178 10E9/L (ref 150–450)
POTASSIUM SERPL-SCNC: 4.2 MMOL/L (ref 3.4–5.3)
PROT SERPL-MCNC: 6.9 G/DL (ref 6.8–8.8)
RBC # BLD AUTO: 4.33 10E12/L (ref 3.8–5.2)
RBC #/AREA URNS AUTO: ABNORMAL /HPF
SODIUM SERPL-SCNC: 141 MMOL/L (ref 133–144)
SOURCE: ABNORMAL
SP GR UR STRIP: 1.01 (ref 1–1.03)
T4 FREE SERPL-MCNC: 1.27 NG/DL (ref 0.76–1.46)
TRIGL SERPL-MCNC: 98 MG/DL
TSH SERPL DL<=0.005 MIU/L-ACNC: 2.18 MU/L (ref 0.4–4)
UROBILINOGEN UR STRIP-ACNC: 0.2 EU/DL (ref 0.2–1)
WBC # BLD AUTO: 5.1 10E9/L (ref 4–11)
WBC #/AREA URNS AUTO: ABNORMAL /HPF

## 2018-02-28 PROCEDURE — 80061 LIPID PANEL: CPT | Performed by: FAMILY MEDICINE

## 2018-02-28 PROCEDURE — 84439 ASSAY OF FREE THYROXINE: CPT | Performed by: FAMILY MEDICINE

## 2018-02-28 PROCEDURE — 80053 COMPREHEN METABOLIC PANEL: CPT | Performed by: FAMILY MEDICINE

## 2018-02-28 PROCEDURE — 69209 REMOVE IMPACTED EAR WAX UNI: CPT | Performed by: FAMILY MEDICINE

## 2018-02-28 PROCEDURE — 85027 COMPLETE CBC AUTOMATED: CPT | Performed by: FAMILY MEDICINE

## 2018-02-28 PROCEDURE — 84443 ASSAY THYROID STIM HORMONE: CPT | Performed by: FAMILY MEDICINE

## 2018-02-28 PROCEDURE — 82550 ASSAY OF CK (CPK): CPT | Performed by: FAMILY MEDICINE

## 2018-02-28 PROCEDURE — 86803 HEPATITIS C AB TEST: CPT | Performed by: FAMILY MEDICINE

## 2018-02-28 PROCEDURE — 99396 PREV VISIT EST AGE 40-64: CPT | Performed by: FAMILY MEDICINE

## 2018-02-28 PROCEDURE — 87624 HPV HI-RISK TYP POOLED RSLT: CPT | Performed by: FAMILY MEDICINE

## 2018-02-28 PROCEDURE — G0145 SCR C/V CYTO,THINLAYER,RESCR: HCPCS | Performed by: FAMILY MEDICINE

## 2018-02-28 PROCEDURE — 82306 VITAMIN D 25 HYDROXY: CPT | Performed by: FAMILY MEDICINE

## 2018-02-28 PROCEDURE — 82043 UR ALBUMIN QUANTITATIVE: CPT | Performed by: FAMILY MEDICINE

## 2018-02-28 PROCEDURE — 81001 URINALYSIS AUTO W/SCOPE: CPT | Performed by: FAMILY MEDICINE

## 2018-02-28 PROCEDURE — 36415 COLL VENOUS BLD VENIPUNCTURE: CPT | Performed by: FAMILY MEDICINE

## 2018-02-28 RX ORDER — FLUTICASONE PROPIONATE 50 MCG
2 SPRAY, SUSPENSION (ML) NASAL DAILY
Qty: 48 G | Refills: 3 | Status: SHIPPED | OUTPATIENT
Start: 2018-02-28 | End: 2019-03-14

## 2018-02-28 RX ORDER — LEVOTHYROXINE SODIUM 75 UG/1
75 TABLET ORAL DAILY
Qty: 90 TABLET | Refills: 3 | Status: SHIPPED | OUTPATIENT
Start: 2018-02-28 | End: 2019-03-27

## 2018-02-28 RX ORDER — LISINOPRIL AND HYDROCHLOROTHIAZIDE 12.5; 2 MG/1; MG/1
1 TABLET ORAL DAILY
Qty: 90 TABLET | Refills: 3 | Status: SHIPPED | OUTPATIENT
Start: 2018-02-28 | End: 2019-03-27

## 2018-02-28 NOTE — NURSING NOTE
"Chief Complaint   Patient presents with     Physical       Initial /74  Pulse 73  Temp 98.2  F (36.8  C) (Oral)  Ht 5' 5\" (1.651 m)  Wt 179 lb (81.2 kg)  LMP 09/15/2011  SpO2 100%  BMI 29.79 kg/m2 Estimated body mass index is 29.79 kg/(m^2) as calculated from the following:    Height as of this encounter: 5' 5\" (1.651 m).    Weight as of this encounter: 179 lb (81.2 kg)..  BP completed using cuff size: bailey Barlow MA  "

## 2018-02-28 NOTE — TELEPHONE ENCOUNTER
Virginia called and left a message indicating that she had a cortisone injection into her knee with Dr. French in February 22, 2018.  She reports that later that night and then through the next few days she had a rash on her face, headache, fatigue, and nausea.  She reports that she still having some mild issues at this point.    I was able to speak with Dr. French quickly and he indicates that these are not symptoms of a reaction to a cortisone injection.     I called the patient back and informed her that these are typically not reactions to a cortisone injection.  Cortisone injection reactions typically are more localized to the area of the injection.    She was appreciative of the information and will be researching what the reaction could be from.    Of note there was a yearly visit with her primary care physician which this was mentioned upon chart review.    Delmar Appiah ATC

## 2018-02-28 NOTE — PATIENT INSTRUCTIONS
Follow up for Mammogram and DEXA bone density scan, as discussed    Inquire with Colorectal about need for repeat Colonoscopy (5 years vs 10 years from previous - last showed hyperplastic polyp)      [facility]                        To reach your care team during and after hours:   [phone]  To reach our pharmacy:        [phone]    Clinic Hours                        Our clinic hours are:    Monday   7:30 am to 7:00 pm                  Tuesday through Friday 7:30 am to 5:00 pm                             Saturday   8:00 am to 12:00 pm      Sunday   Closed      Pharmacy Hours                        Our pharmacy hours are:    Monday   8:30 am to 7:00 pm       Tuesday to Friday  8:30 am to 6:00 pm                       Saturday    9:00 am to 1:00 pm              Sunday    Closed              There is also information available at our web site:  [email or web address]    If your provider ordered any lab tests and you do not receive the results within 10 business days, please call the clinic.    If you need a medication refill please contact your pharmacy.  Please allow 2-3 business days for your refill to be completed.    Our clinic offers telephone visits and e visits.  Please ask one of your team members to explain more.      Use [employer] (secure email communication and access to your chart) to send your primary care provider a message or make an appointment. Ask someone on your Team how to sign up for [facility].  Immunizations                      Immunization History   Administered Date(s) Administered     Influenza (IIV3) PF 01/28/2013, 10/01/2014, 09/15/2015, 02/03/2017     TDAP Vaccine (Boostrix) 07/19/2012     Varicella 03/05/1997        Health Maintenance                         Health Maintenance Due   Topic Date Due     Pneumovax Vaccine  06/26/1961     Hepatitis C Screening  06/26/1977     Osteoporosis Screening (Dexa) - every 3 years   08/22/2017     Flu Vaccine - yearly  09/01/2017      Thyroid Function Lab (TSH) - yearly  02/03/2018     Basic Metabolic Lab - yearly  02/03/2018     Hemoglobin Lab - yearly  02/03/2018     Cholesterol Lab - yearly  02/03/2018     Microalbumin Lab - yearly  02/03/2018     Wellness Visit with your Primary Provider - yearly  02/03/2018

## 2018-02-28 NOTE — PROGRESS NOTES
"   SUBJECTIVE:   CC: Lilly Barnes is an 57 year old woman who presents for preventive health visit.     Physical   Annual:     Getting at least 3 servings of Calcium per day::  Yes    Bi-annual eye exam::  Yes    Dental care twice a year::  Yes    Sleep apnea or symptoms of sleep apnea::  None    Diet::  Regular (no restrictions)    Frequency of exercise::  1 day/week    Duration of exercise::  30-45 minutes    Taking medications regularly::  Yes    Medication side effects::  Other    Additional concerns today::  YES          Ears/Hearing Loss -- [place] has had problem hearing out of right ear X 2 weeks - thinks there may be wax.    Chronic Rhinitis/Allergies -- No concerns currently. Still having chronic rhinitis sx due to working in [employer] environments.     Right knee Pain -- Ongoing for ~1 year - injury at work. [place] had arthroscopy last year per Ortho (Gillian) - she has still had pain - had a corticosteroid injection recently and \"had a reaction\" - this is a work comp case. Instructed her to follow up with Ortho if symptoms don't improve soon.     Osteopenia -- Pt using Calcium/Vit D OTC chewable supplement.     HTN/CKD Stage 3 -- Lisinopril-HCTZ 20-12.5 mg daily.     BP Readings from Last 3 Encounters:   02/28/18 106/74   02/22/18 108/64   01/15/18 106/64     GFR Estimate   Date Value Ref Range Status   03/20/2017 41 (L) >60 mL/min/1.7m2 Final     Comment:     Non  GFR Calc   02/03/2017 43 (L) >60 mL/min/1.7m2 Final     Comment:     Non  GFR Calc   12/18/2015 49 (L) >60 mL/min/1.7m2 Final     Comment:     Non  GFR Calc     Hypothyroidism -- Levothyroxine 75 mcg daily.     TSH   Date Value Ref Range Status   02/03/2017 3.77 0.40 - 4.00 mU/L Final   12/18/2015 2.25 0.40 - 4.00 mU/L Final   02/20/2015 6.25 (H) 0.40 - 4.00 mU/L Final     Comment:     Effective 7/30/2014, the reference range for this assay has changed to reflect   new " instrumentation/methodology.     08/01/2014 4.83 (H) 0.40 - 4.00 mU/L Final     Comment:     Effective 7/30/2014, the reference range for this assay has changed to reflect   new instrumentation/methodology.     05/31/2014 3.35 0.4 - 5.0 mU/L Final     T4 Free   Date Value Ref Range Status   02/20/2015 0.92 0.76 - 1.46 ng/dL Final     Comment:     Effective 7/30/2014, the reference range for this assay has changed to reflect   new instrumentation/methodology.     08/01/2014 0.99 0.76 - 1.46 ng/dL Final     Comment:     Effective 7/30/2014, the reference range for this assay has changed to reflect   new instrumentation/methodology.     01/10/2014 1.00 0.70 - 1.85 ng/dL Final   07/19/2013 1.19 0.70 - 1.85 ng/dL Final   07/19/2012 1.22 0.70 - 1.85 ng/dL Final      Lipids -- Aspirin 81 mg daily.     Recent Labs   Lab Test  02/03/17   0855  12/18/15   1132  02/20/15   0912  08/01/14   0828   CHOL  244*  237*  214*  214*   HDL  74  69  77  75   LDL  157*  151*  128  119   TRIG  65  85  46  101   CHOLHDLRATIO   --    --   2.8  2.9       Past/recent records reviewed and discussed for -- family hx, social hx, surgical hx, medications, immunizations, allergies.      Today's PHQ-2 Score:   PHQ-2 ( 1999 [employer]) 2/26/2018   Q1: Little interest or pleasure in doing things 0   Q2: Feeling down, depressed or hopeless 0   PHQ-2 Score 0   Q1: Little interest or pleasure in doing things Not at all   Q2: Feeling down, depressed or hopeless Not at all   PHQ-2 Score 0     Abuse: Current or Past(Physical, Sexual or Emotional)- No  Do you feel safe in your environment - Yes    Social History   Substance Use Topics     Smoking status: Never Smoker     Smokeless tobacco: Never Used     Alcohol use 0.0 - 0.6 oz/week     0 - 1 Standard drinks or equivalent per week      Comment: 2 drinks per month     No flowsheet data found.    Reviewed orders with patient.  Reviewed health maintenance and updated orders accordingly - Yes    Patient over age  50, mutual decision to screen reflected in health maintenance.    Pertinent mammograms are reviewed under the imaging tab.  History of abnormal Pap smear: NO - age 30- 65 PAP every 3 years recommended    Reviewed and updated as needed this visit by clinical staff  Tobacco  Allergies  Meds  Med Hx  Surg Hx  Fam Hx  Soc Hx      Reviewed and updated as needed this visit by Provider        Health Maintenance     Colonoscopy:  5 years, due 12/18 (last 12/13)   FIT:  Yearly, due (last 4/17)              Mammogram:  Yearly, due (last 4/17)              PAP:  3 years, due 12/18 (last 12/15)   DEXA:  3 years, due (last 8/14)    Health Maintenance Due   Topic Date Due     PNEUMOVAX 1X HI RISK PATIENT < 65 (NO IB MSG)  06/26/1961     HEPATITIS C SCREENING  06/26/1977     DEXA Q3 YR  08/22/2017     INFLUENZA VACCINE (SYSTEM ASSIGNED)  09/01/2017     TSH Q1 YEAR  02/03/2018     BMP Q1 YR  02/03/2018     HEMOGLOBIN Q1 YR  02/03/2018     LIPID MONITORING Q1 YEAR  02/03/2018     MICROALBUMIN Q1 YEAR  02/03/2018       Current Problem List    Patient Active Problem List   Diagnosis     Hypertension goal BP (blood pressure) < 140/90     CKD (chronic kidney disease) stage 3, GFR 30-59 ml/min     History of colonic polyps- 3 mm polyp.  needs every 3-5 yr surveillence- hyperplastic      Hyperlipidemia LDL goal <100     Chronic rhinitis     Osteopenia     Environmental allergies     Hypothyroidism, unspecified type     Advanced directives, counseling/discussion       Past Medical History    Past Medical History:   Diagnosis Date     CKD (chronic kidney disease) stage 3, GFR 30-59 ml/min 7/13     Colon polyps 12/13    hyperplastic polyp - Dr Sheppard, due 3-5 yrs     Environmental allergies      Hyperlipidemia LDL goal < 130      Hypertension goal BP (blood pressure) < 140/90 2009     Hypothyroidism 2009     Osteopenia        Past Surgical History    Past Surgical History:   Procedure Laterality Date     ARTHROSCOPY KNEE WITH MEDIAL  MENISCECTOMY Right 3/20/2017    ARTHROSCOPY KNEE WITH MEDIAL MENISCECTOMY - Dr French     COLONOSCOPY  12/6/2013    hyperplastic polyp - Dr Sheppard - due 3-5 yrs     SURGICAL HISTORY OF -   1997    laproscopic - left - for ectopic pregnancy       Current Medications    Current Outpatient Prescriptions   Medication Sig Dispense Refill     levothyroxine (SYNTHROID/LEVOTHROID) 75 MCG tablet Take 1 tablet (75 mcg) by mouth daily 90 tablet 3     lisinopril-hydrochlorothiazide (PRINZIDE/ZESTORETIC) 20-12.5 MG per tablet Take 1 tablet by mouth daily 90 tablet 3     fluticasone (FLONASE) 50 MCG/ACT spray Spray 2 sprays into both nostrils daily 48 g 3     multivitamin, therapeutic with minerals (MULTI-VITAMIN) TABS Take 1 tablet by mouth daily 100 tablet 3     calcium carbonate-vitamin D 600-400 MG-UNIT CHEW Take 1 chew tab by mouth 2 times daily 180 tablet 3     aspirin 81 MG tablet Take 81 mg by mouth daily   3     [DISCONTINUED] levothyroxine (SYNTHROID/LEVOTHROID) 75 MCG tablet Take 1 tablet (75 mcg) by mouth daily 30 tablet 0     [DISCONTINUED] lisinopril-hydrochlorothiazide (PRINZIDE/ZESTORETIC) 20-12.5 MG per tablet Take 1 tablet by mouth daily 90 tablet 1       Allergies    Allergies   Allergen Reactions     Ibuprofen      Should avoid due to Chronic Kidney Disease       Immunizations    Immunization History   Administered Date(s) Administered     Influenza (IIV3) PF 01/28/2013, 10/01/2014, 09/15/2015, 02/03/2017     TDAP Vaccine (Boostrix) 07/19/2012     Varicella 03/05/1997       Family History    Family History   Problem Relation Age of Onset     DIABETES Mother      CANCER Sister 43     ovarian and uterine      CEREBROVASCULAR DISEASE Sister      Lipids Other      multiple members including 1 degree      DIABETES Brother      Depression Son      resolved     CANCER Maternal Grandmother      Coronary Artery Disease Maternal Grandfather        Social History    Social History     Social History     Marital status:  "     Spouse name: Cullen     Number of children: 1     Years of education: 14     Occupational History      [employer]     Social History Main Topics     Smoking status: Never Smoker     Smokeless tobacco: Never Used     Alcohol use 0.0 - 0.6 oz/week     0 - 1 Standard drinks or equivalent per week      Comment: 2 drinks per month     Drug use: No     Sexual activity: No     Other Topics Concern     Parent/Sibling W/ Cabg, Mi Or Angioplasty Before 65f 55m? No      Service No     Blood Transfusions No     Caffeine Concern Yes     0-1 energy drinks daily     Exercise Yes     walks 3-4 times per week, averages 10,000 steps daily     Seat Belt Yes     Social History Narrative       Review of Systems  Constitutional, HEENT, cardiovascular, pulmonary, GI, , musculoskeletal, neuro, skin, endocrine and psych systems are negative, except as in HPI or otherwise noted     This document serves as a record of the services and decisions personally performed and made by Shelton Csatellanos MD FAAFP. It was created on their behalf by Felice Mcbride, a trained medical scribe. The creation of this document is based the provider's statements to the medical scribe.  Felice Mcbride February 28, 2018 8:00 AM       OBJECTIVE:   /74  Pulse 73  Temp 98.2  F (36.8  C) (Oral)  Ht 5' 5\" (1.651 m)  Wt 179 lb (81.2 kg)  LMP 09/15/2011  SpO2 100%  BMI 29.79 kg/m2  Physical Exam  GENERAL: healthy, alert and no distress, overweight  HENT: ear canals and TM's with impacted cerumen noted to right ear (ear irrigation performed today, cleared of cerumen, TM normal) upon viewing with otoscope, nose and mouth without ulcers or lesions upon viewing with otoscope  RESP: lungs clear to auscultation - no rales, rhonchi or wheezes  BREAST: normal without masses, tenderness or nipple discharge and no palpable axillary masses or adenopathy  CV: regular rate and rhythm, normal S1 S2, no S3 or S4, no murmur, click or rub, no peripheral edema and " peripheral pulses strong - no carotid bruits  ABDOMEN: soft, nontender, no hepatosplenomegaly, no masses and bowel sounds normal   (female): normal female external genitalia, normal urethral meatus , vaginal mucosa pink, moist, well rugated and normal cervix, adnexae, and uterus without masses.  MS: no gross musculoskeletal defects noted, no edema  SKIN: no suspicious lesions or rashes to visible skin  NEURO: mentation intact and speech normal  PSYCH: mentation appears normal, affect normal/bright    ASSESSMENT/PLAN:       ICD-10-CM    1. Encounter for routine adult health examination without abnormal findings Z00.00 Comprehensive metabolic panel     Lipid panel reflex to direct LDL Fasting     CK total     CBC with platelets     TSH with free T4 reflex     Albumin Random Urine Quantitative with Creat Ratio     *UA reflex to Microscopic and Culture ([facility] (except [place] and [facility])     Hepatitis C Screen Reflex to HCV RNA Quant and Genotype     Fecal colorectal cancer screen (FIT)     Vitamin D Deficiency     T4 free     Urine Microscopic   2. CKD (chronic kidney disease) stage 3, GFR 30-59 ml/min N18.3 Comprehensive metabolic panel     Albumin Random Urine Quantitative with Creat Ratio     *UA reflex to Microscopic and Culture ([facility] (except [place] [facility])   3. Hypertension goal BP (blood pressure) < 140/90 I10 Comprehensive metabolic panel     Albumin Random Urine Quantitative with Creat Ratio     *UA reflex to Microscopic and Culture ([facility] (except [place] [facility])     lisinopril-hydrochlorothiazide (PRINZIDE/ZESTORETIC) 20-12.5 MG per tablet   4. Hyperlipidemia LDL goal <100 E78.5 Comprehensive metabolic panel     Lipid panel reflex to direct LDL Fasting     CK total   5. Acquired hypothyroidism E03.9 levothyroxine (SYNTHROID/LEVOTHROID) 75 MCG tablet   6. Osteopenia of multiple sites M85.89 Comprehensive metabolic panel      Vitamin D Deficiency   7. Environmental allergies Z91.09 fluticasone (FLONASE) 50 MCG/ACT spray   8. Other chronic rhinitis J31.0 fluticasone (FLONASE) 50 MCG/ACT spray   9. Chronic vasomotor rhinitis J30.0 fluticasone (FLONASE) 50 MCG/ACT spray   10. Impacted cerumen of right ear H61.21 REMOVE IMPACTED CERUMEN   11. History of colonic polyps- 3 mm polyp.  needs every 3-5 yr surveillence- hyperplastic  Z86.010 Fecal colorectal cancer screen (FIT)   12. Screen for colon cancer Z12.11 Fecal colorectal cancer screen (FIT)   13. Screening for breast cancer Z12.31 *MA Screening Digital Bilateral   14. Screening for malignant neoplasm of cervix Z12.4 Pap imaged thin layer screen with HPV - recommended age 30 - 65 years (select HPV order below)   15. Screening for osteoporosis Z13.820 DX Hip/Pelvis/Spine   16. Medication monitoring encounter Z51.81 Comprehensive metabolic panel     Lipid panel reflex to direct LDL Fasting     CK total     CBC with platelets     TSH with free T4 reflex     Albumin Random Urine Quantitative with Creat Ratio     *UA reflex to Microscopic and Culture ([facility] (except [place] and [place])     Vitamin D Deficiency     T4 free   17. Need for hepatitis C screening test Z11.59 Hepatitis C Screen Reflex to HCV RNA Quant and Genotype     Discussed treatment/modality options, including risk and benefits, she desires advised alcohol consumption 1oz per day or less, advised aspirin 81 mg po daily, advised 1 multivitamin per day, advised calcium 5764-9176 mg/d and Vitamin D 800-1200 IU/d, advised dentist every 6 months, advised diet, exercise, and weight loss, advised opthalmologist every 1-2 years, advised self breast exam q month, further diagnostic(s), further health care maintenance, further imaging, further lab(s), heat/ice/stretching & exercise, medication refill(s), OTC meds and observation. All diagnosis above reviewed and noted above, otherwise stable.  [facility]  "for further details.  Follow up in 6 months to 1 year(s) and as needed.    - Pt did not receive influenza vaccination this season.     Health Maintenance Due   Topic Date Due     PNEUMOVAX 1X HI RISK PATIENT < 65 (NO IB MSG)  06/26/1961     HEPATITIS C SCREENING  06/26/1977     DEXA Q3 YR  08/22/2017     INFLUENZA VACCINE (SYSTEM ASSIGNED)  09/01/2017     TSH Q1 YEAR  02/03/2018     BMP Q1 YR  02/03/2018     HEMOGLOBIN Q1 YR  02/03/2018     LIPID MONITORING Q1 YEAR  02/03/2018     MICROALBUMIN Q1 YEAR  02/03/2018       COUNSELING:  Reviewed preventive health counseling, as reflected in patient instructions     reports that she has never smoked. She has never used smokeless tobacco.    Estimated body mass index is 29.79 kg/(m^2) as calculated from the following:    Height as of this encounter: 5' 5\" (1.651 m).    Weight as of this encounter: 179 lb (81.2 kg).   Weight management plan: Discussed healthy diet and exercise guidelines and patient will follow up in 12 months in clinic to re-evaluate.    Counseling Resources:  ATP IV Guidelines  Pooled Cohorts Equation Calculator  Breast Cancer Risk Calculator  FRAX Risk Assessment  ICSI Preventive Guidelines  Dietary Guidelines for Americans, 2010  USDA's MyPlate  ASA Prophylaxis  Lung CA Screening    The information in this document, created by the medical scribe for me, accurately reflects the services I personally performed and the decisions made by me. I have reviewed and approved this document for accuracy.   Shelton Castellanos MD FAAFP            Shelton Castellanos MD, FAA[address]  [id number] [phone] Fax    "

## 2018-03-01 LAB
DEPRECATED CALCIDIOL+CALCIFEROL SERPL-MC: 19 UG/L (ref 20–75)
HCV AB SERPL QL IA: NONREACTIVE

## 2018-03-02 LAB
COPATH REPORT: NORMAL
PAP: NORMAL

## 2018-03-05 RX ORDER — ATORVASTATIN CALCIUM 20 MG/1
20 TABLET, FILM COATED ORAL DAILY
Qty: 90 TABLET | Refills: 3 | Status: SHIPPED | OUTPATIENT
Start: 2018-03-05 | End: 2018-04-06 | Stop reason: SINTOL

## 2018-03-06 LAB
FINAL DIAGNOSIS: NORMAL
HPV HR 12 DNA CVX QL NAA+PROBE: NEGATIVE
HPV16 DNA SPEC QL NAA+PROBE: NEGATIVE
HPV18 DNA SPEC QL NAA+PROBE: NEGATIVE
SPECIMEN DESCRIPTION: NORMAL
SPECIMEN SOURCE CVX/VAG CYTO: NORMAL

## 2018-03-09 ENCOUNTER — OFFICE VISIT (OUTPATIENT)
Dept: PODIATRY | Facility: CLINIC | Age: 58
End: 2018-03-09
Payer: COMMERCIAL

## 2018-03-09 VITALS — HEIGHT: 65 IN | BODY MASS INDEX: 29.82 KG/M2 | HEART RATE: 78 BPM | WEIGHT: 179 LBS

## 2018-03-09 DIAGNOSIS — R31.9 HEMATURIA: ICD-10-CM

## 2018-03-09 DIAGNOSIS — M72.2 PLANTAR FASCIAL FIBROMATOSIS: Primary | ICD-10-CM

## 2018-03-09 DIAGNOSIS — G57.92 NEURITIS OF LEFT HEEL: ICD-10-CM

## 2018-03-09 LAB

## 2018-03-09 PROCEDURE — 87086 URINE CULTURE/COLONY COUNT: CPT | Performed by: FAMILY MEDICINE

## 2018-03-09 PROCEDURE — 81001 URINALYSIS AUTO W/SCOPE: CPT | Performed by: FAMILY MEDICINE

## 2018-03-09 PROCEDURE — 99243 OFF/OP CNSLTJ NEW/EST LOW 30: CPT | Performed by: PODIATRIST

## 2018-03-09 ASSESSMENT — PAIN SCALES - GENERAL: PAINLEVEL: MODERATE PAIN (4)

## 2018-03-09 NOTE — NURSING NOTE
"Chief Complaint   Patient presents with     Establish Care     left heel pain x 3 months / no injury       Initial Pulse 78  Ht 5' 5\" (1.651 m)  Wt 179 lb (81.2 kg)  LMP 09/15/2011  BMI 29.79 kg/m2 Estimated body mass index is 29.79 kg/(m^2) as calculated from the following:    Height as of this encounter: 5' 5\" (1.651 m).    Weight as of this encounter: 179 lb (81.2 kg).  Medication Reconciliation: complete    "

## 2018-03-09 NOTE — LETTER
"    3/9/2018         RE: Lilly MARTIN Molly  [address] [facility] [address]        Dear Colleague,    Thank you for referring your patient, Lilly Barnes, to the [facility]. Please see a copy of my visit note below.    Foot & Ankle Surgery  March 9, 2018    CC: L heel pain    I was asked to see Lilly Barnes regarding the chief complaint by:  RICCARDO Hopper    HPI:  Pt is a 57 year old female who presents with above complaint.  Left heel pain x 3 months.  She underwent right knee surgery for torn meniscus and subsequently developed a gait abnormality.  Started as a \"sore\" heel but has progressed and she now has sharp pain.  Minimal AM/PSD pain, but can be painful all doay.  Pain 8/10 daily, worse with walking.  She was put on PO steroids without improvement.  She has done icing, stretching and some inserts without improvement.      ROS:   Pos for CC.  The patient denies current nausea, vomiting, chills, fevers, belly pain, calf pain, chest pain or SOB.  Complete remainder of ROS is otherwise neg.    VITALS:    Vitals:    03/09/18 0805   Pulse: 78   Weight: 179 lb (81.2 kg)   Height: 5' 5\" (1.651 m)       PMH:    Past Medical History:   Diagnosis Date     CKD (chronic kidney disease) stage 3, GFR 30-59 ml/min 7/13     Colon polyps 12/13    hyperplastic polyp - Dr Sheppard, due 3-5 yrs     Environmental allergies      Hyperlipidemia LDL goal < 130      Hypertension goal BP (blood pressure) < 140/90 2009     Hypothyroidism 2009     Osteopenia      Vitamin D deficiency        SXHX:    Past Surgical History:   Procedure Laterality Date     ARTHROSCOPY KNEE WITH MEDIAL MENISCECTOMY Right 3/20/2017    ARTHROSCOPY KNEE WITH MEDIAL MENISCECTOMY - Dr French     COLONOSCOPY  12/6/2013    hyperplastic polyp - Dr Sheppard - due 3-5 yrs     SURGICAL HISTORY OF -   1997    laproscopic - left - for ectopic pregnancy        MEDS:    Current Outpatient Prescriptions   Medication     atorvastatin (LIPITOR) 20 MG " tablet     levothyroxine (SYNTHROID/LEVOTHROID) 75 MCG tablet     lisinopril-hydrochlorothiazide (PRINZIDE/ZESTORETIC) 20-12.5 MG per tablet     fluticasone (FLONASE) 50 MCG/ACT spray     multivitamin, therapeutic with minerals (MULTI-VITAMIN) TABS     calcium carbonate-vitamin D 600-400 MG-UNIT CHEW     aspirin 81 MG tablet     No current facility-administered medications for this visit.        ALL:     Allergies   Allergen Reactions     Ibuprofen      Should avoid due to Chronic Kidney Disease       FMH:    Family History   Problem Relation Age of Onset     DIABETES Mother      CANCER Sister 43     ovarian and uterine      CEREBROVASCULAR DISEASE Sister      Lipids Other      multiple members including 1 degree      DIABETES Brother      Depression Son      resolved     CANCER Maternal Grandmother      Coronary Artery Disease Maternal Grandfather        SocHx:    Social History     Social History     Marital status:      Spouse name: Cullen     Number of children: 1     Years of education: 14     Occupational History      [employer]     Social History Main Topics     Smoking status: Never Smoker     Smokeless tobacco: Never Used     Alcohol use 0.0 - 0.6 oz/week     0 - 1 Standard drinks or equivalent per week      Comment: 2 drinks per month     Drug use: No     Sexual activity: No     Other Topics Concern     Parent/Sibling W/ Cabg, Mi Or Angioplasty Before 65f 55m? No      Service No     Blood Transfusions No     Caffeine Concern Yes     0-1 energy drinks daily     Exercise Yes     walks 3-4 times per week, averages 10,000 steps daily     Seat Belt Yes     Social History Narrative           EXAMINATION:  Gen:   No apparent distress  Neuro:   A&Ox3, no deficits  Psych:    Answering questions appropriately for age and situation with normal affect  Head:    NCAT  Eye:    Visual scanning without deficit  Ear:    Response to auditory stimuli wnl  Lung:    Non-labored breathing on RA noted  Abd:    NTND  per patient report  Lymph:    Varicose veins bilateral lower extremity   Vasc:    Pulses palpable, CFT minimally delayed  Neuro:    Light touch sensation intact to all sensory nerve distributions without paresthesias  Derm:    Neg for nodules, lesions or ulcerations  MSK:    Left heel - tender at plantar and plantar-medial L heel.  Tender along ICN at medial heel.  Mild Achilles insertion/bursa pain.    Calf:    Neg for redness, swelling or tenderness    Assessment:  57 year old female with left heel pain related to plantar fasciitis/Manuel's neuritis       Plan:  Discussed etiologies, anatomy and options  1.  Left heel pain related to plantar fasciitis/Manuel's neuritis   -Regarding the plantar fasciitis, the Plantar Fasciitis handout was dispensed and discussed.  We talked about stretching, resting/activity modification, icing, NSAID/tylenol use as tolerated, inserts, supportive shoes and minimizing shoeless walking.    -discussed Achilles, plantar fascial and hamstring stretches  -OTC insert information dispensed and discussed   -consider ICN diagnostic/therapeutic injection if symptoms fail to respond.  Discussed today but patient declined    Follow up:  4 weeks or sooner with acute issues      Patient's medical history was reviewed today    Body mass index is 29.79 kg/(m^2).  Weight management plan: Patient was referred to their PCP to discuss a diet and exercise plan.        Holland Wise DPM   Podiatric Foot & Ankle Surgeon  [facility]  [phone]        Again, thank you for allowing me to participate in the care of your patient.        Sincerely,        Holland Wise DPM, CHYNA

## 2018-03-09 NOTE — PATIENT INSTRUCTIONS
Thank you for choosing [facility]!    DR. MOREAU'S CLINIC LOCATIONS:   MONDAY - [place] TUESDAY - [place]   [address] [facility]  [address]   [place] [address] [place] [address]   [phone]       THURSDAY AM - [place] THURSDAY PM - [place]   [address]   [place] [address] [place] [address]   [phone]       FRIDAY AM - [place] SET UP SURGERY: [phone] [address] APPOINTMENTS: [phone]   [place] [address] BILLING QUESTIONS: [phone] FAX NUMBER: [phone]     Follow Up in 4 weeks    PLANTAR FASCIITIS  Plantar fasciitis is often referred to as heel spurs or heel pain. Plantar fasciitis is a very common problem that affects people of all foot shapes, age, weight and activity level. Pain may be in the arch or on the weight-bearing surface of the heel. The pain may come on without injury or identifiable cause. Pain is generally present when first getting out of bed in the morning or up from a seated break.     CAUSES  The plantar fascia is a dense fibrous band of tissue that stretches across the bottom surface of the foot. The fascia helps support the foot muscles and arch. Plantar fasciitis is thought to be caused by mechanical strain or overload. Frequent walking without shoes or wearing unsupportive shoes is thought to cause structural overload and ultimately inflammation of the plantar fascia. Some people have heel spurs that can be seen on x-ray. The heel spur is actually a minor component of plantar fascitis and is largely ignored.       SELF TREATMENT   The easiest solution is to stop walking around your home without shoes. Plantar fasciitis is largely a shoe problem. Shoes are either not being worn often enough or your current shoes are inadequate for your weight, foot structure or activity level. The majority of shoes on the market today are not  sufficient to resist development of plantar fasciitis or to promote healing. Assume that your current shoes are inadequate and will need to be replaced. Even high quality shoes wear out with 6 months to one year of frequent use. Weight loss is another option. Losing ten pounds in the next two months may be enough to resolve the problem. Ice applied to the area of pain two to three times per day for ten minutes each session can be very helpful. Warm foot soaks in epsom salts can also relieve pain. This should continue until the problem resolves. Achilles tendon stretching is essential. Stretch multiple times daily to promote healing and to prevent recurrence in the future. Over all stretching of the body is helpful as well such as the calves, thighs and lower back. Normally when one area of the body is tight, other areas are too. Gentle Yoga can be good for this.     Over the counter topical anti inflammatories can be helpful such as biofreeze, bengay, salon pas, ect...  Oral ibuprofen or aleve is recommended as well to try to calm down inflammation.     Night splints can be helpful to gradually stretch the foot at night as a lot of pain is when you get up in the morning. Taking a towel or thera band and stretching the foot back multiple times before you get ou of bed can be beneficial as well.     MEDICAL TREATMENT  Medical treatments often include custom arch supports, cortisone injections, physical therapy, splints to be worn in bed, prescription medications and surgery. The home treatments listed above will be necessary regardless of these advanced medical treatments. Surgery is rarely needed but is very helpful in selected cases.     PROGNOSIS  Plantar fasciitis can last from one day to a lifetime. Some people get intermittent fascitis that is very short-lived. Others suffer daily for years. Excessive body weight, frequent bare foot walking, long hours on the feet, inadequate shoes, predisposing foot structures  and excessive activity such as running are all potential issues that lead to chronic and/or recurring plantar fascitis. Having plantar fasciitis means that you are forever prone to this problem and will require modification of some of the above factors. Most people seek treatment within one to four months. Healing usually requires a similar one to four month time frame. Healing time is relative to the amount of effort spent treating the problem.   Plantar fasciitis is highly recurrent. Risk factors often continue, including return to bare foot walking, inadequate shoes, excessive body weight, excessive activities, etc. Your life style and foot structure may predispose you to recurrent plantar fasciitis. A daily prevention regimen can be very helpful. Ongoing use of shoe inserts, careful attention to appropriate shoes, daily Achilles stretching, etc. may prevent recurrence. Prompt attention at the earliest warning signs of heel pain can resolve the problem in as short as a few days.     EXERCISES  Stair Exercise: Step on the stairs with the ball of your foot and hold your position for at least 15 seconds, then slowly step down with the heels of your foot. You can do this daily and as often as you want.   Picking the Towel: Sit comfortably and then pick the towel up with your toes. You can use any object other than a towel as long as the material can be soft and you can pick it up with your toes.  Rolling the Bottle: Use a small ball or frozen water bottle and then roll it around with your foot.   Flex the Toes: Sit comfortably and then flex your toes by pointing it towards the floor or towards your body. This will relax and flex your foot and exercise your plantar fascia, the calf, and the Achilles tendon. The inability of the foot to stretch often causes the bunching up of the plantar fascia area leading to the pain.  Calf/Achilles Stretching: Lay on you back and raise one foot, then point your toes towards the  floor. See photo below:               Hold each stretch for 10 seconds. Stretch 10 times per set, three sets per day. Morning, afternoon and evening. If your heel pain is very severe in the morning, consider doing the first set of stretches before you get out of bed.      OVER THE COUNTER INSERT RECOMMENDATIONS  SuperFeet   Sofsole Fit Spenco   Power Step   Walk-Fit Arch Cradles     Most of these can be found at your local [employer], sporting [employer] stores, or online.  **A good high quality over the counter insert should cost around $40-$50      [facility] LOCATIONS  [place]  [address] [facility]  [phone]   [address]  [phone] [place]  [address] [facility]  [phone]   [place]  [address]  [phone]   [place]  [address]  [phone] [place]  [address] [place] [address] [place]  [phone]   [place]  [address]  [phone]   [place]  [address] [place] [address]  [phone] [place]  [address]  [phone]           Body Mass Index (BMI)  Many things can cause foot and ankle problems. Foot structure, activity level, foot mechanics and injuries are common causes of pain. One very important issue that often goes unmentioned, is body weight. Extra weight can cause increased stress on muscles, ligaments, bones and tendons. Sometimes just a few extra pounds is all it takes to put one over her/his threshold. Without reducing that stress, it can be difficult to alleviate pain. Some people are uncomfortable addressing this issue, but we feel it is important for you to think about it. As Foot &  Ankle specialists, our job is addressing the lower extremity problem and possible causes. Regarding extra body weight, we encourage patients to discuss diet and weight management plans with their primary care doctors. It is this team approach that gives you the best opportunity for pain relief and getting you back on your feet.

## 2018-03-09 NOTE — PROGRESS NOTES
"Foot & Ankle Surgery  March 9, 2018    CC: L heel pain    I was asked to see Lilly Barnes regarding the chief complaint by:  RICCARDO Hopper    HPI:  Pt is a 57 year old female who presents with above complaint.  Left heel pain x 3 months.  She underwent right knee surgery for torn meniscus and subsequently developed a gait abnormality.  Started as a \"sore\" heel but has progressed and she now has sharp pain.  Minimal AM/PSD pain, but can be painful all doay.  Pain 8/10 daily, worse with walking.  She was put on PO steroids without improvement.  She has done icing, stretching and some inserts without improvement.      ROS:   Pos for CC.  The patient denies current nausea, vomiting, chills, fevers, belly pain, calf pain, chest pain or SOB.  Complete remainder of ROS is otherwise neg.    VITALS:    Vitals:    03/09/18 0805   Pulse: 78   Weight: 179 lb (81.2 kg)   Height: 5' 5\" (1.651 m)       PMH:    Past Medical History:   Diagnosis Date     CKD (chronic kidney disease) stage 3, GFR 30-59 ml/min 7/13     Colon polyps 12/13    hyperplastic polyp - Dr Sheppard, due 3-5 yrs     Environmental allergies      Hyperlipidemia LDL goal < 130      Hypertension goal BP (blood pressure) < 140/90 2009     Hypothyroidism 2009     Osteopenia      Vitamin D deficiency        SXHX:    Past Surgical History:   Procedure Laterality Date     ARTHROSCOPY KNEE WITH MEDIAL MENISCECTOMY Right 3/20/2017    ARTHROSCOPY KNEE WITH MEDIAL MENISCECTOMY - Dr French     COLONOSCOPY  12/6/2013    hyperplastic polyp - Dr Sheppard - due 3-5 yrs     SURGICAL HISTORY OF -   1997    laproscopic - left - for ectopic pregnancy        MEDS:    Current Outpatient Prescriptions   Medication     atorvastatin (LIPITOR) 20 MG tablet     levothyroxine (SYNTHROID/LEVOTHROID) 75 MCG tablet     lisinopril-hydrochlorothiazide (PRINZIDE/ZESTORETIC) 20-12.5 MG per tablet     fluticasone (FLONASE) 50 MCG/ACT spray     multivitamin, therapeutic with minerals (MULTI-VITAMIN) " TABS     calcium carbonate-vitamin D 600-400 MG-UNIT CHEW     aspirin 81 MG tablet     No current facility-administered medications for this visit.        ALL:     Allergies   Allergen Reactions     Ibuprofen      Should avoid due to Chronic Kidney Disease       FMH:    Family History   Problem Relation Age of Onset     DIABETES Mother      CANCER Sister 43     ovarian and uterine      CEREBROVASCULAR DISEASE Sister      Lipids Other      multiple members including 1 degree      DIABETES Brother      Depression Son      resolved     CANCER Maternal Grandmother      Coronary Artery Disease Maternal Grandfather        SocHx:    Social History     Social History     Marital status:      Spouse name: Cullen     Number of children: 1     Years of education: 14     Occupational History      [employer]     Social History Main Topics     Smoking status: Never Smoker     Smokeless tobacco: Never Used     Alcohol use 0.0 - 0.6 oz/week     0 - 1 Standard drinks or equivalent per week      Comment: 2 drinks per month     Drug use: No     Sexual activity: No     Other Topics Concern     Parent/Sibling W/ Cabg, Mi Or Angioplasty Before 65f 55m? No      Service No     Blood Transfusions No     Caffeine Concern Yes     0-1 energy drinks daily     Exercise Yes     walks 3-4 times per week, averages 10,000 steps daily     Seat Belt Yes     Social History Narrative           EXAMINATION:  Gen:   No apparent distress  Neuro:   A&Ox3, no deficits  Psych:    Answering questions appropriately for age and situation with normal affect  Head:    NCAT  Eye:    Visual scanning without deficit  Ear:    Response to auditory stimuli wnl  Lung:    Non-labored breathing on RA noted  Abd:    NTND per patient report  Lymph:    Varicose veins bilateral lower extremity   Vasc:    Pulses palpable, CFT minimally delayed  Neuro:    Light touch sensation intact to all sensory nerve distributions without paresthesias  Derm:    Neg for nodules,  lesions or ulcerations  MSK:    Left heel - tender at plantar and plantar-medial L heel.  Tender along ICN at medial heel.  Mild Achilles insertion/bursa pain.    Calf:    Neg for redness, swelling or tenderness    Assessment:  57 year old female with left heel pain related to plantar fasciitis/Manuel's neuritis       Plan:  Discussed etiologies, anatomy and options  1.  Left heel pain related to plantar fasciitis/Manuel's neuritis   -Regarding the plantar fasciitis, the Plantar Fasciitis handout was dispensed and discussed.  We talked about stretching, resting/activity modification, icing, NSAID/tylenol use as tolerated, inserts, supportive shoes and minimizing shoeless walking.    -discussed Achilles, plantar fascial and hamstring stretches  -OTC insert information dispensed and discussed   -consider ICN diagnostic/therapeutic injection if symptoms fail to respond.  Discussed today but patient declined    Follow up:  4 weeks or sooner with acute issues      Patient's medical history was reviewed today    Body mass index is 29.79 kg/(m^2).  Weight management plan: Patient was referred to their PCP to discuss a diet and exercise plan.        Holland Wise DPM   Podiatric Foot & Ankle Surgeon  [facility]  [phone]

## 2018-03-09 NOTE — MR AVS SNAPSHOT
After Visit Summary   3/9/2018    Lilly Barnes    MRN: [id number]           Patient Information     Date Of Birth          1960        Visit Information        Provider Department      3/9/2018 8:15 AM Holland Moreau DPM [facility]        Care Instructions    Thank you for choosing [facility]!    DR. MOREAU'S CLINIC LOCATIONS:   MONDAY - [place] TUESDAY - [place]   [address] [place] [address]  [address]   [place] [address] [place] [address]   [phone]       THURSDAY AM - [place] THURSDAY PM - [place]   [address]   [place] [address] [place] [address]   [phone]       FRIDAY AM - [place] SET UP SURGERY: [phone] [address] APPOINTMENTS: [phone]   [place] [address] BILLING QUESTIONS: [phone] FAX NUMBER: [phone]     Follow Up in 4 weeks    PLANTAR FASCIITIS  Plantar fasciitis is often referred to as heel spurs or heel pain. Plantar fasciitis is a very common problem that affects people of all foot shapes, age, weight and activity level. Pain may be in the arch or on the weight-bearing surface of the heel. The pain may come on without injury or identifiable cause. Pain is generally present when first getting out of bed in the morning or up from a seated break.     CAUSES  The plantar fascia is a dense fibrous band of tissue that stretches across the bottom surface of the foot. The fascia helps support the foot muscles and arch. Plantar fasciitis is thought to be caused by mechanical strain or overload. Frequent walking without shoes or wearing unsupportive shoes is thought to cause structural overload and ultimately inflammation of the plantar fascia. Some people have heel spurs that can be seen on x-ray. The heel spur is actually a minor component of plantar fascitis and is largely ignored.       SELF  TREATMENT   The easiest solution is to stop walking around your home without shoes. Plantar fasciitis is largely a shoe problem. Shoes are either not being worn often enough or your current shoes are inadequate for your weight, foot structure or activity level. The majority of shoes on the market today are not sufficient to resist development of plantar fasciitis or to promote healing. Assume that your current shoes are inadequate and will need to be replaced. Even high quality shoes wear out with 6 months to one year of frequent use. Weight loss is another option. Losing ten pounds in the next two months may be enough to resolve the problem. Ice applied to the area of pain two to three times per day for ten minutes each session can be very helpful. Warm foot soaks in epsom salts can also relieve pain. This should continue until the problem resolves. Achilles tendon stretching is essential. Stretch multiple times daily to promote healing and to prevent recurrence in the future. Over all stretching of the body is helpful as well such as the calves, thighs and lower back. Normally when one area of the body is tight, other areas are too. Gentle Yoga can be good for this.     Over the counter topical anti inflammatories can be helpful such as biofreeze, bengay, salon pas, ect...  Oral ibuprofen or aleve is recommended as well to try to calm down inflammation.     Night splints can be helpful to gradually stretch the foot at night as a lot of pain is when you get up in the morning. Taking a towel or thera band and stretching the foot back multiple times before you get ou of bed can be beneficial as well.     MEDICAL TREATMENT  Medical treatments often include custom arch supports, cortisone injections, physical therapy, splints to be worn in bed, prescription medications and surgery. The home treatments listed above will be necessary regardless of these advanced medical treatments. Surgery is rarely needed but is very  helpful in selected cases.     PROGNOSIS  Plantar fasciitis can last from one day to a lifetime. Some people get intermittent fascitis that is very short-lived. Others suffer daily for years. Excessive body weight, frequent bare foot walking, long hours on the feet, inadequate shoes, predisposing foot structures and excessive activity such as running are all potential issues that lead to chronic and/or recurring plantar fascitis. Having plantar fasciitis means that you are forever prone to this problem and will require modification of some of the above factors. Most people seek treatment within one to four months. Healing usually requires a similar one to four month time frame. Healing time is relative to the amount of effort spent treating the problem.   Plantar fasciitis is highly recurrent. Risk factors often continue, including return to bare foot walking, inadequate shoes, excessive body weight, excessive activities, etc. Your life style and foot structure may predispose you to recurrent plantar fasciitis. A daily prevention regimen can be very helpful. Ongoing use of shoe inserts, careful attention to appropriate shoes, daily Achilles stretching, etc. may prevent recurrence. Prompt attention at the earliest warning signs of heel pain can resolve the problem in as short as a few days.     EXERCISES  Stair Exercise: Step on the stairs with the ball of your foot and hold your position for at least 15 seconds, then slowly step down with the heels of your foot. You can do this daily and as often as you want.   Picking the Towel: Sit comfortably and then pick the towel up with your toes. You can use any object other than a towel as long as the material can be soft and you can pick it up with your toes.  Rolling the Bottle: Use a small ball or frozen water bottle and then roll it around with your foot.   Flex the Toes: Sit comfortably and then flex your toes by pointing it towards the floor or towards your body. This  will relax and flex your foot and exercise your plantar fascia, the calf, and the Achilles tendon. The inability of the foot to stretch often causes the bunching up of the plantar fascia area leading to the pain.  Calf/Achilles Stretching: Lay on you back and raise one foot, then point your toes towards the floor. See photo below:               Hold each stretch for 10 seconds. Stretch 10 times per set, three sets per day. Morning, afternoon and evening. If your heel pain is very severe in the morning, consider doing the first set of stretches before you get out of bed.      OVER THE COUNTER INSERT RECOMMENDATIONS  SuperFeet   Sofsole Fit Spenco   Power Step   Walk-Fit Arch Cradles     Most of these can be found at your local [employer], sporting [employer], or online.  **A good high quality over the counter insert should cost around $40-$50      [facility] LOCATIONS  [place]  [address] [place] [address]  [phone]   [address]  [phone] [place]  [address] [facility]  [phone]   [place]  [address] [facility] [address]  [phone]   [place]  [address] [facility]  [phone] [place]  [address] [place] [address] [place]  [phone]   [place]  [address]  [phone]   [place]  [address] [place] [address]  [phone] [place]  [address]  [phone]           Body Mass Index (BMI)  Many things can cause foot and ankle problems. Foot structure, activity level, foot mechanics and injuries are common causes of pain. One very important issue that often goes unmentioned, is body weight. Extra weight can cause increased stress on muscles, ligaments, bones and tendons. Sometimes just a few extra pounds is all it takes to put one over her/his threshold. Without reducing that stress, it can be difficult to alleviate pain. Some people are uncomfortable addressing this issue, but we feel it is important for you to think about it. As Foot &  Ankle specialists, our job is  addressing the lower extremity problem and possible causes. Regarding extra body weight, we encourage patients to discuss diet and weight management plans with their primary care doctors. It is this team approach that gives you the best opportunity for pain relief and getting you back on your feet.            Follow-ups after your visit        Your next 10 appointments already scheduled     Mar 21, 2018  1:30 PM CDT   DX HIP/PELVIS/SPINE with RIDX1   [facility]    [address]  [address]  [facility] [address]              Please do not take any of the following 24 hours prior to the day of your exam: vitamins, calcium tablets, antacids.  If possible, please wear clothes without metal (snaps, zippers). A sweatsuit works well.              Who to contact     If you have questions or need follow up information about today's clinic visit or your schedule please contact [facility] directly at [phone].  Normal or non-critical lab and imaging results will be communicated to you by [employer], letter or phone within 4 business days after the clinic has received the results. If you do not hear from us within 7 days, please contact the clinic through [employer] or phone. If you have a critical or abnormal lab result, we will notify you by phone as soon as possible.  Submit refill requests through [employer] or call your pharmacy and they will forward the refill request to us. Please allow 3 business days for your refill to be completed.          Additional Information About Your Visit        [employer] Information     [employer] gives you secure access to your electronic health record. If you see a primary care provider, you can also send messages to your care team and make appointments. If you have questions, please call your primary care clinic.  If you do not have a primary care provider, please call [phone] and they will assist you.        Care  "EveryWhere ID     This is your Care EveryWhere ID. This could be used by other organizations to access your [place] medical records  [id number]        Your Vitals Were     Pulse Height Last Period BMI (Body Mass Index)          78 5' 5\" (1.651 m) 09/15/2011 29.79 kg/m2         Blood Pressure from Last 3 Encounters:   02/28/18 106/74   02/22/18 108/64   01/15/18 106/64    Weight from Last 3 Encounters:   03/09/18 179 lb (81.2 kg)   02/28/18 179 lb (81.2 kg)   02/22/18 178 lb (80.7 kg)              Today, you had the following     No orders found for display       Primary Care Provider Office Phone # Fax #    Shelton Castellanos [phone] [address] [facility] [address]        Equal Access to Services     [facility]: Hadii dustin villavicencio hadasho Soomaali, waaxda luqadaha, qaybta kaalmada adeegyada, dagoberto fullerin hayangela maddox . So [facility] [phone].    ATENCIÓN: Si habla español, tiene a garcia disposición servicios gratuitos de asistencia lingüística. Llame al [phone].    We comply with applicable federal civil rights laws and [place] laws. We do not discriminate on the basis of race, color, national origin, age, disability, sex, sexual orientation, or gender identity.            Thank you!     Thank you for choosing [facility]  for your care. Our goal is always to provide you with excellent care. Hearing back from our patients is one way we can continue to improve our services. Please take a few minutes to complete the written survey that you may receive in the mail after your visit with us. Thank you!             Your Updated Medication List - Protect others around you: Learn how to safely use, store and throw away your medicines at www.disposemymeds.org.          This list is accurate as of 3/9/18  8:27 AM.  Always use your most recent med list.                   Brand Name Dispense Instructions for use Diagnosis    aspirin 81 MG tablet      Take 81 mg by " mouth daily        atorvastatin 20 MG tablet    LIPITOR    90 tablet    Take 1 tablet (20 mg) by mouth daily    Hyperlipidemia LDL goal <100       calcium carbonate-vitamin D 600-400 MG-UNIT Chew     180 tablet    Take 1 chew tab by mouth 2 times daily        fluticasone 50 MCG/ACT spray    FLONASE    48 g    Spray 2 sprays into both nostrils daily    Chronic vasomotor rhinitis, Environmental allergies, Other chronic rhinitis       levothyroxine 75 MCG tablet    SYNTHROID/LEVOTHROID    90 tablet    Take 1 tablet (75 mcg) by mouth daily    Acquired hypothyroidism       lisinopril-hydrochlorothiazide 20-12.5 MG per tablet    PRINZIDE/ZESTORETIC    90 tablet    Take 1 tablet by mouth daily    Hypertension goal BP (blood pressure) < 140/90       Multi-vitamin Tabs tablet     100 tablet    Take 1 tablet by mouth daily

## 2018-03-10 LAB
BACTERIA SPEC CULT: NORMAL
SPECIMEN SOURCE: NORMAL

## 2018-03-13 ENCOUNTER — TELEPHONE (OUTPATIENT)
Dept: FAMILY MEDICINE | Facility: CLINIC | Age: 58
End: 2018-03-13

## 2018-03-13 DIAGNOSIS — M72.2 PLANTAR FASCIITIS: Primary | ICD-10-CM

## 2018-03-13 NOTE — TELEPHONE ENCOUNTER
Reason for Call:  Other pt wants to talk to RN about follow up treatment for when she saw Maura Hopper in January 2018    Detailed comments: did not want to give details    Phone Number Patient can be reached at: Cell number on file:    Telephone Information:   Mobile [phone]       Best Time: mid morning or later than 9am    Can we leave a detailed message on this number? YES    Call taken on 3/13/2018 at 4:21 PM by Shameka New

## 2018-03-14 NOTE — TELEPHONE ENCOUNTER
Physical therapy and injections are the next options.  I do not do those injections she would need to return to Dr. Wise.      Please let me know if she would like a PHYSICAL THERAPY referral placed.      Maura Hopper MS, PA-C

## 2018-03-14 NOTE — TELEPHONE ENCOUNTER
Pt agreed with PT referral, placed and gave information.    Theodora Schafer RN  [place] [facility]

## 2018-03-22 ENCOUNTER — THERAPY VISIT (OUTPATIENT)
Dept: PHYSICAL THERAPY | Facility: CLINIC | Age: 58
End: 2018-03-22
Payer: COMMERCIAL

## 2018-03-22 DIAGNOSIS — M79.672 PAIN OF LEFT HEEL: Primary | ICD-10-CM

## 2018-03-22 PROCEDURE — 97110 THERAPEUTIC EXERCISES: CPT | Mod: GP | Performed by: PHYSICAL THERAPIST

## 2018-03-22 PROCEDURE — 97161 PT EVAL LOW COMPLEX 20 MIN: CPT | Mod: GP | Performed by: PHYSICAL THERAPIST

## 2018-03-22 PROCEDURE — 97035 APP MDLTY 1+ULTRASOUND EA 15: CPT | Mod: GP | Performed by: PHYSICAL THERAPIST

## 2018-03-22 NOTE — PROGRESS NOTES
[facility] Initial Evaluation  Lilly Barnes is a 57 year old female patient presenting to Physical Therapy with the following Primary Symptoms: Left sided heel pain along the underside of the heel and in the arch.  She denies having related symptoms spreading to the toes.  Starting to get some general left knee and hip aching as her walking gets worse.  These pains are described as constant.   She denies having painful cough/sneeze, saddle anaesthesia, severe night pain, peripheral motor deficit, recent bowel/bladder change, recent vision change, ringing in the ears or pain with swallowing. Pain is rated 0/10 at rest, and 6/10 at worst. History of symptoms: These pains began gradually  December 2017 as the result of no specific episode or injury.  Previous treatment has included self care.   Since onset, these pains are getting worse :  Current Symptoms are worsened by: end of day, one hour into work day, . Current Symptoms are relieved by trial of stretching, heel raises, ice.   Recent surgical procedure: none   General health as reported by patient is:  good.  Pertinent medical history: none significant. Hx of R knee menisectomy. She denies any significant current illness or recent hospital admissions. She denies any regonal implanted devices.  Current occupational status: retail, standing.  . Previous functional status:  fully functional prior to pain onset/injury.         HPI                    Objective:    Gait:    Gait Type:  Antalgic   Assistive Devices:  None  Deviations:  Ankle:  Heel strike decr LGeneral Deviations:  Stride length decr    Flexibility/Screens:       Lower Extremity:  Decreased left lower extremity flexibility:Gastroc and Soleus    Decreased right lower extremity flexibility:  Gastroc and Soleus          Ankle/Foot Evaluation  ROM:  AROM is normal.PROM is normal.    PROM:              Great Toe Extension:  Left:    20     Right: 25            PALPATION: Palpation of  ankle: pain at the plantar surface of the calcaneous.  Left ankle tenderness present at:  plantar fascia  Left ankle tenderness not present at:   achilles tendon        FUNCTIONAL TESTS: Functional test ankle: walking pain reduced with taped heel fat pad.                                                              General     ROS    Assessment/Plan:    Patient is a 57 year old female with left foot complaints.    Patient has the following significant findings with corresponding treatment plan.                Diagnosis 1:  Heel pain, plantarfascitis  Pain -  hot/cold therapy, US, electric stimulation, manual therapy, splint/taping/bracing/orthotics, self management, education, directional preference exercise and home program  Decreased joint mobility - manual therapy and therapeutic exercise  Decreased strength - therapeutic exercise and therapeutic activities  Impaired balance - neuro re-education and therapeutic activities  Decreased proprioception - neuro re-education and therapeutic activities  Inflammation - cold therapy and self management/home program  Impaired gait - gait training  Impaired muscle performance - neuro re-education  Decreased function - therapeutic activities    Therapy Evaluation Codes:   1) History comprised of:   Personal factors that impact the plan of care:      Time since onset of symptoms.    Comorbidity factors that impact the plan of care are:      High blood pressure, Osteoarthritis and None.     Medications impacting care: thyroid.  2) Examination of Body Systems comprised of:   Body structures and functions that impact the plan of care:      foot.   Activity limitations that impact the plan of care are:      Stairs, Standing and Walking.  3) Clinical presentation characteristics are:   Stable/Uncomplicated.  4) Decision-Making    Low complexity using standardized patient assessment instrument and/or measureable assessment of functional outcome.  Cumulative Therapy Evaluation is:  Low complexity.    Previous and current functional limitations:  (See Goal Flow Sheet for this information)    Short term and Long term goals: (See Goal Flow Sheet for this information)     Communication ability:  Patient appears to be able to clearly communicate and understand verbal and written communication and follow directions correctly.  Treatment Explanation - The following has been discussed with the patient:   RX ordered/plan of care  Anticipated outcomes  Possible risks and side effects  This patient would benefit from PT intervention to resume normal activities.   Rehab potential is excellent.    Frequency:  1 X week, once daily  Duration:  for 8 weeks  Discharge Plan:  Achieve all LTG.  Independent in home treatment program.  Reach maximal therapeutic benefit.    Please refer to the daily flowsheet for treatment today, total treatment time and time spent performing 1:1 timed codes.

## 2018-03-27 ENCOUNTER — THERAPY VISIT (OUTPATIENT)
Dept: PHYSICAL THERAPY | Facility: CLINIC | Age: 58
End: 2018-03-27
Payer: COMMERCIAL

## 2018-03-27 DIAGNOSIS — M79.673 HEEL PAIN: Primary | ICD-10-CM

## 2018-03-27 PROCEDURE — 97110 THERAPEUTIC EXERCISES: CPT | Mod: GP | Performed by: PHYSICAL THERAPIST

## 2018-03-27 PROCEDURE — 97140 MANUAL THERAPY 1/> REGIONS: CPT | Mod: GP | Performed by: PHYSICAL THERAPIST

## 2018-03-27 PROCEDURE — 97035 APP MDLTY 1+ULTRASOUND EA 15: CPT | Mod: GP | Performed by: PHYSICAL THERAPIST

## 2018-04-05 ENCOUNTER — THERAPY VISIT (OUTPATIENT)
Dept: PHYSICAL THERAPY | Facility: CLINIC | Age: 58
End: 2018-04-05
Payer: COMMERCIAL

## 2018-04-05 DIAGNOSIS — M79.672 PAIN OF LEFT HEEL: ICD-10-CM

## 2018-04-05 PROCEDURE — 97140 MANUAL THERAPY 1/> REGIONS: CPT | Mod: GP | Performed by: PHYSICAL THERAPIST

## 2018-04-05 PROCEDURE — 97035 APP MDLTY 1+ULTRASOUND EA 15: CPT | Mod: GP | Performed by: PHYSICAL THERAPIST

## 2018-04-05 PROCEDURE — 97110 THERAPEUTIC EXERCISES: CPT | Mod: GP | Performed by: PHYSICAL THERAPIST

## 2018-04-06 ENCOUNTER — MYC MEDICAL ADVICE (OUTPATIENT)
Dept: FAMILY MEDICINE | Facility: CLINIC | Age: 58
End: 2018-04-06

## 2018-04-06 DIAGNOSIS — E78.5 HYPERLIPIDEMIA LDL GOAL <100: Primary | ICD-10-CM

## 2018-04-06 RX ORDER — ROSUVASTATIN CALCIUM 5 MG/1
5 TABLET, COATED ORAL DAILY
Qty: 90 TABLET | Refills: 3 | Status: SHIPPED | OUTPATIENT
Start: 2018-04-06 | End: 2019-04-26

## 2018-04-06 NOTE — TELEPHONE ENCOUNTER
Let's stop lipitor, off meds for 1 month, when symptoms have improved, let's start low dose crestor 5 mg daily (#90, r x 3) with recheck labs 2-3 months later (cmp, ck, lipid reflex)

## 2018-04-06 NOTE — TELEPHONE ENCOUNTER
Lab Results   Component Value Date    CHOL 234 02/28/2018     Lab Results   Component Value Date    HDL 70 02/28/2018     Lab Results   Component Value Date     02/28/2018     Lab Results   Component Value Date    TRIG 98 02/28/2018     Lab Results   Component Value Date    CHOLHDLRATIO 2.8 02/20/2015     Routing to PCP for further review/recommendations/orders.    Radha Howell RN  [facility]

## 2018-04-06 NOTE — TELEPHONE ENCOUNTER
Med List Updated  Rx for Crestor sent to [id number] - [place], [place] - [address]  Labs have already been futured    [facility] Message sent    Radha Howell RN  [facility]

## 2018-04-09 ENCOUNTER — RADIANT APPOINTMENT (OUTPATIENT)
Dept: BONE DENSITY | Facility: CLINIC | Age: 58
End: 2018-04-09
Attending: FAMILY MEDICINE
Payer: COMMERCIAL

## 2018-04-09 DIAGNOSIS — Z13.820 SCREENING FOR OSTEOPOROSIS: ICD-10-CM

## 2018-04-09 PROCEDURE — 77080 DXA BONE DENSITY AXIAL: CPT | Performed by: INTERNAL MEDICINE

## 2018-04-12 ENCOUNTER — THERAPY VISIT (OUTPATIENT)
Dept: PHYSICAL THERAPY | Facility: CLINIC | Age: 58
End: 2018-04-12
Payer: COMMERCIAL

## 2018-04-12 DIAGNOSIS — I10 HYPERTENSION GOAL BP (BLOOD PRESSURE) < 140/90: ICD-10-CM

## 2018-04-12 DIAGNOSIS — M79.672 PAIN OF LEFT HEEL: ICD-10-CM

## 2018-04-12 PROCEDURE — 97110 THERAPEUTIC EXERCISES: CPT | Mod: GP | Performed by: PHYSICAL THERAPIST

## 2018-04-12 PROCEDURE — 97035 APP MDLTY 1+ULTRASOUND EA 15: CPT | Mod: GP | Performed by: PHYSICAL THERAPIST

## 2018-04-12 PROCEDURE — 97140 MANUAL THERAPY 1/> REGIONS: CPT | Mod: GP | Performed by: PHYSICAL THERAPIST

## 2018-04-13 RX ORDER — LISINOPRIL AND HYDROCHLOROTHIAZIDE 12.5; 2 MG/1; MG/1
TABLET ORAL
Qty: 90 TABLET | Refills: 2 | Status: ON HOLD | OUTPATIENT
Start: 2018-04-13 | End: 2018-12-07

## 2018-04-13 NOTE — TELEPHONE ENCOUNTER
"Requested Prescriptions   Pending Prescriptions Disp Refills     lisinopril-hydrochlorothiazide (PRINZIDE/ZESTORETIC) 20-12.5 MG per tablet [Pharmacy Med Name: LISINOPRIL-HCTZ 20-12.5MG TABLET] 90 tablet      Sig: TAKE 1 TABLET BY MOUTH  DAILY    Diuretics (Including Combos) Protocol Failed    4/12/2018  8:22 PM       Failed - Normal serum creatinine on file in past 12 months    Recent Labs   Lab Test  02/28/18   0834   CR  1.17*             Passed - Blood pressure under 140/90 in past 12 months    BP Readings from Last 3 Encounters:   02/28/18 106/74   02/22/18 108/64   01/15/18 106/64                Passed - Recent (12 mo) or future (30 days) visit within the authorizing provider's specialty    Patient had office visit in the last 12 months or has a visit in the next 30 days with authorizing provider or within the authorizing provider's specialty.  See \"Patient Info\" tab in inbasket, or \"Choose Columns\" in Meds & Orders section of the refill encounter.           Passed - Patient is age 18 or older       Passed - No active pregancy on record       Passed - Normal serum potassium on file in past 12 months    Recent Labs   Lab Test  02/28/18   0834   POTASSIUM  4.2                   Passed - Normal serum sodium on file in past 12 months    Recent Labs   Lab Test  02/28/18   0834   NA  141             Passed - No positive pregnancy test in past 12 months        Last Written Prescription Date:  2/28/2018  Last Fill Quantity: 90,  # refills: 3   Last office visit: 2/28/2018 with prescribing provider:  2/28/2018   Future Office Visit:      Pharmacy change.   Prescription approved per [facility] Refill Protocol.  Theodora Schafer RN  [place] Triage      "

## 2018-04-18 ENCOUNTER — THERAPY VISIT (OUTPATIENT)
Dept: PHYSICAL THERAPY | Facility: CLINIC | Age: 58
End: 2018-04-18
Payer: COMMERCIAL

## 2018-04-18 DIAGNOSIS — M79.672 PAIN OF LEFT HEEL: ICD-10-CM

## 2018-04-18 PROCEDURE — 97140 MANUAL THERAPY 1/> REGIONS: CPT | Mod: GP | Performed by: PHYSICAL THERAPY ASSISTANT

## 2018-04-18 PROCEDURE — 97035 APP MDLTY 1+ULTRASOUND EA 15: CPT | Mod: GP | Performed by: PHYSICAL THERAPY ASSISTANT

## 2018-04-18 PROCEDURE — 97110 THERAPEUTIC EXERCISES: CPT | Mod: GP | Performed by: PHYSICAL THERAPY ASSISTANT

## 2018-04-25 ENCOUNTER — THERAPY VISIT (OUTPATIENT)
Dept: PHYSICAL THERAPY | Facility: CLINIC | Age: 58
End: 2018-04-25
Payer: COMMERCIAL

## 2018-04-25 DIAGNOSIS — M79.672 PAIN OF LEFT HEEL: ICD-10-CM

## 2018-04-25 PROCEDURE — 97035 APP MDLTY 1+ULTRASOUND EA 15: CPT | Mod: GP | Performed by: PHYSICAL THERAPY ASSISTANT

## 2018-04-25 PROCEDURE — 97140 MANUAL THERAPY 1/> REGIONS: CPT | Mod: GP | Performed by: PHYSICAL THERAPY ASSISTANT

## 2018-04-25 PROCEDURE — 97110 THERAPEUTIC EXERCISES: CPT | Mod: GP | Performed by: PHYSICAL THERAPY ASSISTANT

## 2018-05-02 ENCOUNTER — THERAPY VISIT (OUTPATIENT)
Dept: PHYSICAL THERAPY | Facility: CLINIC | Age: 58
End: 2018-05-02
Payer: COMMERCIAL

## 2018-05-02 DIAGNOSIS — M79.672 PAIN OF LEFT HEEL: ICD-10-CM

## 2018-05-02 PROCEDURE — 97110 THERAPEUTIC EXERCISES: CPT | Mod: GP | Performed by: PHYSICAL THERAPY ASSISTANT

## 2018-05-02 PROCEDURE — 97140 MANUAL THERAPY 1/> REGIONS: CPT | Mod: GP | Performed by: PHYSICAL THERAPY ASSISTANT

## 2018-05-02 PROCEDURE — 97035 APP MDLTY 1+ULTRASOUND EA 15: CPT | Mod: GP | Performed by: PHYSICAL THERAPY ASSISTANT

## 2018-05-09 ENCOUNTER — THERAPY VISIT (OUTPATIENT)
Dept: PHYSICAL THERAPY | Facility: CLINIC | Age: 58
End: 2018-05-09
Payer: COMMERCIAL

## 2018-05-09 DIAGNOSIS — M79.672 PAIN OF LEFT HEEL: ICD-10-CM

## 2018-05-09 PROCEDURE — 97035 APP MDLTY 1+ULTRASOUND EA 15: CPT | Mod: GP | Performed by: PHYSICAL THERAPY ASSISTANT

## 2018-05-09 PROCEDURE — 97110 THERAPEUTIC EXERCISES: CPT | Mod: GP | Performed by: PHYSICAL THERAPY ASSISTANT

## 2018-05-09 PROCEDURE — 97140 MANUAL THERAPY 1/> REGIONS: CPT | Mod: GP | Performed by: PHYSICAL THERAPY ASSISTANT

## 2018-05-09 NOTE — MR AVS SNAPSHOT
After Visit Summary   5/9/2018    Lilly Barnes    MRN: [id number]           Patient Information     Date Of Birth          1960        Visit Information        Provider Department      5/9/2018 3:50 PM Mariza Degroot PTA [facility]        Today's Diagnoses     Pain of left heel           Follow-ups after your visit        Your next 10 appointments already scheduled     May 23, 2018  3:10 PM CDT   [facility] Extremity with Mariza Degroot PTA   [facility] ([facility]  )    [address] [facility]  [address] [place] [address]   [phone]            May 30, 2018  3:50 PM CDT   [facility] Extremity with Alireza Alanis, PT   [facility] ([facility]  )    [address] [facility]  [address]  [facility] [address]   [phone]            Jun 06, 2018  3:50 PM CDT   [facility] Extremity with Mariza Degroot PTA   [facility] ([facility]  )    [address] [facility]  [address] [place] [address]   [phone]              Who to contact     If you have questions or need follow up information about today's clinic visit or your schedule please contact [facility] directly at [phone].  Normal or non-critical lab and imaging results will be communicated to you by Friendsign[employer], letter or phone within 4 business days after the clinic has received the results. If you do not hear from us within 7 days, please contact the clinic through Friendsigniah[employer] or phone. If you have a critical or abnormal lab result, we will notify you by phone as soon as possible.  Submit refill requests through [employer] or call your pharmacy and they will forward the refill request to us. Please allow 3 business days for your refill to be completed.          Additional Information About Your Visit        [employer] Information     [employer] gives you secure access to your electronic health record. If you see a primary care provider, you can also send messages to your care team and make appointments. If you have  questions, please call your primary care clinic.  If you do not have a primary care provider, please call [phone] and they will assist you.        Care EveryWhere ID     This is your Care EveryWhere ID. This could be used by other organizations to access your [place] medical records  [id number]        Your Vitals Were     Last Period                   09/15/2011            Blood Pressure from Last 3 Encounters:   02/28/18 106/74   02/22/18 108/64   01/15/18 106/64    Weight from Last 3 Encounters:   03/09/18 81.2 kg (179 lb)   02/28/18 81.2 kg (179 lb)   02/22/18 80.7 kg (178 lb)              We Performed the Following     Manual Ther Tech, 1+Regions, EA 15 min     Therapeutic Exercises     Ultrasound Therapy        Primary Care Provider Office Phone # Fax #    Shelton Castellanos [phone] [address] [facility] [address]        Equal Access to Services     ANURADHA LUGO : Hadii aad ku hadasho Soomaali, waaxda luqadaha, qaybta kaalmada adeegyada, waxay jonnyin haykayleighn cuco maddox . So [facility] [phone].    ATENCIÓN: Si habla español, tiene a garcia disposición servicios gratuitos de asistencia lingüística. Llame al [phone].    We comply with applicable federal civil rights laws and [place] laws. We do not discriminate on the basis of race, color, national origin, age, disability, sex, sexual orientation, or gender identity.            Thank you!     Thank you for choosing [facility]  for your care. Our goal is always to provide you with excellent care. Hearing back from our patients is one way we can continue to improve our services. Please take a few minutes to complete the written survey that you may receive in the mail after your visit with us. Thank you!             Your Updated Medication List - Protect others around you: Learn how to safely use, store and throw away your medicines at www.disposemymeds.org.          This list is accurate as of 5/9/18 11:59  PM.  Always use your most recent med list.                   Brand Name Dispense Instructions for use Diagnosis    aspirin 81 MG tablet      Take 81 mg by mouth daily        calcium carbonate-vitamin D 600-400 MG-UNIT Chew     180 tablet    Take 1 chew tab by mouth 2 times daily        fluticasone 50 MCG/ACT spray    FLONASE    48 g    Spray 2 sprays into both nostrils daily    Chronic vasomotor rhinitis, Environmental allergies, Other chronic rhinitis       levothyroxine 75 MCG tablet    SYNTHROID/LEVOTHROID    90 tablet    Take 1 tablet (75 mcg) by mouth daily    Acquired hypothyroidism       * lisinopril-hydrochlorothiazide 20-12.5 MG per tablet    PRINZIDE/ZESTORETIC    90 tablet    Take 1 tablet by mouth daily    Hypertension goal BP (blood pressure) < 140/90       * lisinopril-hydrochlorothiazide 20-12.5 MG per tablet    PRINZIDE/ZESTORETIC    90 tablet    TAKE 1 TABLET BY MOUTH  DAILY    Hypertension goal BP (blood pressure) < 140/90       Multi-vitamin Tabs tablet     100 tablet    Take 1 tablet by mouth daily        rosuvastatin 5 MG tablet    CRESTOR    90 tablet    Take 1 tablet (5 mg) by mouth daily    Hyperlipidemia LDL goal <100       * Notice:  This list has 2 medication(s) that are the same as other medications prescribed for you. Read the directions carefully, and ask your doctor or other care provider to review them with you.

## 2018-05-09 NOTE — LETTER
[facility]  [address] [facility]  [address]  [facility] [address]  [phone]    May 10, 2018    Re: Lilly Barnes   :   1960  MRN:  [id number]   REFERRING PHYSICIAN:   [facility]  Date of Initial Evaluation:  3/22/18  Visits:  Rxs Used: 8  Reason for Referral:  Pain of left heel    PROGRESS  REPORT  Progress reporting period is from 3/22/18 to 18.      SUBJECTIVE  Subjective changes noted by patient:  Patient reports that at the end of the day that she has more pain in the heel, but overall that is getting less.  She states that she is still wearing her shoes in the house for support.   Current pain level is .  Current Pain level: 10    Previous pain level was:       Changes in function:  Yes (See Goal flowsheet attached for changes in current functional level)     Adverse reaction to treatment or activity: None     OBJECTIVE  Changes noted in objective findings:   DKTC unloaded patient was able to get some pain relief in the left heel.  Have patient continue to push the lumbar ROM. ROM of the left ankle is WNL.  Tightness in the forefoot and she has some pain at the metatarsal head.    ASSESSMENT/PLAN  Updated problem list and treatment plan: Diagnosis 1:  Plantar fascitis  Pain -  hot/cold therapy, US, manual therapy, splint/taping/bracing/orthotics, self management, education and home program  Decreased ROM/flexibility - manual therapy and therapeutic exercise  Decreased joint mobility - manual therapy and therapeutic exercise  Decreased strength - therapeutic exercise and therapeutic activities  Decreased proprioception - neuro re-education and therapeutic activities  Inflammation - cold therapy and self management/home program  Impaired gait - gait training  Impaired muscle performance - neuro re-education  Decreased function - therapeutic activities  Impaired posture - neuro re-education  STG/LTGs have been met:  Yes (See Goal flow sheet completed  today.)  Progress toward STG/LTGs have been made:  Yes (See Goal flow sheet completed today.)  Assessment of Progress: The patient's condition is improving.  Self Management Plans:  Patient has been instructed in a home treatment program.  Patient  has been instructed in self management of symptoms.  Re: Lilly Barnes   :   1960      [place] continues to require the following intervention to meet STG and LT's:  PT    Recommendations:  This patient would benefit from continued therapy.     Frequency:  1 X week, once daily  Duration:  for 4 weeks    Thank you for your referral.    INQUIRIES  Therapist: Amber Blackburn, PT, OCS  [facility]  [address] [id number]  Phone: [phone]  Fax: [phone]

## 2018-05-10 NOTE — PROGRESS NOTES
Subjective:  HPI                    Objective:  System    Physical Exam    General     ROS    Assessment/Plan:    PROGRESS  REPORT    Progress reporting period is from 3/22/18 to 5/9/18.      SUBJECTIVE  Subjective changes noted by patient:  Patient reports that at the end of the day that she has more pain in the heel, but overall that is getting less.  She states that she is still wearing her shoes in the house for support.   Current pain level is .  Current Pain level: 4/10    Previous pain level was:       Changes in function:  Yes (See Goal flowsheet attached for changes in current functional level)     Adverse reaction to treatment or activity: None     OBJECTIVE  Changes noted in objective findings:   DKTC unloaded patient was able to get some pain relief in the left heel.  Have patient continue to push the lumbar ROM. ROM of the left ankle is WNL.  Tightness in the forefoot and she has some pain at the metatarsal head.

## 2018-05-10 NOTE — PROGRESS NOTES
"Subjective:  HPI                    Objective:  System    Physical Exam    General     ROS    Assessment/Plan:    PROGRESS  REPORT    Progress reporting period is from 3/22/18 to 5/10/18.       SUBJECTIVE  Subjective changes noted by patient:   Patient reports that at the end   Current pain level is {PAIN LEVEL (SCALE OF 10):828045} Current Pain level: 4/10.     Previous pain level was  {PAIN LEVEL (SCALE OF 10):109820}  .   Changes in function:  {Changes in Function:035351}  Adverse reaction to treatment or activity: {Adverse reaction to treatment or activity:490644::\"None\"}    OBJECTIVE  Changes noted in objective findings:  {Changes noted in objective findings:557457}        ASSESSMENT/PLAN  Updated problem list and treatment plan: {DIAGNOSIS/PLAN REHAB:186378}  STG/LTGs have been met or progress has been made towards goals:  {Goals met/progress made:523910::\"Yes (See Goal flow sheet completed today.)\"}  Assessment of Progress: {Assessment of progress:632983}  Self Management Plans:  {Self Management Plans:094002}  {Appropriateness of Rx:336648}  [place] continues to require the following intervention to meet STG and LTG's:  {INTERVENTION REHAB:027092}    Recommendations:  {RECOMMENDATIONS REHAB:994876}    Please refer to the daily flowsheet for treatment today, total treatment time and time spent performing 1:1 timed codes.            "

## 2018-05-10 NOTE — PROGRESS NOTES
Subjective:  HPI                    Objective:  System    Physical Exam    General     ROS    Assessment/Plan:    ASSESSMENT/PLAN  Updated problem list and treatment plan: Diagnosis 1:  Plantar fascitis  Pain -  hot/cold therapy, US, manual therapy, splint/taping/bracing/orthotics, self management, education and home program  Decreased ROM/flexibility - manual therapy and therapeutic exercise  Decreased joint mobility - manual therapy and therapeutic exercise  Decreased strength - therapeutic exercise and therapeutic activities  Decreased proprioception - neuro re-education and therapeutic activities  Inflammation - cold therapy and self management/home program  Impaired gait - gait training  Impaired muscle performance - neuro re-education  Decreased function - therapeutic activities  Impaired posture - neuro re-education  STG/LTGs have been met:  Yes (See Goal flow sheet completed today.)  Progress toward STG/LTGs have been made:  Yes (See Goal flow sheet completed today.)  Assessment of Progress: The patient's condition is improving.  Self Management Plans:  Patient has been instructed in a home treatment program.  Patient  has been instructed in self management of symptoms.    [place] continues to require the following intervention to meet STG and LT's:  PT    Recommendations:  This patient would benefit from continued therapy.     Frequency:  1 X week, once daily  Duration:  for 4 weeks        Please refer to the daily flowsheet for treatment today, total treatment time and time spent performing 1:1 timed codes.

## 2018-05-10 NOTE — PATIENT INSTRUCTIONS
Trochanteric Bursitis           What is trochanteric bursitis?   Trochanteric bursitis is irritation or inflammation of the trochanteric bursa. A bursa is a fluid-filled sac that acts as a cushion between tendons, bones, and skin. The trochanteric bursa is located on the upper, outer area of the thigh. There is a bump on the outer side of the upper part of the thigh bone (femur) called the greater trochanter. The trochanteric bursa is located over the greater trochanter.   How does it occur?   The trochanteric bursa may be inflamed by a group of muscles or tendons rubbing over the bursa and causing friction against the thigh bone. This injury can occur with running, walking, or bicycling, especially when the bicycle seat is too high.   What are the symptoms?   You have pain on the upper outer area of your thigh or in your hip. The pain is worse when you walk, bicycle, or go up or down stairs. You have pain when you move your thigh bone and feel tenderness in the area over the greater trochanter.   How is it diagnosed?   Your healthcare provider will ask about your symptoms and examine your hip and thigh.   How is it treated?   To treat this condition:   Put an ice pack, gel pack, or package of frozen vegetables, wrapped in a cloth on the area every 3 to 4 hours, for up to 20 minutes at a time.   Take an anti-inflammatory medicine such as ibuprofen, or other medicine as directed by your provider. Nonsteroidal anti-inflammatory medicines (NSAIDs) may cause stomach bleeding and other problems. These risks increase with age. Read the label and take as directed. Unless recommended by your healthcare provider, do not take for more than 10 days.   Your provider may give you an injection of a corticosteroid medicine.   While you are recovering from your injury you will need to change your sport or activity to one that does not make your condition worse. For example, you may need to swim instead of running or  Patient uses [facility] on [address]. Went over lab results and pt notified understanding. Went over medication changes as well.   Pt has requested refills on Mobic, Viibryd, and Effexor. I explained that per the patients chart it looks that Marla WALTON has not prescribed the patient Viibryd or effexor in the past and would have to get the approval from Marla norwood. Pt verbalized understanding.    bicycling. If you are bicycling, you may need to lower your bicycle seat.   How long do the effects last?   The length of recovery depends on many factors such as your age, health, and if you have had a previous injury. Recovery time also depends on the severity of the injury. A bursa that is only mildly inflamed and has just started to hurt may improve within a few weeks. A bursa that is significantly inflamed and has been painful for a long time may take up to a few months to improve. You need to stop doing the activities that cause pain until your bursa has healed. If you continue doing activities that cause pain, your symptoms will return and it will take longer to recover.   When can I return to my normal activities?   Everyone recovers from an injury at a different rate. Return to your activities depends on how soon your leg recovers, not by how many days or weeks it has been since your injury has occurred. In general, the longer you have symptoms before you start treatment, the longer it will take to get better. The goal of rehabilitation is to return to your normal activities as soon as is safely possible. If you return too soon you may worsen your injury.   You may safely return to your normal activities when, starting from the top of the list and progressing to the end, each of the following is true:   You have full range of motion in the injured leg compared to the uninjured leg.   You have full strength of the injured leg compared to the uninjured leg.   You can walk straight ahead without pain or limping.   How can I prevent trochanteric bursitis?   Trochanteric bursitis is best prevented by warming up properly and stretching the muscles on the outer side of your upper thigh.     Published by [employer].  This content is reviewed periodically and is subject to change as new health information becomes available. The information is intended to inform and educate and is not a replacement for medical  evaluation, advice, diagnosis or treatment by a healthcare professional.   Written by Remington Ardon MD, for [employer].   ? 2010 [facility] and/or its affiliates. All Rights Reserved.   Copyright   [employer] Reference Systems 2011         Trochanteric Bursitis Rehabilitation Exercises   You can begin stretching the muscles that run along the outside of your hip using the first two exercise. You can do the strengthening exercises when the sharp pain lessens.   Stretching exercises     Piriformis stretch: Lying on your back with both knees bent, rest the ankle of your injured leg over the knee of your uninjured leg. Grasp the thigh of your uninjured leg and pull that knee toward your chest. You will feel a stretch along the buttocks and possibly along the outside of your hip on the injured side. Hold this for 15 to 30 seconds. Repeat 3 times.     Iliotibial band stretch (standing): Cross your uninjured leg in front of your injured leg and bend down and touch your toes. You can move your hands across the floor toward the uninjured side and you will feel more stretch on the outside of your thigh on the injured side. Hold this position for 15 to 30 seconds. Return to the starting position. Repeat 3 times.   Strengthening exercises     Straight leg raise: Lie on your back with your legs straight out in front of you. Tighten up the top of your thigh muscle on the injured leg and lift that leg about 8 inches off the floor, keeping the thigh muscle tight throughout. Slowly lower your leg back down to the floor. Do 3 sets of 10.     Wall squat with a ball: Stand with your back, shoulders, and head against a wall and look straight ahead. Keep your shoulders relaxed and your feet 1 foot away from the wall and a shoulder's width apart. Place a rolled up pillow or a soccer-sized ball between your thighs. Keeping your head against the wall, slowly squat while squeezing the pillow or ball at the same time. Squat down until you  are almost in a sitting position. Your thighs will not yet be parallel to the floor. Hold this position for 10 seconds and then slowly slide back up the wall. Make sure you keep squeezing the pillow or ball throughout this exercise. Repeat 10 times. Build up to 3 sets of 10.     Prone hip extension: Lie on your stomach with your legs straight out behind you. Tighten up your buttocks muscles and lift one leg off the floor about 8 inches. Keep your knee straight. Hold for 5 seconds. Then lower your leg and relax. Do 3 sets of 10.   Written by Keri Blandon M.S., P.T., for [employer].   Published by [employer].   This content is reviewed periodically and is subject to change as new health information becomes available. The information is intended to inform and educate and is not a replacement for medical evaluation, advice, diagnosis or treatment by a healthcare professional.   Sports Medicine Advisor 2003.1 Index  Sports Medicine Advisor 2003.1 Credits   Copyright   2003 [employer]. All rights reserved.   Page footer image

## 2018-05-23 ENCOUNTER — THERAPY VISIT (OUTPATIENT)
Dept: PHYSICAL THERAPY | Facility: CLINIC | Age: 58
End: 2018-05-23
Payer: COMMERCIAL

## 2018-05-23 DIAGNOSIS — M79.672 PAIN OF LEFT HEEL: ICD-10-CM

## 2018-05-23 PROCEDURE — 97035 APP MDLTY 1+ULTRASOUND EA 15: CPT | Mod: GP | Performed by: PHYSICAL THERAPY ASSISTANT

## 2018-05-23 PROCEDURE — 97110 THERAPEUTIC EXERCISES: CPT | Mod: GP | Performed by: PHYSICAL THERAPY ASSISTANT

## 2018-05-23 PROCEDURE — 97140 MANUAL THERAPY 1/> REGIONS: CPT | Mod: GP | Performed by: PHYSICAL THERAPY ASSISTANT

## 2018-05-30 ENCOUNTER — THERAPY VISIT (OUTPATIENT)
Dept: PHYSICAL THERAPY | Facility: CLINIC | Age: 58
End: 2018-05-30
Payer: COMMERCIAL

## 2018-05-30 DIAGNOSIS — M79.672 PAIN OF LEFT HEEL: ICD-10-CM

## 2018-05-30 PROCEDURE — 97140 MANUAL THERAPY 1/> REGIONS: CPT | Mod: GP | Performed by: PHYSICAL THERAPIST

## 2018-05-30 PROCEDURE — 97035 APP MDLTY 1+ULTRASOUND EA 15: CPT | Mod: GP | Performed by: PHYSICAL THERAPIST

## 2018-05-30 PROCEDURE — 97110 THERAPEUTIC EXERCISES: CPT | Mod: GP | Performed by: PHYSICAL THERAPIST

## 2018-06-06 ENCOUNTER — TELEPHONE (OUTPATIENT)
Dept: ORTHOPEDICS | Facility: CLINIC | Age: 58
End: 2018-06-06

## 2018-06-06 ENCOUNTER — THERAPY VISIT (OUTPATIENT)
Dept: PHYSICAL THERAPY | Facility: CLINIC | Age: 58
End: 2018-06-06
Payer: COMMERCIAL

## 2018-06-06 DIAGNOSIS — M79.672 PAIN OF LEFT HEEL: ICD-10-CM

## 2018-06-06 PROCEDURE — 97035 APP MDLTY 1+ULTRASOUND EA 15: CPT | Mod: GP | Performed by: PHYSICAL THERAPY ASSISTANT

## 2018-06-06 PROCEDURE — 97140 MANUAL THERAPY 1/> REGIONS: CPT | Mod: GP | Performed by: PHYSICAL THERAPY ASSISTANT

## 2018-06-06 PROCEDURE — 97110 THERAPEUTIC EXERCISES: CPT | Mod: GP | Performed by: PHYSICAL THERAPY ASSISTANT

## 2018-06-06 NOTE — TELEPHONE ENCOUNTER
Spoke with the patient regarding plan discussed at last appointment on 2/22 with Dr. French indicating repeat corticosteroid injections or other invasive treatments. She would like to continue to purse non operative treatments. I suggested a follow up with Dr. French to discuss all options.     She is scheduled for 6/8 with Dr. French.     Marybeth Segura M.Ed., ATR, ATC

## 2018-06-06 NOTE — TELEPHONE ENCOUNTER
Lilly Barnes is a 57 year old female who calls with continued pain of the right knee.    Description/location:  Right knee - daily pain  Associated symtoms:  Decreased ROM      Treatment plan from OV on 2/22/2018 recommended more corticosteroid injections vs more invasive treatment modalities  Next OV scheduled? No      Patient expecting call back: YES      Preferred contact number:  [phone] (home)    Can we leave a detailed message on this number: YES     Marybeth Segura M.Ed., ATR, ATC

## 2018-06-08 ENCOUNTER — OFFICE VISIT (OUTPATIENT)
Dept: ORTHOPEDICS | Facility: CLINIC | Age: 58
End: 2018-06-08
Payer: OTHER MISCELLANEOUS

## 2018-06-08 VITALS — WEIGHT: 179 LBS | BODY MASS INDEX: 29.79 KG/M2 | DIASTOLIC BLOOD PRESSURE: 60 MMHG | SYSTOLIC BLOOD PRESSURE: 104 MMHG

## 2018-06-08 DIAGNOSIS — M22.41 CHONDROMALACIA OF RIGHT PATELLA: Primary | ICD-10-CM

## 2018-06-08 PROCEDURE — 20610 DRAIN/INJ JOINT/BURSA W/O US: CPT | Mod: RT | Performed by: ORTHOPAEDIC SURGERY

## 2018-06-08 NOTE — LETTER
6/8/2018         RE: Lilly Barnes  [address] [facility] [address]        Dear Colleague,    Thank you for referring your patient, Lilly Barnes, to the [facility] ORTHOPEDIC SURGERY. Please see a copy of my visit note below.    HISTORY OF PRESENT ILLNESS:    Lilly Barnes is a 57 year old female who is seen in follow up for chronic right knee pain.  Present symptoms: Patient states increased catching and locking of her knee, sharp pain with squatting and going down stairs, stiffness, swelling of knee.   Treatments tried to this point: Cortisone Injection: 2/22/18 with <4 weeks relief,  icing, Physical therapy, home exercise, new shoes and orthotics, NSAIDS prn.    PHYSICAL EXAM:  /60 (BP Location: Right arm, Patient Position: Chair, Cuff Size: Adult Regular)  Wt 179 lb (81.2 kg)  LMP 09/15/2011  BMI 29.79 kg/m2  Body mass index is 29.79 kg/(m^2).   GENERAL APPEARANCE: healthy, alert and no distress   SKIN: no suspicious lesions or rashes  NEURO: Normal strength and tone, mentation intact and speech normal  VASCULAR:  good pulses, and cappillary refill   LYMPH: no lymphadenopathy   PSYCH:  mentation appears normal and affect normal/bright    MSK:  The patient ambulates without an antalgic gait.  The patient is able to get on and off the exam table without difficulty.        KNEE: Examination of the right knee reveals the lower extremity to have a Normal anatomic alignment.  There is no erythema, ecchymosis nor edema.  There is minimal effusion present clinically.  There is no significant warmth.  Range of motion is 0 to 120 degrees, without crepitus.  There is mild tenderness to palpation at the both medial and lateral joint line.  Xiomara's is negative without crepitus. The knee is ligamentously stable. Patello-femoral grind test is positive.      The calves and thighs are symmetric, without atrophy and non-tender to palpation. Jerod's sign is negative, bilaterally.   CMS is  intact to the toes.       IMAGING INTERPRETATION:       ASSESSMENT / PLAN: Primary osteoarthritis right knee I offered her another corticosteroid injection.    Procedure Note:  After risks and benefits were explained and questions answered, pt agreed to undergo a right knee injection. Using aseptic technique and a inferolateral parapatellar tendon approach, the joint space was infiltrated with 4 mg of Dexamethasone, 40 mg of Depo Medrol, and 3 cc of 1% Lidocaine.  There were no complications and the patient tolerated the procedure well.      Rip French MD  Dept. Orthopedic Surgery  [facility]         Again, thank you for allowing me to participate in the care of your patient.        Sincerely,        Corbin French MD

## 2018-06-08 NOTE — PROGRESS NOTES
HISTORY OF PRESENT ILLNESS:    Lilly Barnes is a 57 year old female who is seen in follow up for chronic right knee pain.  Present symptoms: Patient states increased catching and locking of her knee, sharp pain with squatting and going down stairs, stiffness, swelling of knee.   Treatments tried to this point: Cortisone Injection: 2/22/18 with <4 weeks relief,  icing, Physical therapy, home exercise, new shoes and orthotics, NSAIDS prn.    PHYSICAL EXAM:  /60 (BP Location: Right arm, Patient Position: Chair, Cuff Size: Adult Regular)  Wt 179 lb (81.2 kg)  LMP 09/15/2011  BMI 29.79 kg/m2  Body mass index is 29.79 kg/(m^2).   GENERAL APPEARANCE: healthy, alert and no distress   SKIN: no suspicious lesions or rashes  NEURO: Normal strength and tone, mentation intact and speech normal  VASCULAR:  good pulses, and cappillary refill   LYMPH: no lymphadenopathy   PSYCH:  mentation appears normal and affect normal/bright    MSK:  The patient ambulates without an antalgic gait.  The patient is able to get on and off the exam table without difficulty.        KNEE: Examination of the right knee reveals the lower extremity to have a Normal anatomic alignment.  There is no erythema, ecchymosis nor edema.  There is minimal effusion present clinically.  There is no significant warmth.  Range of motion is 0 to 120 degrees, without crepitus.  There is mild tenderness to palpation at the both medial and lateral joint line.  Xiomara's is negative without crepitus. The knee is ligamentously stable. Patello-femoral grind test is positive.      The calves and thighs are symmetric, without atrophy and non-tender to palpation. Jerod's sign is negative, bilaterally.   CMS is intact to the toes.       IMAGING INTERPRETATION:       ASSESSMENT / PLAN: Primary osteoarthritis right knee I offered her another corticosteroid injection.    Procedure Note:  After risks and benefits were explained and questions answered, pt agreed to  undergo a right knee injection. Using aseptic technique and a inferolateral parapatellar tendon approach, the joint space was infiltrated with 4 mg of Dexamethasone, 40 mg of Depo Medrol, and 3 cc of 1% Lidocaine.  There were no complications and the patient tolerated the procedure well.      Rip Fernch MD  Dept. Orthopedic Surgery  [facility]

## 2018-06-08 NOTE — MR AVS SNAPSHOT
After Visit Summary   6/8/2018    Lilly Barnes    MRN: [id number]           Patient Information     Date Of Birth          1960        Visit Information        Provider Department      6/8/2018 3:40 PM Corbin French MD [facility] ORTHOPEDIC SURGERY        Today's Diagnoses     Chondromalacia of right patella    -  1       Follow-ups after your visit        Who to contact     If you have questions or need follow up information about today's clinic visit or your schedule please contact [facility] ORTHOPEDIC SURGERY directly at [phone].  Normal or non-critical lab and imaging results will be communicated to you by Smart Cubehart, letter or phone within 4 business days after the clinic has received the results. If you do not hear from us within 7 days, please contact the clinic through [employer] or phone. If you have a critical or abnormal lab result, we will notify you by phone as soon as possible.  Submit refill requests through Educreations or call your pharmacy and they will forward the refill request to us. Please allow 3 business days for your refill to be completed.          Additional Information About Your Visit        MyChart Information     Educreations gives you secure access to your electronic health record. If you see a primary care provider, you can also send messages to your care team and make appointments. If you have questions, please call your primary care clinic.  If you do not have a primary care provider, please call [phone] and they will assist you.        Care EveryWhere ID     This is your Care EveryWhere ID. This could be used by other organizations to access your [place] medical records  [id number]        Your Vitals Were     Last Period BMI (Body Mass Index)                09/15/2011 29.79 kg/m2           Blood Pressure from Last 3 Encounters:   06/08/18 104/60   02/28/18 106/74   02/22/18 108/64    Weight from Last 3 Encounters:   06/08/18 179 lb (81.2 kg)    03/09/18 179 lb (81.2 kg)   02/28/18 179 lb (81.2 kg)              We Performed the Following     DEXAMETHASONE SODIUM PHOS PER 1 MG     DRAIN/INJECT LARGE JOINT/BURSA     METHYLPREDNISOLONE 40 MG INJ          Today's Medication Changes          These changes are accurate as of 6/8/18 11:59 PM.  If you have any questions, ask your nurse or doctor.               Start taking these medicines.        Dose/Directions    dexamethasone 4 MG/ML injection   Commonly known as:  DECADRON   Used for:  Chondromalacia of right patella   Started by:  Corbin French MD        Dose:  4 mg   Inject 1 mL (4 mg) as directed once for 1 dose   Quantity:  1 mL   Refills:  0       lidocaine 1 % injection   Used for:  Chondromalacia of right patella   Started by:  Corbin French MD        Dose:  3 mL   3 mLs by INTRA-ARTICULAR route once for 1 dose   Quantity:  3 mL   Refills:  0       methylPREDNISolone acetate 40 MG/ML injection   Commonly known as:  DEPO-MEDROL   Used for:  Chondromalacia of right patella   Started by:  Corbin French MD        Dose:  40 mg   1 mL (40 mg) by INTRA-ARTICULAR route once for 1 dose   Quantity:  1 mL   Refills:  0            Where to get your medicines      Some of these will need a paper prescription and others can be bought over the counter.  Ask your nurse if you have questions.     You don't need a prescription for these medications     dexamethasone 4 MG/ML injection    lidocaine 1 % injection    methylPREDNISolone acetate 40 MG/ML injection                Primary Care Provider Office Phone # Fax #    Shelton Castellanos [phone]       [address] [place] [address]        Equal Access to Services     [facility] AH: Haddejon hernandez Soluci, waaxda luqadaha, qaybta kaalmada adekayode, dagoberto wolfe. [facility] [phone].    ATENCIÓN: Si habla español, tiene a garcia disposición servicios gratuitos de asistencia  lingüísticaAnali Guerrier al [phone].    We comply with applicable federal civil rights laws and [place] laws. We do not discriminate on the basis of race, color, national origin, age, disability, sex, sexual orientation, or gender identity.            Thank you!     Thank you for choosing [facility] ORTHOPEDIC SURGERY  for your care. Our goal is always to provide you with excellent care. Hearing back from our patients is one way we can continue to improve our services. Please take a few minutes to complete the written survey that you may receive in the mail after your visit with us. Thank you!             Your Updated Medication List - Protect others around you: Learn how to safely use, store and throw away your medicines at www.disposemymeds.org.          This list is accurate as of 6/8/18 11:59 PM.  Always use your most recent med list.                   Brand Name Dispense Instructions for use Diagnosis    aspirin 81 MG tablet      Take 81 mg by mouth daily        calcium carbonate-vitamin D 600-400 MG-UNIT Chew     180 tablet    Take 1 chew tab by mouth 2 times daily        dexamethasone 4 MG/ML injection    DECADRON    1 mL    Inject 1 mL (4 mg) as directed once for 1 dose    Chondromalacia of right patella       fluticasone 50 MCG/ACT spray    FLONASE    48 g    Spray 2 sprays into both nostrils daily    Chronic vasomotor rhinitis, Environmental allergies, Other chronic rhinitis       levothyroxine 75 MCG tablet    SYNTHROID/LEVOTHROID    90 tablet    Take 1 tablet (75 mcg) by mouth daily    Acquired hypothyroidism       lidocaine 1 % injection     3 mL    3 mLs by INTRA-ARTICULAR route once for 1 dose    Chondromalacia of right patella       * lisinopril-hydrochlorothiazide 20-12.5 MG per tablet    PRINZIDE/ZESTORETIC    90 tablet    Take 1 tablet by mouth daily    Hypertension goal BP (blood pressure) < 140/90       * lisinopril-hydrochlorothiazide 20-12.5 MG per tablet    PRINZIDE/ZESTORETIC    90  tablet    TAKE 1 TABLET BY MOUTH  DAILY    Hypertension goal BP (blood pressure) < 140/90       methylPREDNISolone acetate 40 MG/ML injection    DEPO-MEDROL    1 mL    1 mL (40 mg) by INTRA-ARTICULAR route once for 1 dose    Chondromalacia of right patella       Multi-vitamin Tabs tablet     100 tablet    Take 1 tablet by mouth daily        rosuvastatin 5 MG tablet    CRESTOR    90 tablet    Take 1 tablet (5 mg) by mouth daily    Hyperlipidemia LDL goal <100       * Notice:  This list has 2 medication(s) that are the same as other medications prescribed for you. Read the directions carefully, and ask your doctor or other care provider to review them with you.

## 2018-06-09 ENCOUNTER — HEALTH MAINTENANCE LETTER (OUTPATIENT)
Age: 58
End: 2018-06-09

## 2018-06-14 ENCOUNTER — THERAPY VISIT (OUTPATIENT)
Dept: PHYSICAL THERAPY | Facility: CLINIC | Age: 58
End: 2018-06-14
Payer: COMMERCIAL

## 2018-06-14 DIAGNOSIS — M79.672 PAIN OF LEFT HEEL: ICD-10-CM

## 2018-06-14 PROCEDURE — 97035 APP MDLTY 1+ULTRASOUND EA 15: CPT | Mod: GP | Performed by: PHYSICAL THERAPY ASSISTANT

## 2018-06-14 PROCEDURE — 97110 THERAPEUTIC EXERCISES: CPT | Mod: GP | Performed by: PHYSICAL THERAPY ASSISTANT

## 2018-06-14 PROCEDURE — 97140 MANUAL THERAPY 1/> REGIONS: CPT | Mod: GP | Performed by: PHYSICAL THERAPY ASSISTANT

## 2018-06-14 NOTE — MR AVS SNAPSHOT
After Visit Summary   6/14/2018    Lilly Barnes    MRN: [id number]           Patient Information     Date Of Birth          1960        Visit Information        Provider Department      6/14/2018 3:50 PM Mariza Degroot PTA [facility]        Today's Diagnoses     Pain of left heel           Follow-ups after your visit        Who to contact     If you have questions or need follow up information about today's clinic visit or your schedule please contact [facility] directly at [phone].  Normal or non-critical lab and imaging results will be communicated to you by Prizeohart, letter or phone within 4 business days after the clinic has received the results. If you do not hear from us within 7 days, please contact the clinic through [employer] or phone. If you have a critical or abnormal lab result, we will notify you by phone as soon as possible.  Submit refill requests through Glomera or call your pharmacy and they will forward the refill request to us. Please allow 3 business days for your refill to be completed.          Additional Information About Your Visit        MyChart Information     Glomera gives you secure access to your electronic health record. If you see a primary care provider, you can also send messages to your care team and make appointments. If you have questions, please call your primary care clinic.  If you do not have a primary care provider, please call [phone] and they will assist you.        Care EveryWhere ID     This is your Care EveryWhere ID. This could be used by other organizations to access your [place] medical records  [id number]        Your Vitals Were     Last Period                   09/15/2011            Blood Pressure from Last 3 Encounters:   06/08/18 104/60   02/28/18 106/74   02/22/18 108/64    Weight from Last 3 Encounters:   06/08/18 81.2 kg (179 lb)   03/09/18 81.2 kg (179 lb)   02/28/18 81.2 kg (179 lb)              We Performed  the Following     Manual Ther Tech, 1+Regions, EA 15 min     Therapeutic Exercises     Ultrasound Therapy        Primary Care Provider Office Phone # Fax #    Shelton Castellanos [phone]       [facility] [address]        Equal Access to Services     EITANANURADHA BRITTNEY : Hadii dustin villavicencio hadflorao Soomaali, waaxda luqadaha, qaybta kaalmada adeegyada, dagoberto roni jeremiasmabel wrayravinder shawjazmine maddox . So [facility] [phone].    ATENCIÓN: Si habla español, tiene a garcia disposición servicios gratuitos de asistencia lingüística. Llame al [phone].    We comply with applicable federal civil rights laws and [place] laws. We do not discriminate on the basis of race, color, national origin, age, disability, sex, sexual orientation, or gender identity.            Thank you!     Thank you for choosing [facility]  for your care. Our goal is always to provide you with excellent care. Hearing back from our patients is one way we can continue to improve our services. Please take a few minutes to complete the written survey that you may receive in the mail after your visit with us. Thank you!             Your Updated Medication List - Protect others around you: Learn how to safely use, store and throw away your medicines at www.disposemymeds.org.          This list is accurate as of 6/14/18 11:59 PM.  Always use your most recent med list.                   Brand Name Dispense Instructions for use Diagnosis    aspirin 81 MG tablet      Take 81 mg by mouth daily        calcium carbonate-vitamin D 600-400 MG-UNIT Chew     180 tablet    Take 1 chew tab by mouth 2 times daily        fluticasone 50 MCG/ACT spray    FLONASE    48 g    Spray 2 sprays into both nostrils daily    Chronic vasomotor rhinitis, Environmental allergies, Other chronic rhinitis       levothyroxine 75 MCG tablet    SYNTHROID/LEVOTHROID    90 tablet    Take 1 tablet (75 mcg) by mouth daily    Acquired hypothyroidism       *  lisinopril-hydrochlorothiazide 20-12.5 MG per tablet    PRINZIDE/ZESTORETIC    90 tablet    Take 1 tablet by mouth daily    Hypertension goal BP (blood pressure) < 140/90       * lisinopril-hydrochlorothiazide 20-12.5 MG per tablet    PRINZIDE/ZESTORETIC    90 tablet    TAKE 1 TABLET BY MOUTH  DAILY    Hypertension goal BP (blood pressure) < 140/90       Multi-vitamin Tabs tablet     100 tablet    Take 1 tablet by mouth daily        rosuvastatin 5 MG tablet    CRESTOR    90 tablet    Take 1 tablet (5 mg) by mouth daily    Hyperlipidemia LDL goal <100       * Notice:  This list has 2 medication(s) that are the same as other medications prescribed for you. Read the directions carefully, and ask your doctor or other care provider to review them with you.

## 2018-06-15 NOTE — PROGRESS NOTES
Subjective:  HPI                    Objective:  System    Physical Exam    General     ROS    Assessment/Plan:    DISCHARGE REPORT    Progress reporting period is from 3/22/18 to 6/14/18.      SUBJECTIVE  Subjective changes noted by patient:  Patient reports that she has had more of a flare up today.  Has not been doing her exercises as much as she should these last few days as she has been very busy at work..      Current pain level is .  Current Pain level: 5/10 (had been a 2-3/10 previous)    Previous pain level was:       Changes in function:  Yes (See Goal flowsheet attached for changes in current functional level)     Adverse reaction to treatment or activity: None     OBJECTIVE Changes noted in objective findings:       AROM of the left ankle = DF=12 -  PF = 55,   PROM,  DF=15 - PF - 55,   Left single heel raise,  x 19   Patient has a good HEP with strengthening and stretches for the left L/E.

## 2018-06-29 RX ORDER — LIDOCAINE HYDROCHLORIDE 10 MG/ML
3 INJECTION, SOLUTION INFILTRATION; PERINEURAL ONCE
Qty: 3 ML | Refills: 0 | OUTPATIENT
Start: 2018-06-29 | End: 2018-06-29

## 2018-06-29 RX ORDER — DEXAMETHASONE SODIUM PHOSPHATE 4 MG/ML
4 INJECTION, SOLUTION INTRA-ARTICULAR; INTRALESIONAL; INTRAMUSCULAR; INTRAVENOUS; SOFT TISSUE ONCE
Qty: 1 ML | Refills: 0 | OUTPATIENT
Start: 2018-06-29 | End: 2018-10-18

## 2018-06-29 RX ORDER — METHYLPREDNISOLONE ACETATE 40 MG/ML
40 INJECTION, SUSPENSION INTRA-ARTICULAR; INTRALESIONAL; INTRAMUSCULAR; SOFT TISSUE ONCE
Qty: 1 ML | Refills: 0 | OUTPATIENT
Start: 2018-06-29 | End: 2018-06-29

## 2018-10-01 ENCOUNTER — HOSPITAL ENCOUNTER (OUTPATIENT)
Dept: MAMMOGRAPHY | Facility: CLINIC | Age: 58
Discharge: HOME | End: 2018-10-01
Attending: FAMILY MEDICINE | Admitting: FAMILY MEDICINE
Payer: COMMERCIAL

## 2018-10-01 DIAGNOSIS — Z12.39 SCREENING FOR BREAST CANCER: ICD-10-CM

## 2018-10-01 PROCEDURE — 77063 BREAST TOMOSYNTHESIS BI: CPT

## 2018-10-18 ENCOUNTER — OFFICE VISIT (OUTPATIENT)
Dept: ORTHOPEDICS | Facility: CLINIC | Age: 58
End: 2018-10-18
Payer: OTHER MISCELLANEOUS

## 2018-10-18 VITALS
DIASTOLIC BLOOD PRESSURE: 66 MMHG | BODY MASS INDEX: 28.66 KG/M2 | SYSTOLIC BLOOD PRESSURE: 116 MMHG | WEIGHT: 172 LBS | HEIGHT: 65 IN

## 2018-10-18 DIAGNOSIS — M22.42 CHONDROMALACIA PATELLAE, LEFT KNEE: Primary | ICD-10-CM

## 2018-10-18 PROCEDURE — 99213 OFFICE O/P EST LOW 20 MIN: CPT | Mod: 25 | Performed by: ORTHOPAEDIC SURGERY

## 2018-10-18 PROCEDURE — 20610 DRAIN/INJ JOINT/BURSA W/O US: CPT | Mod: RT | Performed by: ORTHOPAEDIC SURGERY

## 2018-10-18 RX ADMIN — METHYLPREDNISOLONE ACETATE 40 MG: 40 INJECTION, SUSPENSION INTRA-ARTICULAR; INTRALESIONAL; INTRAMUSCULAR; SOFT TISSUE at 16:16

## 2018-10-18 RX ADMIN — DEXAMETHASONE SODIUM PHOSPHATE 4 MG: 4 INJECTION, SOLUTION INTRA-ARTICULAR; INTRALESIONAL; INTRAMUSCULAR; INTRAVENOUS; SOFT TISSUE at 16:16

## 2018-10-18 NOTE — LETTER
"    10/18/2018         RE: Lilly Barnes  [address] [address] [address]        Dear Colleague,    Thank you for referring your patient, Lilly Barnes, to the [facility] ORTHOPEDIC SURGERY. Please see a copy of my visit note below.    HISTORY OF PRESENT ILLNESS:    Lilly Barnes is a 58 year old female who is seen in follow up for right knee issues. She had about 2 months relief with the corticosteroid injection done at her last visit on 6/8/2018. She is interested in getting another one today.   Present symptoms: pain, swelling, challenges with stairs, worse with twisting of the knee  Treatments tried to this point: ice, rest, no OTC due to other issues    PHYSICAL EXAM:  /66 (BP Location: Right arm, Patient Position: Chair, Cuff Size: Adult Regular)  Ht 5' 5\" (1.651 m)  Wt 172 lb (78 kg)  LMP 09/15/2011  BMI 28.62 kg/m2  Body mass index is 28.62 kg/(m^2).   GENERAL APPEARANCE: healthy, alert and no distress   SKIN: no suspicious lesions or rashes  NEURO: Normal strength and tone, mentation intact and speech normal  VASCULAR:  good pulses, and cappillary refill   LYMPH: no lymphadenopathy   PSYCH:  mentation appears normal and affect normal/bright    MSK:  The patient ambulates without an antalgic gait.  The patient is able to get on and off the exam table without difficulty.        KNEE: Examination of the right knee reveals the lower extremity to have a Normal anatomic alignment.  There is no erythema, ecchymosis nor edema.  There is no effusion present clinically.  There is no significant warmth.  Range of motion is 0 to 130 degrees, without crepitus.  There is mild tenderness to palpation at the medial joint line.  Xiomara's is negative without crepitus. The knee is ligamentously stable. Patello-femoral grind test is positive.      The calves and thighs are symmetric, without atrophy and non-tender to palpation. Jerod's sign is negative, bilaterally.   CMS is intact to the " toes.       IMAGING INTERPRETATION:       ASSESSMENT / PLAN: Chondromalacia patella right knee.  I offered her another corticosteroid injection, which she accepted.    Large Joint Injection/Arthocentesis  Date/Time: 10/18/2018 4:16 PM  Performed by: CORBIN PANIAGUA  Authorized by: CORBIN PANIAGUA     Indications:  Pain and osteoarthritis  Needle Size:  25 G  Guidance: landmark guided    Approach:  Anterolateral  Location:  Knee  Site:  R knee joint  Medications:  40 mg methylPREDNISolone acetate 40 MG/ML; 4 mg dexamethasone 4 MG/ML  Outcome:  Tolerated well, no immediate complications  Procedure discussed: discussed risks, benefits, and alternatives    Consent Given by:  Patient  Timeout: timeout called immediately prior to procedure    Prep: patient was prepped and draped in usual sterile fashion                Rip Paniagua MD  Dept. Orthopedic Surgery  [facility]           Again, thank you for allowing me to participate in the care of your patient.        Sincerely,        Corbin Paniagua MD

## 2018-10-18 NOTE — MR AVS SNAPSHOT
After Visit Summary   10/18/2018    Lilly Barnes    MRN: [id number]           Patient Information     Date Of Birth          1960        Visit Information        Provider Department      10/18/2018 4:00 PM Corbin French MD [facility] ORTHOPEDIC SURGERY        Today's Diagnoses     Chondromalacia patellae, left knee    -  1       Follow-ups after your visit        Your next 10 appointments already scheduled     Dec 07, 2018   Procedure with Julia Sheppard MD   [facility] ([facility])    [address]  [place] [id number] [phone]           [facility] is located at [address] [place]              Who to contact     If you have questions or need follow up information about today's clinic visit or your schedule please contact [facility] ORTHOPEDIC SURGERY directly at [phone].  Normal or non-critical lab and imaging results will be communicated to you by MyChart, letter or phone within 4 business days after the clinic has received the results. If you do not hear from us within 7 days, please contact the clinic through [employer] or phone. If you have a critical or abnormal lab result, we will notify you by phone as soon as possible.  Submit refill requests through [employer]ground or call your pharmacy and they will forward the refill request to us. Please allow 3 business days for your refill to be completed.          Additional Information About Your Visit        MyChart Information     [employer] gives you secure access to your electronic health record. If you see a primary care provider, you can also send messages to your care team and make appointments. If you have questions, please call your primary care clinic.  If you do not have a primary care provider, please call [phone] and they will assist you.        Care EveryWhere ID     This is your Care EveryWhere ID. This could be used by other organizations to  "access your [place] medical records  [id number]        Your Vitals Were     Height Last Period BMI (Body Mass Index)             5' 5\" (1.651 m) 09/15/2011 28.62 kg/m2          Blood Pressure from Last 3 Encounters:   10/18/18 116/66   06/08/18 104/60   02/28/18 106/74    Weight from Last 3 Encounters:   10/18/18 172 lb (78 kg)   06/08/18 179 lb (81.2 kg)   03/09/18 179 lb (81.2 kg)              We Performed the Following     Large Joint Injection/Arthocentesis        Primary Care Provider Office Phone # Fax #    Shelton Castellanos [phone]       [address] [place] [address]        Equal Access to Services     COURTNEY LUGO : Jeanette bañueloso Soluci, waaxda luqadaha, qaybta kaalmada adeegyada, dagoberto maddox . So [facility] [phone].    ATENCIÓN: Si habla español, tiene a garcia disposición servicios gratuitos de asistencia lingüística. Llame al [phone].    We comply with applicable federal civil rights laws and [place] laws. We do not discriminate on the basis of race, color, national origin, age, disability, sex, sexual orientation, or gender identity.            Thank you!     Thank you for choosing [facility] ORTHOPEDIC SURGERY  for your care. Our goal is always to provide you with excellent care. Hearing back from our patients is one way we can continue to improve our services. Please take a few minutes to complete the written survey that you may receive in the mail after your visit with us. Thank you!             Your Updated Medication List - Protect others around you: Learn how to safely use, store and throw away your medicines at www.disposemymeds.org.          This list is accurate as of 10/18/18 11:59 PM.  Always use your most recent med list.                   Brand Name Dispense Instructions for use Diagnosis    aspirin 81 MG tablet      Take 81 mg by mouth daily        calcium carbonate-vitamin D 600-400 MG-UNIT Chew     180 tablet    " Take 1 chew tab by mouth 2 times daily        fluticasone 50 MCG/ACT spray    FLONASE    48 g    Spray 2 sprays into both nostrils daily    Chronic vasomotor rhinitis, Environmental allergies, Other chronic rhinitis       levothyroxine 75 MCG tablet    SYNTHROID/LEVOTHROID    90 tablet    Take 1 tablet (75 mcg) by mouth daily    Acquired hypothyroidism       * lisinopril-hydrochlorothiazide 20-12.5 MG per tablet    PRINZIDE/ZESTORETIC    90 tablet    Take 1 tablet by mouth daily    Hypertension goal BP (blood pressure) < 140/90       * lisinopril-hydrochlorothiazide 20-12.5 MG per tablet    PRINZIDE/ZESTORETIC    90 tablet    TAKE 1 TABLET BY MOUTH  DAILY    Hypertension goal BP (blood pressure) < 140/90       Multi-vitamin Tabs tablet     100 tablet    Take 1 tablet by mouth daily        rosuvastatin 5 MG tablet    CRESTOR    90 tablet    Take 1 tablet (5 mg) by mouth daily    Hyperlipidemia LDL goal <100       * Notice:  This list has 2 medication(s) that are the same as other medications prescribed for you. Read the directions carefully, and ask your doctor or other care provider to review them with you.

## 2018-10-18 NOTE — PROGRESS NOTES
"HISTORY OF PRESENT ILLNESS:    Lilly Barnes is a 58 year old female who is seen in follow up for right knee issues. She had about 2 months relief with the corticosteroid injection done at her last visit on 6/8/2018. She is interested in getting another one today.   Present symptoms: pain, swelling, challenges with stairs, worse with twisting of the knee  Treatments tried to this point: ice, rest, no OTC due to other issues    PHYSICAL EXAM:  /66 (BP Location: Right arm, Patient Position: Chair, Cuff Size: Adult Regular)  Ht 5' 5\" (1.651 m)  Wt 172 lb (78 kg)  LMP 09/15/2011  BMI 28.62 kg/m2  Body mass index is 28.62 kg/(m^2).   GENERAL APPEARANCE: healthy, alert and no distress   SKIN: no suspicious lesions or rashes  NEURO: Normal strength and tone, mentation intact and speech normal  VASCULAR:  good pulses, and cappillary refill   LYMPH: no lymphadenopathy   PSYCH:  mentation appears normal and affect normal/bright    MSK:  The patient ambulates without an antalgic gait.  The patient is able to get on and off the exam table without difficulty.        KNEE: Examination of the right knee reveals the lower extremity to have a Normal anatomic alignment.  There is no erythema, ecchymosis nor edema.  There is no effusion present clinically.  There is no significant warmth.  Range of motion is 0 to 130 degrees, without crepitus.  There is mild tenderness to palpation at the medial joint line.  Xiomara's is negative without crepitus. The knee is ligamentously stable. Patello-femoral grind test is positive.      The calves and thighs are symmetric, without atrophy and non-tender to palpation. Jerod's sign is negative, bilaterally.   CMS is intact to the toes.       IMAGING INTERPRETATION:       ASSESSMENT / PLAN: Chondromalacia patella right knee.  I offered her another corticosteroid injection, which she accepted.    Large Joint Injection/Arthocentesis  Date/Time: 10/18/2018 4:16 PM  Performed by: ARCELIA, " NATE TSAI  Authorized by: NATE PANIAGUA     Indications:  Pain and osteoarthritis  Needle Size:  25 G  Guidance: landmark guided    Approach:  Anterolateral  Location:  Knee  Site:  R knee joint  Medications:  40 mg methylPREDNISolone acetate 40 MG/ML; 4 mg dexamethasone 4 MG/ML  Outcome:  Tolerated well, no immediate complications  Procedure discussed: discussed risks, benefits, and alternatives    Consent Given by:  Patient  Timeout: timeout called immediately prior to procedure    Prep: patient was prepped and draped in usual sterile fashion                Rip Paniagua MD  Dept. Orthopedic Surgery  [facility]

## 2018-10-22 RX ORDER — DEXAMETHASONE SODIUM PHOSPHATE 4 MG/ML
4 INJECTION, SOLUTION INTRA-ARTICULAR; INTRALESIONAL; INTRAMUSCULAR; INTRAVENOUS; SOFT TISSUE
Status: DISCONTINUED | OUTPATIENT
Start: 2018-10-18 | End: 2018-12-07

## 2018-10-22 RX ORDER — METHYLPREDNISOLONE ACETATE 40 MG/ML
40 INJECTION, SUSPENSION INTRA-ARTICULAR; INTRALESIONAL; INTRAMUSCULAR; SOFT TISSUE
Status: DISCONTINUED | OUTPATIENT
Start: 2018-10-18 | End: 2018-12-07

## 2018-12-07 ENCOUNTER — HOSPITAL ENCOUNTER (OUTPATIENT)
Facility: CLINIC | Age: 58
Discharge: HOME | End: 2018-12-07
Attending: COLON & RECTAL SURGERY | Admitting: COLON & RECTAL SURGERY
Payer: COMMERCIAL

## 2018-12-07 VITALS
SYSTOLIC BLOOD PRESSURE: 102 MMHG | OXYGEN SATURATION: 98 % | RESPIRATION RATE: 34 BRPM | WEIGHT: 172 LBS | HEIGHT: 64 IN | DIASTOLIC BLOOD PRESSURE: 66 MMHG | BODY MASS INDEX: 29.37 KG/M2

## 2018-12-07 LAB — COLONOSCOPY: NORMAL

## 2018-12-07 PROCEDURE — 99153 MOD SED SAME PHYS/QHP EA: CPT

## 2018-12-07 PROCEDURE — 25000128 H RX IP 250 OP 636: Performed by: COLON & RECTAL SURGERY

## 2018-12-07 PROCEDURE — 88305 TISSUE EXAM BY PATHOLOGIST: CPT | Performed by: COLON & RECTAL SURGERY

## 2018-12-07 PROCEDURE — 88305 TISSUE EXAM BY PATHOLOGIST: CPT | Mod: 26 | Performed by: COLON & RECTAL SURGERY

## 2018-12-07 PROCEDURE — G0500 MOD SEDAT ENDO SERVICE >5YRS: HCPCS | Performed by: COLON & RECTAL SURGERY

## 2018-12-07 PROCEDURE — 45380 COLONOSCOPY AND BIOPSY: CPT | Mod: PT | Performed by: COLON & RECTAL SURGERY

## 2018-12-07 RX ORDER — FENTANYL CITRATE 50 UG/ML
INJECTION, SOLUTION INTRAMUSCULAR; INTRAVENOUS PRN
Status: DISCONTINUED | OUTPATIENT
Start: 2018-12-07 | End: 2018-12-07 | Stop reason: HOSPADM

## 2018-12-07 RX ORDER — LIDOCAINE 40 MG/G
CREAM TOPICAL
Status: DISCONTINUED | OUTPATIENT
Start: 2018-12-07 | End: 2018-12-07 | Stop reason: HOSPADM

## 2018-12-07 RX ORDER — NALOXONE HYDROCHLORIDE 0.4 MG/ML
.1-.4 INJECTION, SOLUTION INTRAMUSCULAR; INTRAVENOUS; SUBCUTANEOUS
Status: DISCONTINUED | OUTPATIENT
Start: 2018-12-07 | End: 2018-12-07 | Stop reason: HOSPADM

## 2018-12-07 RX ORDER — ONDANSETRON 2 MG/ML
4 INJECTION INTRAMUSCULAR; INTRAVENOUS
Status: DISCONTINUED | OUTPATIENT
Start: 2018-12-07 | End: 2018-12-07 | Stop reason: HOSPADM

## 2018-12-07 RX ORDER — ONDANSETRON 2 MG/ML
4 INJECTION INTRAMUSCULAR; INTRAVENOUS EVERY 6 HOURS PRN
Status: DISCONTINUED | OUTPATIENT
Start: 2018-12-07 | End: 2018-12-07 | Stop reason: HOSPADM

## 2018-12-07 RX ORDER — ONDANSETRON 4 MG/1
4 TABLET, ORALLY DISINTEGRATING ORAL EVERY 6 HOURS PRN
Status: DISCONTINUED | OUTPATIENT
Start: 2018-12-07 | End: 2018-12-07 | Stop reason: HOSPADM

## 2018-12-07 RX ORDER — FLUMAZENIL 0.1 MG/ML
0.2 INJECTION, SOLUTION INTRAVENOUS
Status: DISCONTINUED | OUTPATIENT
Start: 2018-12-07 | End: 2018-12-07 | Stop reason: HOSPADM

## 2018-12-07 NOTE — H&P
Pre-Endoscopy History and Physical     Lilly Barnes MRN# [id number]   YOB: 1960 Age: 58 year old     Date of Procedure: 12/7/2018  Primary care provider: Shelton Castellanos  Type of Endoscopy: Colonoscopy  Reason for Procedure: hx of polyps  Type of Anesthesia Anticipated: Moderate Sedation    HPI:    [place] is a 58 year old female who will be undergoing the above procedure.      A history and physical has been performed. The patient's medications and allergies have been reviewed. The risks and benefits of the procedure and the sedation options and risks were discussed with the patient.  All questions were answered and informed consent was obtained.      She denies a personal or family history of anesthesia complications or bleeding disorders.     Allergies   Allergen Reactions     Ibuprofen      Should avoid due to Chronic Kidney Disease          No current facility-administered medications on file prior to encounter.   Current Outpatient Prescriptions on File Prior to Encounter:  aspirin 81 MG tablet Take 81 mg by mouth daily    calcium carbonate-vitamin D 600-400 MG-UNIT CHEW Take 1 chew tab by mouth 2 times daily   fluticasone (FLONASE) 50 MCG/ACT spray Spray 2 sprays into both nostrils daily   levothyroxine (SYNTHROID/LEVOTHROID) 75 MCG tablet Take 1 tablet (75 mcg) by mouth daily   lisinopril-hydrochlorothiazide (PRINZIDE/ZESTORETIC) 20-12.5 MG per tablet Take 1 tablet by mouth daily   multivitamin, therapeutic with minerals (MULTI-VITAMIN) TABS Take 1 tablet by mouth daily   rosuvastatin (CRESTOR) 5 MG tablet Take 1 tablet (5 mg) by mouth daily (Patient not taking: Reported on 10/18/2018)   [DISCONTINUED] lisinopril-hydrochlorothiazide (PRINZIDE/ZESTORETIC) 20-12.5 MG per tablet TAKE 1 TABLET BY MOUTH  DAILY       Patient Active Problem List   Diagnosis     Hypertension goal BP (blood pressure) < 140/90     CKD (chronic kidney disease) stage 3, GFR 30-59 ml/min (H)     History of colonic polyps-  "3 mm polyp.  needs every 3-5 yr surveillence- hyperplastic      Hyperlipidemia LDL goal <100     Chronic rhinitis     Osteopenia     Environmental allergies     Acquired hypothyroidism     Advanced directives, counseling/discussion     Vitamin D deficiency     Heel pain        Past Medical History:   Diagnosis Date     CKD (chronic kidney disease) stage 3, GFR 30-59 ml/min (H) 7/13     Colon polyps 12/13    hyperplastic polyp - Dr Sheppard, due 3-5 yrs     Environmental allergies      Hyperlipidemia LDL goal < 130      Hypertension goal BP (blood pressure) < 140/90 2009     Hypothyroidism 2009     Osteopenia      Vitamin D deficiency         Past Surgical History:   Procedure Laterality Date     ARTHROSCOPY KNEE WITH MEDIAL MENISCECTOMY Right 3/20/2017    ARTHROSCOPY KNEE WITH MEDIAL MENISCECTOMY - Dr French     COLONOSCOPY  12/6/2013    hyperplastic polyp - Dr Sheppard - due 3-5 yrs     ENT SURGERY      wisdom teeth     SURGICAL HISTORY OF -   1997    laproscopic - left - for ectopic pregnancy       Social History   Substance Use Topics     Smoking status: Never Smoker     Smokeless tobacco: Never Used     Alcohol use 0.0 - 0.6 oz/week     0 - 1 Standard drinks or equivalent per week      Comment: 2 drinks per month       Family History   Problem Relation Age of Onset     Diabetes Mother      Cancer Sister 43     ovarian and uterine      Cerebrovascular Disease Sister      Lipids Other      multiple members including 1 degree      Diabetes Brother      Depression Son      resolved     Cancer Maternal Grandmother      Coronary Artery Disease Maternal Grandfather        REVIEW OF SYSTEMS:     5 point ROS negative except as noted above in HPI, including Gen., Resp., CV, GI &  system review.      PHYSICAL EXAM:   /61  Resp 18  Ht 1.626 m (5' 4\")  Wt 78 kg (172 lb)  LMP 09/15/2011  SpO2 100%  BMI 29.52 kg/m2 Estimated body mass index is 29.52 kg/(m^2) as calculated from the following:    Height as of this " "encounter: 1.626 m (5' 4\").    Weight as of this encounter: 78 kg (172 lb).   GENERAL APPEARANCE: healthy and alert  MENTAL STATUS: alert  AIRWAY EXAM: Mallampatti Class I (visualization of the soft palate, fauces, uvula, anterior and posterior pillars)  RESP: lungs clear to auscultation - no rales, rhonchi or wheezes  CV: regular rates and rhythm      IMPRESSION   ASA Class 2 - Mild systemic disease        PLAN:     Plan for colonoscopy. We discussed the risks, benefits and alternatives and the patient wished to proceed.    The above has been forwarded to the consulting provider.      Julia Sheppard MD  [facility]  Phone: [phone]  Fax: [phone]  December 7, 2018    "

## 2018-12-11 LAB — COPATH REPORT: NORMAL

## 2019-03-13 DIAGNOSIS — T78.40XA ALLERGIC STATE: ICD-10-CM

## 2019-03-13 DIAGNOSIS — Z91.09 ENVIRONMENTAL ALLERGIES: ICD-10-CM

## 2019-03-13 DIAGNOSIS — J30.0 CHRONIC VASOMOTOR RHINITIS: ICD-10-CM

## 2019-03-13 NOTE — TELEPHONE ENCOUNTER
"Requested Prescriptions   Pending Prescriptions Disp Refills     fluticasone (FLONASE) 50 MCG/ACT nasal spray 48 g 3      Last Written Prescription Date:  2.28.18  Last Fill Quantity: 48 g,  # refills: 3   Last office visit: 2/28/2018 with prescribing provider:  Shelton Castellanos MD       Future Office Visit:   Next 5 appointments (look out 90 days)    Apr 19, 2019 10:40 AM CDT  MyChart Physical Adult with Shelton Castellanos MD  [facility] [address] [place] [id number]  [phone]            Sig: Spray 2 sprays into both nostrils daily    Inhaled Steroids Protocol Failed - 3/13/2019  3:30 PM       Failed - Recent (12 mo) or future (30 days) visit within the authorizing provider's specialty    Patient had office visit in the last 12 months or has a visit in the next 30 days with authorizing provider or within the authorizing provider's specialty.  See \"Patient Info\" tab in inbasket, or \"Choose Columns\" in Meds & Orders section of the refill encounter.             Passed - Patient is age 12 or older       Passed - Medication is active on med list        "

## 2019-03-14 RX ORDER — FLUTICASONE PROPIONATE 50 MCG
2 SPRAY, SUSPENSION (ML) NASAL DAILY
Qty: 48 G | Refills: 0 | Status: SHIPPED | OUTPATIENT
Start: 2019-03-14 | End: 2019-04-26

## 2019-03-14 NOTE — TELEPHONE ENCOUNTER
Medication is being filled for 1 time refill only due to:  Patient needs to be seen because it has been more than one year since last visit. pt is scheduled.    Theodora Schafer RN  [facility]

## 2019-03-27 DIAGNOSIS — E03.9 ACQUIRED HYPOTHYROIDISM: ICD-10-CM

## 2019-03-27 DIAGNOSIS — I10 HYPERTENSION GOAL BP (BLOOD PRESSURE) < 140/90: ICD-10-CM

## 2019-03-27 RX ORDER — LEVOTHYROXINE SODIUM 75 UG/1
75 TABLET ORAL DAILY
Qty: 30 TABLET | Refills: 0 | Status: SHIPPED | OUTPATIENT
Start: 2019-03-27 | End: 2019-04-26

## 2019-03-27 RX ORDER — LISINOPRIL AND HYDROCHLOROTHIAZIDE 12.5; 2 MG/1; MG/1
1 TABLET ORAL DAILY
Qty: 30 TABLET | Refills: 0 | Status: SHIPPED | OUTPATIENT
Start: 2019-03-27 | End: 2019-04-26

## 2019-03-27 NOTE — TELEPHONE ENCOUNTER
"Requested Prescriptions   Pending Prescriptions Disp Refills     levothyroxine (SYNTHROID/LEVOTHROID) 75 MCG tablet 90 tablet 3        Last Written Prescription Date:  2.28.18  Last Fill Quantity: 90 tablet,  # refills: 3   Last office visit: 2/28/2018 with prescribing provider:  Shelton Castellanos MD       Future Office Visit:   Next 5 appointments (look out 90 days)    Apr 26, 2019  2:40 PM CDT  [facility] Physical Adult with Shel[facility]anos [facility]  [facility] [address] [place] [id number]  [phone]            Sig: Take 1 tablet (75 mcg) by mouth daily    Thyroid Protocol Failed - 3/27/2019 11:25 AM       Failed - Recent (12 mo) or future (30 days) visit within the authorizing provider's specialty    Patient had office visit in the last 12 months or has a visit in the next 30 days with authorizing provider or within the authorizing provider's specialty.  See \"Patient Info\" tab in inbasket, or \"Choose Columns\" in Meds & Orders section of the refill encounter.             Failed - Normal TSH on file in past 12 months    Recent Labs   Lab Test 02/28/18  0834   TSH 2.18             Passed - Patient is 12 years or older       Passed - Medication is active on med list       Passed - No active pregnancy on record    If patient is pregnant or has had a positive pregnancy test, please check TSH.         Passed - No positive pregnancy test in past 12 months    If patient is pregnant or has had a positive pregnancy test, please check TSH.                  lisinopril-hydrochlorothiazide (PRINZIDE/ZESTORETIC) 20-12.5 MG tablet 90 tablet 3        Last Written Prescription Date:  2.28.18  Last Fill Quantity: 90 tablet,  # refills: 3   Last office visit: 2/28/2018 with prescribing provider:  Shelton Castellanos MD       Future Office Visit:   Next 5 appointments (look out 90 days)    Apr 26, 2019  2:40 PM CDT  [facility] Physical Adult with Shelton Castellanos MD  [facility] " "([facility]) [address] [facility] [id number] [phone]            Sig: Take 1 tablet by mouth daily    Diuretics (Including Combos) Protocol Failed - 3/27/2019 11:25 AM       Failed - Recent (12 mo) or future (30 days) visit within the authorizing provider's specialty    Patient had office visit in the last 12 months or has a visit in the next 30 days with authorizing provider or within the authorizing provider's specialty.  See \"Patient Info\" tab in inbasket, or \"Choose Columns\" in Meds & Orders section of the refill encounter.             Failed - Normal serum creatinine on file in past 12 months    Recent Labs   Lab Test 02/28/18  0834   CR 1.17*             Failed - Normal serum potassium on file in past 12 months    Recent Labs   Lab Test 02/28/18  0834   POTASSIUM 4.2                   Failed - Normal serum sodium on file in past 12 months    Recent Labs   Lab Test 02/28/18  0834                Passed - Blood pressure under 140/90 in past 12 months    BP Readings from Last 3 Encounters:   12/07/18 102/66   10/18/18 116/66   06/08/18 104/60                Passed - Medication is active on med list       Passed - Patient is age 18 or older       Passed - No active pregancy on record       Passed - No positive pregnancy test in past 12 months        "

## 2019-03-27 NOTE — TELEPHONE ENCOUNTER
Due for an Office visit for further refills, only fill for 30 days     Delia Bowden RN, BSN  [facility]

## 2019-03-29 ENCOUNTER — OFFICE VISIT (OUTPATIENT)
Dept: ORTHOPEDICS | Facility: CLINIC | Age: 59
End: 2019-03-29
Payer: OTHER MISCELLANEOUS

## 2019-03-29 ENCOUNTER — ANCILLARY PROCEDURE (OUTPATIENT)
Dept: GENERAL RADIOLOGY | Facility: CLINIC | Age: 59
End: 2019-03-29
Attending: FAMILY MEDICINE
Payer: OTHER MISCELLANEOUS

## 2019-03-29 VITALS
HEIGHT: 64 IN | WEIGHT: 170 LBS | BODY MASS INDEX: 29.02 KG/M2 | SYSTOLIC BLOOD PRESSURE: 115 MMHG | DIASTOLIC BLOOD PRESSURE: 60 MMHG

## 2019-03-29 DIAGNOSIS — S83.241A ACUTE MEDIAL MENISCUS TEAR OF RIGHT KNEE, INITIAL ENCOUNTER: ICD-10-CM

## 2019-03-29 DIAGNOSIS — Z98.890 HX OF ARTHROSCOPY OF RIGHT KNEE: ICD-10-CM

## 2019-03-29 DIAGNOSIS — M17.11 PRIMARY OSTEOARTHRITIS OF RIGHT KNEE: ICD-10-CM

## 2019-03-29 DIAGNOSIS — M17.11 PRIMARY OSTEOARTHRITIS OF RIGHT KNEE: Primary | ICD-10-CM

## 2019-03-29 PROCEDURE — 20611 DRAIN/INJ JOINT/BURSA W/US: CPT | Mod: RT | Performed by: FAMILY MEDICINE

## 2019-03-29 PROCEDURE — 73562 X-RAY EXAM OF KNEE 3: CPT

## 2019-03-29 PROCEDURE — 99214 OFFICE O/P EST MOD 30 MIN: CPT | Mod: 25 | Performed by: FAMILY MEDICINE

## 2019-03-29 RX ADMIN — METHYLPREDNISOLONE ACETATE 40 MG: 40 INJECTION, SUSPENSION INTRA-ARTICULAR; INTRALESIONAL; INTRAMUSCULAR; SOFT TISSUE at 12:20

## 2019-03-29 RX ADMIN — ROPIVACAINE HYDROCHLORIDE 3 ML: 5 INJECTION, SOLUTION EPIDURAL; INFILTRATION; PERINEURAL at 12:20

## 2019-03-29 ASSESSMENT — MIFFLIN-ST. JEOR: SCORE: 1331.11

## 2019-03-29 NOTE — PROGRESS NOTES
ASSESSMENT & PLAN    1. Primary osteoarthritis of right knee    2. Hx of arthroscopy of right knee      Reviewed old MRI which showed arthritis in addition to acute meniscal tear  Most of your pain today is related to the arthritis behind your kneecap  Reviewed x-rays - moderate arthritis  Steroid injection of the right knee was performed today in clinic  Can repeat the injection anytime after 4 months    Based on paperwork in the chart, per Dr. French, she reached maximum medical improvement from the work comp injury on 11/8/2017. He also gave her a 2% PPD rating as a result of having the meniscectomy.    Follow up for repeat injection when pain returns.   -----    SUBJECTIVE  Lilly Barnes is a/an 58 year old female who is seen as a self referral for evaluation of right knee pain. The patient is seen by themselves.    Onset: She had an injury on 1/24/17 when she was moving some shelves at work, stepped back onto her right leg, twisted her leg with a planted foot. She had immediate sharp pain on the lateral side of knee into the posterior part of the knee  Location of Pain: right anterolateral knee  Rating of Pain at worst: 5/10  Rating of Pain Currently: 4/10  Worsened by: kneeling, prolonged inactivity (such as driving), waking her up at night, stairs  Better with: rest, ice  Treatments tried: Tylenol, home exercises, physical therapy and corticosteroid injections with Dr. French (last injection 10/18/2018)  Associated symptoms: mild swelling, stiffness  Orthopedic history: denies previous knee issues prior to injury  Relevant surgical history:  Rt knee arthroscopy, DOS 3/20/17, Dr. French  Patient Social History: works at [employer]    Patient's past medical, surgical, social, and family histories were reviewed today and no changes are noted.    REVIEW OF SYSTEMS:  10 point ROS is negative other than symptoms noted above in HPI, Past Medical History or as stated below  Constitutional: NEGATIVE for fever,  "chills, change in weight  Skin: NEGATIVE for worrisome rashes, moles or lesions  GI/: NEGATIVE for bowel or bladder changes  Neuro: NEGATIVE for weakness, dizziness or paresthesias    OBJECTIVE:  /60   Ht 1.626 m (5' 4\")   Wt 77.1 kg (170 lb)   LMP 09/15/2011   BMI 29.18 kg/m     General: healthy, alert and in no distress  HEENT: no scleral icterus or conjunctival erythema  Skin: no suspicious lesions or rash. No jaundice.  CV: no pedal edema  Resp: normal respiratory effort without conversational dyspnea   Psych: normal mood and affect  Gait: normal steady gait with appropriate coordination and balance  Neuro: Normal light sensory exam of lower extremity  MSK:  RIGHT KNEE  Inspection:    normal alignment  Palpation:    Tender about the medial patellar facet. Remainder of bony and ligamentous landmarks are nontender.    No effusion is present    Patellofemoral crepitus is Present  Range of Motion:     00 extension to 1350 flexion  Strength:    Extensor mechanism intact  Special Tests:    Positive: Patellar grind    Negative: Lachman's, posterior drawer, Xiomara's    Independent visualization of the below image:  KNEE STANDING AP BILATERAL SUNRISE BILATERAL LATERAL RIGHT 3/29/2019  11:52 AM      HISTORY: Primary osteoarthritis of right knee. Acute medial meniscus  tear of right knee, initial encounter.                                                                      IMPRESSION:  1. Right knee 3 views: Comparison 1/24/2017. There is fairly prominent  medial compartment osteoarthritis, markedly increased. Mild patellar  spurring.  2. Left knee 2 views: Unremarkable.     JOAO BRASHER MD    MR KNEE RIGHT WITHOUT CONTRAST 2/22/2017 6:52 PM     HISTORY: Lateral and anterior right knee pain and swelling since  twisting injury 1/24/2017.      TECHNIQUE: Axial and coronal T2 with fat suppression. Coronal T1.  Sagittal dual echo T2.     COMPARISON: None.     FINDINGS:   Medial Meniscus: There is a " full-thickness radial-type tear in the far  posterior horn extending from the periphery to the free edge measuring  up to 2 mm in width (image 23 of series 3, image 21 of series 5 and  image 21 of series 6 and 7). The adjacent posterior meniscal root  ligament appears intact. There is mild intrasubstance myxoid  degenerative change in the mid body without tear and the anterior horn  is unremarkable. There is mild peripheral extrusion of the mid body.  No displaced meniscal fragments or flaps.        Lateral Meniscus: Intrasubstance myxoid degenerative change in the mid  body. No evidence for tear.        Anterior Cruciate Ligament: Unremarkable.      Posterior Cruciate Ligament: Unremarkable.      Medial Collateral Ligament: Unremarkable.     Lateral Collateral Ligament Complex, Popliteus Tendon: The iliotibial  band, fibular collateral ligament, biceps femoris tendon, and  popliteus tendon are unremarkable.     Osseous and Cartilaginous Structures: No acute-appearing bony  abnormalities. There is mild grade II chondromalacia involving the  weightbearing medial femoral condyle. Medial tibial plateau articular  cartilage and articular cartilages throughout the lateral compartment  appear normal. There is grade III to IV chondromalacia involving the  superior aspect of the medial patellar facet, associated with  subchondral cystic changes. Milder chondromalacia is present at the  inferior aspect of the medial patellar facet. The lateral patellar  facet and trochlear groove articular cartilages appear normal.     Extensor Mechanism: The quadriceps and patellar tendons are  unremarkable. The medial and lateral patellar retinacula are normal.     Joint Space: Small to moderate-sized joint effusion. No definite loose  bodies appreciated.     Additional Findings: No popliteal cyst. No semimembranosus-tibial  collateral ligament or pes anserine bursitis.                                                                       IMPRESSION:   1. Full-thickness radial-type tear at the far posterior horn of the  medial meniscus.  2. Medial and patellofemoral compartment chondromalacia as described  above.  3. Joint space effusion.     KAYLEEN EVERETT MD    Large Joint Injection/Arthocentesis: R knee joint  Date/Time: 3/29/2019 12:20 PM  Performed by: Jared Roberts DO  Authorized by: Jared Roberts DO     Indications:  Osteoarthritis and pain  Needle Size:  22 G  Guidance: ultrasound    Approach:  Superolateral  Location:  Knee  Site:  R knee joint  Medications:  40 mg methylPREDNISolone 40 MG/ML; 3 mL ropivacaine 5 MG/ML  Outcome:  Tolerated well, no immediate complications  Procedure discussed: discussed risks, benefits, and alternatives    Consent Given by:  Patient  Timeout: timeout called immediately prior to procedure    Prep: patient was prepped and draped in usual sterile fashion       Pain noted to be a 4/10 before completion of the procedure and 2/10 after completion of the procedure.          Patient's conditions were thoroughly discussed during today's visit with greater than 50% of the visit spent counseling the patient with total time spent face-to-face with the patient being 30 minutes with injection taking 5 minutes.    Jared Roberts DO [facility]

## 2019-03-29 NOTE — LETTER
3/29/2019         RE: Lilly Barnes  [address] [facility] [id number]        Dear Colleague,    Thank you for referring your patient, Lilly Barnes, to the [facility]. Please see a copy of my visit note below.    ASSESSMENT & PLAN    1. Primary osteoarthritis of right knee    2. Hx of arthroscopy of right knee      Reviewed old MRI which showed arthritis in addition to acute meniscal tear  Most of your pain today is related to the arthritis behind your kneecap  Reviewed x-rays - moderate arthritis  Steroid injection of the right knee was performed today in clinic  Can repeat the injection anytime after 4 months    Based on paperwork in the chart, per Dr. French, she reached maximum medical improvement from the work comp injury on 11/8/2017. He also gave her a 2% PPD rating as a result of having the meniscectomy.    Follow up for repeat injection when pain returns.   -----    SUBJECTIVE  Lilly Barnes is a/an 58 year old female who is seen as a self referral for evaluation of right knee pain. The patient is seen by themselves.    Onset: She had an injury on 1/24/17 when she was moving some shelves at work, stepped back onto her right leg, twisted her leg with a planted foot. She had immediate sharp pain on the lateral side of knee into the posterior part of the knee  Location of Pain: right anterolateral knee  Rating of Pain at worst: 5/10  Rating of Pain Currently: 4/10  Worsened by: kneeling, prolonged inactivity (such as driving), waking her up at night, stairs  Better with: rest, ice  Treatments tried: Tylenol, home exercises, physical therapy and corticosteroid injections with Dr. French (last injection 10/18/2018)  Associated symptoms: mild swelling, stiffness  Orthopedic history: denies previous knee issues prior to injury  Relevant surgical history:  Rt knee arthroscopy, DOS 3/20/17, Dr. French  Patient Social History: works at [employer]    Patient's past medical,  "surgical, social, and family histories were reviewed today and no changes are noted.    REVIEW OF SYSTEMS:  10 point ROS is negative other than symptoms noted above in HPI, Past Medical History or as stated below  Constitutional: NEGATIVE for fever, chills, change in weight  Skin: NEGATIVE for worrisome rashes, moles or lesions  GI/: NEGATIVE for bowel or bladder changes  Neuro: NEGATIVE for weakness, dizziness or paresthesias    OBJECTIVE:  /60   Ht 1.626 m (5' 4\")   Wt 77.1 kg (170 lb)   LMP 09/15/2011   BMI 29.18 kg/m      General: healthy, alert and in no distress  HEENT: no scleral icterus or conjunctival erythema  Skin: no suspicious lesions or rash. No jaundice.  CV: no pedal edema  Resp: normal respiratory effort without conversational dyspnea   Psych: normal mood and affect  Gait: normal steady gait with appropriate coordination and balance  Neuro: Normal light sensory exam of lower extremity  MSK:  RIGHT KNEE  Inspection:    normal alignment  Palpation:    Tender about the medial patellar facet. Remainder of bony and ligamentous landmarks are nontender.    No effusion is present    Patellofemoral crepitus is Present  Range of Motion:     00 extension to 1350 flexion  Strength:    Extensor mechanism intact  Special Tests:    Positive: Patellar grind    Negative: Lachman's, posterior drawer, Xiomara's    Independent visualization of the below image:  KNEE STANDING AP BILATERAL SUNRISE BILATERAL LATERAL RIGHT 3/29/2019  11:52 AM      HISTORY: Primary osteoarthritis of right knee. Acute medial meniscus  tear of right knee, initial encounter.                                                                      IMPRESSION:  1. Right knee 3 views: Comparison 1/24/2017. There is fairly prominent  medial compartment osteoarthritis, markedly increased. Mild patellar  spurring.  2. Left knee 2 views: Unremarkable.     JOAO BRASHER MD    MR KNEE RIGHT WITHOUT CONTRAST 2/22/2017 6:52 PM     HISTORY: Lateral " and anterior right knee pain and swelling since  twisting injury 1/24/2017.      TECHNIQUE: Axial and coronal T2 with fat suppression. Coronal T1.  Sagittal dual echo T2.     COMPARISON: None.     FINDINGS:   Medial Meniscus: There is a full-thickness radial-type tear in the far  posterior horn extending from the periphery to the free edge measuring  up to 2 mm in width (image 23 of series 3, image 21 of series 5 and  image 21 of series 6 and 7). The adjacent posterior meniscal root  ligament appears intact. There is mild intrasubstance myxoid  degenerative change in the mid body without tear and the anterior horn  is unremarkable. There is mild peripheral extrusion of the mid body.  No displaced meniscal fragments or flaps.        Lateral Meniscus: Intrasubstance myxoid degenerative change in the mid  body. No evidence for tear.        Anterior Cruciate Ligament: Unremarkable.      Posterior Cruciate Ligament: Unremarkable.      Medial Collateral Ligament: Unremarkable.     Lateral Collateral Ligament Complex, Popliteus Tendon: The iliotibial  band, fibular collateral ligament, biceps femoris tendon, and  popliteus tendon are unremarkable.     Osseous and Cartilaginous Structures: No acute-appearing bony  abnormalities. There is mild grade II chondromalacia involving the  weightbearing medial femoral condyle. Medial tibial plateau articular  cartilage and articular cartilages throughout the lateral compartment  appear normal. There is grade III to IV chondromalacia involving the  superior aspect of the medial patellar facet, associated with  subchondral cystic changes. Milder chondromalacia is present at the  inferior aspect of the medial patellar facet. The lateral patellar  facet and trochlear groove articular cartilages appear normal.     Extensor Mechanism: The quadriceps and patellar tendons are  unremarkable. The medial and lateral patellar retinacula are normal.     Joint Space: Small to moderate-sized joint  effusion. No definite loose  bodies appreciated.     Additional Findings: No popliteal cyst. No semimembranosus-tibial  collateral ligament or pes anserine bursitis.                                                                      IMPRESSION:   1. Full-thickness radial-type tear at the far posterior horn of the  medial meniscus.  2. Medial and patellofemoral compartment chondromalacia as described  above.  3. Joint space effusion.     KAYLEEN EVERETT MD    Large Joint Injection/Arthocentesis: R knee joint  Date/Time: 3/29/2019 12:20 PM  Performed by: Jared Roberts DO  Authorized by: Jared Roberts DO     Indications:  Osteoarthritis and pain  Needle Size:  22 G  Guidance: ultrasound    Approach:  Superolateral  Location:  Knee  Site:  R knee joint  Medications:  40 mg methylPREDNISolone 40 MG/ML; 3 mL ropivacaine 5 MG/ML  Outcome:  Tolerated well, no immediate complications  Procedure discussed: discussed risks, benefits, and alternatives    Consent Given by:  Patient  Timeout: timeout called immediately prior to procedure    Prep: patient was prepped and draped in usual sterile fashion       Pain noted to be a 4/10 before completion of the procedure and 2/10 after completion of the procedure.          Patient's conditions were thoroughly discussed during today's visit with greater than 50% of the visit spent counseling the patient with total time spent face-to-face with the patient being 30 minutes with injection taking 5 minutes.    Jared Roberts DO [facility]      Again, thank you for allowing me to participate in the care of your patient.        Sincerely,        Jared Roberts DO

## 2019-03-29 NOTE — PATIENT INSTRUCTIONS
1. Primary osteoarthritis of right knee    2. Hx of arthroscopy of right knee      Reviewed old MRI which showed arthritis in addition to acute meniscal tear  Most of your pain today is related to the arthritis behind your kneecap  Reviewed x-rays - moderate arthritis  Steroid injection of the right knee was performed today in clinic    - Would not soak in a hot tub, bath or swimming pool for 48 hours  - Ok to shower  - Ice today and only do your normal amounts of activity  - The lidocaine (what is giving you pain relief right now) will likely stop working in 1-2 hours.  You will then have pain again, similar to before you received the injection. The corticosteroid will not start working until approximately 1-2 weeks from now.  Can repeat the injection anytime after 4 months    Follow up for repeat injection when pain returns.     Based on paperwork in your chart, per Dr. French, you reached maximum medical improvement from the work comp injury on 11/8/2017. He also gave you a 2% PPD rating as a result of having the meniscectomy - this is a standard rating.

## 2019-04-01 RX ORDER — METHYLPREDNISOLONE ACETATE 40 MG/ML
40 INJECTION, SUSPENSION INTRA-ARTICULAR; INTRALESIONAL; INTRAMUSCULAR; SOFT TISSUE
Status: DISCONTINUED | OUTPATIENT
Start: 2019-03-29 | End: 2019-04-26

## 2019-04-01 RX ORDER — ROPIVACAINE HYDROCHLORIDE 5 MG/ML
3 INJECTION, SOLUTION EPIDURAL; INFILTRATION; PERINEURAL
Status: DISCONTINUED | OUTPATIENT
Start: 2019-03-29 | End: 2019-04-26

## 2019-04-17 ENCOUNTER — MYC MEDICAL ADVICE (OUTPATIENT)
Dept: FAMILY MEDICINE | Facility: CLINIC | Age: 59
End: 2019-04-17

## 2019-04-17 DIAGNOSIS — Z13.820 SCREENING FOR OSTEOPOROSIS: ICD-10-CM

## 2019-04-17 DIAGNOSIS — Z12.4 SCREENING FOR MALIGNANT NEOPLASM OF CERVIX: ICD-10-CM

## 2019-04-17 DIAGNOSIS — Z00.00 ENCOUNTER FOR ROUTINE ADULT HEALTH EXAMINATION WITHOUT ABNORMAL FINDINGS: Primary | ICD-10-CM

## 2019-04-17 DIAGNOSIS — Z12.39 SCREENING FOR BREAST CANCER: ICD-10-CM

## 2019-04-17 DIAGNOSIS — E55.9 VITAMIN D DEFICIENCY: ICD-10-CM

## 2019-04-17 DIAGNOSIS — Z51.81 MEDICATION MONITORING ENCOUNTER: ICD-10-CM

## 2019-04-17 DIAGNOSIS — E78.5 HYPERLIPIDEMIA LDL GOAL <100: ICD-10-CM

## 2019-04-17 DIAGNOSIS — I10 HYPERTENSION GOAL BP (BLOOD PRESSURE) < 140/90: ICD-10-CM

## 2019-04-17 DIAGNOSIS — Z91.09 ENVIRONMENTAL ALLERGIES: ICD-10-CM

## 2019-04-17 DIAGNOSIS — E03.9 ACQUIRED HYPOTHYROIDISM: ICD-10-CM

## 2019-04-17 DIAGNOSIS — Z86.0100 HISTORY OF COLONIC POLYPS: ICD-10-CM

## 2019-04-17 DIAGNOSIS — N18.30 CKD (CHRONIC KIDNEY DISEASE) STAGE 3, GFR 30-59 ML/MIN (H): ICD-10-CM

## 2019-04-17 DIAGNOSIS — Z12.11 SCREEN FOR COLON CANCER: ICD-10-CM

## 2019-04-17 DIAGNOSIS — M85.89 OSTEOPENIA OF MULTIPLE SITES: ICD-10-CM

## 2019-04-18 NOTE — TELEPHONE ENCOUNTER
Routing to PCP for further review/recommendations/orders.    Please add labs needed for upcoming appt.    Theodora Schafer RN  [facility]

## 2019-04-18 NOTE — TELEPHONE ENCOUNTER
Patient scheduled and notified via MyChart.  Please place orders or advise.    Lucretia Encarnacion

## 2019-04-18 NOTE — TELEPHONE ENCOUNTER
Routing to [facility] for appt.  Theodora Schafer RN  [facility]

## 2019-04-23 DIAGNOSIS — Z13.820 SCREENING FOR OSTEOPOROSIS: ICD-10-CM

## 2019-04-23 DIAGNOSIS — M85.89 OSTEOPENIA OF MULTIPLE SITES: ICD-10-CM

## 2019-04-23 DIAGNOSIS — I10 HYPERTENSION GOAL BP (BLOOD PRESSURE) < 140/90: ICD-10-CM

## 2019-04-23 DIAGNOSIS — N18.30 CKD (CHRONIC KIDNEY DISEASE) STAGE 3, GFR 30-59 ML/MIN (H): ICD-10-CM

## 2019-04-23 DIAGNOSIS — E03.9 ACQUIRED HYPOTHYROIDISM: ICD-10-CM

## 2019-04-23 DIAGNOSIS — E78.5 HYPERLIPIDEMIA LDL GOAL <100: ICD-10-CM

## 2019-04-23 DIAGNOSIS — E55.9 VITAMIN D DEFICIENCY: ICD-10-CM

## 2019-04-23 DIAGNOSIS — Z00.00 ENCOUNTER FOR ROUTINE ADULT HEALTH EXAMINATION WITHOUT ABNORMAL FINDINGS: ICD-10-CM

## 2019-04-23 DIAGNOSIS — Z51.81 MEDICATION MONITORING ENCOUNTER: ICD-10-CM

## 2019-04-23 LAB
ALBUMIN SERPL-MCNC: 3.7 G/DL (ref 3.4–5)
ALBUMIN UR-MCNC: NEGATIVE MG/DL
ALP SERPL-CCNC: 83 U/L (ref 40–150)
ALT SERPL W P-5'-P-CCNC: 37 U/L (ref 0–50)
ANION GAP SERPL CALCULATED.3IONS-SCNC: 7 MMOL/L (ref 3–14)
APPEARANCE UR: CLEAR
AST SERPL W P-5'-P-CCNC: 24 U/L (ref 0–45)
BILIRUB SERPL-MCNC: 0.4 MG/DL (ref 0.2–1.3)
BILIRUB UR QL STRIP: NEGATIVE
BUN SERPL-MCNC: 33 MG/DL (ref 7–30)
CALCIUM SERPL-MCNC: 9 MG/DL (ref 8.5–10.1)
CHLORIDE SERPL-SCNC: 109 MMOL/L (ref 94–109)
CHOLEST SERPL-MCNC: 268 MG/DL
CK SERPL-CCNC: 95 U/L (ref 30–225)
CO2 SERPL-SCNC: 24 MMOL/L (ref 20–32)
COLOR UR AUTO: YELLOW
CREAT SERPL-MCNC: 1.34 MG/DL (ref 0.52–1.04)
CREAT UR-MCNC: 102 MG/DL
DEPRECATED CALCIDIOL+CALCIFEROL SERPL-MC: 15 UG/L (ref 20–75)
ERYTHROCYTE [DISTWIDTH] IN BLOOD BY AUTOMATED COUNT: 13 % (ref 10–15)
GFR SERPL CREATININE-BSD FRML MDRD: 43 ML/MIN/{1.73_M2}
GLUCOSE SERPL-MCNC: 81 MG/DL (ref 70–99)
GLUCOSE UR STRIP-MCNC: NEGATIVE MG/DL
HCT VFR BLD AUTO: 35.2 % (ref 35–47)
HDLC SERPL-MCNC: 69 MG/DL
HGB BLD-MCNC: 11.7 G/DL (ref 11.7–15.7)
HGB UR QL STRIP: NEGATIVE
KETONES UR STRIP-MCNC: NEGATIVE MG/DL
LDLC SERPL CALC-MCNC: 183 MG/DL
LEUKOCYTE ESTERASE UR QL STRIP: NEGATIVE
MCH RBC QN AUTO: 30.3 PG (ref 26.5–33)
MCHC RBC AUTO-ENTMCNC: 33.2 G/DL (ref 31.5–36.5)
MCV RBC AUTO: 91 FL (ref 78–100)
MICROALBUMIN UR-MCNC: <5 MG/L
MICROALBUMIN/CREAT UR: NORMAL MG/G CR (ref 0–25)
NITRATE UR QL: NEGATIVE
NONHDLC SERPL-MCNC: 199 MG/DL
PH UR STRIP: 5.5 PH (ref 5–7)
PLATELET # BLD AUTO: 159 10E9/L (ref 150–450)
POTASSIUM SERPL-SCNC: 4.2 MMOL/L (ref 3.4–5.3)
PROT SERPL-MCNC: 7.1 G/DL (ref 6.8–8.8)
RBC # BLD AUTO: 3.86 10E12/L (ref 3.8–5.2)
SODIUM SERPL-SCNC: 140 MMOL/L (ref 133–144)
SOURCE: NORMAL
SP GR UR STRIP: 1.01 (ref 1–1.03)
T4 FREE SERPL-MCNC: 1.16 NG/DL (ref 0.76–1.46)
TRIGL SERPL-MCNC: 78 MG/DL
TSH SERPL DL<=0.005 MIU/L-ACNC: 3.26 MU/L (ref 0.4–4)
UROBILINOGEN UR STRIP-ACNC: 0.2 EU/DL (ref 0.2–1)
WBC # BLD AUTO: 4.9 10E9/L (ref 4–11)

## 2019-04-23 PROCEDURE — 80061 LIPID PANEL: CPT | Performed by: FAMILY MEDICINE

## 2019-04-23 PROCEDURE — 36415 COLL VENOUS BLD VENIPUNCTURE: CPT | Performed by: FAMILY MEDICINE

## 2019-04-23 PROCEDURE — 82550 ASSAY OF CK (CPK): CPT | Performed by: FAMILY MEDICINE

## 2019-04-23 PROCEDURE — 85027 COMPLETE CBC AUTOMATED: CPT | Performed by: FAMILY MEDICINE

## 2019-04-23 PROCEDURE — 82043 UR ALBUMIN QUANTITATIVE: CPT | Performed by: FAMILY MEDICINE

## 2019-04-23 PROCEDURE — 81003 URINALYSIS AUTO W/O SCOPE: CPT | Performed by: FAMILY MEDICINE

## 2019-04-23 PROCEDURE — 80053 COMPREHEN METABOLIC PANEL: CPT | Performed by: FAMILY MEDICINE

## 2019-04-23 PROCEDURE — 82306 VITAMIN D 25 HYDROXY: CPT | Performed by: FAMILY MEDICINE

## 2019-04-23 PROCEDURE — 84443 ASSAY THYROID STIM HORMONE: CPT | Performed by: FAMILY MEDICINE

## 2019-04-23 PROCEDURE — 84439 ASSAY OF FREE THYROXINE: CPT | Performed by: FAMILY MEDICINE

## 2019-04-26 ENCOUNTER — OFFICE VISIT (OUTPATIENT)
Dept: FAMILY MEDICINE | Facility: CLINIC | Age: 59
End: 2019-04-26
Payer: COMMERCIAL

## 2019-04-26 VITALS
OXYGEN SATURATION: 97 % | TEMPERATURE: 97.9 F | DIASTOLIC BLOOD PRESSURE: 58 MMHG | WEIGHT: 176 LBS | BODY MASS INDEX: 30.05 KG/M2 | HEIGHT: 64 IN | RESPIRATION RATE: 22 BRPM | SYSTOLIC BLOOD PRESSURE: 108 MMHG | HEART RATE: 81 BPM

## 2019-04-26 DIAGNOSIS — E78.5 HYPERLIPIDEMIA LDL GOAL <100: ICD-10-CM

## 2019-04-26 DIAGNOSIS — Z12.11 SCREEN FOR COLON CANCER: ICD-10-CM

## 2019-04-26 DIAGNOSIS — M85.89 OSTEOPENIA OF MULTIPLE SITES: ICD-10-CM

## 2019-04-26 DIAGNOSIS — Z91.09 ENVIRONMENTAL ALLERGIES: ICD-10-CM

## 2019-04-26 DIAGNOSIS — Z51.81 MEDICATION MONITORING ENCOUNTER: ICD-10-CM

## 2019-04-26 DIAGNOSIS — Z86.0100 HISTORY OF COLONIC POLYPS: ICD-10-CM

## 2019-04-26 DIAGNOSIS — I10 HYPERTENSION GOAL BP (BLOOD PRESSURE) < 140/90: ICD-10-CM

## 2019-04-26 DIAGNOSIS — E03.9 ACQUIRED HYPOTHYROIDISM: ICD-10-CM

## 2019-04-26 DIAGNOSIS — E55.9 VITAMIN D DEFICIENCY: ICD-10-CM

## 2019-04-26 DIAGNOSIS — J30.0 CHRONIC VASOMOTOR RHINITIS: ICD-10-CM

## 2019-04-26 DIAGNOSIS — N18.30 CKD (CHRONIC KIDNEY DISEASE) STAGE 3, GFR 30-59 ML/MIN (H): ICD-10-CM

## 2019-04-26 DIAGNOSIS — Z00.00 ROUTINE GENERAL MEDICAL EXAMINATION AT A HEALTH CARE FACILITY: Primary | ICD-10-CM

## 2019-04-26 DIAGNOSIS — Z23 NEED FOR PNEUMOCOCCAL VACCINATION: ICD-10-CM

## 2019-04-26 PROCEDURE — 90471 IMMUNIZATION ADMIN: CPT | Performed by: FAMILY MEDICINE

## 2019-04-26 PROCEDURE — 99396 PREV VISIT EST AGE 40-64: CPT | Mod: 25 | Performed by: FAMILY MEDICINE

## 2019-04-26 PROCEDURE — 90732 PPSV23 VACC 2 YRS+ SUBQ/IM: CPT | Performed by: FAMILY MEDICINE

## 2019-04-26 RX ORDER — ROSUVASTATIN CALCIUM 5 MG/1
5 TABLET, COATED ORAL DAILY
Qty: 90 TABLET | Refills: 3 | Status: SHIPPED | OUTPATIENT
Start: 2019-04-26 | End: 2020-05-07

## 2019-04-26 RX ORDER — FLUTICASONE PROPIONATE 50 MCG
2 SPRAY, SUSPENSION (ML) NASAL DAILY
Qty: 48 G | Refills: 3 | Status: SHIPPED | OUTPATIENT
Start: 2019-04-26

## 2019-04-26 RX ORDER — LEVOTHYROXINE SODIUM 75 UG/1
75 TABLET ORAL DAILY
Qty: 90 TABLET | Refills: 3 | Status: SHIPPED | OUTPATIENT
Start: 2019-04-26 | End: 2020-05-08

## 2019-04-26 RX ORDER — LISINOPRIL AND HYDROCHLOROTHIAZIDE 12.5; 2 MG/1; MG/1
1 TABLET ORAL DAILY
Qty: 90 TABLET | Refills: 3 | Status: SHIPPED | OUTPATIENT
Start: 2019-04-26 | End: 2020-05-08

## 2019-04-26 ASSESSMENT — MIFFLIN-ST. JEOR: SCORE: 1358.33

## 2019-04-26 NOTE — PATIENT INSTRUCTIONS
Recommend Shingrix vaccine for shingles. Check with insurance to see where the Shingrix vaccine is the cheapest. Follow up 2-6 months after receiving the first shot for the second shot.    Recommend 4000 international units of Vitamin D per day.     [facility]                        To reach your care team during and after hours:   [phone]  To reach our pharmacy:        [phone]    Clinic Hours                        Our clinic hours are:    Monday   7:30 am to 7:00 pm                  Tuesday through Friday 7:30 am to 5:00 pm                             Saturday   8:00 am to 12:00 pm      Sunday   Closed      Pharmacy Hours                        Our pharmacy hours are:    Monday   8:30 am to 7:00 pm       Tuesday to Friday  8:30 am to 6:00 pm                       Saturday    9:00 am to 1:00 pm              Sunday    Closed              There is also information available at our web site:  [email or web address]    If your provider ordered any lab tests and you do not receive the results within 10 business days, please call the clinic.    If you need a medication refill please contact your pharmacy.  Please allow 2-3 business days for your refill to be completed.    Our clinic offers telephone visits and e visits.  Please ask one of your team members to explain more.      Use [email or web address] (secure email communication and access to your chart) to send your primary care provider a message or make an appointment. Ask someone on your Team how to sign up for [employer].  Immunizations                      Immunization History   Administered Date(s) Administered     Influenza (IIV3) PF 01/28/2013, 10/01/2014, 09/15/2015, 02/03/2017     TDAP Vaccine (Boostrix) 07/19/2012     Varicella 03/05/1997        Health Maintenance                         Health Maintenance Due   Topic Date Due     Zoster (Shingles) Vaccine (1 of 2) 06/26/2009     Colon Cancer Screening - FIT Test - yearly  04/01/2018     Preventive  Care Visit  02/28/2019       Preventive Health Recommendations  Female Ages 50 - 64    Yearly exam: See your health care provider every year in order to  o Review health changes.   o Discuss preventive care.    o Review your medicines if your doctor has prescribed any.      Get a Pap test every three years (unless you have an abnormal result and your provider advises testing more often).    If you get Pap tests with HPV test, you only need to test every 5 years, unless you have an abnormal result.     You do not need a Pap test if your uterus was removed (hysterectomy) and you have not had cancer.    You should be tested each year for STDs (sexually transmitted diseases) if you're at risk.     Have a mammogram every 1 to 2 years.    Have a colonoscopy at age 50, or have a yearly FIT test (stool test). These exams screen for colon cancer.      Have a cholesterol test every 5 years, or more often if advised.    Have a diabetes test (fasting glucose) every three years. If you are at risk for diabetes, you should have this test more often.     If you are at risk for osteoporosis (brittle bone disease), think about having a bone density scan (DEXA).    Shots: Get a flu shot each year. Get a tetanus shot every 10 years.    Nutrition:     Eat at least 5 servings of fruits and vegetables each day.    Eat whole-grain bread, whole-wheat pasta and brown rice instead of white grains and rice.    Get adequate Calcium and Vitamin D.     Lifestyle    Exercise at least 150 minutes a week (30 minutes a day, 5 days a week). This will help you control your weight and prevent disease.    Limit alcohol to one drink per day.    No smoking.     Wear sunscreen to prevent skin cancer.     See your dentist every six months for an exam and cleaning.    See your eye doctor every 1 to 2 years.

## 2019-04-26 NOTE — PROGRESS NOTES
SUBJECTIVE:   CC: Lilly Barnes is an 58 year old male who presents for preventative health visit.     Healthy Habits:     Getting at least 3 servings of Calcium per day:  Yes    Bi-annual eye exam:  Yes    Dental care twice a year:  Yes    Sleep apnea or symptoms of sleep apnea:  None    Diet:  Low salt and Low fat/cholesterol    Frequency of exercise:  2-3 days/week    Duration of exercise:  45-60 minutes    Taking medications regularly:  Yes    Medication side effects:  Muscle aches and Lightheadedness    PHQ-2 Total Score: 0    Previous labs reviewed with patient.     Hypertension Follow-up  Patient presents today as normotensive. Patient is currently prescribed 20-12.5 mg Lisinopril-hydrochlorothiazide daily for hypertension management.    Outpatient blood pressures are not being checked.    Low Salt Diet: no added salt    BP Readings from Last 5 Encounters:   04/26/19 108/58   03/29/19 115/60   12/07/18 102/66   10/18/18 116/66   06/08/18 104/60     Creatinine   Date Value Ref Range Status   04/23/2019 1.34 (H) 0.52 - 1.04 mg/dL Final     Hyperlipidemia: Patient's hyperlipidemia is moderately controlled. Patient is currently prescribed 5 mg Rosuvastatin daily for hyperlipidemia management. Patient reports that she has been tired, had some weight gain, and has had some leg cramps since starting Rosuvastatin. Patient reports she has not been taking her medication due to these side effects.     Recent Labs   Lab Test 04/23/19  0731 02/28/18  0834  02/20/15  0912 08/01/14  0828   CHOL 268* 234*   < > 214* 214*   HDL 69 70   < > 77 75   * 144*   < > 128 119   TRIG 78 98   < > 46 101   CHOLHDLRATIO  --   --   --  2.8 2.9    < > = values in this interval not displayed.     Hypothyroidism: Patient's hypothyroidism is well controlled. Patient is currently prescribed 75 mcg Levothyroxine daily for hypothyroidism management.     TSH   Date Value Ref Range Status   04/23/2019 3.26 0.40 - 4.00 mU/L Final     T4  Free   Date Value Ref Range Status   04/23/2019 1.16 0.76 - 1.46 ng/dL Final     Right Knee Pain: Patient receives cortisone injections into her right knee and it provides relief of her pain. Patient consults with Dr. Roberts of orthopedics for further right knee pain management.     Patient reports she has had some leg cramps at night.     Today's PHQ-2 Score:   PHQ-2 ( 1999 Pfizer) 4/24/2019   Q1: Little interest or pleasure in doing things 0   Q2: Feeling down, depressed or hopeless 0   PHQ-2 Score 0   Q1: Little interest or pleasure in doing things Not at all   Q2: Feeling down, depressed or hopeless Not at all   PHQ-2 Score 0       Abuse: Current or Past(Physical, Sexual or Emotional)- No  Do you feel safe in your environment? Yes    Social History     Tobacco Use     Smoking status: Never Smoker     Smokeless tobacco: Never Used   Substance Use Topics     Alcohol use: Yes     Alcohol/week: 0.0 - 0.6 oz     Comment: 2 drinks per month       Alcohol Use 4/24/2019   Prescreen: >3 drinks/day or >7 drinks/week? Not Applicable   Prescreen: >3 drinks/day or >7 drinks/week? -     Last PSA: No results found for: PSA    Reviewed orders with patient. Reviewed health maintenance and updated orders accordingly - Yes  Labs reviewed in EPIC    Reviewed and updated as needed this visit by clinical staff  Tobacco  Allergies  Meds  Med Hx  Surg Hx  Fam Hx  Soc Hx        Reviewed and updated as needed this visit by Provider        Health Maintenance     Colonoscopy:  Last 12/7/2018, Due 12/2023   FIT:  Last 4/1/2017, Due              Mammogram:  Last 10/1/2018, Due              PAP:  Last 2/28/2018, Due 2/2023   DEXA:  N/A    Health Maintenance Due   Topic Date Due     ZOSTER IMMUNIZATION (1 of 2) 06/26/2009     FIT Q1 YR  04/01/2018       Current Problem List    Patient Active Problem List   Diagnosis     Hypertension goal BP (blood pressure) < 140/90     CKD (chronic kidney disease) stage 3, GFR 30-59 ml/min (H)      History of colonic polyps- 3 mm polyp.  needs every 3-5 yr surveillence- hyperplastic      Hyperlipidemia LDL goal <100     Chronic rhinitis     Osteopenia     Environmental allergies     Acquired hypothyroidism     Advanced directives, counseling/discussion     Vitamin D deficiency     Heel pain       Past Medical History    Past Medical History:   Diagnosis Date     CKD (chronic kidney disease) stage 3, GFR 30-59 ml/min (H) 7/13     Colon polyps 12/13, 12/18    hyperplastic polyp - polyps x 4 - Dr Sheppard, due 5 yrs     Environmental allergies      Hyperlipidemia LDL goal < 130      Hypertension goal BP (blood pressure) < 140/90 2009     Hypothyroidism 2009     Osteopenia      Vitamin D deficiency        Past Surgical History    Past Surgical History:   Procedure Laterality Date     ARTHROSCOPY KNEE WITH MEDIAL MENISCECTOMY Right 3/20/2017    ARTHROSCOPY KNEE WITH MEDIAL MENISCECTOMY - Dr French     COLONOSCOPY  12/6/2013    hyperplastic polyp - Dr Sheppard - due 3-5 yrs     COLONOSCOPY  12/13, 12/18    multiple polyps - due 5 yrs     COLONOSCOPY N/A 12/7/2018    polyps x 4, due 5 years     DENTAL SURGERY  1980    wisdom teeth     SURGICAL HISTORY OF -   1997    laproscopic - left - for ectopic pregnancy       Current Medications    Current Outpatient Medications   Medication Sig Dispense Refill     aspirin 81 MG tablet Take 81 mg by mouth daily   3     calcium carbonate-vitamin D 600-400 MG-UNIT CHEW Take 1 chew tab by mouth 2 times daily 180 tablet 3     fluticasone (FLONASE) 50 MCG/ACT nasal spray Spray 2 sprays into both nostrils daily 48 g 3     levothyroxine (SYNTHROID/LEVOTHROID) 75 MCG tablet Take 1 tablet (75 mcg) by mouth daily 90 tablet 3     lisinopril-hydrochlorothiazide (PRINZIDE/ZESTORETIC) 20-12.5 MG tablet Take 1 tablet by mouth daily 90 tablet 3     multivitamin, therapeutic with minerals (MULTI-VITAMIN) TABS Take 1 tablet by mouth daily 100 tablet 3     rosuvastatin (CRESTOR) 5 MG tablet Take 1  tablet (5 mg) by mouth daily 90 tablet 3       Allergies    Allergies   Allergen Reactions     Ibuprofen      Should avoid due to Chronic Kidney Disease       Immunizations    Immunization History   Administered Date(s) Administered     Influenza (IIV3) PF 01/28/2013, 10/01/2014, 09/15/2015, 02/03/2017     Pneumococcal 23 valent 04/26/2019     TDAP Vaccine (Boostrix) 07/19/2012     Varicella 03/05/1997       Family History    Family History   Problem Relation Age of Onset     Diabetes Mother      Cancer Sister 43        ovarian and uterine - recurrent     Cerebrovascular Disease Sister      Lipids Other         multiple members including 1 degree      Diabetes Brother      Depression Son         resolved     Cancer Maternal Grandmother      Coronary Artery Disease Maternal Grandfather        Social History    Social History     Socioeconomic History     Marital status:      Spouse name: Cullen     Number of children: 1     Years of education: 14     Highest education level: Not on file   Occupational History     Employer: [employer]   Social Needs     Financial resource strain: Not on file     Food insecurity:     Worry: Not on file     Inability: Not on file     Transportation needs:     Medical: Not on file     Non-medical: Not on file   Tobacco Use     Smoking status: Never Smoker     Smokeless tobacco: Never Used   Substance and Sexual Activity     Alcohol use: Yes     Alcohol/week: 0.0 - 0.6 oz     Comment: 2 drinks per month     Drug use: No     Sexual activity: Never     Partners: Male     Birth control/protection: None   Lifestyle     Physical activity:     Days per week: Not on file     Minutes per session: Not on file     Stress: Not on file   Relationships     Social connections:     Talks on phone: Not on file     Gets together: Not on file     Attends Worship service: Not on file     Active member of club or organization: Not on file     Attends meetings of clubs or organizations: Not on file      "Relationship status: Not on file     Intimate partner violence:     Fear of current or ex partner: Not on file     Emotionally abused: Not on file     Physically abused: Not on file     Forced sexual activity: Not on file   Other Topics Concern     Parent/sibling w/ CABG, MI or angioplasty before 65F 55M? No      Service No     Blood Transfusions No     Caffeine Concern Yes     Comment: 0-1 energy drinks daily     Occupational Exposure Not Asked     Hobby Hazards Not Asked     Sleep Concern Not Asked     Stress Concern Not Asked     Weight Concern Not Asked     Special Diet Not Asked     Back Care Not Asked     Exercise Yes     Comment: walks 3-4 times per week, averages 10,000 steps daily     Bike Helmet Not Asked     Seat Belt Yes     Self-Exams Not Asked   Social History Narrative     Not on file       Review of Systems  Constitutional, HEENT, cardiovascular, pulmonary, GI, , musculoskeletal, neuro, skin, endocrine and psych systems are negative, except as otherwise noted.    OBJECTIVE:   /58   Pulse 81   Temp 97.9  F (36.6  C) (Oral)   Resp 22   Ht 1.626 m (5' 4\")   Wt 79.8 kg (176 lb)   LMP 09/15/2011   SpO2 97%   BMI 30.21 kg/m      Physical Exam  GENERAL: healthy, alert and no distress  EYES: Eyes grossly normal to inspection  HENT:ear canals and TM's normal upon viewing with otoscope, nose and mouth without ulcers or lesions upon viewing with otoscope  NECK: no adenopathy, no asymmetry, masses, or scars and thyroid normal to palpation  RESP: lungs clear to auscultation - no rales, rhonchi or wheezes  BREAST: normal without masses, tenderness or nipple discharge and no palpable axillary masses or adenopathy  CV: regular rate and rhythm, normal S1 S2, no S3 or S4, no murmur, click or rub, no peripheral edema and peripheral pulses strong  ABDOMEN: soft, nontender, no hepatosplenomegaly, no masses and bowel sounds normal   (female): Declined  MS: no gross musculoskeletal defects noted, " no edema  SKIN: no suspicious lesions or rashes  NEURO: Normal strength and tone, mentation intact and speech normal  PSYCH: mentation appears normal, affect normal/bright  BACK: no CVA tenderness, no paralumbar tenderness    Diagnostic Test Results:  No results found for this or any previous visit (from the past 24 hour(s)).    ASSESSMENT/PLAN:       ICD-10-CM    1. Routine general medical examination at a health care facility Z00.00    2. CKD (chronic kidney disease) stage 3, GFR 30-59 ml/min (H) N18.3    3. Hyperlipidemia LDL goal <100 E78.5 rosuvastatin (CRESTOR) 5 MG tablet   4. Hypertension goal BP (blood pressure) < 140/90 I10 lisinopril-hydrochlorothiazide (PRINZIDE/ZESTORETIC) 20-12.5 MG tablet   5. Acquired hypothyroidism E03.9 levothyroxine (SYNTHROID/LEVOTHROID) 75 MCG tablet   6. Chronic vasomotor rhinitis J30.0 fluticasone (FLONASE) 50 MCG/ACT nasal spray   7. Environmental allergies Z91.09 fluticasone (FLONASE) 50 MCG/ACT nasal spray   8. Osteopenia of multiple sites M85.89    9. Vitamin D deficiency E55.9    10. History of colonic polyps- 3 mm polyp.  needs every 3-5 yr surveillence- hyperplastic  Z86.010    11. Screen for colon cancer Z12.11    12. Medication monitoring encounter Z51.81    13. Need for pneumococcal vaccination Z23 ADMIN 1st VACCINE     Discussed treatment/modality options, including risk and benefits, she desires advised aspirin 81 mg po daily, advised 1 multivitamin per day, advised calcium 0708-3869 mg/d and Vitamin D 800-1200 IU/d, advised dentist every 6 months, advised diet and exercise and advised opthalmologist every 1-2 years. All diagnosis above reviewed and noted above, otherwise stable.  See [facility] orders for further details.      1) Patient presents today as normotensive. Patient is currently prescribed 20-12.5 mg Lisinopril-hydrochlorothiazide daily for hypertension management. Advised continued use.     2) Patient's hyperlipidemia is moderately controlled. Patient is  "currently prescribed 5 mg Rosuvastatin daily for hyperlipidemia management. Patient advised to take medication every other day to prevent medication side effects.     3) Patient's hypothyroidism is well controlled. Patient is currently prescribed 75 mcg Levothyroxine daily for hypothyroidism management. Advised continued use.     4) Patient advised to continue receiving cortisone injections as needed for right knee pain management. Patient consults with Dr. Roberts of orthopedics for further right knee pain management. Recommend continued follow up.     5) Prescriptions refilled today,    6) Patient received Pneumovax 23 today. Recommend Shingrix vaccine.    7) Recommend 4000 international units of Vitamin D per day.     8) Recommend Gatorade, Powerade, and or Propel daily to prevent leg cramps.     9) Follow up in 1 year for complete physical exam.     Health Maintenance Due   Topic Date Due     ZOSTER IMMUNIZATION (1 of 2) 06/26/2009     FIT Q1 YR  04/01/2018       COUNSELING:   Reviewed preventive health counseling, as reflected in patient instructions    BP Readings from Last 1 Encounters:   04/26/19 108/58     Estimated body mass index is 30.21 kg/m  as calculated from the following:    Height as of this encounter: 1.626 m (5' 4\").    Weight as of this encounter: 79.8 kg (176 lb).     reports that she has never smoked. She has never used smokeless tobacco.    Counseling Resources:  ATP IV Guidelines  Pooled Cohorts Equation Calculator  FRAX Risk Assessment  ICSI Preventive Guidelines  Dietary Guidelines for Americans, 2010  USDA's MyPlate  ASA Prophylaxis  Lung CA Screening    This document serves as a record of the services and decisions personally performed and made by Shelton Castellanos MD. It was created on his behalf by Saqib Solitario, a trained medical scribe. The creation of this document is based on the provider's statements to the medical scribe.  Saqib Solitario April 26, 2019 2:59 PM     The information in this " document, created by the medical scribe for me, accurately reflects the services I personally performed and the decisions made by me. I have reviewed and approved this document for accuracy prior to leaving the patient care area.  April 26, 2019          Shelton Castellanos MD, FAA[address]  [address] [phone] Fax

## 2019-06-03 PROBLEM — M79.673 HEEL PAIN: Status: RESOLVED | Noted: 2018-03-27 | Resolved: 2019-06-03

## 2019-07-08 DIAGNOSIS — E78.5 HYPERLIPIDEMIA LDL GOAL <100: ICD-10-CM

## 2019-07-08 NOTE — TELEPHONE ENCOUNTER
"Requested Prescriptions   Pending Prescriptions Disp Refills     rosuvastatin (CRESTOR) 5 MG tablet        Last Written Prescription Date:  4.26.19  Last Fill Quantity: 90 tablet,  # refills: 3   Last office visit: 4/26/2019 with prescribing provider:  Shelton Castellanos MD               Future Office Visit:       90 tablet 3     Sig: Take 1 tablet (5 mg) by mouth daily       Statins Protocol Passed - 7/8/2019  3:08 PM        Passed - LDL on file in past 12 months     Recent Labs   Lab Test 04/23/19  0731   *             Passed - No abnormal creatine kinase in past 12 months     Recent Labs   Lab Test 04/23/19  0731   CKT 95                Passed - Recent (12 mo) or future (30 days) visit within the authorizing provider's specialty     Patient had office visit in the last 12 months or has a visit in the next 30 days with authorizing provider or within the authorizing provider's specialty.  See \"Patient Info\" tab in inbasket, or \"Choose Columns\" in Meds & Orders section of the refill encounter.              Passed - Medication is active on med list        Passed - Patient is age 18 or older        Passed - No active pregnancy on record        Passed - No positive pregnancy test in past 12 months        "

## 2019-07-09 RX ORDER — ROSUVASTATIN CALCIUM 5 MG/1
5 TABLET, COATED ORAL DAILY
Qty: 90 TABLET | Refills: 3 | OUTPATIENT
Start: 2019-07-09

## 2019-09-29 ENCOUNTER — HEALTH MAINTENANCE LETTER (OUTPATIENT)
Age: 59
End: 2019-09-29

## 2019-10-11 ENCOUNTER — HOSPITAL ENCOUNTER (OUTPATIENT)
Dept: MAMMOGRAPHY | Facility: CLINIC | Age: 59
Discharge: HOME | End: 2019-10-11
Attending: FAMILY MEDICINE | Admitting: FAMILY MEDICINE
Payer: COMMERCIAL

## 2019-10-11 DIAGNOSIS — Z12.31 VISIT FOR SCREENING MAMMOGRAM: ICD-10-CM

## 2019-10-11 PROCEDURE — 77063 BREAST TOMOSYNTHESIS BI: CPT

## 2020-04-28 DIAGNOSIS — E78.5 HYPERLIPIDEMIA LDL GOAL <100: ICD-10-CM

## 2020-05-07 RX ORDER — ROSUVASTATIN CALCIUM 5 MG/1
5 TABLET, COATED ORAL DAILY
Qty: 90 TABLET | Refills: 0 | Status: SHIPPED | OUTPATIENT
Start: 2020-05-07 | End: 2020-07-28

## 2020-05-07 NOTE — TELEPHONE ENCOUNTER
Next 5 appointments (look out 90 days)    Jul 24, 2020  9:30 AM CDT  [facility] with [facility]anos [facility]  [facility] [address] [place] [id number] [phone]        Routing refill request to provider for review/approval because:  Patient needs to be seen because it has been more than 1 year since last office visit.        Radha Howell RN  [facility]

## 2020-05-07 NOTE — TELEPHONE ENCOUNTER
Please, call or return to clinic or go to the ER immediately if signs or symptoms worsen or fail to improve as anticipated.     Ok for #90 tabs. Needs recheck video visit with labs or inperson visit in 3 months with Dr. Shelton Castellanos  Who is out of office rest of this week.

## 2020-05-07 NOTE — TELEPHONE ENCOUNTER
Noted.   Next 5 appointments (look out 90 days)    Jul 24, 2020  9:30 AM CDT  PHYSICAL with Shelton Castellanos MD  [facility] [address] [place] [phone]        Syed Avila RN   [facility] T[facility]

## 2020-07-26 DIAGNOSIS — I10 HYPERTENSION GOAL BP (BLOOD PRESSURE) < 140/90: ICD-10-CM

## 2020-07-26 DIAGNOSIS — E03.9 ACQUIRED HYPOTHYROIDISM: ICD-10-CM

## 2020-07-28 NOTE — TELEPHONE ENCOUNTER
Routing to  to call and schedule fasting appointment with provider- please route to provider when appointment has been scheduled    Routing refill request to provider for review/approval because:  Laurita given x1 and patient did not follow up, please advise  Labs not current:    Patient needs to be seen because it has been more than 1 year since last office visit.      Concha ALEGRERN BSN  [facility]  [phone]

## 2020-08-05 RX ORDER — LISINOPRIL AND HYDROCHLOROTHIAZIDE 12.5; 2 MG/1; MG/1
TABLET ORAL
Qty: 90 TABLET | Refills: 0 | Status: SHIPPED | OUTPATIENT
Start: 2020-08-05 | End: 2020-10-02

## 2020-08-05 RX ORDER — LEVOTHYROXINE SODIUM 75 UG/1
TABLET ORAL
Qty: 90 TABLET | Refills: 0 | Status: SHIPPED | OUTPATIENT
Start: 2020-08-05 | End: 2020-10-02

## 2020-10-02 ENCOUNTER — OFFICE VISIT (OUTPATIENT)
Dept: FAMILY MEDICINE | Facility: CLINIC | Age: 60
End: 2020-10-02
Payer: COMMERCIAL

## 2020-10-02 VITALS
OXYGEN SATURATION: 100 % | SYSTOLIC BLOOD PRESSURE: 112 MMHG | TEMPERATURE: 97.1 F | DIASTOLIC BLOOD PRESSURE: 60 MMHG | WEIGHT: 167 LBS | HEART RATE: 75 BPM | BODY MASS INDEX: 28.51 KG/M2 | HEIGHT: 64 IN

## 2020-10-02 DIAGNOSIS — Z12.4 SCREENING FOR MALIGNANT NEOPLASM OF CERVIX: ICD-10-CM

## 2020-10-02 DIAGNOSIS — E03.9 ACQUIRED HYPOTHYROIDISM: ICD-10-CM

## 2020-10-02 DIAGNOSIS — E78.5 HYPERLIPIDEMIA LDL GOAL <100: ICD-10-CM

## 2020-10-02 DIAGNOSIS — Z12.31 ENCOUNTER FOR SCREENING MAMMOGRAM FOR MALIGNANT NEOPLASM OF BREAST: ICD-10-CM

## 2020-10-02 DIAGNOSIS — M85.89 OSTEOPENIA OF MULTIPLE SITES: ICD-10-CM

## 2020-10-02 DIAGNOSIS — I10 HYPERTENSION GOAL BP (BLOOD PRESSURE) < 140/90: ICD-10-CM

## 2020-10-02 DIAGNOSIS — Z12.11 SCREEN FOR COLON CANCER: ICD-10-CM

## 2020-10-02 DIAGNOSIS — Z00.00 ROUTINE GENERAL MEDICAL EXAMINATION AT A HEALTH CARE FACILITY: Primary | ICD-10-CM

## 2020-10-02 DIAGNOSIS — R82.90 NONSPECIFIC FINDING ON EXAMINATION OF URINE: ICD-10-CM

## 2020-10-02 DIAGNOSIS — Z51.81 MEDICATION MONITORING ENCOUNTER: ICD-10-CM

## 2020-10-02 DIAGNOSIS — Z86.0100 HISTORY OF COLONIC POLYPS: ICD-10-CM

## 2020-10-02 DIAGNOSIS — Z91.09 ENVIRONMENTAL ALLERGIES: ICD-10-CM

## 2020-10-02 DIAGNOSIS — E55.9 VITAMIN D DEFICIENCY: ICD-10-CM

## 2020-10-02 DIAGNOSIS — N18.32 STAGE 3B CHRONIC KIDNEY DISEASE (H): ICD-10-CM

## 2020-10-02 LAB
ALBUMIN SERPL-MCNC: 3.7 G/DL (ref 3.4–5)
ALBUMIN UR-MCNC: NEGATIVE MG/DL
ALP SERPL-CCNC: 69 U/L (ref 40–150)
ALT SERPL W P-5'-P-CCNC: 25 U/L (ref 0–50)
ANION GAP SERPL CALCULATED.3IONS-SCNC: 8 MMOL/L (ref 3–14)
APPEARANCE UR: CLEAR
AST SERPL W P-5'-P-CCNC: 19 U/L (ref 0–45)
BACTERIA #/AREA URNS HPF: ABNORMAL /HPF
BILIRUB SERPL-MCNC: 0.4 MG/DL (ref 0.2–1.3)
BILIRUB UR QL STRIP: NEGATIVE
BUN SERPL-MCNC: 36 MG/DL (ref 7–30)
CALCIUM SERPL-MCNC: 9.1 MG/DL (ref 8.5–10.1)
CHLORIDE SERPL-SCNC: 107 MMOL/L (ref 94–109)
CHOLEST SERPL-MCNC: 155 MG/DL
CK SERPL-CCNC: 118 U/L (ref 30–225)
CO2 SERPL-SCNC: 23 MMOL/L (ref 20–32)
COLOR UR AUTO: YELLOW
CREAT SERPL-MCNC: 1.27 MG/DL (ref 0.52–1.04)
ERYTHROCYTE [DISTWIDTH] IN BLOOD BY AUTOMATED COUNT: 12.4 % (ref 10–15)
GFR SERPL CREATININE-BSD FRML MDRD: 45 ML/MIN/{1.73_M2}
GLUCOSE SERPL-MCNC: 73 MG/DL (ref 70–99)
GLUCOSE UR STRIP-MCNC: NEGATIVE MG/DL
HCT VFR BLD AUTO: 35.9 % (ref 35–47)
HDLC SERPL-MCNC: 71 MG/DL
HGB BLD-MCNC: 11.5 G/DL (ref 11.7–15.7)
HGB UR QL STRIP: ABNORMAL
KETONES UR STRIP-MCNC: NEGATIVE MG/DL
LDLC SERPL CALC-MCNC: 71 MG/DL
LEUKOCYTE ESTERASE UR QL STRIP: ABNORMAL
MCH RBC QN AUTO: 30 PG (ref 26.5–33)
MCHC RBC AUTO-ENTMCNC: 32 G/DL (ref 31.5–36.5)
MCV RBC AUTO: 94 FL (ref 78–100)
NITRATE UR QL: NEGATIVE
NON-SQ EPI CELLS #/AREA URNS LPF: ABNORMAL /LPF
NONHDLC SERPL-MCNC: 84 MG/DL
PH UR STRIP: 5.5 PH (ref 5–7)
PLATELET # BLD AUTO: 142 10E9/L (ref 150–450)
POTASSIUM SERPL-SCNC: 4.3 MMOL/L (ref 3.4–5.3)
PROT SERPL-MCNC: 7.2 G/DL (ref 6.8–8.8)
RBC # BLD AUTO: 3.83 10E12/L (ref 3.8–5.2)
RBC #/AREA URNS AUTO: ABNORMAL /HPF
SODIUM SERPL-SCNC: 138 MMOL/L (ref 133–144)
SOURCE: ABNORMAL
SP GR UR STRIP: 1.02 (ref 1–1.03)
T4 FREE SERPL-MCNC: 1.48 NG/DL (ref 0.76–1.46)
TRIGL SERPL-MCNC: 67 MG/DL
TSH SERPL DL<=0.005 MIU/L-ACNC: 0.17 MU/L (ref 0.4–4)
UROBILINOGEN UR STRIP-ACNC: 0.2 EU/DL (ref 0.2–1)
WBC # BLD AUTO: 4.1 10E9/L (ref 4–11)
WBC #/AREA URNS AUTO: ABNORMAL /HPF

## 2020-10-02 PROCEDURE — 82043 UR ALBUMIN QUANTITATIVE: CPT | Performed by: FAMILY MEDICINE

## 2020-10-02 PROCEDURE — 87086 URINE CULTURE/COLONY COUNT: CPT | Performed by: FAMILY MEDICINE

## 2020-10-02 PROCEDURE — 80053 COMPREHEN METABOLIC PANEL: CPT | Performed by: FAMILY MEDICINE

## 2020-10-02 PROCEDURE — 81001 URINALYSIS AUTO W/SCOPE: CPT | Performed by: FAMILY MEDICINE

## 2020-10-02 PROCEDURE — 82306 VITAMIN D 25 HYDROXY: CPT | Performed by: FAMILY MEDICINE

## 2020-10-02 PROCEDURE — 84443 ASSAY THYROID STIM HORMONE: CPT | Performed by: FAMILY MEDICINE

## 2020-10-02 PROCEDURE — 80061 LIPID PANEL: CPT | Performed by: FAMILY MEDICINE

## 2020-10-02 PROCEDURE — 85027 COMPLETE CBC AUTOMATED: CPT | Performed by: FAMILY MEDICINE

## 2020-10-02 PROCEDURE — G0145 SCR C/V CYTO,THINLAYER,RESCR: HCPCS | Performed by: FAMILY MEDICINE

## 2020-10-02 PROCEDURE — 84439 ASSAY OF FREE THYROXINE: CPT | Performed by: FAMILY MEDICINE

## 2020-10-02 PROCEDURE — 82550 ASSAY OF CK (CPK): CPT | Performed by: FAMILY MEDICINE

## 2020-10-02 PROCEDURE — 99396 PREV VISIT EST AGE 40-64: CPT | Performed by: FAMILY MEDICINE

## 2020-10-02 PROCEDURE — 87624 HPV HI-RISK TYP POOLED RSLT: CPT | Performed by: FAMILY MEDICINE

## 2020-10-02 RX ORDER — ROSUVASTATIN CALCIUM 5 MG/1
5 TABLET, COATED ORAL DAILY
Qty: 90 TABLET | Refills: 3 | Status: SHIPPED | OUTPATIENT
Start: 2020-10-02 | End: 2021-10-28

## 2020-10-02 RX ORDER — LEVOTHYROXINE SODIUM 75 UG/1
75 TABLET ORAL DAILY
Qty: 90 TABLET | Refills: 3 | Status: SHIPPED | OUTPATIENT
Start: 2020-10-02 | End: 2020-10-12

## 2020-10-02 RX ORDER — LISINOPRIL AND HYDROCHLOROTHIAZIDE 12.5; 2 MG/1; MG/1
1 TABLET ORAL DAILY
Qty: 90 TABLET | Refills: 3 | Status: SHIPPED | OUTPATIENT
Start: 2020-10-02 | End: 2021-10-10

## 2020-10-02 ASSESSMENT — MIFFLIN-ST. JEOR: SCORE: 1307.51

## 2020-10-02 NOTE — PROGRESS NOTES
"   SUBJECTIVE:   CC: Lilly Barnes is an 60 year old woman who presents for preventive health visit.     {Split Bill scripting  The purpose of this visit is to discuss your medical history and prevent health problems before you are sick. You may be responsible for a co-pay, coinsurance, or deductible if your visit today includes services such as checking on a sore throat, having an x-ray or lab test, or treating and evaluating a new or existing condition :510761}  Patient has been advised of split billing requirements and indicates understanding: {Yes and No:756081}  Healthy Habits:    Do you get at least three servings of calcium containing foods daily (dairy, green leafy vegetables, etc.)? { :555435::\"yes\"}    Amount of exercise or daily activities, outside of work: { :444077}    Problems taking medications regularly { :803774::\"No\"}    Medication side effects: { :555220::\"No\"}    Have you had an eye exam in the past two years? { :815787}    Do you see a dentist twice per year? { :010242}    Do you have sleep apnea, excessive snoring or daytime drowsiness?{ :710761}  {Outside tests to abstract? :635402}    {additional problems to add (Optional):257349}    Today's PHQ-2 Score:   PHQ-2 ( 1999 [employer]) 10/1/2020 4/24/2019   Q1: Little interest or pleasure in doing things 0 0   Q2: Feeling down, depressed or hopeless 0 0   PHQ-2 Score 0 0   Q1: Little interest or pleasure in doing things Not at all Not at all   Q2: Feeling down, depressed or hopeless Not at all Not at all   PHQ-2 Score 0 0     {PHQ-2 LOOK IN ASSESSMENTS (Optional) :219149}  Abuse: Current or Past(Physical, Sexual or Emotional)- {YES/NO/NA:890288}  Do you feel safe in your environment? {YES/NO/NA:868492}        Social History     Tobacco Use     Smoking status: Never Smoker     Smokeless tobacco: Never Used   Substance Use Topics     Alcohol use: Yes     Alcohol/week: 0.0 - 1.0 standard drinks     Comment: 2 drinks per month     If you drink alcohol " "do you typically have >3 drinks per day or >7 drinks per week? {ETOH :091333}                     Reviewed orders with patient.  Reviewed health maintenance and updated orders accordingly - {Yes/No:954858::\"Yes\"}    ***    Health Maintenance     Colonoscopy:  ***   FIT:  ***              Mammogram:  ***              PAP:  ***   DEXA:  ***    Health Maintenance Due   Topic Date Due     ZOSTER IMMUNIZATION (1 of 2) 06/26/2009     BMP  04/23/2020     LIPID  04/23/2020     MICROALBUMIN  04/23/2020     TSH W/FREE T4 REFLEX  04/23/2020     PREVENTIVE CARE VISIT  04/26/2020     INFLUENZA VACCINE (1) 09/01/2020     MAMMO SCREENING  10/11/2020       Current Problem List    Patient Active Problem List   Diagnosis     Hypertension goal BP (blood pressure) < 140/90     CKD (chronic kidney disease) stage 3, GFR 30-59 ml/min     History of colonic polyps- 3 mm polyp.  needs every 3-5 yr surveillence- hyperplastic      Hyperlipidemia LDL goal <100     Chronic rhinitis     Osteopenia     Environmental allergies     Acquired hypothyroidism     Advanced directives, counseling/discussion     Vitamin D deficiency       Past Medical History    Past Medical History:   Diagnosis Date     CKD (chronic kidney disease) stage 3, GFR 30-59 ml/min (H) 7/13     Colon polyps 12/13, 12/18    hyperplastic polyp - polyps x 4 - Dr Sheppard, due 5 yrs     Environmental allergies      Hyperlipidemia LDL goal < 130      Hypertension goal BP (blood pressure) < 140/90 2009     Hypothyroidism 2009     Osteopenia      Vitamin D deficiency        Past Surgical History    Past Surgical History:   Procedure Laterality Date     ARTHROSCOPY KNEE WITH MEDIAL MENISCECTOMY Right 3/20/2017    ARTHROSCOPY KNEE WITH MEDIAL MENISCECTOMY - Dr French     COLONOSCOPY  12/6/2013    hyperplastic polyp - Dr Sheppard - due 3-5 yrs     COLONOSCOPY  12/13, 12/18    multiple polyps - due 5 yrs     COLONOSCOPY N/A 12/7/2018    polyps x 4, due 5 years     DENTAL SURGERY  1980    " wisdom teeth     SURGICAL HISTORY OF -   1997    laproscopic - left - for ectopic pregnancy       Current Medications    Current Outpatient Medications   Medication Sig Dispense Refill     aspirin 81 MG tablet Take 81 mg by mouth daily   3     calcium carbonate-vitamin D 600-400 MG-UNIT CHEW Take 1 chew tab by mouth 2 times daily 180 tablet 3     fluticasone (FLONASE) 50 MCG/ACT nasal spray Spray 2 sprays into both nostrils daily 48 g 3     levothyroxine (SYNTHROID/LEVOTHROID) 75 MCG tablet TAKE ONE TABLET BY MOUTH EVERY DAY 90 tablet 0     lisinopril-hydrochlorothiazide (ZESTORETIC) 20-12.5 MG tablet TAKE ONE TABLET BY MOUTH EVERY DAY 90 tablet 0     multivitamin, therapeutic with minerals (MULTI-VITAMIN) TABS Take 1 tablet by mouth daily 100 tablet 3     rosuvastatin (CRESTOR) 5 MG tablet TAKE ONE TABLET BY MOUTH EVERY DAY 90 tablet 1       Allergies    Allergies   Allergen Reactions     Ibuprofen      Should avoid due to Chronic Kidney Disease       Immunizations    Immunization History   Administered Date(s) Administered     Influenza (IIV3) PF 01/28/2013, 10/01/2014, 09/15/2015, 02/03/2017     Pneumococcal 23 valent 04/26/2019     TDAP Vaccine (Boostrix) 07/19/2012     Varicella 03/05/1997       Family History    Family History   Problem Relation Age of Onset     Diabetes Mother      Cancer Sister 43        ovarian and uterine - recurrent     Cerebrovascular Disease Sister      Lipids Other         multiple members including 1 degree      Diabetes Brother      Depression Son         resolved     Cancer Maternal Grandmother      Coronary Artery Disease Maternal Grandfather        Social History    Social History     Socioeconomic History     Marital status:      Spouse name: Cullen     Number of children: 1     Years of education: 14     Highest education level: Not on file   Occupational History     Employer: [employer]   Social Needs     Financial resource strain: Not on file     Food insecurity     Worry:  "Not on file     Inability: Not on file     Transportation needs     Medical: Not on file     Non-medical: Not on file   Tobacco Use     Smoking status: Never Smoker     Smokeless tobacco: Never Used   Substance and Sexual Activity     Alcohol use: Yes     Alcohol/week: 0.0 - 1.0 standard drinks     Comment: 2 drinks per month     Drug use: No     Sexual activity: Never     Partners: Male     Birth control/protection: None   Lifestyle     Physical activity     Days per week: Not on file     Minutes per session: Not on file     Stress: Not on file   Relationships     Social connections     Talks on phone: Not on file     Gets together: Not on file     Attends Confucianism service: Not on file     Active member of club or organization: Not on file     Attends meetings of clubs or organizations: Not on file     Relationship status: Not on file     Intimate partner violence     Fear of current or ex partner: Not on file     Emotionally abused: Not on file     Physically abused: Not on file     Forced sexual activity: Not on file   Other Topics Concern     Parent/sibling w/ CABG, MI or angioplasty before 65F 55M? No      Service No     Blood Transfusions No     Caffeine Concern Yes     Comment: 0-1 energy drinks daily     Occupational Exposure Not Asked     Hobby Hazards Not Asked     Sleep Concern Not Asked     Stress Concern Not Asked     Weight Concern Not Asked     Special Diet Not Asked     Back Care Not Asked     Exercise Yes     Comment: walks 3-4 times per week, averages 10,000 steps daily     Bike Helmet Not Asked     Seat Belt Yes     Self-Exams Not Asked   Social History Narrative     Not on file       ROS    {ROS - zebra stripe:968586::\"CONSTITUTIONAL: NEGATIVE for fever, chills, change in weight\",\"INTEGUMENTARY/SKIN: NEGATIVE for worrisome rashes, moles or lesions\",\"EYES: NEGATIVE for vision changes or irritation\",\"ENT/MOUTH: NEGATIVE for ear, mouth and throat problems\",\"RESP: NEGATIVE for significant " "cough or SOB\",\"BREAST: NEGATIVE for masses, tenderness or discharge\",\"CV: NEGATIVE for chest pain, palpitations or peripheral edema\",\"GI: NEGATIVE for nausea, abdominal pain, heartburn, or change in bowel habits\",\": NEGATIVE for frequency, dysuria, or hematuria\",\"MUSCULOSKELETAL: NEGATIVE for significant arthralgias or myalgia\",\"NEURO: NEGATIVE for weakness, dizziness or paresthesias\",\"ENDOCRINE: NEGATIVE for temperature intolerance, skin/hair changes\",\"HEME: NEGATIVE for bleeding problems\",\"PSYCHIATRIC: NEGATIVE for changes in mood or affect\"}    OBJECTIVE    LMP 09/15/2011   EXAM:  GENERAL: healthy, alert and no distress  EYES: Eyes grossly normal to inspection, PERRL and conjunctivae and sclerae normal  HENT: ear canals and TM's normal, nose and mouth without ulcers or lesions  NECK: no adenopathy, no asymmetry, masses, or scars and thyroid normal to palpation  RESP: lungs clear to auscultation - no rales, rhonchi or wheezes  BREAST: normal without masses, tenderness or nipple discharge and no palpable axillary masses or adenopathy  CV: regular rate and rhythm, normal S1 S2, no S3 or S4, no murmur, click or rub, no peripheral edema and peripheral pulses strong  ABDOMEN: soft, nontender, no hepatosplenomegaly, no masses and bowel sounds normal   (female): {female gu exam abnormal:264466::\"normal female external genitalia\",\"normal urethral meatus \",\"vaginal mucosa pink, moist, well rugated\",\"normal cervix, adnexae, and uterus without masses.\"}  MS: no gross musculoskeletal defects noted, no edema  SKIN: no suspicious lesions or rashes  NEURO: Normal strength and tone, mentation intact and speech normal  PSYCH: mentation appears normal, affect normal/bright  LYMPH: no cervical, supraclavicular, axillary, or inguinal adenopathy  Diabetic foot exam: {feet:5761::\"normal DP and PT pulses\",\"no trophic changes or ulcerative lesions\",\"normal sensory exam\"}    DIAGNOSTICS/PROCEDURES    {DIAGNOSTIC TEST " "RESULTS:461727::\"none \"}    ASSESSMENT      ICD-10-CM    1. Routine general medical examination at a health care facility  Z00.00        PLAN    Discussed treatment/modality options, including risk and benefits, she desires:    {PATIENT WANTS:028327}    All diagnosis above reviewed and noted above, otherwise stable.      See [facility] orders for further details.      1) ***    2) ***    3) ***    4) ***    5) ***    No follow-ups on file.    Health Maintenance Due   Topic Date Due     ZOSTER IMMUNIZATION (1 of 2) 06/26/2009     BMP  04/23/2020     LIPID  04/23/2020     MICROALBUMIN  04/23/2020     TSH W/FREE T4 REFLEX  04/23/2020     PREVENTIVE CARE VISIT  04/26/2020     INFLUENZA VACCINE (1) 09/01/2020     MAMMO SCREENING  10/11/2020       COUNSELING    Reviewed preventive health counseling, as reflected in patient instructions    BP Readings from Last 1 Encounters:   04/26/19 108/58     Estimated body mass index is 30.21 kg/m  as calculated from the following:    Height as of 4/26/19: 1.626 m (5' 4\").    Weight as of 4/26/19: 79.8 kg (176 lb).    {BP Counseling- Complete if BP >= 120/80  (Optional):355883}  {Weight Management Plan (ACO) Complete if BMI is abnormal-  Ages 18-64  BMI >24.9.  Age 65+ with BMI <23 or >30 (Optional):916763}     reports that she has never smoked. She has never used smokeless tobacco.  {Tobacco Cessation -- Complete if patient is a smoker (Optional):870512}         Shelton Castellanos MD, FAAFP     [facility]  [address] [place] [id number]  [email or web address]   Office: [phone]  Fax: [phone]  Pager: [phone]       "

## 2020-10-02 NOTE — PROGRESS NOTES
[facility]  [place]    SUBJECTIVE    CC: Lilly Barnes is an 60 year old woman who presents for preventive health visit.     Patient has been advised of split billing requirements and indicates understanding: Yes  Healthy Habits:     Getting at least 3 servings of Calcium per day:  Yes    Bi-annual eye exam:  Yes    Dental care twice a year:  Yes    Sleep apnea or symptoms of sleep apnea:  None    Diet:  Low salt    Frequency of exercise:  4-5 days/week    Duration of exercise:  45-60 minutes    Taking medications regularly:  Yes    Medication side effects:  None    PHQ-2 Total Score: 0    Additional concerns today:  No      Hypertension Follow-up      Do you check your blood pressure regularly outside of the clinic? No     Are you following a low salt diet? Yes    Are your blood pressures ever more than 140 on the top number (systolic) OR more   than 90 on the bottom number (diastolic), for example 140/90? na    Hypothyroidism Follow-up      Since last visit, patient describes the following symptoms: Weight stable, no hair loss, no skin changes, no constipation, no loose stools      Today's PHQ-2 Score:   PHQ-2 ( 1999 [employer]) 10/1/2020   Q1: Little interest or pleasure in doing things 0   Q2: Feeling down, depressed or hopeless 0   PHQ-2 Score 0   Q1: Little interest or pleasure in doing things Not at all   Q2: Feeling down, depressed or hopeless Not at all   PHQ-2 Score 0       Abuse: Current or Past (Physical, Sexual or Emotional) - No  Do you feel safe in your environment? Yes        Social History     Tobacco Use     Smoking status: Never Smoker     Smokeless tobacco: Never Used   Substance Use Topics     Alcohol use: Yes     Alcohol/week: 0.0 - 1.0 standard drinks     Comment: 2 drinks per month     If you drink alcohol do you typically have >3 drinks per day or >7 drinks per week? No    Alcohol Use 10/2/2020   Prescreen: >3 drinks/day or >7 drinks/week? -   Prescreen: >3 drinks/day or >7  drinks/week? No     Reviewed orders with patient.  Reviewed health maintenance and updated orders accordingly - Yes    CKD/Htn    Creatinine   Date Value Ref Range Status   04/23/2019 1.34 (H) 0.52 - 1.04 mg/dL Final     GFR Estimate   Date Value Ref Range Status   04/23/2019 43 (L) >60 mL/min/[1.73_m2] Final     Comment:     Non  GFR Calc  Starting 12/18/2018, serum creatinine based estimated GFR (eGFR) will be   calculated using the Chronic Kidney Disease Epidemiology Collaboration   (CKD-EPI) equation.       BP Readings from Last 3 Encounters:   10/02/20 112/60   04/26/19 108/58   03/29/19 115/60     Lipids    Recent Labs   Lab Test 04/23/19  0731 02/28/18  0834 02/20/15  0912 02/20/15  0912 08/01/14  0828   CHOL 268* 234*   < > 214* 214*   HDL 69 70   < > 77 75   * 144*   < > 128 119   TRIG 78 98   < > 46 101   CHOLHDLRATIO  --   --   --  2.8 2.9    < > = values in this interval not displayed.     Hypothyroid    TSH   Date Value Ref Range Status   04/23/2019 3.26 0.40 - 4.00 mU/L Final     Health Maintenance     Colonoscopy:  Due 12/2023   FIT:  given              Mammogram:  ordered              PAP:  ordered   DEXA:  deu after 4/2021    Health Maintenance Due   Topic Date Due     ZOSTER IMMUNIZATION (1 of 2) 06/26/2009     BMP  04/23/2020     LIPID  04/23/2020     MICROALBUMIN  04/23/2020     TSH W/FREE T4 REFLEX  04/23/2020     INFLUENZA VACCINE (1) 09/01/2020     MAMMO SCREENING  10/11/2020       Current Problem List    Patient Active Problem List   Diagnosis     Hypertension goal BP (blood pressure) < 140/90     CKD (chronic kidney disease) stage 3, GFR 30-59 ml/min     History of colonic polyps- 3 mm polyp.  needs every 3-5 yr surveillence- hyperplastic      Hyperlipidemia LDL goal <100     Chronic rhinitis     Osteopenia     Environmental allergies     Acquired hypothyroidism     Advanced directives, counseling/discussion     Vitamin D deficiency       Past Medical History    Past  Medical History:   Diagnosis Date     CKD (chronic kidney disease) stage 3, GFR 30-59 ml/min 7/13     Colon polyps 12/13, 12/18    hyperplastic polyp - polyps x 4 - Dr Sheppard, due 5 yrs     Environmental allergies      Hyperlipidemia LDL goal < 130      Hypertension goal BP (blood pressure) < 140/90 2009     Hypothyroidism 2009     Osteopenia      Vitamin D deficiency        Past Surgical History    Past Surgical History:   Procedure Laterality Date     ARTHROSCOPY KNEE WITH MEDIAL MENISCECTOMY Right 3/20/2017    ARTHROSCOPY KNEE WITH MEDIAL MENISCECTOMY - Dr French     COLONOSCOPY  12/6/2013    hyperplastic polyp - Dr Sheppard - due 3-5 yrs     COLONOSCOPY  12/13, 12/18    multiple polyps - due 5 yrs     COLONOSCOPY N/A 12/7/2018    polyps x 4, due 5 years     DENTAL SURGERY  1980    wisdom teeth     SURGICAL HISTORY OF -   1997    laproscopic - left - for ectopic pregnancy       Current Medications    Current Outpatient Medications   Medication Sig Dispense Refill     aspirin 81 MG tablet Take 81 mg by mouth daily   3     fluticasone (FLONASE) 50 MCG/ACT nasal spray Spray 2 sprays into both nostrils daily 48 g 3     levothyroxine (SYNTHROID/LEVOTHROID) 75 MCG tablet Take 1 tablet (75 mcg) by mouth daily 90 tablet 3     lisinopril-hydrochlorothiazide (ZESTORETIC) 20-12.5 MG tablet Take 1 tablet by mouth daily 90 tablet 3     rosuvastatin (CRESTOR) 5 MG tablet Take 1 tablet (5 mg) by mouth daily 90 tablet 3       Allergies    Allergies   Allergen Reactions     Ibuprofen      Should avoid due to Chronic Kidney Disease       Immunizations    Immunization History   Administered Date(s) Administered     Influenza (IIV3) PF 01/28/2013, 10/01/2014, 09/15/2015, 02/03/2017     Pneumococcal 23 valent 04/26/2019     TDAP Vaccine (Boostrix) 07/19/2012     Varicella 03/05/1997       Family History    Family History   Problem Relation Age of Onset     Diabetes Mother      Cancer Sister 43        ovarian and uterine - recurrent      Cerebrovascular Disease Sister      Lipids Other         multiple members including 1 degree      Diabetes Brother      Depression Son         resolved     Cancer Maternal Grandmother      Coronary Artery Disease Maternal Grandfather        Social History    Social History     Socioeconomic History     Marital status:      Spouse name: Cullen     Number of children: 1     Years of education: 14     Highest education level: Not on file   Occupational History     Employer: [employer]   Social Needs     Financial resource strain: Not on file     Food insecurity     Worry: Not on file     Inability: Not on file     Transportation needs     Medical: Not on file     Non-medical: Not on file   Tobacco Use     Smoking status: Never Smoker     Smokeless tobacco: Never Used   Substance and Sexual Activity     Alcohol use: Yes     Alcohol/week: 0.0 - 1.0 standard drinks     Comment: 2 drinks per month     Drug use: No     Sexual activity: Not Currently     Partners: Male     Birth control/protection: None   Lifestyle     Physical activity     Days per week: Not on file     Minutes per session: Not on file     Stress: Not on file   Relationships     Social connections     Talks on phone: Not on file     Gets together: Not on file     Attends Christian service: Not on file     Active member of club or organization: Not on file     Attends meetings of clubs or organizations: Not on file     Relationship status: Not on file     Intimate partner violence     Fear of current or ex partner: Not on file     Emotionally abused: Not on file     Physically abused: Not on file     Forced sexual activity: Not on file   Other Topics Concern     Parent/sibling w/ CABG, MI or angioplasty before 65F 55M? No      Service No     Blood Transfusions No     Caffeine Concern Yes     Comment: 0-1 energy drinks daily     Occupational Exposure Not Asked     Hobby Hazards Not Asked     Sleep Concern Not Asked     Stress Concern Not Asked  "    Weight Concern Not Asked     Special Diet Not Asked     Back Care Not Asked     Exercise Yes     Comment: walks 3-4 times per week, averages 10,000 steps daily     Bike Helmet Not Asked     Seat Belt Yes     Self-Exams Not Asked   Social History Narrative     Not on file       ROS    CONSTITUTIONAL: NEGATIVE for fever, chills, change in weight  INTEGUMENTARY/SKIN: NEGATIVE for worrisome rashes, moles or lesions  EYES: NEGATIVE for vision changes or irritation  ENT/MOUTH: NEGATIVE for ear, mouth and throat problems  RESP: NEGATIVE for significant cough or SOB  BREAST: NEGATIVE for masses, tenderness or discharge  CV: NEGATIVE for chest pain, palpitations or peripheral edema  GI: NEGATIVE for nausea, abdominal pain, heartburn, or change in bowel habits  : NEGATIVE for frequency, dysuria, or hematuria  MUSCULOSKELETAL: NEGATIVE for significant arthralgias or myalgia  NEURO: NEGATIVE for weakness, dizziness or paresthesias  ENDOCRINE: NEGATIVE for temperature intolerance, skin/hair changes  HEME: NEGATIVE for bleeding problems  PSYCHIATRIC: NEGATIVE for changes in mood or affect    OBJECTIVE    /60   Pulse 75   Temp 97.1  F (36.2  C) (Tympanic)   Ht 1.626 m (5' 4\")   Wt 75.8 kg (167 lb)   LMP 09/15/2011   SpO2 100%   BMI 28.67 kg/m    EXAM:  GENERAL: healthy, alert and no distress  EYES: Eyes grossly normal to inspection, PERRL and conjunctivae and sclerae normal  HENT: ear canals and TM's normal, nose and mouth without ulcers or lesions  NECK: no adenopathy, no asymmetry, masses, or scars and thyroid normal to palpation  RESP: lungs clear to auscultation - no rales, rhonchi or wheezes  BREAST: normal without masses, tenderness or nipple discharge and no palpable axillary masses or adenopathy  CV: regular rate and rhythm, normal S1 S2, no S3 or S4, no murmur, click or rub, no peripheral edema and peripheral pulses strong  ABDOMEN: soft, nontender, no hepatosplenomegaly, no masses and bowel sounds " normal   (female): normal female external genitalia, normal urethral meatus , vaginal mucosa pink, moist, well rugated and normal cervix, adnexae, and uterus without masses.  MS: no gross musculoskeletal defects noted, no edema  SKIN: no suspicious lesions or rashes  NEURO: Normal strength and tone, mentation intact and speech normal  PSYCH: mentation appears normal, affect normal/bright  LYMPH: no cervical, supraclavicular, axillary, or inguinal adenopathy    DIAGNOSTICS/PROCEDURES    Pending    ASSESSMENT      ICD-10-CM    1. Routine general medical examination at a health care facility  Z00.00 Comprehensive metabolic panel     Lipid panel reflex to direct LDL Fasting     CBC with platelets     CK total     UA reflex to Microscopic and Culture     Albumin Random Urine Quantitative with Creat Ratio     Fecal colorectal cancer screen FIT     TSH     T4, free     Vitamin D Deficiency     *MA Screening Digital Bilateral     Pap imaged thin layer screen with HPV - recommended age 30 - 65 years (select HPV order below)   2. Stage 3b chronic kidney disease  N18.32 Comprehensive metabolic panel     UA reflex to Microscopic and Culture     Albumin Random Urine Quantitative with Creat Ratio     NEPHROLOGY ADULT REFERRAL   3. Hypertension goal BP (blood pressure) < 140/90  I10 Comprehensive metabolic panel     UA reflex to Microscopic and Culture     Albumin Random Urine Quantitative with Creat Ratio     lisinopril-hydrochlorothiazide (ZESTORETIC) 20-12.5 MG tablet     NEPHROLOGY ADULT REFERRAL   4. Hyperlipidemia LDL goal <100  E78.5 Comprehensive metabolic panel     Lipid panel reflex to direct LDL Fasting     CK total     rosuvastatin (CRESTOR) 5 MG tablet   5. Acquired hypothyroidism  E03.9 TSH     T4, free     levothyroxine (SYNTHROID/LEVOTHROID) 75 MCG tablet   6. Environmental allergies  Z91.09    7. Osteopenia of multiple sites  M85.89 Comprehensive metabolic panel     Vitamin D Deficiency   8. Vitamin D deficiency   E55.9 Comprehensive metabolic panel     Vitamin D Deficiency   9. History of colonic polyps- 3 mm polyp.  needs every 3-5 yr surveillence- hyperplastic   Z86.010 Fecal colorectal cancer screen FIT   10. Screen for colon cancer  Z12.11 Fecal colorectal cancer screen FIT   11. Encounter for screening mammogram for malignant neoplasm of breast  Z12.31 *MA Screening Digital Bilateral   12. Screening for malignant neoplasm of cervix  Z12.4 Pap imaged thin layer screen with HPV - recommended age 30 - 65 years (select HPV order below)   13. Medication monitoring encounter  Z51.81 Comprehensive metabolic panel     Lipid panel reflex to direct LDL Fasting     CBC with platelets     CK total     UA reflex to Microscopic and Culture     Albumin Random Urine Quantitative with Creat Ratio     TSH     T4, free     Vitamin D Deficiency       PLAN    Discussed treatment/modality options, including risk and benefits, she desires:    advised alcohol consumption 1oz per day or less, advised aspirin 81 mg po daily, advised 1 multivitamin per day, advised calcium 2471-9370 mg/d and Vitamin D 800-1200 IU/d, advised dentist every 6 months, advised diet, exercise, and weight loss, advised opthalmologist every 1-2 years, advised self breast exam q month, further health care maintenance, further imaging, further lab(s), immunization(s), medication refill(s) and observation    All diagnosis above reviewed and noted above, otherwise stable.      [facility].      1) med refills    2) labs pending    3) pap pending    4) flu shot 10/3/2020, consider shingrix    5) mammogram soon    6) consider change of lisinopril - am tickle x 2    Return in about 6 months (around 4/2/2021), or if symptoms worsen or fail to improve, for Medication Recheck Visit, Follow Up Chronic.    Health Maintenance Due   Topic Date Due     ZOSTER IMMUNIZATION (1 of 2) 06/26/2009     BMP  04/23/2020     LIPID  04/23/2020     MICROALBUMIN   "04/23/2020     TSH W/FREE T4 REFLEX  04/23/2020     INFLUENZA VACCINE (1) 09/01/2020     MAMMO SCREENING  10/11/2020       COUNSELING    Reviewed preventive health counseling, as reflected in patient instructions    BP Readings from Last 1 Encounters:   10/02/20 112/60     Estimated body mass index is 28.67 kg/m  as calculated from the following:    Height as of this encounter: 1.626 m (5' 4\").    Weight as of this encounter: 75.8 kg (167 lb).           reports that she has never smoked. She has never used smokeless tobacco.           Shelton Castellanos MD, FAAFP     [facility]  [address] [place] [id number]  [email or web address]   Office: [phone]  Fax: [phone]  Pager: [phone]       "

## 2020-10-03 LAB
BACTERIA SPEC CULT: NORMAL
SPECIMEN SOURCE: NORMAL

## 2020-10-05 LAB
CREAT UR-MCNC: 88 MG/DL
DEPRECATED CALCIDIOL+CALCIFEROL SERPL-MC: 35 UG/L (ref 20–75)
MICROALBUMIN UR-MCNC: 15 MG/L
MICROALBUMIN/CREAT UR: 16.5 MG/G CR (ref 0–25)

## 2020-10-07 LAB
COPATH REPORT: NORMAL
PAP: NORMAL

## 2020-10-10 ENCOUNTER — TELEPHONE (OUTPATIENT)
Dept: FAMILY MEDICINE | Facility: CLINIC | Age: 60
End: 2020-10-10

## 2020-10-12 DIAGNOSIS — N18.32 STAGE 3B CHRONIC KIDNEY DISEASE (H): Primary | ICD-10-CM

## 2020-10-12 DIAGNOSIS — E03.9 ACQUIRED HYPOTHYROIDISM: ICD-10-CM

## 2020-10-12 DIAGNOSIS — Z51.81 MEDICATION MONITORING ENCOUNTER: ICD-10-CM

## 2020-10-12 RX ORDER — LEVOTHYROXINE SODIUM 50 UG/1
50 TABLET ORAL DAILY
Qty: 90 TABLET | Refills: 3 | Status: SHIPPED | OUTPATIENT
Start: 2020-10-12 | End: 2021-04-21

## 2020-10-12 NOTE — PROGRESS NOTES
Orders per result note    Syed RANKIN RN   [facility]

## 2020-10-13 ENCOUNTER — TELEPHONE (OUTPATIENT)
Dept: FAMILY MEDICINE | Facility: CLINIC | Age: 60
End: 2020-10-13

## 2020-10-13 NOTE — TELEPHONE ENCOUNTER
Pt calling for results     Reviewed results from 10/2/2020    Patient stated an understanding and agreed with plan.    Delia Bowden RN, BSN  [facility]

## 2020-10-27 DIAGNOSIS — I10 HYPERTENSION GOAL BP (BLOOD PRESSURE) < 140/90: ICD-10-CM

## 2020-10-27 DIAGNOSIS — E03.9 ACQUIRED HYPOTHYROIDISM: ICD-10-CM

## 2020-10-27 RX ORDER — LEVOTHYROXINE SODIUM 75 UG/1
TABLET ORAL
Qty: 90 TABLET | Refills: 0 | OUTPATIENT
Start: 2020-10-27

## 2020-10-27 RX ORDER — LISINOPRIL AND HYDROCHLOROTHIAZIDE 12.5; 2 MG/1; MG/1
TABLET ORAL
Qty: 90 TABLET | Refills: 0 | OUTPATIENT
Start: 2020-10-27

## 2020-10-27 NOTE — TELEPHONE ENCOUNTER
Duplicate- sentrx sent 10/2/20 with refills- info sent to pharmacy.  Concha ALEGRERN  [facility]  [phone]

## 2020-10-28 ENCOUNTER — HOSPITAL ENCOUNTER (OUTPATIENT)
Dept: MAMMOGRAPHY | Facility: CLINIC | Age: 60
Discharge: HOME | End: 2020-10-28
Attending: FAMILY MEDICINE | Admitting: FAMILY MEDICINE
Payer: COMMERCIAL

## 2020-10-28 DIAGNOSIS — Z12.31 ENCOUNTER FOR SCREENING MAMMOGRAM FOR MALIGNANT NEOPLASM OF BREAST: ICD-10-CM

## 2020-10-28 DIAGNOSIS — Z00.00 ROUTINE GENERAL MEDICAL EXAMINATION AT A HEALTH CARE FACILITY: ICD-10-CM

## 2020-10-28 PROCEDURE — 77067 SCR MAMMO BI INCL CAD: CPT

## 2020-11-21 DIAGNOSIS — I10 HYPERTENSION GOAL BP (BLOOD PRESSURE) < 140/90: ICD-10-CM

## 2020-11-22 RX ORDER — LISINOPRIL AND HYDROCHLOROTHIAZIDE 12.5; 2 MG/1; MG/1
TABLET ORAL
Qty: 90 TABLET | Refills: 0 | OUTPATIENT
Start: 2020-11-22

## 2020-11-22 NOTE — TELEPHONE ENCOUNTER
rx just done 10/2/20    BP Readings from Last 3 Encounters:   10/02/20 112/60   04/26/19 108/58   03/29/19 115/60     Recent Labs   Lab Test 10/02/20  0934 04/23/19  0731 02/20/15  0912 02/20/15  0912 08/01/14  0828   CHOL 155 268*   < > 214* 214*   HDL 71 69   < > 77 75   LDL 71 183*   < > 128 119   TRIG 67 78   < > 46 101   CHOLHDLRATIO  --   --   --  2.8 2.9    < > = values in this interval not displayed.     Creatinine   Date Value Ref Range Status   10/02/2020 1.27 (H) 0.52 - 1.04 mg/dL Final     GFR Estimate   Date Value Ref Range Status   10/02/2020 45 (L) >60 mL/min/[1.73_m2] Final     Comment:     Non  GFR Calc  Starting 12/18/2018, serum creatinine based estimated GFR (eGFR) will be   calculated using the Chronic Kidney Disease Epidemiology Collaboration   (CKD-EPI) equation.

## 2021-01-19 ENCOUNTER — OFFICE VISIT (OUTPATIENT)
Dept: FAMILY MEDICINE | Facility: CLINIC | Age: 61
End: 2021-01-19
Payer: COMMERCIAL

## 2021-01-19 VITALS
BODY MASS INDEX: 30.22 KG/M2 | SYSTOLIC BLOOD PRESSURE: 110 MMHG | WEIGHT: 177 LBS | HEIGHT: 64 IN | OXYGEN SATURATION: 100 % | DIASTOLIC BLOOD PRESSURE: 72 MMHG | HEART RATE: 78 BPM | TEMPERATURE: 97 F

## 2021-01-19 DIAGNOSIS — N81.4 UTEROVAGINAL PROLAPSE: Primary | ICD-10-CM

## 2021-01-19 PROCEDURE — 99214 OFFICE O/P EST MOD 30 MIN: CPT | Performed by: PHYSICIAN ASSISTANT

## 2021-01-19 ASSESSMENT — MIFFLIN-ST. JEOR: SCORE: 1352.87

## 2021-01-19 NOTE — PROGRESS NOTES
"  Assessment & Plan   Problem List Items Addressed This Visit     None      Visit Diagnoses     Uterovaginal prolapse    -  Primary  Symptomatic prolapse with cystocele and rectocele.  Recommend evaluation and treatment with OB/GYN.  Patient will schedule.  Advised of manual measures to assist with voiding and stooling as well as recommendation for avoidance of impact activities.  Recommended Kegel exercises to aid with symptoms.    Relevant Orders    OB/GYN REFERRAL            Return in about 1 week (around 1/26/2021) for urogynocology.    Maura Hopper PA-C  [facility] is a 60 year old who presents to clinic today for the following health issues     HPI       Vaginal Symptoms  Onset/Duration: 2 weeks  Description:  Vaginal Discharge: none   Itching (Pruritis): no  Burning sensation:  no  Odor: no  Accompanying Signs & Symptoms:  Urinary symptoms: no  Abdominal pain: no  Fever: no  Presser vaginaly, low back pain, urinary frequency, gassy, looks like there is a bulge from vagina    History:   Sexually active: no  New Partner: no  Possibility of Pregnancy:  no  Recent antibiotic use: no  Previous vaginitis issues: no  Precipitating or alleviating factors: None  Therapies tried and outcome: none      Review of Systems   Constitutional, HEENT, cardiovascular, pulmonary, GI, , musculoskeletal, neuro, skin, endocrine and psych systems are negative, except as otherwise noted.      Objective    /72 (BP Location: Right arm, Patient Position: Sitting, Cuff Size: Adult Regular)   Pulse 78   Temp 97  F (36.1  C) (Tympanic)   Ht 1.626 m (5' 4\")   Wt 80.3 kg (177 lb)   LMP 09/15/2011   SpO2 100%   BMI 30.38 kg/m    Body mass index is 30.38 kg/m .  Physical Exam   GENERAL: healthy, alert and no distress  EYES: Eyes grossly normal to inspection  RESP: lungs clear to auscultation - no rales, rhonchi or wheezes  CV: regular rate and rhythm, normal S1 S2, no S3 " or S4, no murmur, click or rub, no peripheral edema and peripheral pulses strong  ABDOMEN: soft, nontender, no hepatosplenomegaly, no masses and bowel sounds normal   (female): normal female external genitalia, normal urethral meatus, vaginal mucosa pink, moist, well rugated, Valsalva reveals small cystocele very small rectocele  MS: no gross musculoskeletal defects noted, no edema  SKIN: no suspicious lesions or rashes  NEURO: Normal strength and tone, mentation intact and speech normal  PSYCH: mentation appears normal, affect normal/bright

## 2021-01-21 ENCOUNTER — MYC MEDICAL ADVICE (OUTPATIENT)
Dept: FAMILY MEDICINE | Facility: CLINIC | Age: 61
End: 2021-01-21

## 2021-03-06 ENCOUNTER — MYC MEDICAL ADVICE (OUTPATIENT)
Dept: FAMILY MEDICINE | Facility: CLINIC | Age: 61
End: 2021-03-06

## 2021-03-06 NOTE — LETTER
[address] [address]  [phone]        March 10, 2021    Lilly Barnes                                                                                                                                                        [id number] [place] [id number]              Dear [place],    We have been unsuccessful in our attempts to reach you by phone.  Please call us at the [facility] [phone]   between the hours of 8:00am - 5:00 pm, Monday through Friday.        Sincerely,                Shelton Castellanos M.D./SUZIE

## 2021-03-10 NOTE — TELEPHONE ENCOUNTER
Attempt # 3  Called # [phone]   Letter sent      Left a non detailed VM to call back at [phone] and ask for any available Triage Nurse.    Syed Avila RN   [facility]

## 2021-03-31 ENCOUNTER — IMMUNIZATION (OUTPATIENT)
Dept: NURSING | Facility: CLINIC | Age: 61
End: 2021-03-31
Payer: COMMERCIAL

## 2021-03-31 PROCEDURE — 91300 PR COVID VAC PFIZER DIL RECON 30 MCG/0.3 ML IM: CPT

## 2021-03-31 PROCEDURE — 0001A PR COVID VAC PFIZER DIL RECON 30 MCG/0.3 ML IM: CPT

## 2021-04-05 ENCOUNTER — OFFICE VISIT (OUTPATIENT)
Dept: OBGYN | Facility: CLINIC | Age: 61
End: 2021-04-05
Payer: COMMERCIAL

## 2021-04-05 VITALS — DIASTOLIC BLOOD PRESSURE: 64 MMHG | WEIGHT: 183 LBS | SYSTOLIC BLOOD PRESSURE: 112 MMHG | BODY MASS INDEX: 31.41 KG/M2

## 2021-04-05 DIAGNOSIS — N81.4 UTERINE PROLAPSE: Primary | ICD-10-CM

## 2021-04-05 PROCEDURE — 99204 OFFICE O/P NEW MOD 45 MIN: CPT | Performed by: OBSTETRICS & GYNECOLOGY

## 2021-04-05 SDOH — ECONOMIC STABILITY: INCOME INSECURITY: HOW HARD IS IT FOR YOU TO PAY FOR THE VERY BASICS LIKE FOOD, HOUSING, MEDICAL CARE, AND HEATING?: NOT ASKED

## 2021-04-05 SDOH — ECONOMIC STABILITY: FOOD INSECURITY: WITHIN THE PAST 12 MONTHS, THE FOOD YOU BOUGHT JUST DIDN'T LAST AND YOU DIDN'T HAVE MONEY TO GET MORE.: NOT ASKED

## 2021-04-05 SDOH — ECONOMIC STABILITY: TRANSPORTATION INSECURITY
IN THE PAST 12 MONTHS, HAS THE LACK OF TRANSPORTATION KEPT YOU FROM MEDICAL APPOINTMENTS OR FROM GETTING MEDICATIONS?: NOT ASKED

## 2021-04-05 SDOH — ECONOMIC STABILITY: TRANSPORTATION INSECURITY
IN THE PAST 12 MONTHS, HAS LACK OF TRANSPORTATION KEPT YOU FROM MEETINGS, WORK, OR FROM GETTING THINGS NEEDED FOR DAILY LIVING?: NOT ASKED

## 2021-04-05 SDOH — ECONOMIC STABILITY: FOOD INSECURITY: WITHIN THE PAST 12 MONTHS, YOU WORRIED THAT YOUR FOOD WOULD RUN OUT BEFORE YOU GOT MONEY TO BUY MORE.: NOT ASKED

## 2021-04-05 NOTE — Clinical Note
Surgeon: Dr Julio César Cook  Assist:  Yes  Angeli FRENCH  Location: [facility]  Date/time preference: Patient convenience t    Surgery: total laparoscopic hysterectomy bilateral salpingectomyUterosacral ligament vaginal vault suspension  Length of Surgery: 1.5 hours  Diagnosis: Uterine prolapse  Anesthesia type:  GENERAL    Special instructions / equipment:    Am admit or same day: SAME DAY  Bowel prep: Yes  Pre op: PCP  Office visit with surgeon prior to surgery: Yes  Schedule Post Op: 1-2 weeks

## 2021-04-05 NOTE — PROGRESS NOTES
The patient is a. 60 year old  white female  postmenopausal not presently sexually active for contraception, not on HRT whom I am asked to see by FLORENCIO Salcido  for evaluation of Pelvic floor pressure and prolapse that the patient first noticed in early 2021.  The patient said that she has been working out more and does notice more pelvic floor pressure while doing her workouts.  Recently while toileting and wiping she noticed something protruding from the vagina.  She denies any history of cervical atypia or other abnormal bleeding.  Pregnancy history includes vaginal delivery x1 3800 g male infant, forcep assisted vaginal birth for that delivery after a long prodromal labor , without known history of bladder or bowel injury. Patient complains of infrequent urinary leakage with straining and sneezing primarily associated with her prolapse is more prominent that requires her to wear protection.  She states that this is relatively infrequent.  She also notices urgency incontinence primarily when getting up in the morning.  She wears a pad during the day for safety but denies leakage with intercourse or other nonstrenuous activities.  The patient is tried Kegel exercises without significant improvement.     Asthma: Denies  Smoking: Denies  Recurrent UTIs: Denies  Hematuria: Denies   Diabetes: Denies  Related medication usage:   Current Outpatient Medications   Medication     aspirin 81 MG tablet     fluticasone (FLONASE) 50 MCG/ACT nasal spray     levothyroxine (SYNTHROID/LEVOTHROID) 50 MCG tablet     lisinopril-hydrochlorothiazide (ZESTORETIC) 20-12.5 MG tablet     rosuvastatin (CRESTOR) 5 MG tablet     No current facility-administered medications for this visit.        Incontinence of stool or flatus: Occasionally a small amount of gas otherwise none.  She does notice more constipation since the prolapse is present.  She does not use a finger to defecate  Symptoms of pelvic relaxation/prolapse: As  noted above  Voiding or defacatory dysfunction: Feels that she is able to adequately empty her bladder without repositioning or the use of a finger  Previous evaluation: None  Kegel exercises: Try the Kegel exercises with minimal improvement  Nocturia: Once  Fluids: 1 cup of coffee in the morning, rare alcohol, has a energy drink couple times a week.  No excessive fluid intake  Urgency: Yes with rare urge incontinence    Past Medical History:   Diagnosis Date     CKD (chronic kidney disease) stage 3, GFR 30-59 ml/min 7/13     Colon polyps 12/13, 12/18    hyperplastic polyp - polyps x 4 - Dr Sheppard, due 5 yrs     Environmental allergies      Hyperlipidemia LDL goal < 130      Hypertension goal BP (blood pressure) < 140/90 2009     Hypothyroidism 2009     Osteopenia      Vitamin D deficiency      Past Surgical History:   Procedure Laterality Date     ARTHROSCOPY KNEE WITH MEDIAL MENISCECTOMY Right 3/20/2017    ARTHROSCOPY KNEE WITH MEDIAL MENISCECTOMY - Dr French     COLONOSCOPY  12/6/2013    hyperplastic polyp - Dr Sheppard - due 3-5 yrs     COLONOSCOPY  12/13, 12/18    multiple polyps - due 5 yrs     COLONOSCOPY N/A 12/7/2018    polyps x 4, due 5 years     DENTAL SURGERY  1980    wisdom teeth     SURGICAL HISTORY OF -   1997    laproscopic - left - for ectopic pregnancy     Family History   Problem Relation Age of Onset     Diabetes Mother      Cancer Sister 43        ovarian and uterine - recurrent     Cerebrovascular Disease Sister      Diabetes Type 2  Sister         from chemo     Lipids Other         multiple members including 1 degree      Diabetes Brother      Depression Son         resolved     Cancer Maternal Grandmother      Coronary Artery Disease Maternal Grandfather      Social History     Socioeconomic History     Marital status:      Spouse name: Cullen     Number of children: 1     Years of education: 14     Highest education level: Not on file   Occupational History     Not on file   Social  Needs     Financial resource strain: Not on file     Food insecurity     Worry: Not on file     Inability: Not on file     Transportation needs     Medical: Not on file     Non-medical: Not on file   Tobacco Use     Smoking status: Never Smoker     Smokeless tobacco: Never Used   Substance and Sexual Activity     Alcohol use: Yes     Alcohol/week: 0.0 - 1.0 standard drinks     Comment: 2 drinks per month     Drug use: No     Sexual activity: Not Currently     Partners: Male     Birth control/protection: None   Lifestyle     Physical activity     Days per week: Not on file     Minutes per session: Not on file     Stress: Not on file   Relationships     Social connections     Talks on phone: Not on file     Gets together: Not on file     Attends [employer] service: Not on file     Active member of club or organization: Not on file     Attends meetings of clubs or organizations: Not on file     Relationship status: Not on file     Intimate partner violence     Fear of current or ex partner: Not on file     Emotionally abused: Not on file     Physically abused: Not on file     Forced sexual activity: Not on file   Other Topics Concern     Parent/sibling w/ CABG, MI or angioplasty before 65F 55M? No      Service No     Blood Transfusions No     Caffeine Concern Yes     Comment: 0-1 energy drinks daily     Occupational Exposure Not Asked     Hobby Hazards Not Asked     Sleep Concern Not Asked     Stress Concern Not Asked     Weight Concern Not Asked     Special Diet Not Asked     Back Care Not Asked     Exercise Yes     Comment: walks 3-4 times per week, averages 10,000 steps daily     Bike Helmet Not Asked     Seat Belt Yes     Self-Exams Not Asked   Social History Narrative     Not on file     Vitals: /64   Wt 83 kg (183 lb)   LMP 09/15/2011   BMI 31.41 kg/m    BMI= Body mass index is 31.41 kg/m .    Constitutional: healthy, alert and no distress  Head: Normocephalic. No masses, lesions, tenderness or  abnormalities  Neck: Neck supple. No adenopathy. Thyroid symmetric, normal size,, Carotids without bruits.  Cardiovascular: negative, PMI normal. No lifts, heaves, or thrills. RRR. No murmurs, clicks gallops or rub  Respiratory: negative, Percussion normal. Good diaphragmatic excursion. Lungs clear  Gastrointestinal: Abdomen soft, non-tender. BS normal. No masses, organomegaly  Genitourinary: Normal external genitalia without lesions and the patient voided 25 cc and had a 1 cc postvoid residual.  Exam no leakage of urine with coughing or straining.  30 degrees of UV angle hypermobility.  Grade 2-3 cervical prolapse with coughing and Valsalva.  Speculum exam minimal grade 1 cystocele with bilateral apical defects, speculum multipara appearing cervix no atypia noted no history of cervical atypia no bleeding.  Bimanual exam the uterus is parous retroflexed mobile firm adnexa free of masses or lesions.  Rectovaginal exam retroflexed uterus no concerns  Musculoskeletalextremities normal- no gross deformities noted, gait normal and normal muscle tone    (N81.4) Uterine prolapse  (primary encounter diagnosis)  Comment: Risks, benefits, and alternative modes of therapy discussed at length. Pathophysiology of the disease process reviewed, all of the patients questions answered and informed consent obtained.  The findings of pelvic floor relaxation including Uterine Descent and Uterine Prolapse were discussed at length as were the risks, benefits, and alternative modes of treatment.  We discussed a conservative wait-and-see approach, the use of a pessary, conventional repairs, the success rates, issues of erosion, the FDA bulletin on the use of mesh for pelvic floor disorders, post op pain, dysparunea, voiding or defacatory dysfunction as well as injury to other organs, bleeding or infection.   I think the patient has a good understanding. She has been given patient education information, all of her questions have been  answered and informed consent obtained.  I do not see evidence of significant stress incontinence in this patient.  I believe that with restoration of apical support bladder control should markedly improve and hands I plan to forego doing a urodynamic study on her    Plan: Patient desires to proceed with a total laparoscopic hysterectomy bilateral salpingectomy with ovarian preservation if the ovaries appear to be normal.  We discussed the pros and cons of ovarian preservation versus removal.  We discussed issues of ovarian malignancy.  I think the patient good understanding.  We will schedule TLH with bilateral salpingectomy and uterosacral ligament vaginal vault suspension.  She has a preop bowel prep with magnesium citrate.  We discussed the eras protocol.  Would like to see her just prior to surgery.  She will see FLORENCIO Salcido for preop H&P.  Written outline of our discussion and plan given to the patient      Julio César Cook M.D.

## 2021-04-05 NOTE — Clinical Note
Julio César Cook M.D. FACOG [facility] [address] [place]  [address]  [phone]    Phone  [phone]    Fax      Dear Maura,         Thank you for the opportunity to see your patient. Please see my office visit notes for your review.  As always, I appreciate the opportunity to participate in your patient's care.     Sincerely yours,    Julio César Cook M.D.

## 2021-04-05 NOTE — NURSING NOTE
"Chief Complaint   Patient presents with     Consult       Initial /64   Wt 83 kg (183 lb)   LMP 09/15/2011   BMI 31.41 kg/m   Estimated body mass index is 31.41 kg/m  as calculated from the following:    Height as of 21: 1.626 m (5' 4\").    Weight as of this encounter: 83 kg (183 lb).  BP completed using cuff size: regular    Questioned patient about current smoking habits.  Pt. has never smoked.          The following HM Due: NONE      The following patient reported/Care Every where data was sent to:  P ABSTRACT QUALITY INITIATIVES [[id number]]        Laura Sunshine [facility]                 "

## 2021-04-06 ENCOUNTER — TELEPHONE (OUTPATIENT)
Dept: OBGYN | Facility: CLINIC | Age: 61
End: 2021-04-06

## 2021-04-06 ENCOUNTER — PREP FOR PROCEDURE (OUTPATIENT)
Dept: OBGYN | Facility: CLINIC | Age: 61
End: 2021-04-06

## 2021-04-06 DIAGNOSIS — N81.4 UTERINE PROLAPSE: Primary | ICD-10-CM

## 2021-04-06 NOTE — TELEPHONE ENCOUNTER
Surgeon: Dr Julio César Cook   Assist:  Yes  Angeli FRENCH   Location: [facility]   Date/time preference: Patient convenience t     Surgery: total laparoscopic hysterectomy bilateral salpingectomyUterosacral ligament vaginal vault suspension   Length of Surgery: 1.5 hours   Diagnosis: Uterine prolapse   Anesthesia type:  GENERAL     Special instructions / equipment:     Am admit or same day: SAME DAY   Bowel prep: Yes   Pre op: PCP   Office visit with surgeon prior to surgery: Yes   Schedule Post Op: 1-2 weeks

## 2021-04-07 ENCOUNTER — HOSPITAL ENCOUNTER (OUTPATIENT)
Facility: CLINIC | Age: 61
End: 2021-04-07
Attending: OBSTETRICS & GYNECOLOGY | Admitting: OBSTETRICS & GYNECOLOGY
Payer: COMMERCIAL

## 2021-04-07 DIAGNOSIS — N81.4 UTERINE PROLAPSE: ICD-10-CM

## 2021-04-07 NOTE — TELEPHONE ENCOUNTER
Type of surgery: total laparoscopic hysterectomy, bilateral salpingectomy, uterosacral ligament vaginal vault suspension  Location of surgery: [facility]  Date and time of surgery: 4/27/21 @ 8:45 am  Surgeon: Dr. Cook  Pre-Op Appt Date: Patient advised to schedule.  Post-Op Appt Date: 5/10/21   Packet sent out: Yes  Pre-cert/Authorization completed:  No  Date: 4/7/21

## 2021-04-13 DIAGNOSIS — Z11.59 ENCOUNTER FOR SCREENING FOR OTHER VIRAL DISEASES: ICD-10-CM

## 2021-04-19 ENCOUNTER — OFFICE VISIT (OUTPATIENT)
Dept: OBGYN | Facility: CLINIC | Age: 61
End: 2021-04-19
Payer: COMMERCIAL

## 2021-04-19 VITALS — BODY MASS INDEX: 31.93 KG/M2 | DIASTOLIC BLOOD PRESSURE: 72 MMHG | SYSTOLIC BLOOD PRESSURE: 110 MMHG | WEIGHT: 186 LBS

## 2021-04-19 DIAGNOSIS — N81.4 UTERINE PROLAPSE: Primary | ICD-10-CM

## 2021-04-19 PROCEDURE — 99213 OFFICE O/P EST LOW 20 MIN: CPT | Performed by: OBSTETRICS & GYNECOLOGY

## 2021-04-19 NOTE — PATIENT INSTRUCTIONS
You can reach your [place] Care Team any time of the day by calling [phone]. This number will put you in touch with the 24 hour nurse line if the clinic is closed.    To contact your OB/GYN Station Coordinator/Surgery Scheduler please call [phone]. This is a direct number for your care team between 8 a.m. and 4 p.m. Monday through Friday.    [facility] is open for your convenience:  Monday through Friday 8 a.m. to 6 p.m.  Closed weekends and all major holidays.

## 2021-04-19 NOTE — PROGRESS NOTES
[address] [address]  Phone: [phone]  Primary Provider: Shelton Castellanos  Pre-op Performing Provider: BRYANT FLORIAN      PREOPERATIVE EVALUATION:  Today's date: 4/20/2021    Lilly Barnes is a 60 year old female who presents for a preoperative evaluation.    Surgical Information:  Surgery/Procedure: TOTAL LAPAROSCOPIC HYSTERECTOMY, BILATERAL SALPINGECTOMY, UTEROSACRAL LIGAMENT VAGINAL VAULT SUSPENSION -- BILATERAL  Surgery Location: [facility]  Surgeon: Dr Julio César Cook (Primary) and Dr Arnoldo Suh  Surgery Date: 04/27/2021  Time of Surgery: 8:45am  Where patient plans to recover: At home with family  Fax number for surgical facility: Note does not need to be faxed, will be available electronically in Epic.    Type of Anesthesia Anticipated: General    Assessment & Plan     The proposed surgical procedure is considered INTERMEDIATE risk.    Preop general physical exam  Uterine prolapse  Cleared for surgery without further testing  - CBC with platelets  - Comprehensive metabolic panel (BMP + Alb, Alk Phos, ALT, AST, Total. Bili, TP)  - UA with Microscopic  - Urine Culture Aerobic Bacterial  - EKG 12-lead complete w/read - Clinics    Hypertension goal BP (blood pressure) < 140/90  Normotensive.  Continue current regimen.  Mildly decreased renal function when compared to historical.  Hydration efforts encouraged.  Recheck in 6 weeks.    Acquired hypothyroidism  Dosing adjustment based on lab results.  Will have patient alternate 50 mcg and 75 mcg tablets every other day.  Recheck labs in 6 weeks.  - TSH  - T4, free        Medication Instructions:  Morning of procedure: Take levothyroxine, lisinopril-hydrochlorothiazide, and rosuvastatin with a sip of water    For 7 days prior to procedure: Hold all vitamins/supplements.  Hold all NSAID medications (ibuprofen/motrin/aleve) and aspirin.  Tylenol products OK.       RECOMMENDATION:  APPROVAL GIVEN to proceed with proposed procedure, without further diagnostic evaluation.          Subjective     HPI related to upcoming procedure: Pelvic floor pressure and prolapse that the patient first noticed in early January 2021.    Preop Questions 4/14/2021   1. Have you ever had a heart attack or stroke? No   2. Have you ever had surgery on your heart or blood vessels, such as a stent placement, a coronary artery bypass, or surgery on an artery in your head, neck, heart, or legs? No   3. Do you have chest pain with activity? No   4. Do you have a history of  heart failure? No   5. Do you currently have a cold, bronchitis or symptoms of other infection? No   6. Do you have a cough, shortness of breath, or wheezing? No   7. Do you or anyone in your family have previous history of blood clots? No   8. Do you or does anyone in your family have a serious bleeding problem such as prolonged bleeding following surgeries or cuts? No   9. Have you ever had problems with anemia or been told to take iron pills? No   10. Have you had any abnormal blood loss such as black, tarry or bloody stools, or abnormal vaginal bleeding? No   11. Have you ever had a blood transfusion? No   12. Are you willing to have a blood transfusion if it is medically needed before, during, or after your surgery? Yes   13. Have you or any of your relatives ever had problems with anesthesia? No   14. Do you have sleep apnea, excessive snoring or daytime drowsiness? No   15. Do you have any artifical heart valves or other implanted medical devices like a pacemaker, defibrillator, or continuous glucose monitor? No   16. Do you have artificial joints? No   17. Are you allergic to latex? No   18. Is there any chance that you may be pregnant? -       Health Care Directive:  Patient does not have a Health Care Directive or Living Will: Discussed advance care planning with patient; information given to patient to  review.    Preoperative Review of :   reviewed - no record of controlled substances prescribed.      Status of Chronic Conditions:  HYPERLIPIDEMIA - Patient has a long history of significant Hyperlipidemia requiring medication for treatment with recent good control. Patient reports no problems or side effects with the medication.     HYPERTENSION - Patient has longstanding history of HTN , currently denies any symptoms referable to elevated blood pressure. Specifically denies chest pain, palpitations, dyspnea, orthopnea, PND or peripheral edema. Blood pressure readings have been in normal range. Current medication regimen is as listed below. Patient denies any side effects of medication.     HYPOTHYROIDISM - Patient has a longstanding history of chronic Hypothyroidism. Patient has been doing well, noting no tremor, insomnia, hair loss or changes in skin texture. Continues to take medications as directed, without adverse reactions or side effects. Last TSH   Lab Results   Component Value Date    TSH 9.26 (H) 04/20/2021   .        Review of Systems  CONSTITUTIONAL: NEGATIVE for fever, chills, change in weight  INTEGUMENTARY/SKIN: NEGATIVE for worrisome rashes, moles or lesions  EYES: NEGATIVE for vision changes or irritation  ENT/MOUTH: NEGATIVE for ear, mouth and throat problems  RESP: NEGATIVE for significant cough or SOB  BREAST: NEGATIVE for masses, tenderness or discharge  CV: NEGATIVE for chest pain, palpitations or peripheral edema  GI: NEGATIVE for nausea, abdominal pain, heartburn, or change in bowel habits  : NEGATIVE for frequency, dysuria, or hematuria  MUSCULOSKELETAL: NEGATIVE for significant arthralgias or myalgia  NEURO: NEGATIVE for weakness, dizziness or paresthesias  ENDOCRINE: NEGATIVE for temperature intolerance, skin/hair changes  HEME: NEGATIVE for bleeding problems  PSYCHIATRIC: NEGATIVE for changes in mood or affect    Patient Active Problem List    Diagnosis Date Noted     CKD  (chronic kidney disease) stage 3, GFR 30-59 ml/min 07/22/2013     Priority: High     Hypertension goal BP (blood pressure) < 140/90 05/03/2012     Priority: High     Uterine prolapse 04/07/2021     Priority: Medium     Added automatically from request for surgery [id number]       Vitamin D deficiency      Priority: Medium     Acquired hypothyroidism 03/02/2017     Priority: Medium     Advanced directives, counseling/discussion 03/02/2017     Priority: Medium     Advance Care Planning 3/2/2017: ACP Review of Chart / Resources Provided:  Reviewed chart for advance care plan.  Lilly Barnes has been provided information and resources to begin or update their advance care plan.  Added by Shar Calle             Environmental allergies      Priority: Medium     Osteopenia      Priority: Medium     Chronic rhinitis 08/01/2014     Priority: Medium     Hyperlipidemia LDL goal <100      Priority: Medium     The 10-year ASCVD risk score (Goodlandjohn PARIKH Jr., et al., 2013) is: 2.6%    Values used to calculate the score:      Age: 56 years      Sex: Female      Is Non- : No      Diabetic: No      Tobacco smoker: No      Systolic Blood Pressure: 118 mmHg      Is Prescribed Antihypertensives: Yes      HDL Cholesterol: 74 mg/dL      Total Cholesterol: 244 mg/dL        History of colonic polyps- 3 mm polyp.  needs every 3-5 yr surveillence- hyperplastic  12/06/2013     Priority: Medium      Past Medical History:   Diagnosis Date     CKD (chronic kidney disease) stage 3, GFR 30-59 ml/min 7/13     Colon polyps 12/13, 12/18    hyperplastic polyp - polyps x 4 - Dr Sheppard, due 5 yrs     Environmental allergies      Hyperlipidemia LDL goal < 130      Hypertension goal BP (blood pressure) < 140/90 2009     Hypothyroidism 2009     Osteopenia      Vitamin D deficiency      Past Surgical History:   Procedure Laterality Date     ARTHROSCOPY KNEE WITH MEDIAL MENISCECTOMY Right 03/20/2017    ARTHROSCOPY KNEE WITH MEDIAL  "MENISCECTOMY - Dr French     COLONOSCOPY  12/06/2013    hyperplastic polyp - Dr Sheppard - due 3-5 yrs     COLONOSCOPY  12/13, 12/18    multiple polyps - due 5 yrs     COLONOSCOPY N/A 12/07/2018    polyps x 4, due 5 years     DENTAL SURGERY  1980    wisdom teeth     ECTOPIC PREGNANCY SURGERY  1997    laproscopic - left - for ectopic pregnancy     GYN SURGERY       ORTHOPEDIC SURGERY       Current Outpatient Medications   Medication Sig Dispense Refill     aspirin 81 MG tablet Take 81 mg by mouth daily   3     fluticasone (FLONASE) 50 MCG/ACT nasal spray Spray 2 sprays into both nostrils daily 48 g 3     levothyroxine (SYNTHROID/LEVOTHROID) 50 MCG tablet Take 1 tablet (50 mcg) by mouth every other day 90 tablet 3     lisinopril-hydrochlorothiazide (ZESTORETIC) 20-12.5 MG tablet Take 1 tablet by mouth daily 90 tablet 3     rosuvastatin (CRESTOR) 5 MG tablet Take 1 tablet (5 mg) by mouth daily 90 tablet 3     levothyroxine (SYNTHROID/LEVOTHROID) 75 MCG tablet Take 1 tablet (75 mcg) by mouth every other day 90 tablet 0       Allergies   Allergen Reactions     Ibuprofen      Should avoid due to Chronic Kidney Disease        Social History     Tobacco Use     Smoking status: Never Smoker     Smokeless tobacco: Never Used   Substance Use Topics     Alcohol use: Not Currently     Alcohol/week: 0.0 - 1.0 standard drinks     Comment: 2 drinks per month     Family History   Problem Relation Age of Onset     Diabetes Mother      Cancer Sister 43        ovarian and uterine - recurrent     Cerebrovascular Disease Sister      Diabetes Type 2  Sister         from chemo     Other Cancer Sister      Lipids Other         multiple members including 1 degree      Diabetes Brother      Depression Son         resolved     Cancer Maternal Grandmother      Coronary Artery Disease Maternal Grandfather      History   Drug Use No         Objective     /64   Pulse 87   Temp 97.2  F (36.2  C)   Ht 1.626 m (5' 4\")   Wt 83.9 kg (185 lb)  "  LMP 09/15/2011   SpO2 100%   BMI 31.76 kg/m      Physical Exam    GENERAL APPEARANCE: healthy, alert and no distress     EYES: EOMI, PERRL     HENT: ear canals and TM's normal and nose and mouth without ulcers or lesions     NECK: no adenopathy, no asymmetry, masses, or scars and thyroid normal to palpation     RESP: lungs clear to auscultation - no rales, rhonchi or wheezes     CV: regular rates and rhythm, normal S1 S2, no S3 or S4 and no murmur, click or rub     ABDOMEN:  soft, nontender, no HSM or masses and bowel sounds normal     MS: extremities normal- no gross deformities noted, no evidence of inflammation in joints, FROM in all extremities.     SKIN: no suspicious lesions or rashes     NEURO: Normal strength and tone, sensory exam grossly normal, mentation intact and speech normal     PSYCH: mentation appears normal. and affect normal/bright     LYMPHATICS: No cervical adenopathy    Recent Labs   Lab Test 10/02/20  0934 04/23/19  0731   HGB 11.5* 11.7   * 159    140   POTASSIUM 4.3 4.2   CR 1.27* 1.34*        Diagnostics:  Results for orders placed or performed in visit on 04/20/21   CBC with platelets     Status: Abnormal   Result Value Ref Range    WBC 4.2 4.0 - 11.0 10e9/L    RBC Count 4.11 3.8 - 5.2 10e12/L    Hemoglobin 12.3 11.7 - 15.7 g/dL    Hematocrit 38.1 35.0 - 47.0 %    MCV 93 78 - 100 fl    MCH 29.9 26.5 - 33.0 pg    MCHC 32.3 31.5 - 36.5 g/dL    RDW 13.0 10.0 - 15.0 %    Platelet Count 149 (L) 150 - 450 10e9/L   Comprehensive metabolic panel (BMP + Alb, Alk Phos, ALT, AST, Total. Bili, TP)     Status: Abnormal   Result Value Ref Range    Sodium 138 133 - 144 mmol/L    Potassium 4.1 3.4 - 5.3 mmol/L    Chloride 106 94 - 109 mmol/L    Carbon Dioxide 27 20 - 32 mmol/L    Anion Gap 5 3 - 14 mmol/L    Glucose 62 (L) 70 - 99 mg/dL    Urea Nitrogen 41 (H) 7 - 30 mg/dL    Creatinine 1.49 (H) 0.52 - 1.04 mg/dL    GFR Estimate 37 (L) >60 mL/min/[1.73_m2]    GFR Estimate If Black 43 (L)  >60 mL/min/[1.73_m2]    Calcium 8.7 8.5 - 10.1 mg/dL    Bilirubin Total 0.3 0.2 - 1.3 mg/dL    Albumin 3.8 3.4 - 5.0 g/dL    Protein Total 7.0 6.8 - 8.8 g/dL    Alkaline Phosphatase 77 40 - 150 U/L    ALT 23 0 - 50 U/L    AST 21 0 - 45 U/L   TSH     Status: Abnormal   Result Value Ref Range    TSH 9.26 (H) 0.40 - 4.00 mU/L   T4, free     Status: None   Result Value Ref Range    T4 Free 0.88 0.76 - 1.46 ng/dL   UA with Microscopic     Status: Abnormal   Result Value Ref Range    Color Urine Yellow     Appearance Urine Slightly Cloudy     Glucose Urine Negative NEG^Negative mg/dL    Bilirubin Urine Negative NEG^Negative    Ketones Urine Negative NEG^Negative mg/dL    Specific Gravity Urine 1.025 1.003 - 1.035    pH Urine 6.0 5.0 - 7.0 pH    Protein Albumin Urine Negative NEG^Negative mg/dL    Urobilinogen Urine 0.2 0.2 - 1.0 EU/dL    Nitrite Urine Negative NEG^Negative    Blood Urine Trace (A) NEG^Negative    Leukocyte Esterase Urine Moderate (A) NEG^Negative    Source Midstream Urine     WBC Urine 10-25 (A) OTO5^0 - 5 /HPF    RBC Urine O - 2 OTO2^O - 2 /HPF    Squamous Epithelial /LPF Urine Many (A) FEW^Few /LPF    Bacteria Urine Many (A) NEG^Negative /HPF   Urine Culture Aerobic Bacterial     Status: None    Specimen: Midstream Urine   Result Value Ref Range    Specimen Description Midstream Urine     Culture Micro <10,000 colonies/mL  mixed urogenital martha           EKG: appears normal, NSR, normal axis, normal intervals, no acute ST/T changes c/w ischemia, no LVH by voltage criteria, unchanged from previous tracings    Revised Cardiac Risk Index (RCRI):  The patient has the following serious cardiovascular risks for perioperative complications:   - No serious cardiac risks = 0 points     RCRI Interpretation: 0 points: Class I (very low risk - 0.4% complication rate)           Signed Electronically by: Maura Hopper PA-C  Copy of this evaluation report is provided to requesting physician.

## 2021-04-19 NOTE — Clinical Note
Jorge--I spoke with Dr. Indira Montelongo the reviewer from [facility].  Please forward a copy of the office visit notes from 4/19/2021 to [facility] and Ms. Barnes's hysterectomy will be authorized  Julio César Coko M.D.

## 2021-04-19 NOTE — PROGRESS NOTES
The patient is a. 60 year old  white female  postmenopausal not presently sexually active for contraception, not on HRT whom I am asked to see by FLORENCIO Salcido  for evaluation of Pelvic floor pressure and prolapse that the patient first noticed in early 2021.  The patient said that she has been working out more and does notice more pelvic floor pressure while doing her workouts.  Recently while toileting and wiping she noticed something protruding from the vagina.  She denies any history of cervical atypia or other abnormal bleeding.  Pregnancy history includes vaginal delivery x1 3800 g male infant, forcep assisted vaginal birth for that delivery after a long prodromal labor , without known history of bladder or bowel injury. Patient complains of infrequent urinary leakage with straining and sneezing primarily associated with her prolapse.  Under laboratory and office exam I could not demonstrate any stress incontinence.  In reviewing with the patient the findings discussing with her the risk benefits and alternative forms of therapy the patient would like to proceed with a total laparoscopic hysterectomy bilateral salpingectomy with ovarian preservation and uterosacral ligament vaginal vault suspension.  The findings of pelvic floor relaxation including Uterine Prolapse were discussed at length as were the risks, benefits, and alternative modes of treatment.  We discussed an expectant wait-and-see approach, the use of a pessary, conventional repairs, the use of biologics, and mesh augmentation, the success rates, issues of erosion, the FDA bulletin on the use of mesh for pelvic floor disorders, post op pain, dysparunea, voiding or defacatory dysfunction as well as injury to other organs, bleeding or infection.   I think the patient has a good understanding. She has been given patient education information, all of her questions have been answered and informed consent obtained.  The pre and postop  instructions have been reviewed today the risks benefits alternatives discussed.  I think she has a good understanding of the nature of the problem the nature of the procedure.  All of her questions have been answered and informed consents been obtained.  I gave her a detailed written outline of our discussion.  We discussed a preop bowel prep with magnesium citrate    Past Medical History:   Diagnosis Date     CKD (chronic kidney disease) stage 3, GFR 30-59 ml/min 7/13     Colon polyps 12/13, 12/18    hyperplastic polyp - polyps x 4 - Dr Sheppard, due 5 yrs     Environmental allergies      Hyperlipidemia LDL goal < 130      Hypertension goal BP (blood pressure) < 140/90 2009     Hypothyroidism 2009     Osteopenia      Vitamin D deficiency      Current Outpatient Medications   Medication     aspirin 81 MG tablet     fluticasone (FLONASE) 50 MCG/ACT nasal spray     levothyroxine (SYNTHROID/LEVOTHROID) 50 MCG tablet     lisinopril-hydrochlorothiazide (ZESTORETIC) 20-12.5 MG tablet     rosuvastatin (CRESTOR) 5 MG tablet     No current facility-administered medications for this visit.      /72   Wt 84.4 kg (186 lb)   LMP 09/15/2011   BMI 31.93 kg/m    Constitutional: healthy, alert and no distress    (N81.4) Uterine prolapse  (primary encounter diagnosis)  Comment: We will proceed with TLH bilateral salpingectomy with ovarian preservation.  She will do preop bowel prep with magnesium citrate.  A detailed written outline of our discussion including pre and postop cares were also reviewed with her and think she has a good understanding.  All of her questions have been answered and informed consents been obtained  The findings of pelvic floor relaxation including Uterine Prolapse were discussed at length as were the risks, benefits, and alternative modes of treatment.  We discussed an expectant wait-and-see approach, the use of a pessary, conventional repairs, the use of biologics, and mesh augmentation, the success  rates, issues of erosion, the FDA bulletin on the use of mesh for pelvic floor disorders, post op pain, dysparunea, voiding or defacatory dysfunction as well as injury to other organs, bleeding or infection.   I think the patient has a good understanding. She has been given patient education information, all of her questions have been answered and informed consent obtained.  The patient declines conservative therapy and desires TLH with bilateral salpingectomy with ovarian preservation.    Plan: Written plan given.  The patient has a preop physical exam scheduled with FLORENCIO Salcido

## 2021-04-20 ENCOUNTER — OFFICE VISIT (OUTPATIENT)
Dept: FAMILY MEDICINE | Facility: CLINIC | Age: 61
End: 2021-04-20
Payer: COMMERCIAL

## 2021-04-20 VITALS
BODY MASS INDEX: 31.58 KG/M2 | SYSTOLIC BLOOD PRESSURE: 102 MMHG | HEIGHT: 64 IN | HEART RATE: 87 BPM | OXYGEN SATURATION: 100 % | TEMPERATURE: 97.2 F | DIASTOLIC BLOOD PRESSURE: 64 MMHG | WEIGHT: 185 LBS

## 2021-04-20 DIAGNOSIS — Z01.818 PREOP GENERAL PHYSICAL EXAM: Primary | ICD-10-CM

## 2021-04-20 DIAGNOSIS — E03.9 ACQUIRED HYPOTHYROIDISM: ICD-10-CM

## 2021-04-20 DIAGNOSIS — N18.31 STAGE 3A CHRONIC KIDNEY DISEASE (H): ICD-10-CM

## 2021-04-20 DIAGNOSIS — I10 HYPERTENSION GOAL BP (BLOOD PRESSURE) < 140/90: ICD-10-CM

## 2021-04-20 DIAGNOSIS — N81.4 UTERINE PROLAPSE: ICD-10-CM

## 2021-04-20 LAB
ALBUMIN UR-MCNC: NEGATIVE MG/DL
APPEARANCE UR: ABNORMAL
BACTERIA #/AREA URNS HPF: ABNORMAL /HPF
BILIRUB UR QL STRIP: NEGATIVE
COLOR UR AUTO: YELLOW
ERYTHROCYTE [DISTWIDTH] IN BLOOD BY AUTOMATED COUNT: 13 % (ref 10–15)
GLUCOSE UR STRIP-MCNC: NEGATIVE MG/DL
HCT VFR BLD AUTO: 38.1 % (ref 35–47)
HGB BLD-MCNC: 12.3 G/DL (ref 11.7–15.7)
HGB UR QL STRIP: ABNORMAL
KETONES UR STRIP-MCNC: NEGATIVE MG/DL
LEUKOCYTE ESTERASE UR QL STRIP: ABNORMAL
MCH RBC QN AUTO: 29.9 PG (ref 26.5–33)
MCHC RBC AUTO-ENTMCNC: 32.3 G/DL (ref 31.5–36.5)
MCV RBC AUTO: 93 FL (ref 78–100)
NITRATE UR QL: NEGATIVE
NON-SQ EPI CELLS #/AREA URNS LPF: ABNORMAL /LPF
PH UR STRIP: 6 PH (ref 5–7)
PLATELET # BLD AUTO: 149 10E9/L (ref 150–450)
RBC # BLD AUTO: 4.11 10E12/L (ref 3.8–5.2)
RBC #/AREA URNS AUTO: ABNORMAL /HPF
SOURCE: ABNORMAL
SP GR UR STRIP: 1.02 (ref 1–1.03)
UROBILINOGEN UR STRIP-ACNC: 0.2 EU/DL (ref 0.2–1)
WBC # BLD AUTO: 4.2 10E9/L (ref 4–11)
WBC #/AREA URNS AUTO: ABNORMAL /HPF

## 2021-04-20 PROCEDURE — 80053 COMPREHEN METABOLIC PANEL: CPT | Performed by: PHYSICIAN ASSISTANT

## 2021-04-20 PROCEDURE — 81001 URINALYSIS AUTO W/SCOPE: CPT | Performed by: PHYSICIAN ASSISTANT

## 2021-04-20 PROCEDURE — 84443 ASSAY THYROID STIM HORMONE: CPT | Performed by: PHYSICIAN ASSISTANT

## 2021-04-20 PROCEDURE — 84439 ASSAY OF FREE THYROXINE: CPT | Performed by: PHYSICIAN ASSISTANT

## 2021-04-20 PROCEDURE — 93000 ELECTROCARDIOGRAM COMPLETE: CPT | Performed by: PHYSICIAN ASSISTANT

## 2021-04-20 PROCEDURE — 87086 URINE CULTURE/COLONY COUNT: CPT | Performed by: PHYSICIAN ASSISTANT

## 2021-04-20 PROCEDURE — 99214 OFFICE O/P EST MOD 30 MIN: CPT | Performed by: PHYSICIAN ASSISTANT

## 2021-04-20 PROCEDURE — 36415 COLL VENOUS BLD VENIPUNCTURE: CPT | Performed by: PHYSICIAN ASSISTANT

## 2021-04-20 PROCEDURE — 85027 COMPLETE CBC AUTOMATED: CPT | Performed by: PHYSICIAN ASSISTANT

## 2021-04-20 ASSESSMENT — MIFFLIN-ST. JEOR: SCORE: 1389.15

## 2021-04-20 NOTE — PATIENT INSTRUCTIONS
Morning of procedure: Take levothyroxine, lisinopril-hydrochlorothiazide, and rosuvastatin with a sip of water    For 7 days prior to procedure: Hold all vitamins/supplements.  Hold all NSAID medications (ibuprofen/motrin/aleve) and aspirin.  Tylenol products OK.      Preparing for Your Surgery  Getting started  A nurse will call you to review your health history and instructions. They will give you an arrival time based on your scheduled surgery time.  Please be ready to share the following:    Your doctor's clinic name and phone number    Your medical, surgical and anesthesia history    A list of allergies and sensitivities    A list of medicines, including herbal treatments and over-the-counter drugs    Whether the patient has a legal guardian (ask how to send us the papers in advance)  If you have a child who's having surgery, please ask for a copy of Preparing for Your Child's Surgery.    Preparing for surgery    Within 30 days of surgery: Have a pre-op exam (sometimes called an H&P, or History and Physical). This can be done at a clinic or pre-operative center.  ? If you're having a , you may not need this exam. Talk to your care team    At your pre-op exam, talk to your care team about all medicines you take. If you need to stop any medicines before surgery, ask when to start taking them again.  ? We do this for your safety. Many medicines can make you bleed too much during surgery. Some change how well surgery (anesthesia) drugs work.    Call your insurance company to let them know you're having surgery. (If you don't have insurance, call [phone].)    Call your clinic if there's any change in your health. This includes signs of a cold or flu (sore throat, runny nose, cough, rash, fever). It also includes a scrape or scratch near the surgery site.    If you have questions on the day of surgery, call your hospital or surgery center.  Eating and drinking guidelines  For your safety: Unless your  surgeon tells you otherwise, follow the guidelines below.    Eat and drink as usual until 8 hours before surgery. After that, no food or milk.    Drink clear liquids until 2 hours before surgery. These are liquids you can see through, like water, Gatorade and Propel Water. You may also have black coffee and tea (no cream or milk).    Nothing by mouth within 2 hours of surgery. This includes gum, candy and breath mints.    If you drink, stop drinking alcohol the night before surgery.    If your care team tells you to take medicine on the morning of surgery, it's okay to take it with a sip of water.  Preventing infection    Shower or bathe the night before and morning of your surgery. Follow the instructions your clinic gave you. (If no instructions, use regular soap.)    Don't shave or clip hair near your surgery site. We'll remove the hair if needed.    Don't smoke or vape the morning of surgery. You may chew nicotine gum up to 2 hours before surgery. A nicotine patch is okay.  ? Note: Some surgeries require you to completely quit smoking and nicotine. Check with your surgeon.    Your care team will make every effort to keep you safe from infection. We will:  ? Clean our hands often with soap and water (or an alcohol-based hand rub).  ? Clean the skin at your surgery site with a special soap that kills germs.  ? Give you a special gown to keep you warm. (Cold raises the risk of infection.)  ? Wear special hair covers, masks, gowns and gloves during surgery.  ? Give antibiotic medicine, if prescribed. Not all surgeries need antibiotics.  What to bring on the day of surgery    Photo ID and insurance card    Copy of your health care directive, if you have one    Glasses and hearing aides (bring cases)  ? You can't wear contacts during surgery    Inhaler and eye drops, if you use them (tell us about these when you arrive)    CPAP machine or breathing device, if you use them    A few personal items, if spending the  night    If you have . . .  ? A pacemaker or ICD (cardiac defibrillator): Bring the ID card.  ? An implanted stimulator: Bring the remote control.  ? A legal guardian: Bring a copy of the certified (court-stamped) guardianship papers.  Please remove any jewelry, including body piercings. Leave jewelry and other valuables at home.  If you're going home the day of surgery  Important: If you don't follow the rules below, we must cancel your surgery.     Arrange for someone to drive you home after surgery. You may not drive, take a taxi or take public transportation by yourself (unless you'll have local anesthesia only).    Arrange for a responsible adult to stay with you overnight. If you don't, we may keep you in the hospital overnight, and you may need to pay the costs yourself.  Questions?   If you have any questions for your care team, list them here: _________________________________________________________________________________________________________________________________________________________________________________________________________________________________________________________________________________________________________________________  For informational purposes only. Not to replace the advice of your health care provider. Copyright   2003, 2019 [facility]. All rights reserved. Clinically reviewed by Yanet Alva MD. [employer] [id number] - REV 4/20.

## 2021-04-21 ENCOUNTER — IMMUNIZATION (OUTPATIENT)
Dept: NURSING | Facility: CLINIC | Age: 61
End: 2021-04-21
Attending: INTERNAL MEDICINE
Payer: COMMERCIAL

## 2021-04-21 LAB
ALBUMIN SERPL-MCNC: 3.8 G/DL (ref 3.4–5)
ALP SERPL-CCNC: 77 U/L (ref 40–150)
ALT SERPL W P-5'-P-CCNC: 23 U/L (ref 0–50)
ANION GAP SERPL CALCULATED.3IONS-SCNC: 5 MMOL/L (ref 3–14)
AST SERPL W P-5'-P-CCNC: 21 U/L (ref 0–45)
BILIRUB SERPL-MCNC: 0.3 MG/DL (ref 0.2–1.3)
BUN SERPL-MCNC: 41 MG/DL (ref 7–30)
CALCIUM SERPL-MCNC: 8.7 MG/DL (ref 8.5–10.1)
CHLORIDE SERPL-SCNC: 106 MMOL/L (ref 94–109)
CO2 SERPL-SCNC: 27 MMOL/L (ref 20–32)
CREAT SERPL-MCNC: 1.49 MG/DL (ref 0.52–1.04)
GFR SERPL CREATININE-BSD FRML MDRD: 37 ML/MIN/{1.73_M2}
GLUCOSE SERPL-MCNC: 62 MG/DL (ref 70–99)
POTASSIUM SERPL-SCNC: 4.1 MMOL/L (ref 3.4–5.3)
PROT SERPL-MCNC: 7 G/DL (ref 6.8–8.8)
SODIUM SERPL-SCNC: 138 MMOL/L (ref 133–144)
T4 FREE SERPL-MCNC: 0.88 NG/DL (ref 0.76–1.46)
TSH SERPL DL<=0.005 MIU/L-ACNC: 9.26 MU/L (ref 0.4–4)

## 2021-04-21 PROCEDURE — 91300 PR COVID VAC PFIZER DIL RECON 30 MCG/0.3 ML IM: CPT

## 2021-04-21 PROCEDURE — 0002A PR COVID VAC PFIZER DIL RECON 30 MCG/0.3 ML IM: CPT

## 2021-04-21 RX ORDER — LEVOTHYROXINE SODIUM 50 UG/1
50 TABLET ORAL EVERY OTHER DAY
Qty: 90 TABLET | Refills: 3
Start: 2021-04-21 | End: 2021-09-30

## 2021-04-21 RX ORDER — LEVOTHYROXINE SODIUM 75 UG/1
75 TABLET ORAL EVERY OTHER DAY
Start: 2021-04-21 | End: 2021-04-22

## 2021-04-21 NOTE — RESULT ENCOUNTER NOTE
Triage: Please advise of results/recommendations and dosage adjustment for thyroid function.  Please send in 75 mcg or 50 mcg tablets if needed to be able to do this alternating regimen.      [place]  I have reviewed your recent labs. Here are the results:    -Normal red blood cell (hgb) levels, normal white blood cell count and normal/stable platelet levels.  -Liver and gallbladder tests are normal (ALT,AST, Alk phos, bilirubin), kidney function is mildly decreased when compared to your typical renal function.  (Cr, GFR) -recommend ensuring you stay well-hydrated and rechecking this in 6 weeks when we recheck your thyroid function, sodium is normal, potassium is normal, calcium is normal, glucose is normal.  -TSH (thyroid stimulating hormone) is abnormal suggesting you are currently overreplaced.  ADVISE: changing your medication dose to an every other day alternating regimen of 75 and 50 micrograms daily -I will ask my triage team to call you to see if you need refills of the previously prescribed 75 mcg.  We would then recheck your TSH with a lab appointment in 6-8 weeks.    Your urine culture results are still pending and I will keep you apprised of those when they are available.     If you have any questions please do not hesitate to contact our office via phone ([phone]) or [place].    Maura Hopper, MS, PA-C  [facility] [place]

## 2021-04-22 DIAGNOSIS — E03.9 ACQUIRED HYPOTHYROIDISM: ICD-10-CM

## 2021-04-22 LAB
BACTERIA SPEC CULT: NORMAL
SPECIMEN SOURCE: NORMAL

## 2021-04-22 RX ORDER — LEVOTHYROXINE SODIUM 75 UG/1
75 TABLET ORAL EVERY OTHER DAY
Qty: 90 TABLET | Refills: 0 | Status: SHIPPED | OUTPATIENT
Start: 2021-04-22 | End: 2021-10-10

## 2021-04-22 NOTE — RESULT ENCOUNTER NOTE
[place]  Here are your recent results.  Your urine culture is normal and no need for any antibiotic treatment as recommended.  If you have any questions please do not hesitate to contact our office via phone ([phone]) or ams [email or web address].    Good luck with your surgery!    Maura Hopper, MS, PA-C  [facility]

## 2021-04-22 NOTE — PROGRESS NOTES
Rx sent per result note.    Syed RANKIN RN   [facility]

## 2021-04-24 ENCOUNTER — HOSPITAL ENCOUNTER (OUTPATIENT)
Dept: LAB | Facility: CLINIC | Age: 61
Discharge: HOME | End: 2021-04-24
Attending: OBSTETRICS & GYNECOLOGY | Admitting: OBSTETRICS & GYNECOLOGY
Payer: COMMERCIAL

## 2021-04-24 DIAGNOSIS — Z11.59 ENCOUNTER FOR SCREENING FOR OTHER VIRAL DISEASES: ICD-10-CM

## 2021-04-24 LAB
LABORATORY COMMENT REPORT: NORMAL
SARS-COV-2 RNA RESP QL NAA+PROBE: NEGATIVE
SARS-COV-2 RNA RESP QL NAA+PROBE: NORMAL
SPECIMEN SOURCE: NORMAL
SPECIMEN SOURCE: NORMAL

## 2021-04-24 PROCEDURE — U0005 INFEC AGEN DETEC AMPLI PROBE: HCPCS | Performed by: OBSTETRICS & GYNECOLOGY

## 2021-04-24 PROCEDURE — U0003 INFECTIOUS AGENT DETECTION BY NUCLEIC ACID (DNA OR RNA); SEVERE ACUTE RESPIRATORY SYNDROME CORONAVIRUS 2 (SARS-COV-2) (CORONAVIRUS DISEASE [COVID-19]), AMPLIFIED PROBE TECHNIQUE, MAKING USE OF HIGH THROUGHPUT TECHNOLOGIES AS DESCRIBED BY CMS-2020-01-R: HCPCS | Performed by: OBSTETRICS & GYNECOLOGY

## 2021-04-26 RX ORDER — ACETAMINOPHEN 325 MG/1
975 TABLET ORAL ONCE
Status: CANCELLED | OUTPATIENT
Start: 2021-04-26 | End: 2021-04-26

## 2021-04-26 RX ORDER — CEFAZOLIN SODIUM 2 G/100ML
2 INJECTION, SOLUTION INTRAVENOUS
Status: CANCELLED | OUTPATIENT
Start: 2021-04-26

## 2021-04-26 RX ORDER — CEFAZOLIN SODIUM 2 G/100ML
2 INJECTION, SOLUTION INTRAVENOUS SEE ADMIN INSTRUCTIONS
Status: CANCELLED | OUTPATIENT
Start: 2021-04-26

## 2021-04-28 DIAGNOSIS — Z11.59 ENCOUNTER FOR SCREENING FOR OTHER VIRAL DISEASES: ICD-10-CM

## 2021-04-29 NOTE — TELEPHONE ENCOUNTER
Surgery rescheduled due insurance issues.    Type of surgery: total laparoscopic hysterectomy, bilateral salpingectomy, uterosacral ligament vaginal vault suspension  Location of surgery: [facility]  Date and time of surgery: 5/11/21 @ 10:25 am  Surgeon: Dr. Cook  Pre-Op Appt Date: 4/20/21  Post-Op Appt Date: 5/28/21   Packet sent out: Yes  Pre-cert/Authorization completed:  No  Date: 4/29/21

## 2021-05-08 ENCOUNTER — HOSPITAL ENCOUNTER (OUTPATIENT)
Dept: LAB | Facility: CLINIC | Age: 61
Discharge: HOME | End: 2021-05-08
Attending: OBSTETRICS & GYNECOLOGY | Admitting: OBSTETRICS & GYNECOLOGY
Payer: COMMERCIAL

## 2021-05-08 DIAGNOSIS — Z11.59 ENCOUNTER FOR SCREENING FOR OTHER VIRAL DISEASES: ICD-10-CM

## 2021-05-08 PROCEDURE — U0005 INFEC AGEN DETEC AMPLI PROBE: HCPCS | Performed by: OBSTETRICS & GYNECOLOGY

## 2021-05-08 PROCEDURE — U0003 INFECTIOUS AGENT DETECTION BY NUCLEIC ACID (DNA OR RNA); SEVERE ACUTE RESPIRATORY SYNDROME CORONAVIRUS 2 (SARS-COV-2) (CORONAVIRUS DISEASE [COVID-19]), AMPLIFIED PROBE TECHNIQUE, MAKING USE OF HIGH THROUGHPUT TECHNOLOGIES AS DESCRIBED BY CMS-2020-01-R: HCPCS | Performed by: OBSTETRICS & GYNECOLOGY

## 2021-05-11 ENCOUNTER — HOSPITAL ENCOUNTER (OUTPATIENT)
Facility: CLINIC | Age: 61
Discharge: HOME | End: 2021-05-11
Attending: OBSTETRICS & GYNECOLOGY | Admitting: OBSTETRICS & GYNECOLOGY
Payer: COMMERCIAL

## 2021-05-11 ENCOUNTER — ANESTHESIA EVENT (OUTPATIENT)
Dept: SURGERY | Facility: CLINIC | Age: 61
End: 2021-05-11
Payer: COMMERCIAL

## 2021-05-11 ENCOUNTER — ANESTHESIA (OUTPATIENT)
Dept: SURGERY | Facility: CLINIC | Age: 61
End: 2021-05-11
Payer: COMMERCIAL

## 2021-05-11 VITALS
HEIGHT: 64 IN | BODY MASS INDEX: 30.9 KG/M2 | DIASTOLIC BLOOD PRESSURE: 63 MMHG | TEMPERATURE: 97.3 F | HEART RATE: 55 BPM | SYSTOLIC BLOOD PRESSURE: 113 MMHG | OXYGEN SATURATION: 95 % | WEIGHT: 181 LBS | RESPIRATION RATE: 13 BRPM

## 2021-05-11 DIAGNOSIS — Z98.890 POST-OPERATIVE STATE: Primary | ICD-10-CM

## 2021-05-11 LAB
CREAT SERPL-MCNC: 1.33 MG/DL (ref 0.52–1.04)
GFR SERPL CREATININE-BSD FRML MDRD: 43 ML/MIN/{1.73_M2}

## 2021-05-11 PROCEDURE — 272N000001 HC OR GENERAL SUPPLY STERILE: Performed by: OBSTETRICS & GYNECOLOGY

## 2021-05-11 PROCEDURE — 88307 TISSUE EXAM BY PATHOLOGIST: CPT | Mod: TC | Performed by: OBSTETRICS & GYNECOLOGY

## 2021-05-11 PROCEDURE — 370N000017 HC ANESTHESIA TECHNICAL FEE, PER MIN: Performed by: OBSTETRICS & GYNECOLOGY

## 2021-05-11 PROCEDURE — 250N000009 HC RX 250: Performed by: NURSE ANESTHETIST, CERTIFIED REGISTERED

## 2021-05-11 PROCEDURE — 360N000077 HC SURGERY LEVEL 4, PER MIN: Performed by: OBSTETRICS & GYNECOLOGY

## 2021-05-11 PROCEDURE — 36415 COLL VENOUS BLD VENIPUNCTURE: CPT | Performed by: OBSTETRICS & GYNECOLOGY

## 2021-05-11 PROCEDURE — 999N000141 HC STATISTIC PRE-PROCEDURE NURSING ASSESSMENT: Performed by: OBSTETRICS & GYNECOLOGY

## 2021-05-11 PROCEDURE — 250N000011 HC RX IP 250 OP 636: Performed by: NURSE ANESTHETIST, CERTIFIED REGISTERED

## 2021-05-11 PROCEDURE — 710N000009 HC RECOVERY PHASE 1, LEVEL 1, PER MIN: Performed by: OBSTETRICS & GYNECOLOGY

## 2021-05-11 PROCEDURE — 88307 TISSUE EXAM BY PATHOLOGIST: CPT | Mod: 26

## 2021-05-11 PROCEDURE — 250N000009 HC RX 250: Performed by: OBSTETRICS & GYNECOLOGY

## 2021-05-11 PROCEDURE — 250N000011 HC RX IP 250 OP 636: Performed by: OBSTETRICS & GYNECOLOGY

## 2021-05-11 PROCEDURE — 82565 ASSAY OF CREATININE: CPT | Performed by: OBSTETRICS & GYNECOLOGY

## 2021-05-11 PROCEDURE — 250N000011 HC RX IP 250 OP 636: Performed by: ANESTHESIOLOGY

## 2021-05-11 PROCEDURE — 250N000013 HC RX MED GY IP 250 OP 250 PS 637: Performed by: OBSTETRICS & GYNECOLOGY

## 2021-05-11 PROCEDURE — 58571 TLH W/T/O 250 G OR LESS: CPT | Mod: 80 | Performed by: OBSTETRICS & GYNECOLOGY

## 2021-05-11 PROCEDURE — 258N000003 HC RX IP 258 OP 636: Performed by: ANESTHESIOLOGY

## 2021-05-11 PROCEDURE — 258N000003 HC RX IP 258 OP 636: Performed by: NURSE ANESTHETIST, CERTIFIED REGISTERED

## 2021-05-11 PROCEDURE — 710N000012 HC RECOVERY PHASE 2, PER MINUTE: Performed by: OBSTETRICS & GYNECOLOGY

## 2021-05-11 PROCEDURE — 58571 TLH W/T/O 250 G OR LESS: CPT | Performed by: OBSTETRICS & GYNECOLOGY

## 2021-05-11 RX ORDER — NALOXONE HYDROCHLORIDE 0.4 MG/ML
0.4 INJECTION, SOLUTION INTRAMUSCULAR; INTRAVENOUS; SUBCUTANEOUS
Status: DISCONTINUED | OUTPATIENT
Start: 2021-05-11 | End: 2021-05-11 | Stop reason: HOSPADM

## 2021-05-11 RX ORDER — FENTANYL CITRATE 50 UG/ML
INJECTION, SOLUTION INTRAMUSCULAR; INTRAVENOUS PRN
Status: DISCONTINUED | OUTPATIENT
Start: 2021-05-11 | End: 2021-05-11

## 2021-05-11 RX ORDER — SODIUM CHLORIDE, SODIUM LACTATE, POTASSIUM CHLORIDE, CALCIUM CHLORIDE 600; 310; 30; 20 MG/100ML; MG/100ML; MG/100ML; MG/100ML
INJECTION, SOLUTION INTRAVENOUS CONTINUOUS
Status: DISCONTINUED | OUTPATIENT
Start: 2021-05-11 | End: 2021-05-11 | Stop reason: HOSPADM

## 2021-05-11 RX ORDER — ONDANSETRON 2 MG/ML
4 INJECTION INTRAMUSCULAR; INTRAVENOUS EVERY 30 MIN PRN
Status: DISCONTINUED | OUTPATIENT
Start: 2021-05-11 | End: 2021-05-11 | Stop reason: HOSPADM

## 2021-05-11 RX ORDER — BUPIVACAINE HYDROCHLORIDE 2.5 MG/ML
INJECTION, SOLUTION EPIDURAL; INFILTRATION; INTRACAUDAL PRN
Status: DISCONTINUED | OUTPATIENT
Start: 2021-05-11 | End: 2021-05-11 | Stop reason: HOSPADM

## 2021-05-11 RX ORDER — FENTANYL CITRATE 50 UG/ML
25-50 INJECTION, SOLUTION INTRAMUSCULAR; INTRAVENOUS
Status: DISCONTINUED | OUTPATIENT
Start: 2021-05-11 | End: 2021-05-11 | Stop reason: HOSPADM

## 2021-05-11 RX ORDER — LIDOCAINE HYDROCHLORIDE 10 MG/ML
INJECTION, SOLUTION INFILTRATION; PERINEURAL PRN
Status: DISCONTINUED | OUTPATIENT
Start: 2021-05-11 | End: 2021-05-11

## 2021-05-11 RX ORDER — ONDANSETRON 2 MG/ML
INJECTION INTRAMUSCULAR; INTRAVENOUS PRN
Status: DISCONTINUED | OUTPATIENT
Start: 2021-05-11 | End: 2021-05-11

## 2021-05-11 RX ORDER — OXYCODONE HYDROCHLORIDE 5 MG/1
10 TABLET ORAL
Status: COMPLETED | OUTPATIENT
Start: 2021-05-11 | End: 2021-05-11

## 2021-05-11 RX ORDER — CEFAZOLIN SODIUM 2 G/100ML
2 INJECTION, SOLUTION INTRAVENOUS
Status: DISCONTINUED | OUTPATIENT
Start: 2021-05-11 | End: 2021-05-11 | Stop reason: HOSPADM

## 2021-05-11 RX ORDER — CEFAZOLIN SODIUM 2 G/100ML
2 INJECTION, SOLUTION INTRAVENOUS SEE ADMIN INSTRUCTIONS
Status: DISCONTINUED | OUTPATIENT
Start: 2021-05-11 | End: 2021-05-11 | Stop reason: HOSPADM

## 2021-05-11 RX ORDER — EPHEDRINE SULFATE 50 MG/ML
INJECTION, SOLUTION INTRAMUSCULAR; INTRAVENOUS; SUBCUTANEOUS PRN
Status: DISCONTINUED | OUTPATIENT
Start: 2021-05-11 | End: 2021-05-11

## 2021-05-11 RX ORDER — ACETAMINOPHEN 325 MG/1
975 TABLET ORAL ONCE
Status: COMPLETED | OUTPATIENT
Start: 2021-05-11 | End: 2021-05-11

## 2021-05-11 RX ORDER — PROPOFOL 10 MG/ML
INJECTION, EMULSION INTRAVENOUS CONTINUOUS PRN
Status: DISCONTINUED | OUTPATIENT
Start: 2021-05-11 | End: 2021-05-11

## 2021-05-11 RX ORDER — LIDOCAINE 40 MG/G
CREAM TOPICAL
Status: DISCONTINUED | OUTPATIENT
Start: 2021-05-11 | End: 2021-05-11 | Stop reason: HOSPADM

## 2021-05-11 RX ORDER — BUPIVACAINE HYDROCHLORIDE 5 MG/ML
INJECTION, SOLUTION PERINEURAL PRN
Status: DISCONTINUED | OUTPATIENT
Start: 2021-05-11 | End: 2021-05-11 | Stop reason: HOSPADM

## 2021-05-11 RX ORDER — ACETAMINOPHEN 325 MG/1
650 TABLET ORAL EVERY 4 HOURS PRN
Qty: 50 TABLET | Refills: 0 | COMMUNITY
Start: 2021-05-11

## 2021-05-11 RX ORDER — OXYCODONE HYDROCHLORIDE 5 MG/1
5-10 TABLET ORAL EVERY 4 HOURS PRN
Qty: 12 TABLET | Refills: 0 | Status: SHIPPED | OUTPATIENT
Start: 2021-05-11 | End: 2021-07-13

## 2021-05-11 RX ORDER — ONDANSETRON 4 MG/1
4 TABLET, ORALLY DISINTEGRATING ORAL EVERY 30 MIN PRN
Status: DISCONTINUED | OUTPATIENT
Start: 2021-05-11 | End: 2021-05-11 | Stop reason: HOSPADM

## 2021-05-11 RX ORDER — NALOXONE HYDROCHLORIDE 0.4 MG/ML
0.2 INJECTION, SOLUTION INTRAMUSCULAR; INTRAVENOUS; SUBCUTANEOUS
Status: DISCONTINUED | OUTPATIENT
Start: 2021-05-11 | End: 2021-05-11 | Stop reason: HOSPADM

## 2021-05-11 RX ORDER — LABETALOL HYDROCHLORIDE 5 MG/ML
10 INJECTION, SOLUTION INTRAVENOUS EVERY 5 MIN PRN
Status: DISCONTINUED | OUTPATIENT
Start: 2021-05-11 | End: 2021-05-11 | Stop reason: HOSPADM

## 2021-05-11 RX ORDER — HYDROMORPHONE HYDROCHLORIDE 1 MG/ML
.3-.5 INJECTION, SOLUTION INTRAMUSCULAR; INTRAVENOUS; SUBCUTANEOUS EVERY 5 MIN PRN
Status: DISCONTINUED | OUTPATIENT
Start: 2021-05-11 | End: 2021-05-11 | Stop reason: HOSPADM

## 2021-05-11 RX ORDER — MEPERIDINE HYDROCHLORIDE 25 MG/ML
12.5 INJECTION INTRAMUSCULAR; INTRAVENOUS; SUBCUTANEOUS
Status: DISCONTINUED | OUTPATIENT
Start: 2021-05-11 | End: 2021-05-11 | Stop reason: HOSPADM

## 2021-05-11 RX ORDER — NEOSTIGMINE METHYLSULFATE 1 MG/ML
VIAL (ML) INJECTION PRN
Status: DISCONTINUED | OUTPATIENT
Start: 2021-05-11 | End: 2021-05-11

## 2021-05-11 RX ORDER — GLYCOPYRROLATE 0.2 MG/ML
INJECTION, SOLUTION INTRAMUSCULAR; INTRAVENOUS PRN
Status: DISCONTINUED | OUTPATIENT
Start: 2021-05-11 | End: 2021-05-11

## 2021-05-11 RX ORDER — DEXAMETHASONE SODIUM PHOSPHATE 4 MG/ML
INJECTION, SOLUTION INTRA-ARTICULAR; INTRALESIONAL; INTRAMUSCULAR; INTRAVENOUS; SOFT TISSUE PRN
Status: DISCONTINUED | OUTPATIENT
Start: 2021-05-11 | End: 2021-05-11

## 2021-05-11 RX ORDER — PROPOFOL 10 MG/ML
INJECTION, EMULSION INTRAVENOUS PRN
Status: DISCONTINUED | OUTPATIENT
Start: 2021-05-11 | End: 2021-05-11

## 2021-05-11 RX ORDER — ACETAMINOPHEN 500 MG
1000 TABLET ORAL EVERY 4 HOURS PRN
Status: DISCONTINUED | OUTPATIENT
Start: 2021-05-11 | End: 2021-05-11 | Stop reason: HOSPADM

## 2021-05-11 RX ADMIN — MIDAZOLAM 2 MG: 1 INJECTION INTRAMUSCULAR; INTRAVENOUS at 11:46

## 2021-05-11 RX ADMIN — GLYCOPYRROLATE 0.3 MG: 0.2 INJECTION, SOLUTION INTRAMUSCULAR; INTRAVENOUS at 13:26

## 2021-05-11 RX ADMIN — FENTANYL CITRATE 50 MCG: 50 INJECTION, SOLUTION INTRAMUSCULAR; INTRAVENOUS at 12:11

## 2021-05-11 RX ADMIN — SODIUM CHLORIDE, POTASSIUM CHLORIDE, SODIUM LACTATE AND CALCIUM CHLORIDE: 600; 310; 30; 20 INJECTION, SOLUTION INTRAVENOUS at 15:00

## 2021-05-11 RX ADMIN — HYDROMORPHONE HYDROCHLORIDE 0.5 MG: 1 INJECTION, SOLUTION INTRAMUSCULAR; INTRAVENOUS; SUBCUTANEOUS at 15:39

## 2021-05-11 RX ADMIN — PROPOFOL 50 MCG/KG/MIN: 10 INJECTION, EMULSION INTRAVENOUS at 11:56

## 2021-05-11 RX ADMIN — OXYCODONE HYDROCHLORIDE 5 MG: 5 TABLET ORAL at 15:41

## 2021-05-11 RX ADMIN — DEXAMETHASONE SODIUM PHOSPHATE 4 MG: 4 INJECTION, SOLUTION INTRA-ARTICULAR; INTRALESIONAL; INTRAMUSCULAR; INTRAVENOUS; SOFT TISSUE at 11:53

## 2021-05-11 RX ADMIN — FENTANYL CITRATE 50 MCG: 50 INJECTION, SOLUTION INTRAMUSCULAR; INTRAVENOUS at 11:52

## 2021-05-11 RX ADMIN — SODIUM CHLORIDE, POTASSIUM CHLORIDE, SODIUM LACTATE AND CALCIUM CHLORIDE: 600; 310; 30; 20 INJECTION, SOLUTION INTRAVENOUS at 12:30

## 2021-05-11 RX ADMIN — PROPOFOL 160 MG: 10 INJECTION, EMULSION INTRAVENOUS at 11:52

## 2021-05-11 RX ADMIN — FENTANYL CITRATE 50 MCG: 50 INJECTION, SOLUTION INTRAMUSCULAR; INTRAVENOUS at 14:45

## 2021-05-11 RX ADMIN — CEFAZOLIN SODIUM 2 G: 2 INJECTION, SOLUTION INTRAVENOUS at 11:47

## 2021-05-11 RX ADMIN — Medication 5 MG: at 12:57

## 2021-05-11 RX ADMIN — ONDANSETRON HYDROCHLORIDE 4 MG: 2 INJECTION, SOLUTION INTRAVENOUS at 13:20

## 2021-05-11 RX ADMIN — PHENYLEPHRINE HYDROCHLORIDE 200 MCG: 10 INJECTION INTRAVENOUS at 12:02

## 2021-05-11 RX ADMIN — NEOSTIGMINE METHYLSULFATE 2 MG: 1 INJECTION, SOLUTION INTRAVENOUS at 13:26

## 2021-05-11 RX ADMIN — ROCURONIUM BROMIDE 50 MG: 10 INJECTION INTRAVENOUS at 11:53

## 2021-05-11 RX ADMIN — HYDROMORPHONE HYDROCHLORIDE 1 MG: 1 INJECTION, SOLUTION INTRAMUSCULAR; INTRAVENOUS; SUBCUTANEOUS at 12:11

## 2021-05-11 RX ADMIN — LIDOCAINE HYDROCHLORIDE 50 MG: 10 INJECTION, SOLUTION INFILTRATION; PERINEURAL at 11:52

## 2021-05-11 RX ADMIN — ACETAMINOPHEN 975 MG: 325 TABLET, FILM COATED ORAL at 10:02

## 2021-05-11 RX ADMIN — FENTANYL CITRATE 50 MCG: 50 INJECTION, SOLUTION INTRAMUSCULAR; INTRAVENOUS at 14:13

## 2021-05-11 RX ADMIN — SODIUM CHLORIDE, POTASSIUM CHLORIDE, SODIUM LACTATE AND CALCIUM CHLORIDE: 600; 310; 30; 20 INJECTION, SOLUTION INTRAVENOUS at 11:45

## 2021-05-11 ASSESSMENT — MIFFLIN-ST. JEOR: SCORE: 1371.01

## 2021-05-11 NOTE — ANESTHESIA CARE TRANSFER NOTE
Patient: Lilly Barnes    Procedure(s):  TOTAL LAPAROSCOPIC HYSTERECTOMY, BILATERAL SALPINGECTOMY, UTEROSACRAL LIGAMENT VAGINAL VAULT SUSPENSION, exam under anesthesia    Diagnosis: Uterine prolapse [N81.4]  Diagnosis Additional Information: No value filed.    Anesthesia Type:   General     Note:    Oropharynx: oropharynx clear of all foreign objects  Level of Consciousness: drowsy  Oxygen Supplementation: face mask  Level of Supplemental Oxygen (L/min / FiO2): 5  Independent Airway: airway patency satisfactory and stable  Dentition: dentition unchanged  Vital Signs Stable: post-procedure vital signs reviewed and stable  Report to RN Given: handoff report given  Patient transferred to: PACU    Handoff Report: Identifed the Patient, Identified the Reponsible Provider, Reviewed the pertinent medical history, Discussed the surgical course, Reviewed Intra-OP anesthesia mangement and issues during anesthesia, Set expectations for post-procedure period and Allowed opportunity for questions and acknowledgement of understanding      Vitals: (Last set prior to Anesthesia Care Transfer)  CRNA VITALS  5/11/2021 1310 - 5/11/2021 1346      5/11/2021             Pulse:  89    SpO2:  99 %        Electronically Signed By: Dean Dennis Severson, APRN CRNA  May 11, 2021  1:46 PM

## 2021-05-11 NOTE — PROGRESS NOTES
Preop Diagnosis: uterine prolapse unresponsive to conservative measures    Post-Op Diagnosis: same    Procedure: Exam under anesthesia, Total Laproscopic Hysterectomy and bilateral salpingectomy with uterosacral ligament vaginal vault suspension    Surgeon: PETAR AN MD, Assistant: Angeli FRENCH    EBL: 10 cc    Anesthesia: General and Local    Path: uterus bilateral fallopian tubes    Complications: no problems reported    Findings: see dictated operative note for full details        PETAR AN MD  1:35 PM  5/11/2021

## 2021-05-11 NOTE — ANESTHESIA POSTPROCEDURE EVALUATION
Patient: Lilly Barnes    Procedure(s):  TOTAL LAPAROSCOPIC HYSTERECTOMY, BILATERAL SALPINGECTOMY, UTEROSACRAL LIGAMENT VAGINAL VAULT SUSPENSION, exam under anesthesia    Diagnosis:Uterine prolapse [N81.4]  Diagnosis Additional Information: No value filed.    Anesthesia Type:  General    Note:  Disposition: Outpatient   Postop Pain Control: Uneventful            Sign Out: Well controlled pain   PONV: No   Neuro/Psych: Uneventful            Sign Out: Acceptable/Baseline neuro status   Airway/Respiratory: Uneventful            Sign Out: Acceptable/Baseline resp. status   CV/Hemodynamics: Uneventful            Sign Out: Acceptable CV status   Other NRE: NONE   DID A NON-ROUTINE EVENT OCCUR?            Last vitals:  Vitals:    05/11/21 1415 05/11/21 1426 05/11/21 1430   BP: 102/59  107/60   Pulse: 55 54 54   Resp: 17 17 14   Temp:      SpO2: 97% 100% 100%       Last vitals prior to Anesthesia Care Transfer:  CRNA VITALS  5/11/2021 1310 - 5/11/2021 1410      5/11/2021             Pulse:  89    SpO2:  99 %          Electronically Signed By: Sid Amin MD  May 11, 2021  2:42 PM

## 2021-05-11 NOTE — ANESTHESIA PREPROCEDURE EVALUATION
Anesthesia Pre-Procedure Evaluation    Patient: Lilly Barnes   MRN: [id number] : 1960        Preoperative Diagnosis: Uterine prolapse [N81.4]   Procedure : Procedure(s):  TOTAL LAPAROSCOPIC HYSTERECTOMY, BILATERAL SALPINGECTOMY, UTEROSACRAL LIGAMENT VAGINAL VAULT SUSPENSION     Past Medical History:   Diagnosis Date     CKD (chronic kidney disease) stage 3, GFR 30-59 ml/min      Colon polyps ,     hyperplastic polyp - polyps x 4 - Dr Sheppard, due 5 yrs     Environmental allergies      Hyperlipidemia LDL goal < 130      Hypertension goal BP (blood pressure) < 140/90      Hypothyroidism      Osteopenia      Vitamin D deficiency       Past Surgical History:   Procedure Laterality Date     ARTHROSCOPY KNEE WITH MEDIAL MENISCECTOMY Right 2017    ARTHROSCOPY KNEE WITH MEDIAL MENISCECTOMY - Dr French     COLONOSCOPY  2013    hyperplastic polyp - Dr Sheppard - due 3-5 yrs     COLONOSCOPY  ,     multiple polyps - due 5 yrs     COLONOSCOPY N/A 2018    polyps x 4, due 5 years     DENTAL SURGERY      wisdom teeth     ECTOPIC PREGNANCY SURGERY      laproscopic - left - for ectopic pregnancy     GYN SURGERY       ORTHOPEDIC SURGERY        Allergies   Allergen Reactions     Ibuprofen      Should avoid due to Chronic Kidney Disease      Social History     Tobacco Use     Smoking status: Never Smoker     Smokeless tobacco: Never Used   Substance Use Topics     Alcohol use: Not Currently     Alcohol/week: 0.0 - 1.0 standard drinks     Comment: 2 drinks per month      Wt Readings from Last 1 Encounters:   21 82.1 kg (181 lb)        Anesthesia Evaluation   Pt has had prior anesthetic. Type: General.    No history of anesthetic complications       ROS/MED HX  ENT/Pulmonary:  - neg pulmonary ROS     Neurologic:  - neg neurologic ROS     Cardiovascular:     (+) hypertension-----    METS/Exercise Tolerance:     Hematologic:  - neg hematologic  ROS     Musculoskeletal:   - neg musculoskeletal ROS     GI/Hepatic:  - neg GI/hepatic ROS     Renal/Genitourinary:     (+) renal disease, type: CRI,     Endo:  - neg endo ROS   (+) thyroid problem, hypothyroidism,     Psychiatric/Substance Use:  - neg psychiatric ROS     Infectious Disease:  - neg infectious disease ROS     Malignancy:  - neg malignancy ROS     Other:  - neg other ROS          Physical Exam    Airway        Mallampati: I       Respiratory Devices and Support         Dental  no notable dental history         Cardiovascular   cardiovascular exam normal          Pulmonary   pulmonary exam normal                OUTSIDE LABS:  CBC:   Lab Results   Component Value Date    WBC 4.2 04/20/2021    WBC 4.1 10/02/2020    HGB 12.3 04/20/2021    HGB 11.5 (L) 10/02/2020    HCT 38.1 04/20/2021    HCT 35.9 10/02/2020     (L) 04/20/2021     (L) 10/02/2020     BMP:   Lab Results   Component Value Date     04/20/2021     10/02/2020    POTASSIUM 4.1 04/20/2021    POTASSIUM 4.3 10/02/2020    CHLORIDE 106 04/20/2021    CHLORIDE 107 10/02/2020    CO2 27 04/20/2021    CO2 23 10/02/2020    BUN 41 (H) 04/20/2021    BUN 36 (H) 10/02/2020    CR 1.49 (H) 04/20/2021    CR 1.27 (H) 10/02/2020    GLC 62 (L) 04/20/2021    GLC 73 10/02/2020     COAGS: No results found for: PTT, INR, FIBR  POC: No results found for: BGM, HCG, HCGS  HEPATIC:   Lab Results   Component Value Date    ALBUMIN 3.8 04/20/2021    PROTTOTAL 7.0 04/20/2021    ALT 23 04/20/2021    AST 21 04/20/2021    ALKPHOS 77 04/20/2021    BILITOTAL 0.3 04/20/2021     OTHER:   Lab Results   Component Value Date    SYLVESTER 8.7 04/20/2021    PHOS 3.3 02/20/2015    TSH 9.26 (H) 04/20/2021    T4 0.88 04/20/2021       Anesthesia Plan    ASA Status:  3   NPO Status:  NPO Appropriate    Anesthesia Type: General.     - Airway: ETT   Induction: Intravenous, Propofol.   Maintenance: Balanced.        Consents    Anesthesia Plan(s) and associated risks, benefits, and realistic alternatives  discussed. Questions answered and patient/representative(s) expressed understanding.     - Discussed with:  Patient         Postoperative Care    Pain management: IV analgesics, Oral pain medications, Multi-modal analgesia.   PONV prophylaxis: Ondansetron (or other 5HT-3), Dexamethasone or Solumedrol     Comments:                Sid Amin MD

## 2021-05-11 NOTE — DISCHARGE INSTRUCTIONS
DR. PETAR AN M.D.   CLINIC PHONE NUMBER:  [phone]  [facility]    Maximum acetaminophen (Tylenol) dose from all sources should not exceed 4 grams (4000 mg) per day. You had 975 mg at 10 AM; you may repeat this after 4 PM if needed.       GENERAL ANESTHESIA OR SEDATION ADULT DISCHARGE INSTRUCTIONS   SPECIAL PRECAUTIONS FOR 24 HOURS AFTER SURGERY    IT IS NOT UNUSUAL TO FEEL LIGHT-HEADED OR FAINT, UP TO 24 HOURS AFTER SURGERY OR WHILE TAKING PAIN MEDICATION.  IF YOU HAVE THESE SYMPTOMS; SIT FOR A FEW MINUTES BEFORE STANDING AND HAVE SOMEONE ASSIST YOU WHEN YOU GET UP TO WALK OR USE THE BATHROOM.    YOU SHOULD REST AND RELAX FOR THE NEXT 24 HOURS AND YOU MUST MAKE ARRANGEMENTS TO HAVE SOMEONE STAY WITH YOU FOR AT LEAST 24 HOURS AFTER YOUR DISCHARGE.  AVOID HAZARDOUS AND STRENUOUS ACTIVITIES.  DO NOT MAKE IMPORTANT DECISIONS FOR 24 HOURS.    DO NOT DRIVE ANY VEHICLE OR OPERATE MECHANICAL EQUIPMENT FOR 24 HOURS FOLLOWING THE END OF YOUR SURGERY.  EVEN THOUGH YOU MAY FEEL NORMAL, YOUR REACTIONS MAY BE AFFECTED BY THE MEDICATION YOU HAVE RECEIVED.    DO NOT DRINK ALCOHOLIC BEVERAGES FOR 24 HOURS FOLLOWING YOUR SURGERY.    DRINK CLEAR LIQUIDS (APPLE JUICE, GINGER ALE, 7-UP, BROTH, ETC.).  PROGRESS TO YOUR REGULAR DIET AS YOU FEEL ABLE.    YOU MAY HAVE A DRY MOUTH, A SORE THROAT, MUSCLES ACHES OR TROUBLE SLEEPING.  THESE SHOULD GO AWAY AFTER 24 HOURS.    CALL YOUR DOCTOR FOR ANY OF THE FOLLOWING:  SIGNS OF INFECTION (FEVER, GROWING TENDERNESS AT THE SURGERY SITE, A LARGE AMOUNT OF DRAINAGE OR BLEEDING, SEVERE PAIN, FOUL-SMELLING DRAINAGE, REDNESS OR SWELLING.    IT HAS BEEN OVER 8 TO 10 HOURS SINCE SURGERY AND YOU ARE STILL NOT ABLE TO URINATE (PASS WATER).

## 2021-05-11 NOTE — OP NOTE
Procedure Date: 05/11/2021    PREOPERATIVE DIAGNOSIS:  Uterine prolapse, unresponsive to medical management.    POSTOPERATIVE DIAGNOSIS:  Uterine prolapse, unresponsive to medical management.    PROCEDURES PERFORMED:  Examination under anesthesia, diagnostic laparoscopy, total laparoscopic hysterectomy, bilateral salpingectomy with uterosacral ligament vaginal vault suspension.    SURGEON:  Julio César Cook MD     ASSISTANT:  Arnoldo Suh MD.  Dr. Suh's assistance was necessary to aid with retraction and ensure patient safety and well-being during this procedure.      HISTORY OF PRESENT ILLNESS:  The patient is a 60-year-old white female, para 1-0-2-1 postmenopausal, not presently sexually active, not on hormone replacement therapy, who I was asked to see by FLORENCIO Salcido for evaluation of pelvic floor prolapse.  The patient first noticed the prolapse in 01/2021.  The patient states that she has been working out more and does notice more pelvic pressure while doing her workouts.  Recently, while toileting and wiping, she noticed something protruding from the vagina.      Examination revealed grade 2-3 uterine prolapse with a grade 1 cystocele.      The patient denied significant urinary incontinence or difficulty with emptying of her bladder.  I saw this patient and evaluated her on multiple occasions, discussed the risks, benefits, and alternative forms of therapy.  We discussed a conservative wait and see approach, the use of a pessary or surgical repair.  The patient has strong feelings towards proceeding with a surgical repair.  A decision was made to proceed with the above procedures with ovarian preservation.  Pre and postop instructions have been reviewed.  All of her questions were answered and informed consent was obtained.    OPERATIVE FINDINGS:  Exam under anesthesia:  External genitalia BUS without lesions.  Speculum normal appearing vaginal epithelium.  A multiparous appearing cervix that with  traction protrudes from the vagina.  Bimanual exam:  The uterus is parous, smooth, firm, mobile.  Adnexa without masses, enlargement or tenderness.  There was a grade 1 midline cystocele and minimal grade 1 distal rectocele.  Details from the diagnostic laparoscopy: The left fallopian tube and fimbriated end were adherent to the left pelvic sidewall.  The fimbriated end and the fallopian tube were otherwise were unremarkable.  The left ovary was adherent to the left pelvic sidewall and to the rectum and sigmoid with filmy adhesions.  The left lateral pelvic sidewall was within normal limits.  Ureteral vermiculation was noted.  The left round ligaments normal.  The anterior surface of the uterus and bladder flap were within normal limits.  The round ligaments were normal.  The right fallopian tube and fimbriated end are within normal limits.  The right ovary had thin filmy adhesions between the ovary and the lateral pelvic sidewall.  Ureteral vermiculation was noted.  The appendix was visualized, appeared to be within normal limits.  There was tense band between the mesoappendix and the right lateral lower pelvic sidewall.  The liver edge appeared to be marked unremarkable.  Gallbladder was not clearly seen.  Both ovaries appeared otherwise to be unremarkable.  There was no evidence of any malignancy.  I did not see any excrescences, endometriosis or obvious signs of malignancy.    DETAILS OF PROCEDURE:  After obtaining informed consent, the patient was taken to the operating room, where general anesthetic with endotracheal intubation was performed.  Pneumatic stockings had been placed preoperatively.  She was placed in the dorsal lithotomy position in [facility] with the legs approximately 30 degrees to the horizontal.  An exam under anesthesia was performed.  Following this, the patient was prepped and draped in the usual sterile fashion.  A bivalve speculum was placed in the vagina, the cervix was  visualized and a figure-of-X suture of 0 Vicryl placed through the anterior cervical lip.  The endocervical canal was dilated to a #6 Hegar dilator.  The endometrial cavity sounded to 7 cm.  I then brought a large VCare cup into the operative field.  I instilled the intrauterine manipulator without difficulty and inflated the stay balloon.  The green stay cup was then sutured to the anterior cervix with the previously placed suture of 0 Vicryl.  The cup was then secured over the cervix without difficulty.  A Smith catheter was placed with the return of clear fluid.  The blue stay ring was then placed for additional support and a glove with 2 Ray-Alec sponges was placed in the vagina to aid in maintaining pneumoperitoneum.  These instruments were then draped sterilely.  The subumbilical ligaments were identified, injected with approximately 3-5 mL of 0.5% Marcaine with epinephrine, grasped with [place] clamps, tented and a Veress needle placed intraumbilically without difficulty or complication.  With proper position confirmed with free flow of saline technique, the abdomen was inflated with 3 liters of CO2 gas.  Opening pressure was less than 10 mmHg.  A linear infraumbilical incision was made and a 5 mm nonbladed Optiview trocar was placed under direct visualization without difficulty.  Inspection of the pelvis was made with the above findings.  A second 5 mm nonbladed trocar was placed in the right lower quadrant 2 fingerbreadths superior to the iliac crest lateral to the rectus abdominis muscles after infiltrating the skin and subcutaneous tissues with approximately 3 mL of the 0.5% Marcaine solution.  A 10-11 port was then placed in the left lower quadrant 2 fingerbreadths superior to the iliac crest lateral to the rectus abdominis muscles after infiltrating the skin and subcutaneous tissues with approximately 5-7 mL of the 0.5% Marcaine solution.  All trocars were placed under direct visualization.  All were  nonbladed and all were placed without complication.  Inspection of the pelvis was made.  The Thunderbeat device was then brought into the right lower port and Prestige forceps to the left port.  Using meticulous traction, countertraction and lysis, the adhesions of the left ovary and to the left pelvic sidewall were taken down without injury to other intra-abdominal retroperitoneal structures.  The left fallopian tube was freed from the left pelvic sidewall and from the left ovary with the Thunderbeat device without difficulty or injury to other intra-abdominal or retroperitoneal structures.  Attention was then turned to the right ovary.  The right ovary was freed from the right pelvic sidewall and the thin filmy adhesions taken down with the Thunderbeat device without difficulty or complication or injury to other structures.  We then grasped the distal end of the right fallopian tube and gentle superior medial traction was applied, isolating the mesosalpinx.  The mesosalpinx was then taken down in successive bites with the Thunderbeat device.  The pedicle was cauterized and cut under direct visualization.  The right round ligament was identified, cross clamped, cauterized, and divided with the Thunderbeat device.  The right uteroovarian ligament was identified, cross clamped, cauterized, and divided with the Thunderbeat device.  The remaining portions of the broad and cardinal ligament were then taken down in successive bites to the level of the remnants of the uterosacral ligament.  The pedicles cauterized and divided with the Thunderbeat under direct visualization without complication.  The vesicouterine reflection was identified and incised in a right to left manner and the bladder meticulously dissected off the lower uterine segment and proximal vagina.  The posterior peritoneal reflection was identified, incised in a right to left manner with the Thunderbeat device and deflected posteriorly.  Attention was  then turned to the left adnexal structures with the Thunderbeat device and the left lower port and the Prestige forceps to the right port, the uterus was deflected to the patient's right.  The distal end of the left fallopian tube was identified, grasped and gentle superior medial traction was applied, isolating the mesosalpinx.  The mesosalpinx was taken down in successive bites with a Thunderbeat device.  The pedicle was cauterized and divided under direct visualization without complications.  The left round ligament was identified, cross clamped, cauterized, and divided.  The left uteroovarian ligament was identified, cross clamped, cauterized, and divided with the Thunderbeat device.  The remaining portions of the broad ligament on the left side were then taken down in successive bites, the pedicles cauterized and divided under direct visualization without complication.  With the ureters deflected free of the field of dissection and the blood supply taken down successfully on the right and left sides, a colpotomy incision was made with the Thunderbeat device and the vaginal apex excised in a circumferential manner without difficulty.  The uterus, cervix and fallopian tubes were then removed through the vagina.  The surgical glove with Ray-Tecs was then replaced in the vagina to maintain pneumoperitoneum.  We then closed the vagina in the following manner:  I used a suture of 0 V-Loc material beginning at the right lateral margin and incorporated the anterior vaginal wall, the fibromuscular layer, the vaginal epithelium anteriorly and posteriorly, the rectovaginal septum and the right uterosacral ligament.  This was secured.  The ureter was free of the field of dissection.  I then took a second suture of 0 V-Loc material beginning at the left lateral margin and incorporated the anterior fibromuscular layer of the vaginal epithelium anteriorly and posteriorly, the rectovaginal fascia and the uterosacral ligament  on the left side.  The suture was secured and this resulted in good apical DeLancey level 1, level of apical compartment support.  Beginning at the left lateral margin, I used the previously placed suture of 0 V-Loc material and closed the vagina in a running manner, incorporating the anterior fibromuscular layer, the vaginal epithelium anterior and posteriorly in the rectovaginal septum to the level of the right uterosacral ligament.  This was incorporated into the repair and the suture brought back to the midline and the excess suture material trimmed.  I then used the previously placed suture of 0 V-Loc material from the right lateral margin and placed a second reinforcing layer over the first beginning at the right lateral margin, proceeding to the left lateral margin, incorporating the uterosacral ligament and brought back to the midline.  This resulted in a good firm 2-layer vaginal cuff closure with DeLancey level 1, level of the apical compartment support.  We then inspected the pelvic sidewalls and ureteral vermiculation was noted on both the right and left sides.  There was no evidence of ureteral injury or dilation.  The pelvis was suctioned of excess clot and heme and 30 mL of 0.25% Marcaine was placed in the pelvis for additional analgesia.  The left lower quadrant port was removed and the fascial defect repaired with a Wes-Wendy closure device with an interrupted suture of 0 Vicryl.  This resulted in an airtight hemostatic reapproximation with no palpable defects.  The abdomen, after we had checked and documented that sponge, needle and instrument counts were checked and were correct x2, that hemostasis was checked and found to be acceptable.  The abdomen was deflated of CO2 gas.  Trocars were removed under direct visualization.  The skin incision was closed with subcuticular sutures of 4-0 Monocryl and Dermabond.  Rectovaginal exam was performed.  The previously placed glove with sponges was  removed.  Bimanual examination revealed excellent DeLancey level 1, level of apical compartment support with minimal anterior compartment defect.  Rectovaginal exam revealed no evidence of compromise to the rectum.  Uterosacral ligaments were palpable in the right and left lateral vaginal cuff margins.  The patient tolerated the procedure well and was taken out of the dorsal lithotomy position and returned to the recovery area in good condition with counts correct and hemostasis acceptable.  Blood loss was estimated at approximately 10 mL.  There were no difficulties or complications.    Julio César Cook MD        D: 2021   T: 2021   MT: [id number]    Name:     JOSE J SIBLEY  MRN:      [id number]        Account:        [id number]   :      1960           Procedure Date: 2021     Document: [id number]    cc:  MD Maura Iverson PA-C

## 2021-05-12 ENCOUNTER — TELEPHONE (OUTPATIENT)
Dept: OBGYN | Facility: CLINIC | Age: 61
End: 2021-05-12

## 2021-05-12 DIAGNOSIS — R11.0 NAUSEA: Primary | ICD-10-CM

## 2021-05-12 LAB — COPATH REPORT: NORMAL

## 2021-05-12 RX ORDER — ONDANSETRON 4 MG/1
4 TABLET, ORALLY DISINTEGRATING ORAL EVERY 6 HOURS PRN
Qty: 10 TABLET | Refills: 0 | Status: SHIPPED | OUTPATIENT
Start: 2021-05-12 | End: 2021-07-13

## 2021-05-12 NOTE — TELEPHONE ENCOUNTER
Pt calling regarding pain Rx from her surgery yesterday.  Had significant nausea when she took her Oxy.  Is taking Tylenol as directed, cannot take Ibuprofen.  She did take the Oxy with food.    Discussed with Dr Cook.  Gallo ordered.    Pt advised, she will call if Zofran is not helping.    Jeniffer Yadav RN

## 2021-05-13 NOTE — RESULT ENCOUNTER NOTE
I reviewed this path report will further discuss it with the patient when I see her for her postop follow-up visit  Julio César Cook MD FACOG

## 2021-05-28 ENCOUNTER — OFFICE VISIT (OUTPATIENT)
Dept: OBGYN | Facility: CLINIC | Age: 61
End: 2021-05-28
Payer: COMMERCIAL

## 2021-05-28 VITALS — DIASTOLIC BLOOD PRESSURE: 64 MMHG | WEIGHT: 182.7 LBS | BODY MASS INDEX: 31.36 KG/M2 | SYSTOLIC BLOOD PRESSURE: 104 MMHG

## 2021-05-28 DIAGNOSIS — Z98.890 POSTOPERATIVE STATE: Primary | ICD-10-CM

## 2021-05-28 PROCEDURE — 99024 POSTOP FOLLOW-UP VISIT: CPT | Performed by: OBSTETRICS & GYNECOLOGY

## 2021-05-28 NOTE — NURSING NOTE
"Chief Complaint   Patient presents with     Post-op Visit     2021  TLHAYDEE, BS         Initial /64 (BP Location: Right arm, Cuff Size: Adult Regular)   Wt 82.9 kg (182 lb 11.2 oz)   LMP 09/15/2011   Breastfeeding No   BMI 31.36 kg/m   Estimated body mass index is 31.36 kg/m  as calculated from the following:    Height as of 21: 1.626 m (5' 4\").    Weight as of this encounter: 82.9 kg (182 lb 11.2 oz).  BP completed using cuff size: regular    Questioned patient about current smoking habits.  Pt. has never smoked.          The following HM Due: NONE      Faith Randolph CMA on 2021 at 9:49 AM       "

## 2021-05-28 NOTE — PATIENT INSTRUCTIONS
You can reach your [facility] Care Team any time of the day by calling [phone]. This number will put you in touch with the 24 hour nurse line if the clinic is closed.    To contact your OB/GYN Station Coordinator/Surgery Scheduler please call [phone]. This is a direct number for your care team between 8 a.m. and 4 p.m. Monday through Friday.    [facility] is open for your convenience:  Monday through Friday 8 a.m. to 6 p.m.  Closed weekends and all major holidays.

## 2021-05-28 NOTE — PROGRESS NOTES
The patient is a 60-year-old white female, para 1-0-2-1 postmenopausal, not presently sexually active, not on hormone replacement therapy, who I was asked to see by FLORENCIO Salcido for evaluation of pelvic floor prolapse.  The patient first noticed the prolapse in 01/2021.  The patient states that she has been working out more and does notice more pelvic pressure while doing her workouts.  Recently, while toileting and wiping, she noticed something protruding from the vagina.       Examination revealed grade 2-3 uterine prolapse with a grade 1 cystocele.       The patient denied significant urinary incontinence or difficulty with emptying of her bladder.  I saw this patient and evaluated her on multiple occasions, discussed the risks, benefits, and alternative forms of therapy.  We discussed a conservative wait and see approach, the use of a pessary or surgical repair.  The patient has strong feelings towards proceeding with a surgical repair  On 5/11/2021 the patient underwent the following procedure    Examination under anesthesia, diagnostic laparoscopy, total laparoscopic hysterectomy, bilateral salpingectomy with uterosacral ligament vaginal vault suspension.    The pathology report returned showing the following    FINAL DIAGNOSIS:   Uterus, right fallopian tube, left fallopian tube, total hysterectomy with    bilateral salpingectomy-   -Inactive/atrophic endometrium without hyperplasia or atypia.   -Intramural uterine leiomyomas, x3 (largest 0.7 cm) with areas of   sclerosis and focal dystrophic   calcifications.   -Cervix without squamous intraepithelial lesion or glandular dysplasia.   -Unremarkable bilateral fallopian tubes.   -Negative for malignancy.     The patient presents today for postop follow-up visit.  I reviewed the pathology report with the patient.  She is doing well.  Her pain is in good control.  She does have some mild discomfort in the left lower quadrant incision.  I reviewed the reason  why this would happen.  She has minimal vaginal bleeding.  She has normal bowel and bladder function is eating a regular diet    Past Medical History:   Diagnosis Date     CKD (chronic kidney disease) stage 3, GFR 30-59 ml/min 7/13     Colon polyps 12/13, 12/18    hyperplastic polyp - polyps x 4 - Dr Sheppard, due 5 yrs     Environmental allergies      Hyperlipidemia LDL goal < 130      Hypertension goal BP (blood pressure) < 140/90 2009     Hypothyroidism 2009     Osteopenia      Vitamin D deficiency      Current Outpatient Medications   Medication     acetaminophen (TYLENOL) 325 MG tablet     aspirin 81 MG tablet     fluticasone (FLONASE) 50 MCG/ACT nasal spray     levothyroxine (SYNTHROID/LEVOTHROID) 50 MCG tablet     levothyroxine (SYNTHROID/LEVOTHROID) 75 MCG tablet     lisinopril-hydrochlorothiazide (ZESTORETIC) 20-12.5 MG tablet     rosuvastatin (CRESTOR) 5 MG tablet     ondansetron (ZOFRAN-ODT) 4 MG ODT tab     oxyCODONE (ROXICODONE) 5 MG tablet     No current facility-administered medications for this visit.      /64 (BP Location: Right arm, Cuff Size: Adult Regular)   Wt 82.9 kg (182 lb 11.2 oz)   LMP 09/15/2011   Breastfeeding No   BMI 31.36 kg/m    Constitutional: healthy, alert and no distress  Gastrointestinal: Abdomen soft, minimally-tender in the left lower quadrant.  Incisions clean intact and healing well.. BS normal. No masses, organomegaly    (Z98.890) Postoperative state  (primary encounter diagnosis)  Comment: Doing well without concerns.  I reviewed again the postop restrictions including pelvic rest.  We discussed the signs and symptoms of concern and the patient will call should these occur  Plan: I will see her back for an 8-week postop visit.  She will call for questions in the interval

## 2021-06-14 ENCOUNTER — MYC MEDICAL ADVICE (OUTPATIENT)
Dept: FAMILY MEDICINE | Facility: CLINIC | Age: 61
End: 2021-06-14

## 2021-06-15 NOTE — TELEPHONE ENCOUNTER
My chart message sent     Delia Bowden RN, BSN  [facility]

## 2021-06-18 DIAGNOSIS — N18.31 STAGE 3A CHRONIC KIDNEY DISEASE (H): ICD-10-CM

## 2021-06-18 DIAGNOSIS — E03.9 ACQUIRED HYPOTHYROIDISM: ICD-10-CM

## 2021-06-18 LAB
ANION GAP SERPL CALCULATED.3IONS-SCNC: 3 MMOL/L (ref 3–14)
BUN SERPL-MCNC: 31 MG/DL (ref 7–30)
CALCIUM SERPL-MCNC: 9.3 MG/DL (ref 8.5–10.1)
CHLORIDE SERPL-SCNC: 106 MMOL/L (ref 94–109)
CO2 SERPL-SCNC: 30 MMOL/L (ref 20–32)
CREAT SERPL-MCNC: 1.41 MG/DL (ref 0.52–1.04)
GFR SERPL CREATININE-BSD FRML MDRD: 40 ML/MIN/{1.73_M2}
GLUCOSE SERPL-MCNC: 81 MG/DL (ref 70–99)
POTASSIUM SERPL-SCNC: 4.6 MMOL/L (ref 3.4–5.3)
SODIUM SERPL-SCNC: 139 MMOL/L (ref 133–144)
T4 FREE SERPL-MCNC: 0.91 NG/DL (ref 0.76–1.46)
TSH SERPL DL<=0.005 MIU/L-ACNC: 6.06 MU/L (ref 0.4–4)

## 2021-06-18 PROCEDURE — 84443 ASSAY THYROID STIM HORMONE: CPT | Performed by: PHYSICIAN ASSISTANT

## 2021-06-18 PROCEDURE — 80048 BASIC METABOLIC PNL TOTAL CA: CPT | Performed by: PHYSICIAN ASSISTANT

## 2021-06-18 PROCEDURE — 36415 COLL VENOUS BLD VENIPUNCTURE: CPT | Performed by: PHYSICIAN ASSISTANT

## 2021-06-18 PROCEDURE — 84439 ASSAY OF FREE THYROXINE: CPT | Performed by: PHYSICIAN ASSISTANT

## 2021-06-21 NOTE — RESULT ENCOUNTER NOTE
[place]  I have reviewed your recent labs. Here are the results:    -TSH (thyroid stimulating hormone) level is improved and your T4 is normal which indicates appropriate thyroid replacement dosing.  ADVISE: continuing same replacement dose (alternating 75 mcg and 50 mcg) and rechecking this in 6 months.      If you have any questions please do not hesitate to contact our office via phone ([phone]) or MyChart.    Maura Hopper, MS, PA-C  [facility]

## 2021-07-13 ENCOUNTER — OFFICE VISIT (OUTPATIENT)
Dept: OBGYN | Facility: CLINIC | Age: 61
End: 2021-07-13
Payer: COMMERCIAL

## 2021-07-13 VITALS — SYSTOLIC BLOOD PRESSURE: 106 MMHG | DIASTOLIC BLOOD PRESSURE: 70 MMHG | WEIGHT: 188 LBS | BODY MASS INDEX: 32.27 KG/M2

## 2021-07-13 DIAGNOSIS — N32.81 URGENCY-FREQUENCY SYNDROME: Primary | ICD-10-CM

## 2021-07-13 PROCEDURE — 99024 POSTOP FOLLOW-UP VISIT: CPT | Performed by: OBSTETRICS & GYNECOLOGY

## 2021-07-13 PROCEDURE — 99212 OFFICE O/P EST SF 10 MIN: CPT | Mod: 24 | Performed by: OBSTETRICS & GYNECOLOGY

## 2021-07-13 NOTE — NURSING NOTE
"Chief Complaint   Patient presents with     Surgical Followup       Initial /70   Wt 85.3 kg (188 lb)   LMP 09/15/2011   BMI 32.27 kg/m   Estimated body mass index is 32.27 kg/m  as calculated from the following:    Height as of 21: 1.626 m (5' 4\").    Weight as of this encounter: 85.3 kg (188 lb).  BP completed using cuff size: regular    Questioned patient about current smoking habits.  Pt. has never smoked.          The following HM Due: NONE      The following patient reported/Care Every where data was sent to:  P ABSTRACT QUALITY INITIATIVES [[id number]]        Laura Sunshine [facility]                 "

## 2021-07-13 NOTE — PROGRESS NOTES
The patient is a 60-year-old white female, para 1-0-2-1 postmenopausal, not presently sexually active, not on hormone replacement therapy, who I was asked to see by FLORENCIO Salcido for evaluation of pelvic floor prolapse.  The patient first noticed the prolapse in 01/2021.  The patient states that she has been working out more and does notice more pelvic pressure while doing her workouts.  Recently, while toileting and wiping, she noticed something protruding from the vagina.       Examination revealed grade 2-3 uterine prolapse with a grade 1 cystocele.       The patient denied significant urinary incontinence or difficulty with emptying of her bladder.  I saw this patient and evaluated her on multiple occasions, discussed the risks, benefits, and alternative forms of therapy.  We discussed a conservative wait and see approach, the use of a pessary or surgical repair.  The patient has strong feelings towards proceeding with a surgical repair  On 5/11/2021 the patient underwent the following procedure     Examination under anesthesia, diagnostic laparoscopy, total laparoscopic hysterectomy, bilateral salpingectomy with uterosacral ligament vaginal vault suspension.     The pathology report returned showing the following     FINAL DIAGNOSIS:   Uterus, right fallopian tube, left fallopian tube, total hysterectomy with    bilateral salpingectomy-   -Inactive/atrophic endometrium without hyperplasia or atypia.   -Intramural uterine leiomyomas, x3 (largest 0.7 cm) with areas of   sclerosis and focal dystrophic   calcifications.   -Cervix without squamous intraepithelial lesion or glandular dysplasia.   -Unremarkable bilateral fallopian tubes.   -Negative for malignancy.      The patient presents today for postop follow-up visit.  See the office visit notes from 5/28/2021 for further details.  The patient states that she is feeling fine but does notice a urgency frequency and occasional urge incontinence.  She typically  drinks a couple cups of coffee in the morning and release carbonated beverages 4 times weekly.  No alcohol use.  No excessive fluid intake.  She otherwise has normal bowel bladder function    Past Medical History:   Diagnosis Date     CKD (chronic kidney disease) stage 3, GFR 30-59 ml/min 7/13     Colon polyps 12/13, 12/18    hyperplastic polyp - polyps x 4 - Dr Sheppard, due 5 yrs     Environmental allergies      Hyperlipidemia LDL goal < 130      Hypertension goal BP (blood pressure) < 140/90 2009     Hypothyroidism 2009     Osteopenia      Vitamin D deficiency      Current Outpatient Medications   Medication     acetaminophen (TYLENOL) 325 MG tablet     aspirin 81 MG tablet     fluticasone (FLONASE) 50 MCG/ACT nasal spray     levothyroxine (SYNTHROID/LEVOTHROID) 50 MCG tablet     levothyroxine (SYNTHROID/LEVOTHROID) 75 MCG tablet     lisinopril-hydrochlorothiazide (ZESTORETIC) 20-12.5 MG tablet     rosuvastatin (CRESTOR) 5 MG tablet     No current facility-administered medications for this visit.     /70   Wt 85.3 kg (188 lb)   LMP 09/15/2011   BMI 32.27 kg/m    Constitutional: healthy, alert and no distress  Genitourinary: Normal external genitalia without lesions and speculum exam excellent apical anterior and posterior compartment support.  The cuff is well-healed absent cervix.  Bimanual exam uterus is absent adnexa without masses enlargement or tenderness.  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  I reviewed the pathophysiology of urgency frequency.  Preop exam she had no evidence of demonstrable stress incontinence.  I reviewed with her timed voidings fluid management elimination of bladder irritants including methylxanthine derivatives carbonated products and alcoholic beverages.  I also discussed with her the use of  antimuscarinic agents and beta 3 agonist such as Myrbetriq.  I gave her a detailed written outline of this discussion.  Plan: The patient will review the options that I gave her.  If she desires to try medication I will prescribe that for her.  The patient otherwise can resume a normal lifestyle.  I would like to see her back in 6 months for follow-up or as needed concerns arise

## 2021-07-13 NOTE — PATIENT INSTRUCTIONS
You can reach your [facility] Care Team any time of the day by calling [phone]. This number will put you in touch with the 24 hour nurse line if the clinic is closed.    To contact your OB/GYN Station Coordinator/Surgery Scheduler please call [phone]. This is a direct number for your care team between 8 a.m. and 4 p.m. Monday through Friday.    [facility] is open for your convenience:  Monday through Friday 8 a.m. to 6 p.m.  Closed weekends and all major holidays.

## 2021-07-20 ENCOUNTER — TRANSFERRED RECORDS (OUTPATIENT)
Dept: HEALTH INFORMATION MANAGEMENT | Facility: CLINIC | Age: 61
End: 2021-07-20

## 2021-08-20 ENCOUNTER — ALLIED HEALTH/NURSE VISIT (OUTPATIENT)
Dept: FAMILY MEDICINE | Facility: CLINIC | Age: 61
End: 2021-08-20
Payer: COMMERCIAL

## 2021-08-20 DIAGNOSIS — Z23 ENCOUNTER FOR IMMUNIZATION: Primary | ICD-10-CM

## 2021-08-20 PROCEDURE — 90750 HZV VACC RECOMBINANT IM: CPT

## 2021-08-20 PROCEDURE — 90471 IMMUNIZATION ADMIN: CPT

## 2021-08-20 PROCEDURE — 99207 PR NO CHARGE NURSE ONLY: CPT

## 2021-08-20 NOTE — PROGRESS NOTES
Prior to immunization administration, verified patients identity using patient s name and date of birth. Please see Immunization Activity for additional information.     Screening Questionnaire for Adult Immunization    Are you sick today?   No   Do you have allergies to medications, food, a vaccine component or latex?   No   Have you ever had a serious reaction after receiving a vaccination?   No   Do you have a long-term health problem with heart, lung, kidney, or metabolic disease (e.g., diabetes), asthma, a blood disorder, no spleen, complement component deficiency, a cochlear implant, or a spinal fluid leak?  Are you on long-term aspirin therapy?   No   Do you have cancer, leukemia, HIV/AIDS, or any other immune system problem?   No   Do you have a parent, brother, or sister with an immune system problem?   No   In the past 3 months, have you taken medications that affect  your immune system, such as prednisone, other steroids, or anticancer drugs; drugs for the treatment of rheumatoid arthritis, Crohn s disease, or psoriasis; or have you had radiation treatments?   No   Have you had a seizure, or a brain or other nervous system problem?   No   During the past year, have you received a transfusion of blood or blood    products, or been given immune (gamma) globulin or antiviral drug?   No   For women: Are you pregnant or is there a chance you could become       pregnant during the next month?   No   Have you received any vaccinations in the past 4 weeks?   No     Immunization questionnaire answers were all negative.        Per orders of Dr. Castellanos, injection of Shingrix given by Ehsan Guajardo CMA. Patient instructed to remain in clinic for 15 minutes afterwards, and to report any adverse reaction to me immediately.       Screening performed by Ehsan Guajardo MA on 8/20/2021 at 2:08 PM.

## 2021-09-30 DIAGNOSIS — E03.9 ACQUIRED HYPOTHYROIDISM: ICD-10-CM

## 2021-09-30 RX ORDER — LEVOTHYROXINE SODIUM 50 UG/1
TABLET ORAL
Qty: 90 TABLET | Refills: 0 | Status: SHIPPED | OUTPATIENT
Start: 2021-09-30 | End: 2021-10-28

## 2021-09-30 NOTE — TELEPHONE ENCOUNTER
Limited rx, due for cpx fasting    TSH   Date Value Ref Range Status   06/18/2021 6.06 (H) 0.40 - 4.00 mU/L Final

## 2021-09-30 NOTE — TELEPHONE ENCOUNTER
Routing refill request to provider for review/approval because:  TSH   Date Value Ref Range Status   06/18/2021 6.06 (H) 0.40 - 4.00 mU/L Final     Theodora Santana, RN

## 2021-10-08 DIAGNOSIS — I10 HYPERTENSION GOAL BP (BLOOD PRESSURE) < 140/90: ICD-10-CM

## 2021-10-10 RX ORDER — LISINOPRIL AND HYDROCHLOROTHIAZIDE 12.5; 2 MG/1; MG/1
TABLET ORAL
Qty: 90 TABLET | Refills: 0 | Status: SHIPPED | OUTPATIENT
Start: 2021-10-10 | End: 2021-10-28

## 2021-10-11 NOTE — TELEPHONE ENCOUNTER
Routing refill request to provider for review/approval because:  Labs out of range:    Creatinine   Date Value Ref Range Status   06/18/2021 1.41 (H) 0.52 - 1.04 mg/dL Final     Padilla Hernandez RN, BSN

## 2021-10-25 ASSESSMENT — ENCOUNTER SYMPTOMS
EYE PAIN: 0
HEADACHES: 0
DIZZINESS: 0
HEMATOCHEZIA: 0
COUGH: 0
DIARRHEA: 0
MYALGIAS: 0
SHORTNESS OF BREATH: 0
BREAST MASS: 0
PALPITATIONS: 0
HEARTBURN: 0
FEVER: 0
WEAKNESS: 0
DYSURIA: 0
ARTHRALGIAS: 0
SORE THROAT: 0
FREQUENCY: 0
NAUSEA: 0
NERVOUS/ANXIOUS: 0
PARESTHESIAS: 0
HEMATURIA: 0
CONSTIPATION: 0
CHILLS: 0
JOINT SWELLING: 0
ABDOMINAL PAIN: 0

## 2021-10-27 ENCOUNTER — ALLIED HEALTH/NURSE VISIT (OUTPATIENT)
Dept: FAMILY MEDICINE | Facility: CLINIC | Age: 61
End: 2021-10-27
Payer: COMMERCIAL

## 2021-10-27 DIAGNOSIS — Z23 ENCOUNTER FOR IMMUNIZATION: Primary | ICD-10-CM

## 2021-10-27 PROCEDURE — 90750 HZV VACC RECOMBINANT IM: CPT

## 2021-10-27 PROCEDURE — 99207 PR NO CHARGE NURSE ONLY: CPT

## 2021-10-28 ENCOUNTER — OFFICE VISIT (OUTPATIENT)
Dept: FAMILY MEDICINE | Facility: CLINIC | Age: 61
End: 2021-10-28
Payer: COMMERCIAL

## 2021-10-28 VITALS
DIASTOLIC BLOOD PRESSURE: 70 MMHG | WEIGHT: 185 LBS | TEMPERATURE: 98.3 F | OXYGEN SATURATION: 99 % | SYSTOLIC BLOOD PRESSURE: 110 MMHG | BODY MASS INDEX: 31.58 KG/M2 | HEIGHT: 64 IN | HEART RATE: 91 BPM

## 2021-10-28 DIAGNOSIS — E78.5 HYPERLIPIDEMIA LDL GOAL <100: ICD-10-CM

## 2021-10-28 DIAGNOSIS — M85.80 OSTEOPENIA, UNSPECIFIED LOCATION: ICD-10-CM

## 2021-10-28 DIAGNOSIS — Z13.0 SCREENING FOR DEFICIENCY ANEMIA: ICD-10-CM

## 2021-10-28 DIAGNOSIS — Z00.00 ROUTINE GENERAL MEDICAL EXAMINATION AT A HEALTH CARE FACILITY: Primary | ICD-10-CM

## 2021-10-28 DIAGNOSIS — I10 HYPERTENSION GOAL BP (BLOOD PRESSURE) < 140/90: ICD-10-CM

## 2021-10-28 DIAGNOSIS — E03.9 ACQUIRED HYPOTHYROIDISM: ICD-10-CM

## 2021-10-28 DIAGNOSIS — N18.32 STAGE 3B CHRONIC KIDNEY DISEASE (H): ICD-10-CM

## 2021-10-28 DIAGNOSIS — Z78.0 ASYMPTOMATIC MENOPAUSAL STATE: ICD-10-CM

## 2021-10-28 DIAGNOSIS — Z80.41 FAMILY HISTORY OF MALIGNANT NEOPLASM OF OVARY: ICD-10-CM

## 2021-10-28 LAB
CANCER AG125 SERPL-ACNC: <5 U/ML (ref 0–30)
ERYTHROCYTE [DISTWIDTH] IN BLOOD BY AUTOMATED COUNT: 12.6 % (ref 10–15)
HCT VFR BLD AUTO: 36.5 % (ref 35–47)
HGB BLD-MCNC: 12.2 G/DL (ref 11.7–15.7)
MCH RBC QN AUTO: 30 PG (ref 26.5–33)
MCHC RBC AUTO-ENTMCNC: 33.4 G/DL (ref 31.5–36.5)
MCV RBC AUTO: 90 FL (ref 78–100)
PLATELET # BLD AUTO: 187 10E3/UL (ref 150–450)
RBC # BLD AUTO: 4.07 10E6/UL (ref 3.8–5.2)
WBC # BLD AUTO: 6.7 10E3/UL (ref 4–11)

## 2021-10-28 PROCEDURE — 99396 PREV VISIT EST AGE 40-64: CPT | Performed by: PHYSICIAN ASSISTANT

## 2021-10-28 PROCEDURE — 84439 ASSAY OF FREE THYROXINE: CPT | Performed by: PHYSICIAN ASSISTANT

## 2021-10-28 PROCEDURE — 84443 ASSAY THYROID STIM HORMONE: CPT | Performed by: PHYSICIAN ASSISTANT

## 2021-10-28 PROCEDURE — 36415 COLL VENOUS BLD VENIPUNCTURE: CPT | Performed by: PHYSICIAN ASSISTANT

## 2021-10-28 PROCEDURE — 80053 COMPREHEN METABOLIC PANEL: CPT | Performed by: PHYSICIAN ASSISTANT

## 2021-10-28 PROCEDURE — 99213 OFFICE O/P EST LOW 20 MIN: CPT | Mod: 25 | Performed by: PHYSICIAN ASSISTANT

## 2021-10-28 PROCEDURE — 85027 COMPLETE CBC AUTOMATED: CPT | Performed by: PHYSICIAN ASSISTANT

## 2021-10-28 PROCEDURE — 86304 IMMUNOASSAY TUMOR CA 125: CPT | Performed by: PHYSICIAN ASSISTANT

## 2021-10-28 PROCEDURE — 80061 LIPID PANEL: CPT | Performed by: PHYSICIAN ASSISTANT

## 2021-10-28 PROCEDURE — 82043 UR ALBUMIN QUANTITATIVE: CPT | Performed by: PHYSICIAN ASSISTANT

## 2021-10-28 RX ORDER — LEVOTHYROXINE SODIUM 50 UG/1
50 TABLET ORAL EVERY OTHER DAY
Qty: 45 TABLET | Refills: 3 | Status: SHIPPED | OUTPATIENT
Start: 2021-10-28 | End: 2022-11-09

## 2021-10-28 RX ORDER — LEVOTHYROXINE SODIUM 75 UG/1
TABLET ORAL
Qty: 45 TABLET | Refills: 3 | Status: SHIPPED | OUTPATIENT
Start: 2021-10-28 | End: 2022-11-09

## 2021-10-28 RX ORDER — LISINOPRIL AND HYDROCHLOROTHIAZIDE 12.5; 2 MG/1; MG/1
1 TABLET ORAL DAILY
Qty: 90 TABLET | Refills: 3 | Status: SHIPPED | OUTPATIENT
Start: 2021-10-28 | End: 2023-01-09

## 2021-10-28 RX ORDER — ROSUVASTATIN CALCIUM 5 MG/1
5 TABLET, COATED ORAL DAILY
Qty: 90 TABLET | Refills: 3 | Status: SHIPPED | OUTPATIENT
Start: 2021-10-28 | End: 2022-11-09

## 2021-10-28 ASSESSMENT — MIFFLIN-ST. JEOR: SCORE: 1384.15

## 2021-10-28 ASSESSMENT — ENCOUNTER SYMPTOMS
SORE THROAT: 0
HEADACHES: 0
CHILLS: 0
WEAKNESS: 0
DYSURIA: 0
BREAST MASS: 0
HEARTBURN: 0
HEMATOCHEZIA: 0
PALPITATIONS: 0
ARTHRALGIAS: 0
NERVOUS/ANXIOUS: 0
SHORTNESS OF BREATH: 0
MYALGIAS: 0
DIARRHEA: 0
FREQUENCY: 0
DIZZINESS: 0
FEVER: 0
NAUSEA: 0
COUGH: 0
ABDOMINAL PAIN: 0
EYE PAIN: 0
PARESTHESIAS: 0
HEMATURIA: 0
JOINT SWELLING: 0
CONSTIPATION: 0

## 2021-10-28 NOTE — PROGRESS NOTES
SUBJECTIVE:   CC: Lilly Barnes is an 61 year old woman who presents for preventive health visit.       Patient has been advised of split billing requirements and indicates understanding: Yes  Healthy Habits:     Getting at least 3 servings of Calcium per day:  Yes    Bi-annual eye exam:  Yes    Dental care twice a year:  Yes    Sleep apnea or symptoms of sleep apnea:  None    Diet:  Low salt    Frequency of exercise:  4-5 days/week    Duration of exercise:  45-60 minutes    Taking medications regularly:  Yes    Medication side effects:  Not applicable    PHQ-2 Total Score: 0    Additional concerns today:  No    Hyperlipidemia Follow-Up  Rosuvastatin 5 mg    Are you regularly taking any medication or supplement to lower your cholesterol?   Yes- Rosuvastatin    Are you having muscle aches or other side effects that you think could be caused by your cholesterol lowering medication?  No  Recent Labs   Lab Test 10/02/20  0934 04/23/19  0731 12/18/15  1132 02/20/15  0912 08/01/14  0828 08/01/14  0828   CHOL 155 268*   < > 214*   < > 214*   HDL 71 69   < > 77   < > 75   LDL 71 183*   < > 128   < > 119   TRIG 67 78   < > 46   < > 101   CHOLHDLRATIO  --   --   --  2.8  --  2.9    < > = values in this interval not displayed.       Hypertension Follow-up  Lisinopril hydrochlorothiazide 20/12.5    Do you check your blood pressure regularly outside of the clinic? No     Are you following a low salt diet? Yes    Are your blood pressures ever more than 140 on the top number (systolic) OR more   than 90 on the bottom number (diastolic), for example 140/90? Unsure    Hypothyroidism Follow-up  Alternating levothyroxine 50 mcg and 75 mcg every other day (changed April 20, 2021)    Since last visit, patient describes the following symptoms: Has gained some weight recently and is unable to loss    Osteopenia  Last bone density scan April 9, 2018. 3- 5-year follow-up recommended.  Not taking calcium as she does not tolerate this  supplementation well.  Tries to get this through her diet.    Today's PHQ-2 Score:   PHQ-2 ( 1999 Pfizer) 10/25/2021   Q1: Little interest or pleasure in doing things 0   Q2: Feeling down, depressed or hopeless 0   PHQ-2 Score 0   Q1: Little interest or pleasure in doing things Not at all   Q2: Feeling down, depressed or hopeless Not at all   PHQ-2 Score 0       Abuse: Current or Past (Physical, Sexual or Emotional) - No  Do you feel safe in your environment? Yes        Social History     Tobacco Use     Smoking status: Never Smoker     Smokeless tobacco: Never Used   Substance Use Topics     Alcohol use: No     Alcohol/week: 0.0 - 1.0 standard drinks     Comment: 2 drinks per month     If you drink alcohol do you typically have >3 drinks per day or >7 drinks per week? No    Alcohol Use 10/28/2021   Prescreen: >3 drinks/day or >7 drinks/week? -   Prescreen: >3 drinks/day or >7 drinks/week? No       Reviewed orders with patient.  Reviewed health maintenance and updated orders accordingly - Yes  BP Readings from Last 3 Encounters:   10/28/21 110/70   07/13/21 106/70   05/28/21 104/64    Wt Readings from Last 3 Encounters:   10/28/21 83.9 kg (185 lb)   07/13/21 85.3 kg (188 lb)   05/28/21 82.9 kg (182 lb 11.2 oz)                  Patient Active Problem List   Diagnosis     Hypertension goal BP (blood pressure) < 140/90     CKD (chronic kidney disease) stage 3, GFR 30-59 ml/min (H)     History of colonic polyps- 3 mm polyp.  needs every 3-5 yr surveillence- hyperplastic      Hyperlipidemia LDL goal <100     Chronic rhinitis     Osteopenia     Environmental allergies     Acquired hypothyroidism     Advanced directives, counseling/discussion     Vitamin D deficiency     Uterine prolapse     Past Surgical History:   Procedure Laterality Date     ARTHROSCOPY KNEE WITH MEDIAL MENISCECTOMY Right 03/20/2017    ARTHROSCOPY KNEE WITH MEDIAL MENISCECTOMY - Dr French     COLONOSCOPY  12/06/2013    hyperplastic polyp - Dr Sheppard -  due 3-5 yrs     COLONOSCOPY  12/13, 12/18    multiple polyps - due 5 yrs     COLONOSCOPY N/A 12/07/2018    polyps x 4, due 5 years     DENTAL SURGERY  1980    wisdom teeth     ECTOPIC PREGNANCY SURGERY  1997    laproscopic - left - for ectopic pregnancy     GYN SURGERY       LAPAROSCOPIC HYSTERECTOMY TOTAL, SALPINGECTOMY BILATERAL Bilateral 5/11/2021    Procedure: TOTAL LAPAROSCOPIC HYSTERECTOMY, BILATERAL SALPINGECTOMY, UTEROSACRAL LIGAMENT VAGINAL VAULT SUSPENSION, exam under anesthesia;  Surgeon: Julio César Cook MD;  Location: [facility]     ORTHOPEDIC SURGERY         Social History     Tobacco Use     Smoking status: Never Smoker     Smokeless tobacco: Never Used   Substance Use Topics     Alcohol use: No     Alcohol/week: 0.0 - 1.0 standard drinks     Comment: 2 drinks per month     Family History   Problem Relation Age of Onset     Diabetes Mother      Heart Failure Mother      Osteoporosis Mother      Brain Cancer Father 40     Cancer Sister 43        ovarian and uterine - recurrent     Cerebrovascular Disease Sister      Diabetes Type 2  Sister         from chemo     Stomach Cancer Sister 72     Diabetes Brother      Diabetes Brother      Liver Cancer Brother         no ETOH     Diabetes Brother      Leukemia Maternal Grandmother      Coronary Artery Disease Maternal Grandfather      Depression Son         resolved     Lipids Other         multiple members including 1 degree          Current Outpatient Medications   Medication Sig Dispense Refill     acetaminophen (TYLENOL) 325 MG tablet Take 2 tablets (650 mg) by mouth every 4 hours as needed for mild pain 50 tablet 0     aspirin 81 MG tablet Take 81 mg by mouth daily   3     fluticasone (FLONASE) 50 MCG/ACT nasal spray Spray 2 sprays into both nostrils daily 48 g 3     levothyroxine (SYNTHROID/LEVOTHROID) 50 MCG tablet Take 1 tablet (50 mcg) by mouth every other day 45 tablet 3     levothyroxine (SYNTHROID/LEVOTHROID) 75 MCG tablet TAKE ONE TABLET BY MOUTH  EVERY OTHER DAY 45 tablet 3     lisinopril-hydrochlorothiazide (ZESTORETIC) 20-12.5 MG tablet Take 1 tablet by mouth daily 90 tablet 3     rosuvastatin (CRESTOR) 5 MG tablet Take 1 tablet (5 mg) by mouth daily 90 tablet 3       Breast Cancer Screening:    FHS-7:   Breast CA Risk Assessment (FHS-7) 10/25/2021   Did any of your first-degree relatives have breast or ovarian cancer? Yes   Did any of your relatives have bilateral breast cancer? No   Did any man in your family have breast cancer? No   Did any woman in your family have breast and ovarian cancer? Yes   Did any woman in your family have breast cancer before age 50 y? No   Do you have 2 or more relatives with breast and/or ovarian cancer? Unknown   Do you have 2 or more relatives with breast and/or bowel cancer? Unknown     Mammogram Screening: Recommended mammography every 1-2 years with patient discussion and risk factor consideration  Pertinent mammograms are reviewed under the imaging tab.    History of abnormal Pap smear: Status post benign hysterectomy. Health Maintenance and Surgical History updated.  PAP / HPV Latest Ref Rng & Units 10/2/2020 2/28/2018 12/18/2015   PAP (Historical) - NIL NIL NIL   HPV16 NEG:Negative Negative Negative -   HPV18 NEG:Negative Negative Negative -   HRHPV NEG:Negative Negative Negative -     Reviewed and updated as needed this visit by clinical staff  Tobacco  Allergies  Meds  Problems  Med Hx  Surg Hx  Fam Hx  Soc Hx          Reviewed and updated as needed this visit by Provider  Tobacco  Allergies  Meds  Problems  Med Hx  Surg Hx  Fam Hx             Review of Systems   Constitutional: Negative for chills and fever.   HENT: Negative for congestion, ear pain, hearing loss and sore throat.    Eyes: Negative for pain and visual disturbance.   Respiratory: Negative for cough and shortness of breath.    Cardiovascular: Negative for chest pain, palpitations and peripheral edema.   Gastrointestinal: Negative for  "abdominal pain, constipation, diarrhea, heartburn, hematochezia and nausea.   Breasts:  Negative for tenderness, breast mass and discharge.   Genitourinary: Negative for dysuria, frequency, genital sores, hematuria, pelvic pain, urgency, vaginal bleeding and vaginal discharge.   Musculoskeletal: Negative for arthralgias, joint swelling and myalgias.   Skin: Negative for rash.   Neurological: Negative for dizziness, weakness, headaches and paresthesias.   Psychiatric/Behavioral: Negative for mood changes. The patient is not nervous/anxious.      CONSTITUTIONAL: NEGATIVE for fever, chills, change in weight  INTEGUMENTARY/SKIN: NEGATIVE for worrisome rashes, moles or lesions  EYES: NEGATIVE for vision changes or irritation  ENT: NEGATIVE for ear, mouth and throat problems  RESP: NEGATIVE for significant cough or SOB  BREAST: NEGATIVE for masses, tenderness or discharge  CV: NEGATIVE for chest pain, palpitations or peripheral edema  GI: NEGATIVE for nausea, abdominal pain, heartburn, or change in bowel habits  : NEGATIVE for unusual urinary or vaginal symptoms. No vaginal bleeding.  MUSCULOSKELETAL: NEGATIVE for significant arthralgias or myalgia  NEURO: NEGATIVE for weakness, dizziness or paresthesias  PSYCHIATRIC: NEGATIVE for changes in mood or affect      OBJECTIVE:   /70 (BP Location: Right arm, Patient Position: Chair, Cuff Size: Adult Regular)   Pulse 91   Temp 98.3  F (36.8  C) (Tympanic)   Ht 1.626 m (5' 4\")   Wt 83.9 kg (185 lb)   LMP 09/15/2011   SpO2 99%   Breastfeeding No   BMI 31.76 kg/m    Physical Exam  GENERAL APPEARANCE: healthy, alert and no distress  EYES: Eyes grossly normal to inspection, PERRL and conjunctivae and sclerae normal  HENT: ear canals and TM's normal, nose and mouth without ulcers or lesions, oropharynx clear and oral mucous membranes moist  NECK: no adenopathy, no asymmetry, masses, or scars and thyroid normal to palpation  RESP: lungs clear to auscultation - no rales, " rhonchi or wheezes  BREAST: normal without masses, tenderness or nipple discharge and no palpable axillary masses or adenopathy  CV: regular rate and rhythm, normal S1 S2, no S3 or S4, no murmur, click or rub, no peripheral edema and peripheral pulses strong  ABDOMEN: soft, nontender, no hepatosplenomegaly, no masses and bowel sounds normal  MS: no musculoskeletal defects are noted and gait is age appropriate without ataxia  SKIN: no suspicious lesions or rashes  NEURO: Normal strength and tone, sensory exam grossly normal, mentation intact and speech normal  PSYCH: mentation appears normal and affect normal/bright    Diagnostic Test Results:  Results for orders placed or performed in visit on 10/28/21   Lipid panel reflex to direct LDL Fasting     Status: Abnormal   Result Value Ref Range    Cholesterol 179 <200 mg/dL    Triglycerides 166 (H) <150 mg/dL    Direct Measure HDL 56 >=50 mg/dL    LDL Cholesterol Calculated 90 <=100 mg/dL    Non HDL Cholesterol 123 <130 mg/dL    Patient Fasting > 8hrs? Yes     Narrative    Cholesterol  Desirable:  <200 mg/dL    Triglycerides  Normal:  Less than 150 mg/dL  Borderline High:  150-199 mg/dL  High:  200-499 mg/dL  Very High:  Greater than or equal to 500 mg/dL    Direct Measure HDL  Female:  Greater than or equal to 50 mg/dL   Male:  Greater than or equal to 40 mg/dL    LDL Cholesterol  Desirable:  <100mg/dL  Above Desirable:  100-129 mg/dL   Borderline High:  130-159 mg/dL   High:  160-189 mg/dL   Very High:  >= 190 mg/dL    Non HDL Cholesterol  Desirable:  130 mg/dL  Above Desirable:  130-159 mg/dL  Borderline High:  160-189 mg/dL  High:  190-219 mg/dL  Very High:  Greater than or equal to 220 mg/dL   Comprehensive metabolic panel (BMP + Alb, Alk Phos, ALT, AST, Total. Bili, TP)     Status: Abnormal   Result Value Ref Range    Sodium 137 133 - 144 mmol/L    Potassium 3.9 3.4 - 5.3 mmol/L    Chloride 107 94 - 109 mmol/L    Carbon Dioxide (CO2) 24 20 - 32 mmol/L    Anion Gap  6 3 - 14 mmol/L    Urea Nitrogen 33 (H) 7 - 30 mg/dL    Creatinine 1.36 (H) 0.52 - 1.04 mg/dL    Calcium 9.2 8.5 - 10.1 mg/dL    Glucose 79 70 - 99 mg/dL    Alkaline Phosphatase 78 40 - 150 U/L    AST 16 0 - 45 U/L    ALT 21 0 - 50 U/L    Protein Total 7.0 6.8 - 8.8 g/dL    Albumin 3.6 3.4 - 5.0 g/dL    Bilirubin Total 0.3 0.2 - 1.3 mg/dL    GFR Estimate 42 (L) >60 mL/min/1.73m2   CBC with platelets     Status: Normal   Result Value Ref Range    WBC Count 6.7 4.0 - 11.0 10e3/uL    RBC Count 4.07 3.80 - 5.20 10e6/uL    Hemoglobin 12.2 11.7 - 15.7 g/dL    Hematocrit 36.5 35.0 - 47.0 %    MCV 90 78 - 100 fL    MCH 30.0 26.5 - 33.0 pg    MCHC 33.4 31.5 - 36.5 g/dL    RDW 12.6 10.0 - 15.0 %    Platelet Count 187 150 - 450 10e3/uL   TSH with free T4 reflex     Status: Abnormal   Result Value Ref Range    TSH 7.95 (H) 0.40 - 4.00 mU/L        Status: Normal   Result Value Ref Range     <5 0 - 30 U/mL    Narrative    Assay Method:  Chemiluminescence using Siemens Centaur  XP   T4 free     Status: Normal   Result Value Ref Range    Free T4 1.10 0.76 - 1.46 ng/dL       ASSESSMENT/PLAN:   [place] was seen today for physical.    Diagnoses and all orders for this visit:    Routine general medical examination at a health care facility  -     REVIEW OF HEALTH MAINTENANCE PROTOCOL ORDERS    Acquired hypothyroidism  Euthyroid.  Continue current regimen with alternating 75 and 50 mcg tablets.  -     levothyroxine (SYNTHROID/LEVOTHROID) 75 MCG tablet; TAKE ONE TABLET BY MOUTH EVERY OTHER DAY  -     levothyroxine (SYNTHROID/LEVOTHROID) 50 MCG tablet; Take 1 tablet (50 mcg) by mouth every other day  -     TSH with free T4 reflex  -     T4 free    Hyperlipidemia LDL goal <100  Well-controlled.  Continue current regimen.  -     rosuvastatin (CRESTOR) 5 MG tablet; Take 1 tablet (5 mg) by mouth daily  -     Lipid panel reflex to direct LDL Fasting    Hypertension goal BP (blood pressure) < 140/90  Stage 3b chronic kidney disease  "(H)  Well-controlled.  Continue current regimen.  Labs for surveillance.  -     lisinopril-hydrochlorothiazide (ZESTORETIC) 20-12.5 MG tablet; Take 1 tablet by mouth daily  -     Albumin Random Urine Quantitative with Creat Ratio  -     Comprehensive metabolic panel (BMP + Alb, Alk Phos, ALT, AST, Total. Bili, TP)    Family history of malignant neoplasm of ovary  Ovarian cancer history in sister at a young age.  Father with history of brain cancer and brother with liver cancer.  Recommend cancer risk management as well.  -     Cancer Risk Mgmt/Cancer Genetic Counseling Referral; Future  -         Osteopenia, unspecified location  Asymptomatic menopausal state  DEXA for surveillance.  Does not tolerate calcium supplementation but tries to get it through her diet.  -     DEXA HIP/PELVIS/SPINE - Future; Future    Screening for deficiency anemia  Routine screening  -     CBC with platelets        Patient has been advised of split billing requirements and indicates understanding: Yes  COUNSELING:  Reviewed preventive health counseling, as reflected in patient instructions       Regular exercise       Healthy diet/nutrition    Estimated body mass index is 31.76 kg/m  as calculated from the following:    Height as of this encounter: 1.626 m (5' 4\").    Weight as of this encounter: 83.9 kg (185 lb).    Weight management plan: Discussed healthy diet and exercise guidelines    She reports that she has never smoked. She has never used smokeless tobacco.      Counseling Resources:  ATP IV Guidelines  Pooled Cohorts Equation Calculator  Breast Cancer Risk Calculator  BRCA-Related Cancer Risk Assessment: FHS-7 Tool  FRAX Risk Assessment  ICSI Preventive Guidelines  Dietary Guidelines for Americans, 2010  USDA's MyPlate  ASA Prophylaxis  Lung CA Screening    Maura Hopper PA-C  [facility] [place]  "

## 2021-10-29 LAB
ALBUMIN SERPL-MCNC: 3.6 G/DL (ref 3.4–5)
ALP SERPL-CCNC: 78 U/L (ref 40–150)
ALT SERPL W P-5'-P-CCNC: 21 U/L (ref 0–50)
ANION GAP SERPL CALCULATED.3IONS-SCNC: 6 MMOL/L (ref 3–14)
AST SERPL W P-5'-P-CCNC: 16 U/L (ref 0–45)
BILIRUB SERPL-MCNC: 0.3 MG/DL (ref 0.2–1.3)
BUN SERPL-MCNC: 33 MG/DL (ref 7–30)
CALCIUM SERPL-MCNC: 9.2 MG/DL (ref 8.5–10.1)
CHLORIDE BLD-SCNC: 107 MMOL/L (ref 94–109)
CHOLEST SERPL-MCNC: 179 MG/DL
CO2 SERPL-SCNC: 24 MMOL/L (ref 20–32)
CREAT SERPL-MCNC: 1.36 MG/DL (ref 0.52–1.04)
FASTING STATUS PATIENT QL REPORTED: YES
GFR SERPL CREATININE-BSD FRML MDRD: 42 ML/MIN/1.73M2
GLUCOSE BLD-MCNC: 79 MG/DL (ref 70–99)
HDLC SERPL-MCNC: 56 MG/DL
LDLC SERPL CALC-MCNC: 90 MG/DL
NONHDLC SERPL-MCNC: 123 MG/DL
POTASSIUM BLD-SCNC: 3.9 MMOL/L (ref 3.4–5.3)
PROT SERPL-MCNC: 7 G/DL (ref 6.8–8.8)
SODIUM SERPL-SCNC: 137 MMOL/L (ref 133–144)
T4 FREE SERPL-MCNC: 1.1 NG/DL (ref 0.76–1.46)
TRIGL SERPL-MCNC: 166 MG/DL
TSH SERPL DL<=0.005 MIU/L-ACNC: 7.95 MU/L (ref 0.4–4)

## 2021-10-30 LAB
CREAT UR-MCNC: 350 MG/DL
MICROALBUMIN UR-MCNC: 12 MG/L
MICROALBUMIN/CREAT UR: 3.43 MG/G CR (ref 0–25)

## 2021-10-31 NOTE — RESULT ENCOUNTER NOTE
[place]ia  I have reviewed your recent labs. Here are the results:    -Normal red blood cell (hgb) levels, normal white blood cell count and normal platelet levels.  -Cholesterol levels are at your goal levels.  ADVISE: continuing your medication, a regular exercise program with at least 150 minutes of aerobic exercise per week, and eating a low saturated fat/low carbohydrate diet.  Also, you should recheck this fasting cholesterol panel in 12 months.  -Liver and gallbladder tests are normal (ALT,AST, Alk phos, bilirubin), kidney function is decreased but stable (Cr, GFR), sodium is normal, potassium is normal, calcium is normal, glucose is normal.  -TSH (thyroid stimulating hormone) level is elevated but your T4 is normal which indicates appropriate thyroid replacement dosing.  ADVISE: continuing same replacement dose and rechecking this in 6 months.  -Microalbumin (urine protein) test is normal.  ADVISE: rechecking this annually.  - (ovarian cancer marker) is normal.    For additional lab test information, labtestsonline.org is an excellent reference.       If you have any questions please do not hesitate to contact our office via phone ([phone]) or MyChart.    Maura Hopper, MS, PA-C  [facility] [place]

## 2021-11-05 ENCOUNTER — HOSPITAL ENCOUNTER (OUTPATIENT)
Dept: MAMMOGRAPHY | Facility: CLINIC | Age: 61
Discharge: HOME | End: 2021-11-05
Attending: PHYSICIAN ASSISTANT | Admitting: PHYSICIAN ASSISTANT
Payer: COMMERCIAL

## 2021-11-05 DIAGNOSIS — Z12.31 VISIT FOR SCREENING MAMMOGRAM: ICD-10-CM

## 2021-11-05 PROCEDURE — 77063 BREAST TOMOSYNTHESIS BI: CPT

## 2021-11-05 NOTE — RESULT ENCOUNTER NOTE
[place]ia  Here are your recent results.  They are normal.  If you have any questions please do not hesitate to contact our office via phone ([phone]) or MyChart.    Maura Hopper MS, PA-C  [facility]

## 2021-11-17 ENCOUNTER — HOSPITAL ENCOUNTER (OUTPATIENT)
Dept: BONE DENSITY | Facility: CLINIC | Age: 61
Discharge: HOME | End: 2021-11-17
Attending: PHYSICIAN ASSISTANT | Admitting: PHYSICIAN ASSISTANT
Payer: COMMERCIAL

## 2021-11-17 DIAGNOSIS — M85.80 OSTEOPENIA, UNSPECIFIED LOCATION: ICD-10-CM

## 2021-11-17 DIAGNOSIS — Z78.0 ASYMPTOMATIC MENOPAUSAL STATE: ICD-10-CM

## 2021-11-17 PROCEDURE — 77080 DXA BONE DENSITY AXIAL: CPT

## 2021-11-19 NOTE — RESULT ENCOUNTER NOTE
Virginia-    Here are your recent bone density results.  They are no significant change from your previous studies.  Your bone density is consistent with osteopenia (thinning of bone, but not as severe as osteoporosis). Taking 1200 mg-1500 mg (post-menopausal) of calcium +D (Caltrate D or generic equivalent) twice daily can help to prevent progression of bone thinning in addition to weight bearing exercise at least 3 times per week.  We should plan to repeat your bone density scan in 5 years.      Thank you for allowing me to be a part of your healthcare team.    Maura Hopper, MS, PA-C  [facility] [place]

## 2021-12-16 ENCOUNTER — MYC MEDICAL ADVICE (OUTPATIENT)
Dept: FAMILY MEDICINE | Facility: CLINIC | Age: 61
End: 2021-12-16
Payer: COMMERCIAL

## 2021-12-21 NOTE — TELEPHONE ENCOUNTER
Immunizations updated. Unable to pull from [facility].     Julienne Nunez RN  [facility]

## 2022-01-05 DIAGNOSIS — E03.9 ACQUIRED HYPOTHYROIDISM: ICD-10-CM

## 2022-01-07 RX ORDER — LEVOTHYROXINE SODIUM 50 UG/1
TABLET ORAL
Qty: 90 TABLET | Refills: 0 | OUTPATIENT
Start: 2022-01-07

## 2022-04-13 ENCOUNTER — MYC MEDICAL ADVICE (OUTPATIENT)
Dept: FAMILY MEDICINE | Facility: CLINIC | Age: 62
End: 2022-04-13
Payer: OTHER GOVERNMENT

## 2022-04-13 DIAGNOSIS — E03.9 ACQUIRED HYPOTHYROIDISM: Primary | ICD-10-CM

## 2022-04-13 NOTE — TELEPHONE ENCOUNTER
Please see my chart message below - please place orders     Please review and advise     Thank you     Delia Bowden RN, BSN  [facility]

## 2022-04-19 ENCOUNTER — LAB (OUTPATIENT)
Dept: LAB | Facility: CLINIC | Age: 62
End: 2022-04-19
Payer: OTHER GOVERNMENT

## 2022-04-19 DIAGNOSIS — E03.9 ACQUIRED HYPOTHYROIDISM: ICD-10-CM

## 2022-04-19 LAB
T4 FREE SERPL-MCNC: 1.02 NG/DL (ref 0.76–1.46)
TSH SERPL DL<=0.005 MIU/L-ACNC: 5.08 MU/L (ref 0.4–4)

## 2022-04-19 PROCEDURE — 84443 ASSAY THYROID STIM HORMONE: CPT

## 2022-04-19 PROCEDURE — 36415 COLL VENOUS BLD VENIPUNCTURE: CPT

## 2022-04-19 PROCEDURE — 84439 ASSAY OF FREE THYROXINE: CPT

## 2022-04-20 NOTE — RESULT ENCOUNTER NOTE
[place]ia  I have reviewed your recent labs. Here are the results:    -TSH (thyroid stimulating hormone) level is elevated (but improved from the levels in October) but your T4 is normal which indicates appropriate thyroid replacement dosing.  ADVISE: continuing same replacement dose and rechecking this in 6 months.       If you have any questions please do not hesitate to contact our office via phone ([phone]) or MyChart.    Maura Hopper MBA, MS, PA-C  [facility] [place]

## 2022-07-25 NOTE — PATIENT INSTRUCTIONS
Patient calling  She is reporting;  Painful leg cramps.    Still feeling leg tightness and twitching in legs.  After being seen in  for the same thing back in May 2022  Also has thick sinus  drainage , and states she is having some groin problems as well.    Patient states she is leaving on a trip tomorrow, and wants to go to  today.    Layla Baldwin RN   [facility] Nurse Advisor        Reason for Disposition    MILD pain (e.g., does not interfere with normal activities) and present > 7 days    Additional Information    Negative: MODERATE pain (e.g., interferes with normal activities, limping) and present > 3 days    Negative: Numbness in a leg or foot (i.e., loss of sensation)    Negative: Patient wants to be seen    Negative: Painful rash with multiple small blisters grouped together (i.e., dermatomal distribution or 'band' or 'stripe')    Negative: Looks like a boil, infected sore, deep ulcer, or other infected rash (spreading redness, pus)    Negative: Localized rash is very painful (no fever)    Negative: SEVERE pain (e.g., excruciating, unable to do any normal activities)    Negative: Red area or streak and large (> 2 in. or 5 cm)    Negative: SEVERE pain (e.g., excruciating, unable to do any normal activities) and fever    Negative: Can't stand (bear weight) or walk    Negative: Followed a hip injury    Negative: Leg pain is main symptom    Negative: Back pain radiating (shooting) into hip    Protocols used: HIP PAIN-A-OH       You can reach your [facility] Care Team any time of the day by calling [phone]. This number will put you in touch with the 24 hour nurse line if the clinic is closed.    To contact your OB/GYN Station Coordinator/Surgery Scheduler please call [phone]. This is a direct number for your care team between 8 a.m. and 4 p.m. Monday through Friday.    [facility] is open for your convenience:  Monday through Friday 8 a.m. to 6 p.m.  Closed weekends and all major holidays.

## 2022-10-10 ENCOUNTER — HEALTH MAINTENANCE LETTER (OUTPATIENT)
Age: 62
End: 2022-10-10

## 2022-11-06 DIAGNOSIS — E03.9 ACQUIRED HYPOTHYROIDISM: ICD-10-CM

## 2022-11-06 DIAGNOSIS — E78.5 HYPERLIPIDEMIA LDL GOAL <100: ICD-10-CM

## 2022-11-09 RX ORDER — LEVOTHYROXINE SODIUM 50 UG/1
TABLET ORAL
Qty: 45 TABLET | Refills: 0 | Status: SHIPPED | OUTPATIENT
Start: 2022-11-09 | End: 2023-02-08

## 2022-11-09 RX ORDER — ROSUVASTATIN CALCIUM 5 MG/1
TABLET, COATED ORAL
Qty: 90 TABLET | Refills: 0 | Status: SHIPPED | OUTPATIENT
Start: 2022-11-09 | End: 2023-02-08

## 2022-11-09 RX ORDER — LEVOTHYROXINE SODIUM 75 UG/1
TABLET ORAL
Qty: 45 TABLET | Refills: 0 | Status: SHIPPED | OUTPATIENT
Start: 2022-11-09 | End: 2023-02-08

## 2022-11-09 NOTE — TELEPHONE ENCOUNTER
Patient has moved to [place] so will be following up with a new provider      /Aniya Watts CMA

## 2022-11-09 NOTE — TELEPHONE ENCOUNTER
Routing refill request to provider for review/approval because:  TSH   Date Value Ref Range Status   04/19/2022 5.08 (H) 0.40 - 4.00 mU/L Final   06/18/2021 6.06 (H) 0.40 - 4.00 mU/L Final     LDL Cholesterol Calculated   Date Value Ref Range Status   10/28/2021 90 <=100 mg/dL Final   10/02/2020 71 <100 mg/dL Final     Comment:     Desirable:       <100 mg/dl      Pt needs yearly Team please call to schedule      Theodora Santana RN

## 2023-02-06 DIAGNOSIS — E03.9 ACQUIRED HYPOTHYROIDISM: ICD-10-CM

## 2023-02-06 DIAGNOSIS — E78.5 HYPERLIPIDEMIA LDL GOAL <100: ICD-10-CM

## 2023-02-08 RX ORDER — ROSUVASTATIN CALCIUM 5 MG/1
TABLET, COATED ORAL
Qty: 90 TABLET | Refills: 0 | Status: SHIPPED | OUTPATIENT
Start: 2023-02-08

## 2023-02-08 RX ORDER — LEVOTHYROXINE SODIUM 50 UG/1
TABLET ORAL
Qty: 45 TABLET | Refills: 0 | Status: SHIPPED | OUTPATIENT
Start: 2023-02-08 | End: 2023-05-13

## 2023-02-08 RX ORDER — LEVOTHYROXINE SODIUM 75 UG/1
TABLET ORAL
Qty: 45 TABLET | Refills: 0 | Status: SHIPPED | OUTPATIENT
Start: 2023-02-08 | End: 2023-05-13

## 2023-02-08 NOTE — TELEPHONE ENCOUNTER
Routing refill request to provider for review/approval because:  Patient needs to be seen because it has been more than 1 year since last office visit.    Jefe Larsen RN

## 2023-02-22 NOTE — NURSING NOTE
"Chief Complaint   Patient presents with     RECHECK     Right knee, synvisc injection 3 of 3       Initial /64 (BP Location: Right arm, Patient Position: Sitting, Cuff Size: Adult Regular)  Ht 5' 4\" (1.626 m)  Wt 182 lb (82.6 kg)  LMP 09/15/2011  BMI 31.24 kg/m2 Estimated body mass index is 31.24 kg/(m^2) as calculated from the following:    Height as of this encounter: 5' 4\" (1.626 m).    Weight as of this encounter: 182 lb (82.6 kg).  Medication Reconciliation: complete    " Wound Care: Mupirocin

## 2023-03-21 NOTE — TELEPHONE ENCOUNTER
benoit sent advising follow up    Lucretia Encarnacion       Cleanser Recommendations: Sulfur Detail Level: Zone Spironolactone Pregnancy And Lactation Text: This medication can cause feminization of the male fetus and should be avoided during pregnancy. The active metabolite is also found in breast milk. High Dose Vitamin A Counseling: Side effects reviewed, pt to contact office should one occur. Benzoyl Peroxide Counseling: Patient counseled that medicine may cause skin irritation and bleach clothing. In the event of skin irritation, the patient was advised to reduce the amount of the drug applied or use it less frequently. The patient verbalized understanding of the proper use and possible adverse effects of benzoyl peroxide. All of the patient's questions and concerns were addressed. Topical Clindamycin Pregnancy And Lactation Text: This medication is Pregnancy Category B and is considered safe during pregnancy. It is unknown if it is excreted in breast milk. Azithromycin Counseling:  I discussed with the patient the risks of azithromycin including but not limited to GI upset, allergic reaction, drug rash, diarrhea, and yeast infections. Dapsone Pregnancy And Lactation Text: This medication is Pregnancy Category C and is not considered safe during pregnancy or breast feeding. Birth Control Pills Pregnancy And Lactation Text: This medication should be avoided if pregnant and for the first 30 days post-partum. Isotretinoin Counseling: Patient should get monthly blood tests, not donate blood, not drive at night if vision affected, not share medication, and not undergo elective surgery for 6 months after tx completed. Side effects reviewed, pt to contact office should one occur. Sarecycline Pregnancy And Lactation Text: This medication is Pregnancy Category D and not consider safe during pregnancy. It is also excreted in breast milk. Azelaic Acid Counseling: Patient counseled that medicine may cause skin irritation and to avoid applying near the eyes. In the event of skin irritation, the patient was advised to reduce the amount of the drug applied or use it less frequently. The patient verbalized understanding of the proper use and possible adverse effects of azelaic acid. All of the patient's questions and concerns were addressed. Tazorac Pregnancy And Lactation Text: This medication is not safe during pregnancy. It is unknown if this medication is excreted in breast milk. Aklief counseling:  Patient advised to apply a pea-sized amount only at bedtime and wait 30 minutes after washing their face before applying. If too drying, patient may add a non-comedogenic moisturizer. The most commonly reported side effects including irritation, redness, scaling, dryness, stinging, burning, itching, and increased risk of sunburn. The patient verbalized understanding of the proper use and possible adverse effects of retinoids. All of the patient's questions and concerns were addressed. Bactrim Pregnancy And Lactation Text: This medication is Pregnancy Category D and is known to cause fetal risk. It is also excreted in breast milk. Erythromycin Counseling:  I discussed with the patient the risks of erythromycin including but not limited to GI upset, allergic reaction, drug rash, diarrhea, increase in liver enzymes, and yeast infections. Winlevi Counseling:  I discussed with the patient the risks of topical clascoterone including but not limited to erythema, scaling, itching, and stinging. Patient voiced their understanding. Topical Retinoid Pregnancy And Lactation Text: This medication is Pregnancy Category C. It is unknown if this medication is excreted in breast milk. Include Pregnancy/Lactation Warning?: No Topical Sulfur Applications Counseling: Topical Sulfur Counseling: Patient counseled that this medication may cause skin irritation or allergic reactions. In the event of skin irritation, the patient was advised to reduce the amount of the drug applied or use it less frequently. The patient verbalized understanding of the proper use and possible adverse effects of topical sulfur application. All of the patient's questions and concerns were addressed. Benzoyl Peroxide Pregnancy And Lactation Text: This medication is Pregnancy Category C. It is unknown if benzoyl peroxide is excreted in breast milk. High Dose Vitamin A Pregnancy And Lactation Text: High dose vitamin A therapy is contraindicated during pregnancy and breast feeding. Tetracycline Counseling: Patient counseled regarding possible photosensitivity and increased risk for sunburn. Patient instructed to avoid sunlight, if possible. When exposed to sunlight, patients should wear protective clothing, sunglasses, and sunscreen. The patient was instructed to call the office immediately if the following severe adverse effects occur:  hearing changes, easy bruising/bleeding, severe headache, or vision changes. The patient verbalized understanding of the proper use and possible adverse effects of tetracycline. All of the patient's questions and concerns were addressed. Patient understands to avoid pregnancy while on therapy due to potential birth defects. Azithromycin Pregnancy And Lactation Text: This medication is considered safe during pregnancy and is also secreted in breast milk. Doxycycline Counseling:  Patient counseled regarding possible photosensitivity and increased risk for sunburn. Patient instructed to avoid sunlight, if possible. When exposed to sunlight, patients should wear protective clothing, sunglasses, and sunscreen. The patient was instructed to call the office immediately if the following severe adverse effects occur:  hearing changes, easy bruising/bleeding, severe headache, or vision changes. The patient verbalized understanding of the proper use and possible adverse effects of doxycycline. All of the patient's questions and concerns were addressed. Azelaic Acid Pregnancy And Lactation Text: This medication is considered safe during pregnancy and breast feeding. Topical Clindamycin Counseling: Patient counseled that this medication may cause skin irritation or allergic reactions. In the event of skin irritation, the patient was advised to reduce the amount of the drug applied or use it less frequently. The patient verbalized understanding of the proper use and possible adverse effects of clindamycin. All of the patient's questions and concerns were addressed. Isotretinoin Pregnancy And Lactation Text: This medication is Pregnancy Category X and is considered extremely dangerous during pregnancy. It is unknown if it is excreted in breast milk. Spironolactone Counseling: Patient advised regarding risks of diarrhea, abdominal pain, hyperkalemia, birth defects (for female patients), liver toxicity and renal toxicity. The patient may need blood work to monitor liver and kidney function and potassium levels while on therapy. The patient verbalized understanding of the proper use and possible adverse effects of spironolactone. All of the patient's questions and concerns were addressed. Dapsone Counseling: I discussed with the patient the risks of dapsone including but not limited to hemolytic anemia, agranulocytosis, rashes, methemoglobinemia, kidney failure, peripheral neuropathy, headaches, GI upset, and liver toxicity. Patients who start dapsone require monitoring including baseline LFTs and weekly CBCs for the first month, then every month thereafter. The patient verbalized understanding of the proper use and possible adverse effects of dapsone. All of the patient's questions and concerns were addressed. Aklief Pregnancy And Lactation Text: It is unknown if this medication is safe to use during pregnancy. It is unknown if this medication is excreted in breast milk. Breastfeeding women should use the topical cream on the smallest area of the skin for the shortest time needed while breastfeeding. Do not apply to nipple and areola. Sarecycline Counseling: Patient advised regarding possible photosensitivity and discoloration of the teeth, skin, lips, tongue and gums. Patient instructed to avoid sunlight, if possible. When exposed to sunlight, patients should wear protective clothing, sunglasses, and sunscreen. The patient was instructed to call the office immediately if the following severe adverse effects occur:  hearing changes, easy bruising/bleeding, severe headache, or vision changes. The patient verbalized understanding of the proper use and possible adverse effects of sarecycline. All of the patient's questions and concerns were addressed. Birth Control Pills Counseling: Birth Control Pill Counseling: I discussed with the patient the potential side effects of OCPs including but not limited to increased risk of stroke, heart attack, thrombophlebitis, deep venous thrombosis, hepatic adenomas, breast changes, GI upset, headaches, and depression. The patient verbalized understanding of the proper use and possible adverse effects of OCPs. All of the patient's questions and concerns were addressed. Erythromycin Pregnancy And Lactation Text: This medication is Pregnancy Category B and is considered safe during pregnancy. It is also excreted in breast milk. Tazorac Counseling:  Patient advised that medication is irritating and drying. Patient may need to apply sparingly and wash off after an hour before eventually leaving it on overnight. The patient verbalized understanding of the proper use and possible adverse effects of tazorac. All of the patient's questions and concerns were addressed. Minocycline Counseling: Patient advised regarding possible photosensitivity and discoloration of the teeth, skin, lips, tongue and gums. Patient instructed to avoid sunlight, if possible. When exposed to sunlight, patients should wear protective clothing, sunglasses, and sunscreen. The patient was instructed to call the office immediately if the following severe adverse effects occur:  hearing changes, easy bruising/bleeding, severe headache, or vision changes. The patient verbalized understanding of the proper use and possible adverse effects of minocycline. All of the patient's questions and concerns were addressed. Topical Retinoid counseling:  Patient advised to apply a pea-sized amount only at bedtime and wait 30 minutes after washing their face before applying. If too drying, patient may add a non-comedogenic moisturizer. The patient verbalized understanding of the proper use and possible adverse effects of retinoids. All of the patient's questions and concerns were addressed. Winlevi Pregnancy And Lactation Text: This medication is considered safe during pregnancy and breastfeeding. Bactrim Counseling:  I discussed with the patient the risks of sulfa antibiotics including but not limited to GI upset, allergic reaction, drug rash, diarrhea, dizziness, photosensitivity, and yeast infections. Rarely, more serious reactions can occur including but not limited to aplastic anemia, agranulocytosis, methemoglobinemia, blood dyscrasias, liver or kidney failure, lung infiltrates or desquamative/blistering drug rashes. Doxycycline Pregnancy And Lactation Text: This medication is Pregnancy Category D and not consider safe during pregnancy. It is also excreted in breast milk but is considered safe for shorter treatment courses. Topical Sulfur Applications Pregnancy And Lactation Text: This medication is Pregnancy Category C and has an unknown safety profile during pregnancy. It is unknown if this topical medication is excreted in breast milk.

## 2023-03-25 ENCOUNTER — HEALTH MAINTENANCE LETTER (OUTPATIENT)
Age: 63
End: 2023-03-25

## 2023-04-04 ENCOUNTER — OFFICE VISIT (OUTPATIENT)
Dept: FAMILY MEDICINE | Age: 63
End: 2023-04-04

## 2023-04-04 ENCOUNTER — IMAGING SERVICES (OUTPATIENT)
Dept: GENERAL RADIOLOGY | Age: 63
End: 2023-04-04
Attending: FAMILY MEDICINE

## 2023-04-04 ENCOUNTER — LAB SERVICES (OUTPATIENT)
Dept: LAB | Age: 63
End: 2023-04-04

## 2023-04-04 VITALS
RESPIRATION RATE: 16 BRPM | HEIGHT: 64 IN | WEIGHT: 192.5 LBS | SYSTOLIC BLOOD PRESSURE: 132 MMHG | DIASTOLIC BLOOD PRESSURE: 78 MMHG | HEART RATE: 66 BPM | BODY MASS INDEX: 32.87 KG/M2 | TEMPERATURE: 98.3 F | OXYGEN SATURATION: 98 %

## 2023-04-04 DIAGNOSIS — E78.5 HYPERLIPIDEMIA LDL GOAL <100: ICD-10-CM

## 2023-04-04 DIAGNOSIS — N18.30 STAGE 3 CHRONIC KIDNEY DISEASE, UNSPECIFIED WHETHER STAGE 3A OR 3B CKD (CMD): ICD-10-CM

## 2023-04-04 DIAGNOSIS — M25.551 CHRONIC PAIN OF BOTH HIPS: ICD-10-CM

## 2023-04-04 DIAGNOSIS — Z11.59 NEED FOR HEPATITIS C SCREENING TEST: ICD-10-CM

## 2023-04-04 DIAGNOSIS — E55.9 VITAMIN D DEFICIENCY: ICD-10-CM

## 2023-04-04 DIAGNOSIS — Z83.3 FAMILY HISTORY OF DIABETES MELLITUS (DM): ICD-10-CM

## 2023-04-04 DIAGNOSIS — E03.9 ACQUIRED HYPOTHYROIDISM: ICD-10-CM

## 2023-04-04 DIAGNOSIS — M25.552 CHRONIC PAIN OF BOTH HIPS: ICD-10-CM

## 2023-04-04 DIAGNOSIS — E03.9 ACQUIRED HYPOTHYROIDISM: Primary | ICD-10-CM

## 2023-04-04 DIAGNOSIS — Z00.00 ANNUAL PHYSICAL EXAM: ICD-10-CM

## 2023-04-04 DIAGNOSIS — I10 PRIMARY HYPERTENSION: ICD-10-CM

## 2023-04-04 DIAGNOSIS — Z86.010 HISTORY OF COLONIC POLYPS: ICD-10-CM

## 2023-04-04 DIAGNOSIS — G89.29 CHRONIC PAIN OF BOTH HIPS: ICD-10-CM

## 2023-04-04 DIAGNOSIS — M85.80 OSTEOPENIA, UNSPECIFIED LOCATION: ICD-10-CM

## 2023-04-04 PROBLEM — E03.8 SUBCLINICAL HYPOTHYROIDISM: Status: RESOLVED | Noted: 2023-04-04 | Resolved: 2023-04-04

## 2023-04-04 PROBLEM — Z90.710 HISTORY OF HYSTERECTOMY: Status: ACTIVE | Noted: 2023-04-04

## 2023-04-04 PROBLEM — E03.8 SUBCLINICAL HYPOTHYROIDISM: Status: ACTIVE | Noted: 2023-04-04

## 2023-04-04 PROBLEM — Z91.09 ENVIRONMENTAL ALLERGIES: Status: ACTIVE | Noted: 2023-04-04

## 2023-04-04 LAB
BASOPHILS # BLD: 0 K/MCL (ref 0–0.3)
BASOPHILS NFR BLD: 1 %
DEPRECATED RDW RBC: 46.5 FL (ref 39–50)
EOSINOPHIL # BLD: 0.1 K/MCL (ref 0–0.5)
EOSINOPHIL NFR BLD: 2 %
ERYTHROCYTE [DISTWIDTH] IN BLOOD: 14.3 % (ref 11–15)
HBA1C MFR BLD: 5.4 % (ref 4.5–5.6)
HCT VFR BLD CALC: 36.3 % (ref 36–46.5)
HGB BLD-MCNC: 11.9 G/DL (ref 12–15.5)
IMM GRANULOCYTES # BLD AUTO: 0 K/MCL (ref 0–0.2)
IMM GRANULOCYTES # BLD: 0 %
LYMPHOCYTES # BLD: 1.4 K/MCL (ref 1–4)
LYMPHOCYTES NFR BLD: 29 %
MCH RBC QN AUTO: 29.3 PG (ref 26–34)
MCHC RBC AUTO-ENTMCNC: 32.8 G/DL (ref 32–36.5)
MCV RBC AUTO: 89.4 FL (ref 78–100)
MONOCYTES # BLD: 0.5 K/MCL (ref 0.3–0.9)
MONOCYTES NFR BLD: 10 %
NEUTROPHILS # BLD: 2.7 K/MCL (ref 1.8–7.7)
NEUTROPHILS NFR BLD: 58 %
NRBC BLD MANUAL-RTO: 0 /100 WBC
PLATELET # BLD AUTO: 180 K/MCL (ref 140–450)
RBC # BLD: 4.06 MIL/MCL (ref 4–5.2)
WBC # BLD: 4.7 K/MCL (ref 4.2–11)

## 2023-04-04 PROCEDURE — 82043 UR ALBUMIN QUANTITATIVE: CPT | Performed by: INTERNAL MEDICINE

## 2023-04-04 PROCEDURE — 73522 X-RAY EXAM HIPS BI 3-4 VIEWS: CPT | Performed by: RADIOLOGY

## 2023-04-04 PROCEDURE — 86803 HEPATITIS C AB TEST: CPT | Performed by: INTERNAL MEDICINE

## 2023-04-04 PROCEDURE — 82306 VITAMIN D 25 HYDROXY: CPT | Performed by: INTERNAL MEDICINE

## 2023-04-04 PROCEDURE — 36415 COLL VENOUS BLD VENIPUNCTURE: CPT | Performed by: INTERNAL MEDICINE

## 2023-04-04 PROCEDURE — 80050 GENERAL HEALTH PANEL: CPT | Performed by: INTERNAL MEDICINE

## 2023-04-04 PROCEDURE — 99204 OFFICE O/P NEW MOD 45 MIN: CPT | Performed by: FAMILY MEDICINE

## 2023-04-04 PROCEDURE — 80061 LIPID PANEL: CPT | Performed by: INTERNAL MEDICINE

## 2023-04-04 PROCEDURE — 99386 PREV VISIT NEW AGE 40-64: CPT | Performed by: FAMILY MEDICINE

## 2023-04-04 PROCEDURE — 82570 ASSAY OF URINE CREATININE: CPT | Performed by: INTERNAL MEDICINE

## 2023-04-04 PROCEDURE — 84439 ASSAY OF FREE THYROXINE: CPT | Performed by: INTERNAL MEDICINE

## 2023-04-04 PROCEDURE — 83036 HEMOGLOBIN GLYCOSYLATED A1C: CPT | Performed by: INTERNAL MEDICINE

## 2023-04-04 RX ORDER — LEVOTHYROXINE SODIUM 0.05 MG/1
1 TABLET ORAL EVERY OTHER DAY
COMMUNITY
Start: 2023-02-08 | End: 2023-04-05 | Stop reason: DRUGHIGH

## 2023-04-04 RX ORDER — ASPIRIN 81 MG/1
81 TABLET, CHEWABLE ORAL DAILY
COMMUNITY

## 2023-04-04 RX ORDER — ROSUVASTATIN CALCIUM 5 MG/1
1 TABLET, COATED ORAL DAILY
COMMUNITY
Start: 2023-02-08 | End: 2023-04-05 | Stop reason: SDUPTHER

## 2023-04-04 RX ORDER — LISINOPRIL AND HYDROCHLOROTHIAZIDE 20; 12.5 MG/1; MG/1
1 TABLET ORAL DAILY
COMMUNITY
Start: 2023-01-09 | End: 2023-04-05 | Stop reason: SDUPTHER

## 2023-04-04 RX ORDER — LEVOTHYROXINE SODIUM 0.07 MG/1
1 TABLET ORAL EVERY OTHER DAY
COMMUNITY
Start: 2023-02-08 | End: 2023-04-05 | Stop reason: SDUPTHER

## 2023-04-04 ASSESSMENT — PATIENT HEALTH QUESTIONNAIRE - PHQ9
6. FEELING BAD ABOUT YOURSELF - OR THAT YOU ARE A FAILURE OR HAVE LET YOURSELF OR YOUR FAMILY DOWN: NOT AT ALL
4. FEELING TIRED OR HAVING LITTLE ENERGY: SEVERAL DAYS
1. LITTLE INTEREST OR PLEASURE IN DOING THINGS: NOT AT ALL
2. FEELING DOWN, DEPRESSED OR HOPELESS: NOT AT ALL
3. TROUBLE FALLING OR STAYING ASLEEP OR SLEEPING TOO MUCH: SEVERAL DAYS
2. FEELING DOWN, DEPRESSED OR HOPELESS: NOT AT ALL
SUM OF ALL RESPONSES TO PHQ QUESTIONS 1-9: 2
SUM OF ALL RESPONSES TO PHQ9 QUESTIONS 1 AND 2: 0
SUM OF ALL RESPONSES TO PHQ9 QUESTIONS 1 AND 2: 0
CLINICAL INTERPRETATION OF PHQ2 SCORE: NO FURTHER SCREENING NEEDED
CLINICAL INTERPRETATION OF PHQ2 SCORE: NO FURTHER SCREENING NEEDED
8. MOVING OR SPEAKING SO SLOWLY THAT OTHER PEOPLE COULD HAVE NOTICED. OR THE OPPOSITE, BEING SO FIGETY OR RESTLESS THAT YOU HAVE BEEN MOVING AROUND A LOT MORE THAN USUAL: NOT AT ALL
CLINICAL INTERPRETATION OF PHQ9 SCORE: MINIMAL DEPRESSION
SUM OF ALL RESPONSES TO PHQ9 QUESTIONS 1 AND 2: 0
CLINICAL INTERPRETATION OF PHQ2 SCORE: NO FURTHER SCREENING NEEDED
9. THOUGHTS THAT YOU WOULD BE BETTER OFF DEAD, OR OF HURTING YOURSELF: NOT AT ALL
1. LITTLE INTEREST OR PLEASURE IN DOING THINGS: NOT AT ALL
SUM OF ALL RESPONSES TO PHQ9 QUESTIONS 1 AND 2: 0
SUM OF ALL RESPONSES TO PHQ9 QUESTIONS 1 AND 2: 0
1. LITTLE INTEREST OR PLEASURE IN DOING THINGS: NOT AT ALL
SUM OF ALL RESPONSES TO PHQ9 QUESTIONS 1 AND 2: 0
7. TROUBLE CONCENTRATING ON THINGS, SUCH AS READING THE NEWSPAPER OR WATCHING TELEVISION: NOT AT ALL
5. POOR APPETITE, WEIGHT LOSS, OR OVEREATING: NOT AT ALL
10. IF YOU CHECKED OFF ANY PROBLEMS, HOW DIFFICULT HAVE THESE PROBLEMS MADE IT FOR YOU TO DO YOUR WORK, TAKE CARE OF THINGS AT HOME, OR GET ALONG WITH OTHER PEOPLE: NOT DIFFICULT AT ALL
2. FEELING DOWN, DEPRESSED OR HOPELESS: NOT AT ALL

## 2023-04-05 LAB
25(OH)D3+25(OH)D2 SERPL-MCNC: 16.7 NG/ML (ref 30–100)
ALBUMIN SERPL-MCNC: 3.8 G/DL (ref 3.6–5.1)
ALBUMIN/GLOB SERPL: 1.3 {RATIO} (ref 1–2.4)
ALP SERPL-CCNC: 81 UNITS/L (ref 45–117)
ALT SERPL-CCNC: 20 UNITS/L
ANION GAP SERPL CALC-SCNC: 7 MMOL/L (ref 7–19)
AST SERPL-CCNC: 20 UNITS/L
BILIRUB SERPL-MCNC: 0.3 MG/DL (ref 0.2–1)
BUN SERPL-MCNC: 22 MG/DL (ref 6–20)
BUN/CREAT SERPL: 19 (ref 7–25)
CALCIUM SERPL-MCNC: 8.6 MG/DL (ref 8.4–10.2)
CHLORIDE SERPL-SCNC: 109 MMOL/L (ref 97–110)
CHOLEST SERPL-MCNC: 201 MG/DL
CHOLEST/HDLC SERPL: 3 {RATIO}
CO2 SERPL-SCNC: 26 MMOL/L (ref 21–32)
CREAT SERPL-MCNC: 1.14 MG/DL (ref 0.51–0.95)
CREAT UR-MCNC: 181 MG/DL
FASTING DURATION TIME PATIENT: 7 HOURS (ref 0–999)
FASTING DURATION TIME PATIENT: 7 HOURS (ref 0–999)
GFR SERPLBLD BASED ON 1.73 SQ M-ARVRAT: 54 ML/MIN
GLOBULIN SER-MCNC: 3 G/DL (ref 2–4)
GLUCOSE SERPL-MCNC: 82 MG/DL (ref 70–99)
HCV AB SER QL: NEGATIVE
HDLC SERPL-MCNC: 66 MG/DL
LDLC SERPL CALC-MCNC: 104 MG/DL
MICROALBUMIN UR-MCNC: 0.68 MG/DL
MICROALBUMIN/CREAT UR: 3.8 MG/G
NONHDLC SERPL-MCNC: 135 MG/DL
POTASSIUM SERPL-SCNC: 4 MMOL/L (ref 3.4–5.1)
PROT SERPL-MCNC: 6.8 G/DL (ref 6.4–8.2)
SODIUM SERPL-SCNC: 138 MMOL/L (ref 135–145)
T4 FREE SERPL-MCNC: 0.8 NG/DL (ref 0.8–1.5)
TRIGL SERPL-MCNC: 153 MG/DL
TSH SERPL-ACNC: 10.73 MCUNITS/ML (ref 0.35–5)

## 2023-04-05 RX ORDER — LEVOTHYROXINE SODIUM 0.07 MG/1
75 TABLET ORAL DAILY
Qty: 90 TABLET | Refills: 1 | Status: SHIPPED | OUTPATIENT
Start: 2023-04-05 | End: 2023-11-07 | Stop reason: SDUPTHER

## 2023-04-05 RX ORDER — LISINOPRIL AND HYDROCHLOROTHIAZIDE 20; 12.5 MG/1; MG/1
1 TABLET ORAL DAILY
Qty: 90 TABLET | Refills: 3 | Status: SHIPPED | OUTPATIENT
Start: 2023-04-05 | End: 2023-08-11 | Stop reason: SDUPTHER

## 2023-04-05 RX ORDER — ROSUVASTATIN CALCIUM 5 MG/1
5 TABLET, COATED ORAL DAILY
Qty: 90 TABLET | Refills: 3 | Status: SHIPPED | OUTPATIENT
Start: 2023-04-05

## 2023-04-06 ENCOUNTER — TELEPHONE (OUTPATIENT)
Dept: GASTROENTEROLOGY | Age: 63
End: 2023-04-06

## 2023-04-07 DIAGNOSIS — E78.5 HYPERLIPIDEMIA LDL GOAL <100: ICD-10-CM

## 2023-04-07 DIAGNOSIS — I10 PRIMARY HYPERTENSION: ICD-10-CM

## 2023-04-07 RX ORDER — ROSUVASTATIN CALCIUM 5 MG/1
5 TABLET, COATED ORAL DAILY
Qty: 90 TABLET | Refills: 3 | OUTPATIENT
Start: 2023-04-07

## 2023-04-07 RX ORDER — LISINOPRIL AND HYDROCHLOROTHIAZIDE 20; 12.5 MG/1; MG/1
1 TABLET ORAL DAILY
Qty: 90 TABLET | Refills: 3 | OUTPATIENT
Start: 2023-04-07

## 2023-04-09 DIAGNOSIS — G89.29 CHRONIC PAIN OF BOTH HIPS: Primary | ICD-10-CM

## 2023-04-09 DIAGNOSIS — M25.551 CHRONIC PAIN OF BOTH HIPS: Primary | ICD-10-CM

## 2023-04-09 DIAGNOSIS — M25.552 CHRONIC PAIN OF BOTH HIPS: Primary | ICD-10-CM

## 2023-04-24 ENCOUNTER — E-ADVICE (OUTPATIENT)
Dept: FAMILY MEDICINE | Age: 63
End: 2023-04-24

## 2023-04-26 ENCOUNTER — HOSPITAL ENCOUNTER (OUTPATIENT)
Dept: REHABILITATION | Age: 63
Discharge: STILL ADMITTED OR ADMINISTRATIVE | End: 2023-04-26
Attending: FAMILY MEDICINE

## 2023-04-26 PROCEDURE — 10004173 HB COUNTER-THERAPY VISIT PT: Performed by: PHYSICAL THERAPIST

## 2023-04-26 PROCEDURE — 97535 SELF CARE MNGMENT TRAINING: CPT | Performed by: PHYSICAL THERAPIST

## 2023-04-26 PROCEDURE — 97162 PT EVAL MOD COMPLEX 30 MIN: CPT | Performed by: PHYSICAL THERAPIST

## 2023-04-26 ASSESSMENT — ENCOUNTER SYMPTOMS
PAIN SEVERITY NOW: 3
PAIN SCALE AT HIGHEST: 8
PAIN FREQUENCY: CONSTANT
PAIN SCALE AT LOWEST: 3
SUBJECTIVE PAIN PROGRESSION: WORSENING

## 2023-04-26 ASSESSMENT — MOVEMENT AND STRENGTH ASSESSMENTS
LIFTING AN OBJECT, LIKE A BAG OF GROCERIES, FROM THE FLOOR: QUITE A BIT OF DIFFICULTY
RUNNING ON UNEVEN GROUND: QUITE A BIT OF DIFFICULTY
HOPPING: QUITE A BIT OF DIFFICULTY
PERFORMING LIGHT ACTIVITES AROUND YOUR HOME: A LITTLE BIT OF DIFFICULTY
RUNNING ON EVEN GROUND: MODERATE DIFFICULTY
ROLLING OVER IN BED: QUITE A BIT OF DIFFICULTY
YOUR USUAL HOBBIES, RECREATIONAL OR SPORTING ACTIVIITIES: MODERATE DIFFICULTY
STANDING FOR 1 HOUR: QUITE A BIT OF DIFFICULTY
ANY OF YOUR USUAL WORK, HOUSEWORK OR SCHOOL ACTIVITIES: MODERATE DIFFICULTY
GETTING INTO OR OUT OF A CAR: MODERATE DIFFICULTY
TOTAL SCORE: 43.75
GETTING INTO OR OUT OF THE BATH: A LITTLE BIT OF DIFFICULTY
WALKING 2 BLOCKS: MODERATE DIFFICULTY
GOING UP OR DOWN 10 STAIRS (ABOUT 1 FLIGHT OF STAIRS): MODERATE DIFFICULTY
SITTING FOR 1 HOUR: MODERATE DIFFICULTY
MAKING SHARP TURNS WHILE RUNNING FAST: QUITE A BIT OF DIFFICULTY
WALKING BETWEEN ROOMS: A LITTLE BIT OF DIFFICULTY
PUTTING ON YOUR SHOES OR SOCKS: MODERATE DIFFICULTY
WALKING A MILE: QUITE A BIT OF DIFFICULTY
PERFORMING HEAVY ACTIVITIES AROUND YOUR HOME: MODERATE DIFFICULTY
SQUATTING: QUITE A BIT OF DIFFICULTY

## 2023-05-01 ENCOUNTER — HOSPITAL ENCOUNTER (OUTPATIENT)
Dept: REHABILITATION | Age: 63
Discharge: STILL ADMITTED OR ADMINISTRATIVE | End: 2023-05-01
Attending: FAMILY MEDICINE

## 2023-05-01 PROCEDURE — 10004173 HB COUNTER-THERAPY VISIT PT: Performed by: PHYSICAL THERAPIST

## 2023-05-01 PROCEDURE — 97110 THERAPEUTIC EXERCISES: CPT | Performed by: PHYSICAL THERAPIST

## 2023-05-04 ENCOUNTER — HOSPITAL ENCOUNTER (OUTPATIENT)
Dept: REHABILITATION | Age: 63
Discharge: STILL ADMITTED OR ADMINISTRATIVE | End: 2023-05-04
Attending: FAMILY MEDICINE

## 2023-05-04 PROCEDURE — 10004173 HB COUNTER-THERAPY VISIT PT: Performed by: PHYSICAL THERAPIST

## 2023-05-04 PROCEDURE — 97110 THERAPEUTIC EXERCISES: CPT | Performed by: PHYSICAL THERAPIST

## 2023-05-04 ASSESSMENT — ENCOUNTER SYMPTOMS
PAIN SEVERITY NOW: 3
PAIN SCALE AT LOWEST: 2
PAIN SCALE AT HIGHEST: 4

## 2023-05-09 ENCOUNTER — HOSPITAL ENCOUNTER (OUTPATIENT)
Dept: REHABILITATION | Age: 63
Discharge: STILL ADMITTED OR ADMINISTRATIVE | End: 2023-05-09
Attending: FAMILY MEDICINE

## 2023-05-09 PROCEDURE — 97110 THERAPEUTIC EXERCISES: CPT | Performed by: PHYSICAL THERAPIST

## 2023-05-09 PROCEDURE — 10004173 HB COUNTER-THERAPY VISIT PT: Performed by: PHYSICAL THERAPIST

## 2023-05-09 ASSESSMENT — ENCOUNTER SYMPTOMS: PAIN SEVERITY NOW: 3

## 2023-05-10 DIAGNOSIS — E03.9 ACQUIRED HYPOTHYROIDISM: ICD-10-CM

## 2023-05-11 ENCOUNTER — HOSPITAL ENCOUNTER (OUTPATIENT)
Dept: REHABILITATION | Age: 63
Discharge: STILL ADMITTED OR ADMINISTRATIVE | End: 2023-05-11
Attending: FAMILY MEDICINE

## 2023-05-11 PROCEDURE — 10004173 HB COUNTER-THERAPY VISIT PT: Performed by: PHYSICAL THERAPIST

## 2023-05-11 PROCEDURE — 97110 THERAPEUTIC EXERCISES: CPT | Performed by: PHYSICAL THERAPIST

## 2023-05-11 PROCEDURE — 97140 MANUAL THERAPY 1/> REGIONS: CPT | Performed by: PHYSICAL THERAPIST

## 2023-05-11 ASSESSMENT — ENCOUNTER SYMPTOMS: PAIN SEVERITY NOW: 3

## 2023-05-12 NOTE — TELEPHONE ENCOUNTER
Routing refill request to provider for review/approval because:   Patient needs to be seen because it has been more than 1 year since last office visit.

## 2023-05-13 RX ORDER — LEVOTHYROXINE SODIUM 75 UG/1
TABLET ORAL
Qty: 45 TABLET | Refills: 0 | Status: SHIPPED | OUTPATIENT
Start: 2023-05-13

## 2023-05-13 RX ORDER — LEVOTHYROXINE SODIUM 50 UG/1
TABLET ORAL
Qty: 45 TABLET | Refills: 0 | Status: SHIPPED | OUTPATIENT
Start: 2023-05-13

## 2023-05-13 NOTE — TELEPHONE ENCOUNTER
Limited rx, due for physical fasting    TSH   Date Value Ref Range Status   04/19/2022 5.08 (H) 0.40 - 4.00 mU/L Final   06/18/2021 6.06 (H) 0.40 - 4.00 mU/L Final

## 2023-05-15 ENCOUNTER — MYC MEDICAL ADVICE (OUTPATIENT)
Dept: FAMILY MEDICINE | Facility: CLINIC | Age: 63
End: 2023-05-15
Payer: OTHER GOVERNMENT

## 2023-05-16 ENCOUNTER — APPOINTMENT (OUTPATIENT)
Dept: REHABILITATION | Age: 63
End: 2023-05-16
Attending: FAMILY MEDICINE

## 2023-05-18 ENCOUNTER — APPOINTMENT (OUTPATIENT)
Dept: REHABILITATION | Age: 63
End: 2023-05-18
Attending: FAMILY MEDICINE

## 2023-05-22 ENCOUNTER — HOSPITAL ENCOUNTER (OUTPATIENT)
Dept: REHABILITATION | Age: 63
Discharge: STILL ADMITTED OR ADMINISTRATIVE | End: 2023-05-22
Attending: FAMILY MEDICINE

## 2023-05-22 PROCEDURE — 10004173 HB COUNTER-THERAPY VISIT PT: Performed by: PHYSICAL THERAPIST

## 2023-05-22 PROCEDURE — 97110 THERAPEUTIC EXERCISES: CPT | Performed by: PHYSICAL THERAPIST

## 2023-05-22 ASSESSMENT — ENCOUNTER SYMPTOMS: PAIN SEVERITY NOW: 4

## 2023-05-24 ENCOUNTER — APPOINTMENT (OUTPATIENT)
Dept: REHABILITATION | Age: 63
End: 2023-05-24
Attending: FAMILY MEDICINE

## 2023-05-24 RX ORDER — BISACODYL 5 MG/1
TABLET, DELAYED RELEASE ORAL
Qty: 3 TABLET | Refills: 0 | Status: ON HOLD | OUTPATIENT
Start: 2023-05-24 | End: 2023-07-07 | Stop reason: ALTCHOICE

## 2023-05-24 RX ORDER — POLYETHYLENE GLYCOL 3350 17 G/17G
POWDER, FOR SOLUTION ORAL
Qty: 510 G | Refills: 0 | Status: ON HOLD | OUTPATIENT
Start: 2023-05-24 | End: 2023-07-07 | Stop reason: ALTCHOICE

## 2023-05-26 ENCOUNTER — HOSPITAL ENCOUNTER (OUTPATIENT)
Dept: MAMMOGRAPHY | Age: 63
Discharge: HOME | End: 2023-05-26
Attending: FAMILY MEDICINE

## 2023-05-26 DIAGNOSIS — Z12.31 VISIT FOR SCREENING MAMMOGRAM: ICD-10-CM

## 2023-05-26 PROCEDURE — 77067 SCR MAMMO BI INCL CAD: CPT | Performed by: RADIOLOGY

## 2023-05-26 PROCEDURE — 77063 BREAST TOMOSYNTHESIS BI: CPT

## 2023-05-26 PROCEDURE — 77063 BREAST TOMOSYNTHESIS BI: CPT | Performed by: RADIOLOGY

## 2023-05-30 ENCOUNTER — HOSPITAL ENCOUNTER (OUTPATIENT)
Dept: REHABILITATION | Age: 63
Discharge: STILL ADMITTED OR ADMINISTRATIVE | End: 2023-05-30
Attending: FAMILY MEDICINE

## 2023-05-30 PROCEDURE — 10004173 HB COUNTER-THERAPY VISIT PT: Performed by: PHYSICAL THERAPIST

## 2023-05-30 PROCEDURE — 97110 THERAPEUTIC EXERCISES: CPT | Performed by: PHYSICAL THERAPIST

## 2023-05-30 ASSESSMENT — ENCOUNTER SYMPTOMS: PAIN SEVERITY NOW: 3

## 2023-06-06 ENCOUNTER — HOSPITAL ENCOUNTER (OUTPATIENT)
Dept: REHABILITATION | Age: 63
Discharge: STILL ADMITTED OR ADMINISTRATIVE | End: 2023-06-06
Attending: FAMILY MEDICINE

## 2023-06-06 PROCEDURE — 97110 THERAPEUTIC EXERCISES: CPT | Performed by: PHYSICAL THERAPIST

## 2023-06-06 PROCEDURE — 10004173 HB COUNTER-THERAPY VISIT PT: Performed by: PHYSICAL THERAPIST

## 2023-06-06 ASSESSMENT — ENCOUNTER SYMPTOMS: PAIN SEVERITY NOW: 3

## 2023-06-13 ENCOUNTER — APPOINTMENT (OUTPATIENT)
Dept: REHABILITATION | Age: 63
End: 2023-06-13
Attending: FAMILY MEDICINE

## 2023-06-20 ENCOUNTER — APPOINTMENT (OUTPATIENT)
Dept: REHABILITATION | Age: 63
End: 2023-06-20
Attending: FAMILY MEDICINE

## 2023-06-27 ENCOUNTER — LAB SERVICES (OUTPATIENT)
Dept: LAB | Age: 63
End: 2023-06-27

## 2023-06-27 ENCOUNTER — APPOINTMENT (OUTPATIENT)
Dept: REHABILITATION | Age: 63
End: 2023-06-27
Attending: FAMILY MEDICINE

## 2023-06-27 DIAGNOSIS — E03.9 ACQUIRED HYPOTHYROIDISM: ICD-10-CM

## 2023-06-27 DIAGNOSIS — E55.9 VITAMIN D DEFICIENCY: ICD-10-CM

## 2023-06-27 LAB
25(OH)D3+25(OH)D2 SERPL-MCNC: 63.5 NG/ML (ref 30–100)
TSH SERPL-ACNC: 2.86 MCUNITS/ML (ref 0.35–5)

## 2023-06-27 PROCEDURE — 82306 VITAMIN D 25 HYDROXY: CPT | Performed by: INTERNAL MEDICINE

## 2023-06-27 PROCEDURE — 36415 COLL VENOUS BLD VENIPUNCTURE: CPT | Performed by: INTERNAL MEDICINE

## 2023-06-27 PROCEDURE — 84443 ASSAY THYROID STIM HORMONE: CPT | Performed by: INTERNAL MEDICINE

## 2023-06-30 DIAGNOSIS — E55.9 VITAMIN D DEFICIENCY: ICD-10-CM

## 2023-07-06 ENCOUNTER — ANESTHESIA EVENT (OUTPATIENT)
Dept: SURGERY | Age: 63
End: 2023-07-06

## 2023-07-07 ENCOUNTER — HOSPITAL ENCOUNTER (OUTPATIENT)
Age: 63
Discharge: HOME | End: 2023-07-07
Attending: INTERNAL MEDICINE | Admitting: INTERNAL MEDICINE

## 2023-07-07 ENCOUNTER — PREP FOR CASE (OUTPATIENT)
Dept: GASTROENTEROLOGY | Age: 63
End: 2023-07-07

## 2023-07-07 ENCOUNTER — ANESTHESIA (OUTPATIENT)
Dept: SURGERY | Age: 63
End: 2023-07-07

## 2023-07-07 VITALS
SYSTOLIC BLOOD PRESSURE: 110 MMHG | DIASTOLIC BLOOD PRESSURE: 64 MMHG | HEART RATE: 59 BPM | BODY MASS INDEX: 32.78 KG/M2 | WEIGHT: 185 LBS | TEMPERATURE: 98 F | HEIGHT: 63 IN | OXYGEN SATURATION: 95 % | RESPIRATION RATE: 18 BRPM

## 2023-07-07 DIAGNOSIS — Z86.010 HX OF ADENOMATOUS COLONIC POLYPS: Primary | ICD-10-CM

## 2023-07-07 PROCEDURE — G0105 COLORECTAL SCRN; HI RISK IND: HCPCS | Performed by: INTERNAL MEDICINE

## 2023-07-07 PROCEDURE — A45378 ANES COLONOSCOPY FLEX PROX SPLEN FLEX D: Performed by: NURSE ANESTHETIST, CERTIFIED REGISTERED

## 2023-07-07 PROCEDURE — 10003428 HB COLONOSCOPY: Performed by: INTERNAL MEDICINE

## 2023-07-07 PROCEDURE — 10002807 HB RX 258: Performed by: NURSE ANESTHETIST, CERTIFIED REGISTERED

## 2023-07-07 PROCEDURE — 10002800 HB RX 250 W HCPCS: Performed by: NURSE ANESTHETIST, CERTIFIED REGISTERED

## 2023-07-07 PROCEDURE — 10002801 HB RX 250 W/O HCPCS: Performed by: NURSE ANESTHETIST, CERTIFIED REGISTERED

## 2023-07-07 PROCEDURE — 10002362 HB ANESTH MAC IV 1ST 1/2 HR: Performed by: INTERNAL MEDICINE

## 2023-07-07 RX ORDER — LIDOCAINE HYDROCHLORIDE 10 MG/ML
INJECTION, SOLUTION INFILTRATION; PERINEURAL PRN
Status: DISCONTINUED | OUTPATIENT
Start: 2023-07-07 | End: 2023-07-07

## 2023-07-07 RX ORDER — 0.9 % SODIUM CHLORIDE 0.9 %
2 VIAL (ML) INJECTION EVERY 12 HOURS SCHEDULED
Status: DISCONTINUED | OUTPATIENT
Start: 2023-07-07 | End: 2023-07-07 | Stop reason: HOSPADM

## 2023-07-07 RX ORDER — SODIUM CHLORIDE, SODIUM LACTATE, POTASSIUM CHLORIDE, CALCIUM CHLORIDE 600; 310; 30; 20 MG/100ML; MG/100ML; MG/100ML; MG/100ML
INJECTION, SOLUTION INTRAVENOUS CONTINUOUS PRN
Status: DISCONTINUED | OUTPATIENT
Start: 2023-07-07 | End: 2023-07-07

## 2023-07-07 RX ORDER — PROPOFOL 10 MG/ML
INJECTION, EMULSION INTRAVENOUS PRN
Status: DISCONTINUED | OUTPATIENT
Start: 2023-07-07 | End: 2023-07-07

## 2023-07-07 RX ORDER — SODIUM CHLORIDE, SODIUM LACTATE, POTASSIUM CHLORIDE, CALCIUM CHLORIDE 600; 310; 30; 20 MG/100ML; MG/100ML; MG/100ML; MG/100ML
INJECTION, SOLUTION INTRAVENOUS CONTINUOUS
Status: DISCONTINUED | OUTPATIENT
Start: 2023-07-07 | End: 2023-07-07 | Stop reason: HOSPADM

## 2023-07-07 RX ADMIN — PROPOFOL 60 MG: 10 INJECTION, EMULSION INTRAVENOUS at 11:06

## 2023-07-07 RX ADMIN — PROPOFOL 80 MG: 10 INJECTION, EMULSION INTRAVENOUS at 11:05

## 2023-07-07 RX ADMIN — LIDOCAINE HYDROCHLORIDE 20 MG: 10 INJECTION, SOLUTION INFILTRATION; PERINEURAL at 11:05

## 2023-07-07 RX ADMIN — PROPOFOL 40 MG: 10 INJECTION, EMULSION INTRAVENOUS at 11:13

## 2023-07-07 RX ADMIN — PROPOFOL 120 MCG/KG/MIN: 10 INJECTION, EMULSION INTRAVENOUS at 11:06

## 2023-07-07 RX ADMIN — PROPOFOL 50 MG: 10 INJECTION, EMULSION INTRAVENOUS at 11:10

## 2023-07-07 RX ADMIN — SODIUM CHLORIDE, POTASSIUM CHLORIDE, SODIUM LACTATE AND CALCIUM CHLORIDE: 600; 310; 30; 20 INJECTION, SOLUTION INTRAVENOUS at 11:02

## 2023-07-07 RX ADMIN — SODIUM CHLORIDE, POTASSIUM CHLORIDE, SODIUM LACTATE AND CALCIUM CHLORIDE: 600; 310; 30; 20 INJECTION, SOLUTION INTRAVENOUS at 10:25

## 2023-07-07 ASSESSMENT — PAIN SCALES - GENERAL
PAINLEVEL_OUTOF10: 0

## 2023-08-10 DIAGNOSIS — E03.9 ACQUIRED HYPOTHYROIDISM: ICD-10-CM

## 2023-08-10 DIAGNOSIS — I10 PRIMARY HYPERTENSION: ICD-10-CM

## 2023-08-10 RX ORDER — LEVOTHYROXINE SODIUM 0.07 MG/1
75 TABLET ORAL DAILY
Qty: 90 TABLET | Refills: 1 | OUTPATIENT
Start: 2023-08-10

## 2023-08-11 ENCOUNTER — E-ADVICE (OUTPATIENT)
Dept: OTHER | Age: 63
End: 2023-08-11

## 2023-08-11 DIAGNOSIS — E03.9 ACQUIRED HYPOTHYROIDISM: ICD-10-CM

## 2023-08-11 RX ORDER — LEVOTHYROXINE SODIUM 0.07 MG/1
75 TABLET ORAL DAILY
Qty: 90 TABLET | Refills: 1 | Status: CANCELLED | OUTPATIENT
Start: 2023-08-11

## 2023-08-11 RX ORDER — LISINOPRIL AND HYDROCHLOROTHIAZIDE 20; 12.5 MG/1; MG/1
1 TABLET ORAL DAILY
Qty: 90 TABLET | Refills: 3 | Status: SHIPPED | OUTPATIENT
Start: 2023-08-11

## 2023-08-11 ASSESSMENT — MOVEMENT AND STRENGTH ASSESSMENTS
WALKING 2 BLOCKS: QUITE A BIT OF DIFFICULTY
MAKING SHARP TURNS WHILE RUNNING FAST: EXTREME DIFFICULTY OR UNABLE TO PERFORM ACTIVITY
RUNNING ON UNEVEN GROUND: EXTREME DIFFICULTY OR UNABLE TO PERFORM ACTIVITY
WALKING BETWEEN ROOMS: A LITTLE BIT OF DIFFICULTY
GETTING INTO OR OUT OF A CAR: A LITTLE BIT OF DIFFICULTY
STANDING FOR 1 HOUR: QUITE A BIT OF DIFFICULTY
PERFORMING LIGHT ACTIVITES AROUND YOUR HOME: MODERATE DIFFICULTY
LIFTING AN OBJECT, LIKE A BAG OF GROCERIES, FROM THE FLOOR: QUITE A BIT OF DIFFICULTY
PUTTING ON YOUR SHOES OR SOCKS: MODERATE DIFFICULTY
HOPPING: EXTREME DIFFICULTY OR UNABLE TO PERFORM ACTIVITY
YOUR USUAL HOBBIES, RECREATIONAL OR SPORTING ACTIVIITIES: QUITE A BIT OF DIFFICULTY
ROLLING OVER IN BED: MODERATE DIFFICULTY
TOTAL SCORE: 35
RUNNING ON EVEN GROUND: EXTREME DIFFICULTY OR UNABLE TO PERFORM ACTIVITY
ANY OF YOUR USUAL WORK, HOUSEWORK OR SCHOOL ACTIVITIES: MODERATE DIFFICULTY
WALKING A MILE: EXTREME DIFFICULTY OR UNABLE TO PERFORM ACTIVITY
SQUATTING: QUITE A BIT OF DIFFICULTY
GETTING INTO OR OUT OF THE BATH: A LITTLE BIT OF DIFFICULTY
PERFORMING HEAVY ACTIVITIES AROUND YOUR HOME: QUITE A BIT OF DIFFICULTY
SITTING FOR 1 HOUR: A LITTLE BIT OF DIFFICULTY
GOING UP OR DOWN 10 STAIRS (ABOUT 1 FLIGHT OF STAIRS): MODERATE DIFFICULTY

## 2023-08-25 ENCOUNTER — E-ADVICE (OUTPATIENT)
Dept: OTHER | Age: 63
End: 2023-08-25

## 2023-08-25 ASSESSMENT — MOVEMENT AND STRENGTH ASSESSMENTS
GOING UP OR DOWN 10 STAIRS (ABOUT 1 FLIGHT OF STAIRS): MODERATE DIFFICULTY
SITTING FOR 1 HOUR: MODERATE DIFFICULTY
ROLLING OVER IN BED: MODERATE DIFFICULTY
PERFORMING LIGHT ACTIVITES AROUND YOUR HOME: A LITTLE BIT OF DIFFICULTY
RUNNING ON UNEVEN GROUND: QUITE A BIT OF DIFFICULTY
HOPPING: QUITE A BIT OF DIFFICULTY
LIFTING AN OBJECT, LIKE A BAG OF GROCERIES, FROM THE FLOOR: QUITE A BIT OF DIFFICULTY
TOTAL SCORE: 41.25
WALKING BETWEEN ROOMS: A LITTLE BIT OF DIFFICULTY
GETTING INTO OR OUT OF A CAR: MODERATE DIFFICULTY
WALKING A MILE: QUITE A BIT OF DIFFICULTY
PUTTING ON YOUR SHOES OR SOCKS: A LITTLE BIT OF DIFFICULTY
WALKING 2 BLOCKS: QUITE A BIT OF DIFFICULTY
MAKING SHARP TURNS WHILE RUNNING FAST: QUITE A BIT OF DIFFICULTY
GETTING INTO OR OUT OF THE BATH: A LITTLE BIT OF DIFFICULTY
PERFORMING HEAVY ACTIVITIES AROUND YOUR HOME: QUITE A BIT OF DIFFICULTY
STANDING FOR 1 HOUR: QUITE A BIT OF DIFFICULTY
YOUR USUAL HOBBIES, RECREATIONAL OR SPORTING ACTIVIITIES: QUITE A BIT OF DIFFICULTY
RUNNING ON EVEN GROUND: QUITE A BIT OF DIFFICULTY
ANY OF YOUR USUAL WORK, HOUSEWORK OR SCHOOL ACTIVITIES: MODERATE DIFFICULTY
SQUATTING: QUITE A BIT OF DIFFICULTY

## 2023-11-07 DIAGNOSIS — E03.9 ACQUIRED HYPOTHYROIDISM: ICD-10-CM

## 2023-11-07 RX ORDER — LEVOTHYROXINE SODIUM 0.07 MG/1
75 TABLET ORAL DAILY
Qty: 90 TABLET | Refills: 1 | Status: SHIPPED | OUTPATIENT
Start: 2023-11-07

## 2023-11-08 RX ORDER — LEVOTHYROXINE SODIUM 0.07 MG/1
75 TABLET ORAL DAILY
Qty: 90 TABLET | Refills: 1 | OUTPATIENT
Start: 2023-11-08

## 2024-04-16 ENCOUNTER — LAB SERVICES (OUTPATIENT)
Dept: LAB | Age: 64
End: 2024-04-16

## 2024-04-16 ENCOUNTER — APPOINTMENT (OUTPATIENT)
Dept: FAMILY MEDICINE | Age: 64
End: 2024-04-16

## 2024-04-16 VITALS
SYSTOLIC BLOOD PRESSURE: 110 MMHG | DIASTOLIC BLOOD PRESSURE: 68 MMHG | HEIGHT: 63 IN | WEIGHT: 197 LBS | HEART RATE: 75 BPM | BODY MASS INDEX: 34.91 KG/M2

## 2024-04-16 DIAGNOSIS — E03.9 ACQUIRED HYPOTHYROIDISM: ICD-10-CM

## 2024-04-16 DIAGNOSIS — E55.9 VITAMIN D DEFICIENCY: ICD-10-CM

## 2024-04-16 DIAGNOSIS — G89.29 CHRONIC RIGHT HIP PAIN: ICD-10-CM

## 2024-04-16 DIAGNOSIS — E78.5 HYPERLIPIDEMIA LDL GOAL <100: ICD-10-CM

## 2024-04-16 DIAGNOSIS — Z00.00 ANNUAL PHYSICAL EXAM: ICD-10-CM

## 2024-04-16 DIAGNOSIS — N18.30 STAGE 3 CHRONIC KIDNEY DISEASE, UNSPECIFIED WHETHER STAGE 3A OR 3B CKD  (CMD): ICD-10-CM

## 2024-04-16 DIAGNOSIS — I10 PRIMARY HYPERTENSION: ICD-10-CM

## 2024-04-16 DIAGNOSIS — Z23 NEED FOR VACCINATION: Primary | ICD-10-CM

## 2024-04-16 DIAGNOSIS — M25.551 CHRONIC RIGHT HIP PAIN: ICD-10-CM

## 2024-04-16 LAB
25(OH)D3+25(OH)D2 SERPL-MCNC: 31 NG/ML (ref 30–100)
BASOPHILS # BLD: 0 K/MCL (ref 0–0.3)
BASOPHILS NFR BLD: 1 %
DEPRECATED RDW RBC: 44.2 FL (ref 39–50)
EOSINOPHIL # BLD: 0.1 K/MCL (ref 0–0.5)
EOSINOPHIL NFR BLD: 2 %
ERYTHROCYTE [DISTWIDTH] IN BLOOD: 13.2 % (ref 11–15)
HCT VFR BLD CALC: 36.9 % (ref 36–46.5)
HGB BLD-MCNC: 11.8 G/DL (ref 12–15.5)
IMM GRANULOCYTES # BLD AUTO: 0 K/MCL (ref 0–0.2)
IMM GRANULOCYTES # BLD: 0 %
LYMPHOCYTES # BLD: 1 K/MCL (ref 1–4)
LYMPHOCYTES NFR BLD: 25 %
MCH RBC QN AUTO: 29.5 PG (ref 26–34)
MCHC RBC AUTO-ENTMCNC: 32 G/DL (ref 32–36.5)
MCV RBC AUTO: 92.3 FL (ref 78–100)
MONOCYTES # BLD: 0.5 K/MCL (ref 0.3–0.9)
MONOCYTES NFR BLD: 13 %
NEUTROPHILS # BLD: 2.4 K/MCL (ref 1.8–7.7)
NEUTROPHILS NFR BLD: 59 %
NRBC BLD MANUAL-RTO: 0 /100 WBC
PLATELET # BLD AUTO: 171 K/MCL (ref 140–450)
RBC # BLD: 4 MIL/MCL (ref 4–5.2)
WBC # BLD: 4 K/MCL (ref 4.2–11)

## 2024-04-16 PROCEDURE — 91322 SARSCOV2 VAC 50 MCG/0.5ML IM: CPT | Performed by: FAMILY MEDICINE

## 2024-04-16 PROCEDURE — 82043 UR ALBUMIN QUANTITATIVE: CPT | Performed by: CLINICAL MEDICAL LABORATORY

## 2024-04-16 PROCEDURE — 80050 GENERAL HEALTH PANEL: CPT | Performed by: CLINICAL MEDICAL LABORATORY

## 2024-04-16 PROCEDURE — 99214 OFFICE O/P EST MOD 30 MIN: CPT | Performed by: FAMILY MEDICINE

## 2024-04-16 PROCEDURE — 99396 PREV VISIT EST AGE 40-64: CPT | Performed by: FAMILY MEDICINE

## 2024-04-16 PROCEDURE — 82570 ASSAY OF URINE CREATININE: CPT | Performed by: CLINICAL MEDICAL LABORATORY

## 2024-04-16 PROCEDURE — 80061 LIPID PANEL: CPT | Performed by: CLINICAL MEDICAL LABORATORY

## 2024-04-16 PROCEDURE — 90480 ADMN SARSCOV2 VAC 1/ONLY CMP: CPT | Performed by: FAMILY MEDICINE

## 2024-04-16 PROCEDURE — 90715 TDAP VACCINE 7 YRS/> IM: CPT | Performed by: FAMILY MEDICINE

## 2024-04-16 PROCEDURE — 90471 IMMUNIZATION ADMIN: CPT | Performed by: FAMILY MEDICINE

## 2024-04-16 PROCEDURE — 82306 VITAMIN D 25 HYDROXY: CPT | Performed by: CLINICAL MEDICAL LABORATORY

## 2024-04-16 PROCEDURE — 36415 COLL VENOUS BLD VENIPUNCTURE: CPT | Performed by: INTERNAL MEDICINE

## 2024-04-16 RX ORDER — LEVOTHYROXINE SODIUM 0.07 MG/1
75 TABLET ORAL DAILY
Qty: 90 TABLET | Refills: 1 | Status: SHIPPED | OUTPATIENT
Start: 2024-04-16

## 2024-04-16 RX ORDER — ROSUVASTATIN CALCIUM 5 MG/1
5 TABLET, COATED ORAL DAILY
Qty: 90 TABLET | Refills: 3 | Status: SHIPPED | OUTPATIENT
Start: 2024-04-16

## 2024-04-16 RX ORDER — LISINOPRIL AND HYDROCHLOROTHIAZIDE 20; 12.5 MG/1; MG/1
1 TABLET ORAL DAILY
Qty: 90 TABLET | Refills: 3 | Status: SHIPPED | OUTPATIENT
Start: 2024-04-16

## 2024-04-16 ASSESSMENT — PATIENT HEALTH QUESTIONNAIRE - PHQ9
1. LITTLE INTEREST OR PLEASURE IN DOING THINGS: NOT AT ALL
2. FEELING DOWN, DEPRESSED OR HOPELESS: NOT AT ALL
CLINICAL INTERPRETATION OF PHQ2 SCORE: NO FURTHER SCREENING NEEDED
SUM OF ALL RESPONSES TO PHQ9 QUESTIONS 1 AND 2: 0
SUM OF ALL RESPONSES TO PHQ9 QUESTIONS 1 AND 2: 0

## 2024-04-17 LAB
ALBUMIN SERPL-MCNC: 4 G/DL (ref 3.6–5.1)
ALBUMIN/GLOB SERPL: 1.3 {RATIO} (ref 1–2.4)
ALP SERPL-CCNC: 86 UNITS/L (ref 45–117)
ALT SERPL-CCNC: 19 UNITS/L
ANION GAP SERPL CALC-SCNC: 11 MMOL/L (ref 7–19)
AST SERPL-CCNC: 21 UNITS/L
BILIRUB SERPL-MCNC: 0.4 MG/DL (ref 0.2–1)
BUN SERPL-MCNC: 28 MG/DL (ref 6–20)
BUN/CREAT SERPL: 22 (ref 7–25)
CALCIUM SERPL-MCNC: 9.4 MG/DL (ref 8.4–10.2)
CHLORIDE SERPL-SCNC: 109 MMOL/L (ref 97–110)
CHOLEST SERPL-MCNC: 183 MG/DL
CHOLEST/HDLC SERPL: 2.6 {RATIO}
CO2 SERPL-SCNC: 27 MMOL/L (ref 21–32)
CREAT SERPL-MCNC: 1.27 MG/DL (ref 0.51–0.95)
CREAT UR-MCNC: 188 MG/DL
EGFRCR SERPLBLD CKD-EPI 2021: 47 ML/MIN/{1.73_M2}
FASTING DURATION TIME PATIENT: 12 HOURS (ref 0–999)
GLOBULIN SER-MCNC: 3 G/DL (ref 2–4)
GLUCOSE SERPL-MCNC: 80 MG/DL (ref 70–99)
HDLC SERPL-MCNC: 70 MG/DL
LDLC SERPL CALC-MCNC: 93 MG/DL
MICROALBUMIN UR-MCNC: 0.68 MG/DL
MICROALBUMIN/CREAT UR: 3.6 MG/G
NONHDLC SERPL-MCNC: 113 MG/DL
POTASSIUM SERPL-SCNC: 4.3 MMOL/L (ref 3.4–5.1)
PROT SERPL-MCNC: 7 G/DL (ref 6.4–8.2)
SODIUM SERPL-SCNC: 143 MMOL/L (ref 135–145)
TRIGL SERPL-MCNC: 102 MG/DL
TSH SERPL-ACNC: 3.31 MCUNITS/ML (ref 0.35–5)

## 2024-04-23 ENCOUNTER — IMAGING SERVICES (OUTPATIENT)
Dept: GENERAL RADIOLOGY | Age: 64
End: 2024-04-23
Attending: ORTHOPAEDIC SURGERY

## 2024-04-23 ENCOUNTER — APPOINTMENT (OUTPATIENT)
Dept: ORTHOPEDICS | Age: 64
End: 2024-04-23

## 2024-04-23 DIAGNOSIS — M25.551 RIGHT HIP PAIN: ICD-10-CM

## 2024-04-23 DIAGNOSIS — M76.31 ILIOTIBIAL BAND SYNDROME OF RIGHT SIDE: ICD-10-CM

## 2024-04-23 DIAGNOSIS — M70.61 GREATER TROCHANTERIC BURSITIS OF RIGHT HIP: ICD-10-CM

## 2024-04-23 DIAGNOSIS — M16.11 PRIMARY OSTEOARTHRITIS OF RIGHT HIP: Primary | ICD-10-CM

## 2024-04-23 PROCEDURE — 73501 X-RAY EXAM HIP UNI 1 VIEW: CPT | Performed by: RADIOLOGY

## 2024-05-01 ENCOUNTER — HOSPITAL ENCOUNTER (OUTPATIENT)
Dept: GENERAL RADIOLOGY | Age: 64
Discharge: HOME | End: 2024-05-01
Attending: ORTHOPAEDIC SURGERY

## 2024-05-01 DIAGNOSIS — M16.11 PRIMARY OSTEOARTHRITIS OF RIGHT HIP: ICD-10-CM

## 2024-05-01 PROCEDURE — 77002 NEEDLE LOCALIZATION BY XRAY: CPT | Performed by: RADIOLOGY

## 2024-05-01 PROCEDURE — 77002 NEEDLE LOCALIZATION BY XRAY: CPT

## 2024-05-01 PROCEDURE — 10002800 HB RX 250 W HCPCS: Performed by: ORTHOPAEDIC SURGERY

## 2024-05-01 PROCEDURE — 10002805 HB CONTRAST AGENT: Performed by: ORTHOPAEDIC SURGERY

## 2024-05-01 PROCEDURE — 10002801 HB RX 250 W/O HCPCS: Performed by: ORTHOPAEDIC SURGERY

## 2024-05-01 PROCEDURE — 20610 DRAIN/INJ JOINT/BURSA W/O US: CPT | Performed by: RADIOLOGY

## 2024-05-01 RX ORDER — METHYLPREDNISOLONE ACETATE 80 MG/ML
80 INJECTION, SUSPENSION INTRA-ARTICULAR; INTRALESIONAL; INTRAMUSCULAR; SOFT TISSUE ONCE
Status: COMPLETED | OUTPATIENT
Start: 2024-05-01 | End: 2024-05-01

## 2024-05-01 RX ORDER — LIDOCAINE HYDROCHLORIDE 10 MG/ML
6 INJECTION, SOLUTION INFILTRATION; PERINEURAL ONCE
Status: COMPLETED | OUTPATIENT
Start: 2024-05-01 | End: 2024-05-01

## 2024-05-01 RX ADMIN — IOHEXOL 3 ML: 180 INJECTION INTRAVENOUS at 13:33

## 2024-05-01 RX ADMIN — METHYLPREDNISOLONE ACETATE 80 MG: 80 INJECTION, SUSPENSION INTRA-ARTICULAR; INTRALESIONAL; INTRAMUSCULAR; SOFT TISSUE at 13:34

## 2024-05-01 RX ADMIN — LIDOCAINE HYDROCHLORIDE 6 ML: 10 INJECTION, SOLUTION INFILTRATION; PERINEURAL at 13:33

## 2024-05-02 ENCOUNTER — HOSPITAL ENCOUNTER (OUTPATIENT)
Dept: GENERAL RADIOLOGY | Age: 64
Discharge: HOME | End: 2024-05-02
Attending: ORTHOPAEDIC SURGERY

## 2024-05-03 ENCOUNTER — HOSPITAL ENCOUNTER (OUTPATIENT)
Dept: REHABILITATION | Age: 64
Discharge: STILL ADMITTED OR ADMINISTRATIVE | End: 2024-05-03
Attending: ORTHOPAEDIC SURGERY

## 2024-05-03 PROCEDURE — 97161 PT EVAL LOW COMPLEX 20 MIN: CPT

## 2024-05-03 PROCEDURE — 97530 THERAPEUTIC ACTIVITIES: CPT

## 2024-05-03 PROCEDURE — 10004173 HB COUNTER-THERAPY VISIT PT

## 2024-05-03 ASSESSMENT — MOVEMENT AND STRENGTH ASSESSMENTS
RUNNING ON UNEVEN GROUND: EXTREME DIFFICULTY OR UNABLE TO PERFORM ACTIVITY
ROLLING OVER IN BED: MODERATE DIFFICULTY
WALKING A MILE: QUITE A BIT OF DIFFICULTY
PERFORMING HEAVY ACTIVITIES AROUND YOUR HOME: QUITE A BIT OF DIFFICULTY
YOUR USUAL HOBBIES, RECREATIONAL OR SPORTING ACTIVIITIES: QUITE A BIT OF DIFFICULTY
STANDING FOR 1 HOUR: MODERATE DIFFICULTY
GOING UP OR DOWN 10 STAIRS (ABOUT 1 FLIGHT OF STAIRS): QUITE A BIT OF DIFFICULTY
TOTAL SCORE: 35
ANY OF YOUR USUAL WORK, HOUSEWORK OR SCHOOL ACTIVITIES: MODERATE DIFFICULTY
SQUATTING: QUITE A BIT OF DIFFICULTY
SITTING FOR 1 HOUR: A LITTLE BIT OF DIFFICULTY
PUTTING ON YOUR SHOES OR SOCKS: QUITE A BIT OF DIFFICULTY
LIFTING AN OBJECT, LIKE A BAG OF GROCERIES, FROM THE FLOOR: MODERATE DIFFICULTY
WALKING BETWEEN ROOMS: MODERATE DIFFICULTY
GETTING INTO OR OUT OF A CAR: MODERATE DIFFICULTY
HOPPING: EXTREME DIFFICULTY OR UNABLE TO PERFORM ACTIVITY
MAKING SHARP TURNS WHILE RUNNING FAST: EXTREME DIFFICULTY OR UNABLE TO PERFORM ACTIVITY
PERFORMING LIGHT ACTIVITES AROUND YOUR HOME: A LITTLE BIT OF DIFFICULTY
RUNNING ON EVEN GROUND: EXTREME DIFFICULTY OR UNABLE TO PERFORM ACTIVITY
GETTING INTO OR OUT OF THE BATH: MODERATE DIFFICULTY
WALKING 2 BLOCKS: MODERATE DIFFICULTY

## 2024-05-03 ASSESSMENT — ENCOUNTER SYMPTOMS
ALLEVIATING FACTORS: REST
QUALITY: ACHE
QUALITY: SHARP
PAIN SEVERITY NOW: 3
PAIN FREQUENCY: CONSTANT
ALLEVIATING FACTORS: OVER-THE-COUNTER MEDICATION
PAIN SCALE AT HIGHEST: 5
QUALITY: SHOOTING
SUBJECTIVE PAIN PROGRESSION: IMPROVED
PAIN SCALE AT LOWEST: 3

## 2024-05-07 ENCOUNTER — HOSPITAL ENCOUNTER (OUTPATIENT)
Dept: REHABILITATION | Age: 64
Discharge: STILL ADMITTED OR ADMINISTRATIVE | End: 2024-05-07
Attending: ORTHOPAEDIC SURGERY

## 2024-05-07 PROCEDURE — 97140 MANUAL THERAPY 1/> REGIONS: CPT

## 2024-05-07 PROCEDURE — 97110 THERAPEUTIC EXERCISES: CPT

## 2024-05-07 PROCEDURE — 10004173 HB COUNTER-THERAPY VISIT PT

## 2024-05-07 PROCEDURE — 97112 NEUROMUSCULAR REEDUCATION: CPT

## 2024-05-07 ASSESSMENT — ENCOUNTER SYMPTOMS: PAIN SEVERITY NOW: 3

## 2024-05-09 ENCOUNTER — HOSPITAL ENCOUNTER (OUTPATIENT)
Dept: REHABILITATION | Age: 64
Discharge: STILL ADMITTED OR ADMINISTRATIVE | End: 2024-05-09
Attending: ORTHOPAEDIC SURGERY

## 2024-05-09 PROCEDURE — 97112 NEUROMUSCULAR REEDUCATION: CPT

## 2024-05-09 PROCEDURE — 10004173 HB COUNTER-THERAPY VISIT PT

## 2024-05-09 PROCEDURE — 97140 MANUAL THERAPY 1/> REGIONS: CPT

## 2024-05-09 ASSESSMENT — ENCOUNTER SYMPTOMS: PAIN SEVERITY NOW: 2

## 2024-05-13 ENCOUNTER — APPOINTMENT (OUTPATIENT)
Dept: REHABILITATION | Age: 64
End: 2024-05-13
Attending: ORTHOPAEDIC SURGERY

## 2024-05-13 PROCEDURE — 97140 MANUAL THERAPY 1/> REGIONS: CPT

## 2024-05-13 PROCEDURE — 97112 NEUROMUSCULAR REEDUCATION: CPT

## 2024-05-13 PROCEDURE — 97110 THERAPEUTIC EXERCISES: CPT

## 2024-05-13 PROCEDURE — 10004173 HB COUNTER-THERAPY VISIT PT

## 2024-05-13 ASSESSMENT — ENCOUNTER SYMPTOMS: PAIN SEVERITY NOW: 3

## 2024-05-16 ENCOUNTER — APPOINTMENT (OUTPATIENT)
Dept: REHABILITATION | Age: 64
End: 2024-05-16
Attending: ORTHOPAEDIC SURGERY

## 2024-05-16 PROCEDURE — 97140 MANUAL THERAPY 1/> REGIONS: CPT | Performed by: PHYSICAL THERAPIST

## 2024-05-16 PROCEDURE — 97110 THERAPEUTIC EXERCISES: CPT | Performed by: PHYSICAL THERAPIST

## 2024-05-16 PROCEDURE — 10004173 HB COUNTER-THERAPY VISIT PT: Performed by: PHYSICAL THERAPIST

## 2024-05-20 ENCOUNTER — APPOINTMENT (OUTPATIENT)
Dept: REHABILITATION | Age: 64
End: 2024-05-20
Attending: ORTHOPAEDIC SURGERY

## 2024-05-20 PROCEDURE — 97110 THERAPEUTIC EXERCISES: CPT

## 2024-05-20 PROCEDURE — 10004173 HB COUNTER-THERAPY VISIT PT

## 2024-05-20 PROCEDURE — 97112 NEUROMUSCULAR REEDUCATION: CPT

## 2024-05-20 PROCEDURE — 97140 MANUAL THERAPY 1/> REGIONS: CPT

## 2024-05-20 ASSESSMENT — ENCOUNTER SYMPTOMS: PAIN SEVERITY NOW: 3

## 2024-05-22 ENCOUNTER — APPOINTMENT (OUTPATIENT)
Dept: REHABILITATION | Age: 64
End: 2024-05-22
Attending: ORTHOPAEDIC SURGERY

## 2024-05-22 PROCEDURE — 97140 MANUAL THERAPY 1/> REGIONS: CPT

## 2024-05-22 PROCEDURE — 10004173 HB COUNTER-THERAPY VISIT PT

## 2024-05-22 PROCEDURE — 97112 NEUROMUSCULAR REEDUCATION: CPT

## 2024-05-22 PROCEDURE — 97110 THERAPEUTIC EXERCISES: CPT

## 2024-05-22 ASSESSMENT — ENCOUNTER SYMPTOMS: PAIN: 1

## 2024-05-26 ENCOUNTER — HEALTH MAINTENANCE LETTER (OUTPATIENT)
Age: 64
End: 2024-05-26

## 2024-05-28 ENCOUNTER — APPOINTMENT (OUTPATIENT)
Dept: ORTHOPEDICS | Age: 64
End: 2024-05-28

## 2024-05-28 ENCOUNTER — APPOINTMENT (OUTPATIENT)
Dept: REHABILITATION | Age: 64
End: 2024-05-28
Attending: ORTHOPAEDIC SURGERY

## 2024-05-28 DIAGNOSIS — M70.61 GREATER TROCHANTERIC BURSITIS OF RIGHT HIP: ICD-10-CM

## 2024-05-28 DIAGNOSIS — M16.11 PRIMARY OSTEOARTHRITIS OF RIGHT HIP: Primary | ICD-10-CM

## 2024-05-28 DIAGNOSIS — M76.31 ILIOTIBIAL BAND SYNDROME OF RIGHT SIDE: ICD-10-CM

## 2024-05-28 PROCEDURE — 97140 MANUAL THERAPY 1/> REGIONS: CPT | Performed by: PHYSICAL THERAPIST

## 2024-05-28 PROCEDURE — 10004173 HB COUNTER-THERAPY VISIT PT: Performed by: PHYSICAL THERAPIST

## 2024-05-31 ENCOUNTER — APPOINTMENT (OUTPATIENT)
Dept: REHABILITATION | Age: 64
End: 2024-05-31
Attending: ORTHOPAEDIC SURGERY

## 2024-05-31 PROCEDURE — 97140 MANUAL THERAPY 1/> REGIONS: CPT

## 2024-05-31 PROCEDURE — 97110 THERAPEUTIC EXERCISES: CPT

## 2024-05-31 PROCEDURE — 97530 THERAPEUTIC ACTIVITIES: CPT

## 2024-05-31 PROCEDURE — 10004173 HB COUNTER-THERAPY VISIT PT

## 2024-05-31 ASSESSMENT — ENCOUNTER SYMPTOMS: PAIN SEVERITY NOW: 4

## 2024-05-31 ASSESSMENT — MOVEMENT AND STRENGTH ASSESSMENTS
LIFTING AN OBJECT, LIKE A BAG OF GROCERIES, FROM THE FLOOR: MODERATE DIFFICULTY
WALKING BETWEEN ROOMS: A LITTLE BIT OF DIFFICULTY
TOTAL SCORE: 51.25
PUTTING ON YOUR SHOES OR SOCKS: A LITTLE BIT OF DIFFICULTY
SQUATTING: QUITE A BIT OF DIFFICULTY
ROLLING OVER IN BED: A LITTLE BIT OF DIFFICULTY
RUNNING ON EVEN GROUND: QUITE A BIT OF DIFFICULTY
HOPPING: QUITE A BIT OF DIFFICULTY
STANDING FOR 1 HOUR: MODERATE DIFFICULTY
PERFORMING LIGHT ACTIVITES AROUND YOUR HOME: A LITTLE BIT OF DIFFICULTY
GETTING INTO OR OUT OF A CAR: MODERATE DIFFICULTY
RUNNING ON UNEVEN GROUND: QUITE A BIT OF DIFFICULTY
WALKING A MILE: MODERATE DIFFICULTY
GETTING INTO OR OUT OF THE BATH: A LITTLE BIT OF DIFFICULTY
PERFORMING HEAVY ACTIVITIES AROUND YOUR HOME: MODERATE DIFFICULTY
SITTING FOR 1 HOUR: MODERATE DIFFICULTY
GOING UP OR DOWN 10 STAIRS (ABOUT 1 FLIGHT OF STAIRS): A LITTLE BIT OF DIFFICULTY
MAKING SHARP TURNS WHILE RUNNING FAST: QUITE A BIT OF DIFFICULTY
YOUR USUAL HOBBIES, RECREATIONAL OR SPORTING ACTIVIITIES: MODERATE DIFFICULTY
ANY OF YOUR USUAL WORK, HOUSEWORK OR SCHOOL ACTIVITIES: MODERATE DIFFICULTY
WALKING 2 BLOCKS: MODERATE DIFFICULTY

## 2024-06-03 ENCOUNTER — HOSPITAL ENCOUNTER (OUTPATIENT)
Dept: REHABILITATION | Age: 64
Discharge: STILL ADMITTED OR ADMINISTRATIVE | End: 2024-06-03
Attending: ORTHOPAEDIC SURGERY

## 2024-06-03 PROCEDURE — 97110 THERAPEUTIC EXERCISES: CPT | Performed by: PHYSICAL THERAPIST

## 2024-06-03 PROCEDURE — 97112 NEUROMUSCULAR REEDUCATION: CPT | Performed by: PHYSICAL THERAPIST

## 2024-06-03 PROCEDURE — 10004173 HB COUNTER-THERAPY VISIT PT: Performed by: PHYSICAL THERAPIST

## 2024-06-03 PROCEDURE — 97140 MANUAL THERAPY 1/> REGIONS: CPT | Performed by: PHYSICAL THERAPIST

## 2024-06-03 ASSESSMENT — ENCOUNTER SYMPTOMS: PAIN SEVERITY NOW: 4

## 2024-06-05 ENCOUNTER — HOSPITAL ENCOUNTER (OUTPATIENT)
Dept: MAMMOGRAPHY | Age: 64
Discharge: HOME | End: 2024-06-05
Attending: FAMILY MEDICINE

## 2024-06-05 ENCOUNTER — HOSPITAL ENCOUNTER (OUTPATIENT)
Dept: REHABILITATION | Age: 64
Discharge: STILL ADMITTED OR ADMINISTRATIVE | End: 2024-06-05
Attending: ORTHOPAEDIC SURGERY

## 2024-06-05 DIAGNOSIS — Z12.31 ENCOUNTER FOR SCREENING MAMMOGRAM FOR MALIGNANT NEOPLASM OF BREAST: ICD-10-CM

## 2024-06-05 PROCEDURE — 97140 MANUAL THERAPY 1/> REGIONS: CPT

## 2024-06-05 PROCEDURE — 77063 BREAST TOMOSYNTHESIS BI: CPT | Performed by: RADIOLOGY

## 2024-06-05 PROCEDURE — 10004173 HB COUNTER-THERAPY VISIT PT

## 2024-06-05 PROCEDURE — 97110 THERAPEUTIC EXERCISES: CPT

## 2024-06-05 PROCEDURE — 77067 SCR MAMMO BI INCL CAD: CPT | Performed by: RADIOLOGY

## 2024-06-05 PROCEDURE — 97112 NEUROMUSCULAR REEDUCATION: CPT

## 2024-06-05 PROCEDURE — 77063 BREAST TOMOSYNTHESIS BI: CPT

## 2024-06-05 ASSESSMENT — ENCOUNTER SYMPTOMS: PAIN SEVERITY NOW: 3

## 2024-06-10 ENCOUNTER — HOSPITAL ENCOUNTER (OUTPATIENT)
Dept: REHABILITATION | Age: 64
Discharge: STILL ADMITTED OR ADMINISTRATIVE | End: 2024-06-10
Attending: ORTHOPAEDIC SURGERY

## 2024-06-10 PROCEDURE — 97112 NEUROMUSCULAR REEDUCATION: CPT

## 2024-06-10 PROCEDURE — 97110 THERAPEUTIC EXERCISES: CPT

## 2024-06-10 PROCEDURE — 97140 MANUAL THERAPY 1/> REGIONS: CPT

## 2024-06-10 PROCEDURE — 10004173 HB COUNTER-THERAPY VISIT PT

## 2024-06-10 ASSESSMENT — ENCOUNTER SYMPTOMS: PAIN SEVERITY NOW: 2

## 2024-06-13 ENCOUNTER — HOSPITAL ENCOUNTER (OUTPATIENT)
Dept: REHABILITATION | Age: 64
Discharge: STILL ADMITTED OR ADMINISTRATIVE | End: 2024-06-13
Attending: ORTHOPAEDIC SURGERY

## 2024-06-13 PROCEDURE — 10004173 HB COUNTER-THERAPY VISIT PT: Performed by: PHYSICAL THERAPIST

## 2024-06-13 PROCEDURE — 97110 THERAPEUTIC EXERCISES: CPT | Performed by: PHYSICAL THERAPIST

## 2024-06-13 PROCEDURE — 97140 MANUAL THERAPY 1/> REGIONS: CPT | Performed by: PHYSICAL THERAPIST

## 2024-06-17 ENCOUNTER — HOSPITAL ENCOUNTER (OUTPATIENT)
Dept: REHABILITATION | Age: 64
Discharge: STILL ADMITTED OR ADMINISTRATIVE | End: 2024-06-17
Attending: ORTHOPAEDIC SURGERY

## 2024-06-17 PROBLEM — Z71.89 ADVANCED DIRECTIVES, COUNSELING/DISCUSSION: Status: RESOLVED | Noted: 2017-03-02 | Resolved: 2024-06-17

## 2024-06-17 PROCEDURE — 10004173 HB COUNTER-THERAPY VISIT PT: Performed by: PHYSICAL THERAPIST

## 2024-06-17 PROCEDURE — 97110 THERAPEUTIC EXERCISES: CPT | Performed by: PHYSICAL THERAPIST

## 2024-06-17 PROCEDURE — 97140 MANUAL THERAPY 1/> REGIONS: CPT | Performed by: PHYSICAL THERAPIST

## 2024-06-25 ENCOUNTER — HOSPITAL ENCOUNTER (OUTPATIENT)
Dept: REHABILITATION | Age: 64
Discharge: STILL ADMITTED OR ADMINISTRATIVE | End: 2024-06-25
Attending: ORTHOPAEDIC SURGERY

## 2024-06-25 PROCEDURE — 10004173 HB COUNTER-THERAPY VISIT PT

## 2024-06-25 PROCEDURE — 97112 NEUROMUSCULAR REEDUCATION: CPT

## 2024-06-25 PROCEDURE — 97140 MANUAL THERAPY 1/> REGIONS: CPT

## 2024-06-25 PROCEDURE — 97110 THERAPEUTIC EXERCISES: CPT

## 2024-06-25 ASSESSMENT — ENCOUNTER SYMPTOMS: PAIN SEVERITY NOW: 2

## 2024-06-28 ENCOUNTER — HOSPITAL ENCOUNTER (OUTPATIENT)
Dept: REHABILITATION | Age: 64
Discharge: STILL ADMITTED OR ADMINISTRATIVE | End: 2024-06-28
Attending: ORTHOPAEDIC SURGERY

## 2024-06-28 PROCEDURE — 10004173 HB COUNTER-THERAPY VISIT PT: Performed by: PHYSICAL THERAPIST

## 2024-06-28 PROCEDURE — 97112 NEUROMUSCULAR REEDUCATION: CPT | Performed by: PHYSICAL THERAPIST

## 2024-06-28 PROCEDURE — 97140 MANUAL THERAPY 1/> REGIONS: CPT | Performed by: PHYSICAL THERAPIST

## 2024-06-28 PROCEDURE — 97110 THERAPEUTIC EXERCISES: CPT | Performed by: PHYSICAL THERAPIST

## 2024-06-28 ASSESSMENT — ENCOUNTER SYMPTOMS: PAIN SEVERITY NOW: 2

## 2024-07-02 ENCOUNTER — HOSPITAL ENCOUNTER (OUTPATIENT)
Dept: REHABILITATION | Age: 64
Discharge: STILL ADMITTED OR ADMINISTRATIVE | End: 2024-07-02
Attending: ORTHOPAEDIC SURGERY

## 2024-07-02 PROCEDURE — 10004173 HB COUNTER-THERAPY VISIT PT: Performed by: PHYSICAL THERAPIST

## 2024-07-02 PROCEDURE — 97110 THERAPEUTIC EXERCISES: CPT | Performed by: PHYSICAL THERAPIST

## 2024-07-02 PROCEDURE — 97140 MANUAL THERAPY 1/> REGIONS: CPT | Performed by: PHYSICAL THERAPIST

## 2024-07-02 ASSESSMENT — MOVEMENT AND STRENGTH ASSESSMENTS
WALKING A MILE: MODERATE DIFFICULTY
GETTING INTO OR OUT OF A CAR: A LITTLE BIT OF DIFFICULTY
PERFORMING HEAVY ACTIVITIES AROUND YOUR HOME: MODERATE DIFFICULTY
STANDING FOR 1 HOUR: A LITTLE BIT OF DIFFICULTY
ROLLING OVER IN BED: A LITTLE BIT OF DIFFICULTY
SQUATTING: MODERATE DIFFICULTY
LIFTING AN OBJECT, LIKE A BAG OF GROCERIES, FROM THE FLOOR: A LITTLE BIT OF DIFFICULTY
HOPPING: MODERATE DIFFICULTY
RUNNING ON UNEVEN GROUND: QUITE A BIT OF DIFFICULTY
TOTAL SCORE: 60
PERFORMING LIGHT ACTIVITES AROUND YOUR HOME: A LITTLE BIT OF DIFFICULTY
ANY OF YOUR USUAL WORK, HOUSEWORK OR SCHOOL ACTIVITIES: MODERATE DIFFICULTY
SITTING FOR 1 HOUR: A LITTLE BIT OF DIFFICULTY
PUTTING ON YOUR SHOES OR SOCKS: MODERATE DIFFICULTY
GETTING INTO OR OUT OF THE BATH: A LITTLE BIT OF DIFFICULTY
WALKING BETWEEN ROOMS: NO DIFFICULTY
RUNNING ON EVEN GROUND: QUITE A BIT OF DIFFICULTY
YOUR USUAL HOBBIES, RECREATIONAL OR SPORTING ACTIVIITIES: MODERATE DIFFICULTY
GOING UP OR DOWN 10 STAIRS (ABOUT 1 FLIGHT OF STAIRS): A LITTLE BIT OF DIFFICULTY
MAKING SHARP TURNS WHILE RUNNING FAST: QUITE A BIT OF DIFFICULTY
WALKING 2 BLOCKS: A LITTLE BIT OF DIFFICULTY

## 2024-07-02 ASSESSMENT — ENCOUNTER SYMPTOMS: PAIN SEVERITY NOW: 3

## 2024-07-10 ENCOUNTER — HOSPITAL ENCOUNTER (OUTPATIENT)
Dept: REHABILITATION | Age: 64
Discharge: STILL ADMITTED OR ADMINISTRATIVE | End: 2024-07-10
Attending: ORTHOPAEDIC SURGERY

## 2024-07-10 PROCEDURE — 97140 MANUAL THERAPY 1/> REGIONS: CPT | Performed by: PHYSICAL THERAPIST

## 2024-07-10 PROCEDURE — 97110 THERAPEUTIC EXERCISES: CPT | Performed by: PHYSICAL THERAPIST

## 2024-07-10 PROCEDURE — 10004173 HB COUNTER-THERAPY VISIT PT: Performed by: PHYSICAL THERAPIST

## 2024-07-17 ENCOUNTER — HOSPITAL ENCOUNTER (OUTPATIENT)
Dept: REHABILITATION | Age: 64
Discharge: STILL ADMITTED OR ADMINISTRATIVE | End: 2024-07-17
Attending: ORTHOPAEDIC SURGERY

## 2024-07-17 PROCEDURE — 97110 THERAPEUTIC EXERCISES: CPT | Performed by: PHYSICAL THERAPIST

## 2024-07-17 PROCEDURE — 10004173 HB COUNTER-THERAPY VISIT PT: Performed by: PHYSICAL THERAPIST

## 2024-07-17 PROCEDURE — 97140 MANUAL THERAPY 1/> REGIONS: CPT | Performed by: PHYSICAL THERAPIST

## 2024-07-17 ASSESSMENT — MOVEMENT AND STRENGTH ASSESSMENTS
LIFTING AN OBJECT, LIKE A BAG OF GROCERIES, FROM THE FLOOR: MODERATE DIFFICULTY
YOUR USUAL HOBBIES, RECREATIONAL OR SPORTING ACTIVIITIES: MODERATE DIFFICULTY
WALKING A MILE: MODERATE DIFFICULTY
HOPPING: MODERATE DIFFICULTY
SITTING FOR 1 HOUR: MODERATE DIFFICULTY
GOING UP OR DOWN 10 STAIRS (ABOUT 1 FLIGHT OF STAIRS): A LITTLE BIT OF DIFFICULTY
RUNNING ON UNEVEN GROUND: QUITE A BIT OF DIFFICULTY
WALKING 2 BLOCKS: A LITTLE BIT OF DIFFICULTY
STANDING FOR 1 HOUR: QUITE A BIT OF DIFFICULTY
GETTING INTO OR OUT OF THE BATH: A LITTLE BIT OF DIFFICULTY
WALKING BETWEEN ROOMS: A LITTLE BIT OF DIFFICULTY
ROLLING OVER IN BED: A LITTLE BIT OF DIFFICULTY
PERFORMING LIGHT ACTIVITES AROUND YOUR HOME: NO DIFFICULTY
PUTTING ON YOUR SHOES OR SOCKS: MODERATE DIFFICULTY
MAKING SHARP TURNS WHILE RUNNING FAST: QUITE A BIT OF DIFFICULTY
GETTING INTO OR OUT OF A CAR: MODERATE DIFFICULTY
PERFORMING HEAVY ACTIVITIES AROUND YOUR HOME: MODERATE DIFFICULTY
SQUATTING: QUITE A BIT OF DIFFICULTY
TOTAL SCORE: 53.75
RUNNING ON EVEN GROUND: QUITE A BIT OF DIFFICULTY
ANY OF YOUR USUAL WORK, HOUSEWORK OR SCHOOL ACTIVITIES: A LITTLE BIT OF DIFFICULTY

## 2024-07-17 ASSESSMENT — ENCOUNTER SYMPTOMS: PAIN SEVERITY NOW: 3

## 2024-10-10 DIAGNOSIS — E03.9 ACQUIRED HYPOTHYROIDISM: ICD-10-CM

## 2024-10-10 RX ORDER — LEVOTHYROXINE SODIUM 75 UG/1
75 TABLET ORAL DAILY
Qty: 90 TABLET | Refills: 1 | Status: SHIPPED | OUTPATIENT
Start: 2024-10-10

## 2024-10-15 SDOH — ECONOMIC STABILITY: HOUSING INSECURITY: WHAT IS YOUR LIVING SITUATION TODAY?: I HAVE A STEADY PLACE TO LIVE

## 2024-10-15 SDOH — ECONOMIC STABILITY: GENERAL: WOULD YOU LIKE HELP WITH ANY OF THE FOLLOWING NEEDS?: I DON'T WANT HELP WITH ANY OF THESE

## 2024-10-15 SDOH — ECONOMIC STABILITY: HOUSING INSECURITY: DO YOU HAVE PROBLEMS WITH ANY OF THE FOLLOWING?: NONE OF THE ABOVE

## 2024-10-15 SDOH — ECONOMIC STABILITY: FOOD INSECURITY: WITHIN THE PAST 12 MONTHS, THE FOOD YOU BOUGHT JUST DIDN'T LAST AND YOU DIDN'T HAVE MONEY TO GET MORE.: NEVER TRUE

## 2024-10-15 SDOH — ECONOMIC STABILITY: TRANSPORTATION INSECURITY
IN THE PAST 12 MONTHS, HAS LACK OF RELIABLE TRANSPORTATION KEPT YOU FROM MEDICAL APPOINTMENTS, MEETINGS, WORK OR FROM GETTING THINGS NEEDED FOR DAILY LIVING?: NO

## 2024-10-15 ASSESSMENT — SOCIAL DETERMINANTS OF HEALTH (SDOH): IN THE PAST 12 MONTHS, HAS THE ELECTRIC, GAS, OIL, OR WATER COMPANY THREATENED TO SHUT OFF SERVICE IN YOUR HOME?: NO

## 2024-10-15 ASSESSMENT — PATIENT HEALTH QUESTIONNAIRE - PHQ9
CLINICAL INTERPRETATION OF PHQ2 SCORE: NO FURTHER SCREENING NEEDED
1. LITTLE INTEREST OR PLEASURE IN DOING THINGS: NOT AT ALL
2. FEELING DOWN, DEPRESSED OR HOPELESS: NOT AT ALL
CLINICAL INTERPRETATION OF PHQ2 SCORE: 0
SUM OF ALL RESPONSES TO PHQ9 QUESTIONS 1 AND 2: 0

## 2024-10-22 ENCOUNTER — TELEPHONE (OUTPATIENT)
Dept: FAMILY MEDICINE | Age: 64
End: 2024-10-22

## 2024-10-22 ENCOUNTER — LAB SERVICES (OUTPATIENT)
Dept: LAB | Age: 64
End: 2024-10-22

## 2024-10-22 ENCOUNTER — APPOINTMENT (OUTPATIENT)
Dept: FAMILY MEDICINE | Age: 64
End: 2024-10-22

## 2024-10-22 VITALS
RESPIRATION RATE: 16 BRPM | WEIGHT: 197 LBS | SYSTOLIC BLOOD PRESSURE: 124 MMHG | DIASTOLIC BLOOD PRESSURE: 82 MMHG | BODY MASS INDEX: 34.91 KG/M2 | OXYGEN SATURATION: 99 % | HEIGHT: 63 IN | HEART RATE: 68 BPM

## 2024-10-22 DIAGNOSIS — Z00.00 MEDICARE WELCOME VISIT: Primary | ICD-10-CM

## 2024-10-22 DIAGNOSIS — H91.93 DECREASED HEARING, BILATERAL: ICD-10-CM

## 2024-10-22 DIAGNOSIS — Z23 NEED FOR VACCINATION: ICD-10-CM

## 2024-10-22 DIAGNOSIS — I10 PRIMARY HYPERTENSION: ICD-10-CM

## 2024-10-22 DIAGNOSIS — Z78.0 ASYMPTOMATIC MENOPAUSAL STATE: ICD-10-CM

## 2024-10-22 DIAGNOSIS — N18.31 STAGE 3A CHRONIC KIDNEY DISEASE  (CMD): ICD-10-CM

## 2024-10-22 DIAGNOSIS — E03.9 ACQUIRED HYPOTHYROIDISM: ICD-10-CM

## 2024-10-22 DIAGNOSIS — E78.5 HYPERLIPIDEMIA LDL GOAL <100: ICD-10-CM

## 2024-10-22 DIAGNOSIS — R05.3 CHRONIC COUGH: ICD-10-CM

## 2024-10-22 DIAGNOSIS — M85.80 OSTEOPENIA, UNSPECIFIED LOCATION: ICD-10-CM

## 2024-10-22 DIAGNOSIS — M16.11 PRIMARY OSTEOARTHRITIS OF RIGHT HIP: ICD-10-CM

## 2024-10-22 LAB — PTH-INTACT SERPL-MCNC: 94 PG/ML (ref 19–88)

## 2024-10-22 PROCEDURE — 84443 ASSAY THYROID STIM HORMONE: CPT | Performed by: CLINICAL MEDICAL LABORATORY

## 2024-10-22 PROCEDURE — 36415 COLL VENOUS BLD VENIPUNCTURE: CPT | Performed by: INTERNAL MEDICINE

## 2024-10-22 PROCEDURE — 80048 BASIC METABOLIC PNL TOTAL CA: CPT | Performed by: CLINICAL MEDICAL LABORATORY

## 2024-10-22 PROCEDURE — 83970 ASSAY OF PARATHORMONE: CPT | Performed by: CLINICAL MEDICAL LABORATORY

## 2024-10-22 RX ORDER — LISINOPRIL AND HYDROCHLOROTHIAZIDE 12.5; 2 MG/1; MG/1
1 TABLET ORAL DAILY
Qty: 90 TABLET | Refills: 3 | Status: SHIPPED | OUTPATIENT
Start: 2024-10-22

## 2024-10-22 ASSESSMENT — ENCOUNTER SYMPTOMS
HEADACHES: 0
WHEEZING: 0
ABDOMINAL PAIN: 1
FEVER: 0
DIZZINESS: 0
COUGH: 1
CONSTIPATION: 0
SHORTNESS OF BREATH: 0
FATIGUE: 0
LIGHT-HEADEDNESS: 0
DIARRHEA: 0

## 2024-10-23 LAB
ANION GAP SERPL CALC-SCNC: 13 MMOL/L (ref 7–19)
BUN SERPL-MCNC: 27 MG/DL (ref 6–20)
BUN/CREAT SERPL: 19 (ref 7–25)
CALCIUM SERPL-MCNC: 9.5 MG/DL (ref 8.4–10.2)
CHLORIDE SERPL-SCNC: 111 MMOL/L (ref 97–110)
CO2 SERPL-SCNC: 22 MMOL/L (ref 21–32)
CREAT SERPL-MCNC: 1.39 MG/DL (ref 0.51–0.95)
EGFRCR SERPLBLD CKD-EPI 2021: 42 ML/MIN/{1.73_M2}
FASTING DURATION TIME PATIENT: 12 HOURS (ref 0–999)
GLUCOSE SERPL-MCNC: 80 MG/DL (ref 70–99)
POTASSIUM SERPL-SCNC: 4.1 MMOL/L (ref 3.4–5.1)
SODIUM SERPL-SCNC: 142 MMOL/L (ref 135–145)
TSH SERPL-ACNC: 4.52 MCUNITS/ML (ref 0.35–5)

## 2024-10-24 ENCOUNTER — TELEPHONE (OUTPATIENT)
Dept: FAMILY MEDICINE | Age: 64
End: 2024-10-24

## 2024-10-24 DIAGNOSIS — N18.30 STAGE 3 CHRONIC KIDNEY DISEASE, UNSPECIFIED WHETHER STAGE 3A OR 3B CKD  (CMD): Primary | ICD-10-CM

## 2024-10-29 ENCOUNTER — APPOINTMENT (OUTPATIENT)
Dept: REHABILITATION | Age: 64
End: 2024-10-29
Attending: FAMILY MEDICINE

## 2024-10-29 ENCOUNTER — HOSPITAL ENCOUNTER (OUTPATIENT)
Dept: REHABILITATION | Age: 64
Discharge: STILL ADMITTED OR ADMINISTRATIVE | End: 2024-10-29
Attending: FAMILY MEDICINE

## 2024-10-29 PROCEDURE — 10004173 HB COUNTER-THERAPY VISIT PT

## 2024-10-29 PROCEDURE — 97110 THERAPEUTIC EXERCISES: CPT

## 2024-10-29 PROCEDURE — 97161 PT EVAL LOW COMPLEX 20 MIN: CPT

## 2024-10-29 ASSESSMENT — ENCOUNTER SYMPTOMS
PAIN SEVERITY NOW: 3
ALLEVIATING FACTORS: ICE
PAIN FREQUENCY: CONSTANT
QUALITY: SHARP
QUALITY: SHOOTING
QUALITY: ACHE
ALLEVIATING FACTORS: OVER-THE-COUNTER MEDICATION
ALLEVIATING FACTORS: CHANGE IN POSITION
PAIN SCALE AT HIGHEST: 6
PAIN SCALE AT LOWEST: 3
QUALITY: TIGHT

## 2024-10-29 ASSESSMENT — MOVEMENT AND STRENGTH ASSESSMENTS
LIFTING AN OBJECT, LIKE A BAG OF GROCERIES, FROM THE FLOOR: MODERATE DIFFICULTY
WALKING BETWEEN ROOMS: A LITTLE BIT OF DIFFICULTY
WALKING 2 BLOCKS: MODERATE DIFFICULTY
GOING UP OR DOWN 10 STAIRS (ABOUT 1 FLIGHT OF STAIRS): MODERATE DIFFICULTY
YOUR USUAL HOBBIES, RECREATIONAL OR SPORTING ACTIVIITIES: MODERATE DIFFICULTY
SITTING FOR 1 HOUR: MODERATE DIFFICULTY
SQUATTING: QUITE A BIT OF DIFFICULTY
RUNNING ON EVEN GROUND: EXTREME DIFFICULTY OR UNABLE TO PERFORM ACTIVITY
MAKING SHARP TURNS WHILE RUNNING FAST: QUITE A BIT OF DIFFICULTY
ROLLING OVER IN BED: MODERATE DIFFICULTY
TOTAL SCORE: 42.5
PERFORMING LIGHT ACTIVITES AROUND YOUR HOME: A LITTLE BIT OF DIFFICULTY
RUNNING ON UNEVEN GROUND: EXTREME DIFFICULTY OR UNABLE TO PERFORM ACTIVITY
PERFORMING HEAVY ACTIVITIES AROUND YOUR HOME: MODERATE DIFFICULTY
HOPPING: QUITE A BIT OF DIFFICULTY
PUTTING ON YOUR SHOES OR SOCKS: QUITE A BIT OF DIFFICULTY
STANDING FOR 1 HOUR: MODERATE DIFFICULTY
WALKING A MILE: QUITE A BIT OF DIFFICULTY
ANY OF YOUR USUAL WORK, HOUSEWORK OR SCHOOL ACTIVITIES: MODERATE DIFFICULTY
GETTING INTO OR OUT OF THE BATH: A LITTLE BIT OF DIFFICULTY
GETTING INTO OR OUT OF A CAR: MODERATE DIFFICULTY

## 2024-10-31 ENCOUNTER — APPOINTMENT (OUTPATIENT)
Dept: REHABILITATION | Age: 64
End: 2024-10-31
Attending: FAMILY MEDICINE

## 2024-11-05 ENCOUNTER — APPOINTMENT (OUTPATIENT)
Dept: REHABILITATION | Age: 64
End: 2024-11-05
Attending: FAMILY MEDICINE

## 2024-11-06 ENCOUNTER — HOSPITAL ENCOUNTER (OUTPATIENT)
Dept: REHABILITATION | Age: 64
Discharge: STILL ADMITTED OR ADMINISTRATIVE | End: 2024-11-06
Attending: FAMILY MEDICINE

## 2024-11-06 PROCEDURE — 10004173 HB COUNTER-THERAPY VISIT PT

## 2024-11-06 PROCEDURE — 97110 THERAPEUTIC EXERCISES: CPT

## 2024-11-06 PROCEDURE — 97140 MANUAL THERAPY 1/> REGIONS: CPT

## 2024-11-06 ASSESSMENT — ENCOUNTER SYMPTOMS: PAIN SEVERITY NOW: 3

## 2024-11-08 ENCOUNTER — HOSPITAL ENCOUNTER (OUTPATIENT)
Dept: REHABILITATION | Age: 64
Discharge: STILL ADMITTED OR ADMINISTRATIVE | End: 2024-11-08
Attending: FAMILY MEDICINE

## 2024-11-08 PROCEDURE — 97140 MANUAL THERAPY 1/> REGIONS: CPT

## 2024-11-08 PROCEDURE — 10004173 HB COUNTER-THERAPY VISIT PT

## 2024-11-08 PROCEDURE — 97110 THERAPEUTIC EXERCISES: CPT

## 2024-11-08 PROCEDURE — 97530 THERAPEUTIC ACTIVITIES: CPT

## 2024-11-08 ASSESSMENT — ENCOUNTER SYMPTOMS: PAIN SEVERITY NOW: 3

## 2024-11-12 ENCOUNTER — HOSPITAL ENCOUNTER (OUTPATIENT)
Dept: REHABILITATION | Age: 64
Discharge: STILL ADMITTED OR ADMINISTRATIVE | End: 2024-11-12
Attending: FAMILY MEDICINE

## 2024-11-12 PROCEDURE — 97110 THERAPEUTIC EXERCISES: CPT

## 2024-11-12 PROCEDURE — 97140 MANUAL THERAPY 1/> REGIONS: CPT

## 2024-11-12 PROCEDURE — 10004173 HB COUNTER-THERAPY VISIT PT

## 2024-11-12 ASSESSMENT — ENCOUNTER SYMPTOMS: PAIN SEVERITY NOW: 2

## 2024-11-13 ENCOUNTER — TELEPHONE (OUTPATIENT)
Dept: FAMILY MEDICINE | Age: 64
End: 2024-11-13

## 2024-11-14 ENCOUNTER — HOSPITAL ENCOUNTER (OUTPATIENT)
Dept: REHABILITATION | Age: 64
Discharge: STILL ADMITTED OR ADMINISTRATIVE | End: 2024-11-14
Attending: FAMILY MEDICINE

## 2024-11-14 ENCOUNTER — APPOINTMENT (OUTPATIENT)
Dept: NEPHROLOGY | Age: 64
End: 2024-11-14
Attending: FAMILY MEDICINE

## 2024-11-14 VITALS
SYSTOLIC BLOOD PRESSURE: 126 MMHG | HEART RATE: 77 BPM | OXYGEN SATURATION: 95 % | HEIGHT: 63 IN | RESPIRATION RATE: 16 BRPM | WEIGHT: 201.6 LBS | BODY MASS INDEX: 35.72 KG/M2 | DIASTOLIC BLOOD PRESSURE: 68 MMHG

## 2024-11-14 DIAGNOSIS — N25.81 HYPERPARATHYROIDISM, SECONDARY  (CMD): ICD-10-CM

## 2024-11-14 DIAGNOSIS — D64.9 ANEMIA, UNSPECIFIED TYPE: ICD-10-CM

## 2024-11-14 DIAGNOSIS — I12.9 HYPERTENSIVE KIDNEY DISEASE: ICD-10-CM

## 2024-11-14 DIAGNOSIS — N18.32 STAGE 3B CHRONIC KIDNEY DISEASE  (CMD): Primary | ICD-10-CM

## 2024-11-14 PROCEDURE — 97110 THERAPEUTIC EXERCISES: CPT | Performed by: PHYSICAL THERAPIST

## 2024-11-14 PROCEDURE — 10004173 HB COUNTER-THERAPY VISIT PT: Performed by: PHYSICAL THERAPIST

## 2024-11-14 PROCEDURE — 97140 MANUAL THERAPY 1/> REGIONS: CPT | Performed by: PHYSICAL THERAPIST

## 2024-11-14 PROCEDURE — 99204 OFFICE O/P NEW MOD 45 MIN: CPT | Performed by: INTERNAL MEDICINE

## 2024-11-14 PROCEDURE — G2211 COMPLEX E/M VISIT ADD ON: HCPCS | Performed by: INTERNAL MEDICINE

## 2024-11-14 ASSESSMENT — ENCOUNTER SYMPTOMS: PAIN SEVERITY NOW: 3

## 2024-11-21 ENCOUNTER — HOSPITAL ENCOUNTER (OUTPATIENT)
Dept: REHABILITATION | Age: 64
Discharge: STILL ADMITTED OR ADMINISTRATIVE | End: 2024-11-21
Attending: FAMILY MEDICINE

## 2024-11-21 ENCOUNTER — HOSPITAL ENCOUNTER (OUTPATIENT)
Dept: ULTRASOUND IMAGING | Age: 64
Discharge: HOME | End: 2024-11-21
Attending: INTERNAL MEDICINE

## 2024-11-21 DIAGNOSIS — N18.32 STAGE 3B CHRONIC KIDNEY DISEASE  (CMD): ICD-10-CM

## 2024-11-21 PROCEDURE — 10004173 HB COUNTER-THERAPY VISIT PT

## 2024-11-21 PROCEDURE — 97110 THERAPEUTIC EXERCISES: CPT

## 2024-11-21 PROCEDURE — 97140 MANUAL THERAPY 1/> REGIONS: CPT

## 2024-11-21 PROCEDURE — 76770 US EXAM ABDO BACK WALL COMP: CPT

## 2024-11-21 ASSESSMENT — ENCOUNTER SYMPTOMS: PAIN SEVERITY NOW: 3

## 2024-11-22 ENCOUNTER — APPOINTMENT (OUTPATIENT)
Dept: FAMILY MEDICINE | Age: 64
End: 2024-11-22

## 2024-11-22 DIAGNOSIS — Z23 NEED FOR VACCINATION: Primary | ICD-10-CM

## 2024-11-22 PROCEDURE — G0009 ADMIN PNEUMOCOCCAL VACCINE: HCPCS | Performed by: FAMILY MEDICINE

## 2024-11-22 PROCEDURE — 90677 PCV20 VACCINE IM: CPT | Performed by: FAMILY MEDICINE

## 2024-11-25 ENCOUNTER — HOSPITAL ENCOUNTER (OUTPATIENT)
Dept: REHABILITATION | Age: 64
Discharge: STILL ADMITTED OR ADMINISTRATIVE | End: 2024-11-25
Attending: FAMILY MEDICINE

## 2024-11-25 PROCEDURE — 97140 MANUAL THERAPY 1/> REGIONS: CPT

## 2024-11-25 PROCEDURE — 10004173 HB COUNTER-THERAPY VISIT PT

## 2024-11-25 PROCEDURE — 97110 THERAPEUTIC EXERCISES: CPT

## 2024-11-25 ASSESSMENT — ENCOUNTER SYMPTOMS: PAIN SEVERITY NOW: 2

## 2024-11-27 ENCOUNTER — HOSPITAL ENCOUNTER (OUTPATIENT)
Dept: REHABILITATION | Age: 64
Discharge: STILL ADMITTED OR ADMINISTRATIVE | End: 2024-11-27
Attending: FAMILY MEDICINE

## 2024-11-27 PROCEDURE — 10004173 HB COUNTER-THERAPY VISIT PT

## 2024-11-27 PROCEDURE — 97110 THERAPEUTIC EXERCISES: CPT

## 2024-11-27 PROCEDURE — 97140 MANUAL THERAPY 1/> REGIONS: CPT

## 2024-11-27 ASSESSMENT — MOVEMENT AND STRENGTH ASSESSMENTS
GOING UP OR DOWN 10 STAIRS (ABOUT 1 FLIGHT OF STAIRS): A LITTLE BIT OF DIFFICULTY
ROLLING OVER IN BED: MODERATE DIFFICULTY
WALKING BETWEEN ROOMS: A LITTLE BIT OF DIFFICULTY
PERFORMING LIGHT ACTIVITES AROUND YOUR HOME: A LITTLE BIT OF DIFFICULTY
PUTTING ON YOUR SHOES OR SOCKS: QUITE A BIT OF DIFFICULTY
WALKING A MILE: QUITE A BIT OF DIFFICULTY
SITTING FOR 1 HOUR: MODERATE DIFFICULTY
GETTING INTO OR OUT OF A CAR: MODERATE DIFFICULTY
PERFORMING HEAVY ACTIVITIES AROUND YOUR HOME: MODERATE DIFFICULTY
GETTING INTO OR OUT OF THE BATH: MODERATE DIFFICULTY
RUNNING ON UNEVEN GROUND: EXTREME DIFFICULTY OR UNABLE TO PERFORM ACTIVITY
SQUATTING: QUITE A BIT OF DIFFICULTY
HOPPING: QUITE A BIT OF DIFFICULTY
MAKING SHARP TURNS WHILE RUNNING FAST: QUITE A BIT OF DIFFICULTY
YOUR USUAL HOBBIES, RECREATIONAL OR SPORTING ACTIVIITIES: QUITE A BIT OF DIFFICULTY
WALKING 2 BLOCKS: MODERATE DIFFICULTY
STANDING FOR 1 HOUR: MODERATE DIFFICULTY
RUNNING ON EVEN GROUND: EXTREME DIFFICULTY OR UNABLE TO PERFORM ACTIVITY
LIFTING AN OBJECT, LIKE A BAG OF GROCERIES, FROM THE FLOOR: MODERATE DIFFICULTY
TOTAL SCORE: 42.5
ANY OF YOUR USUAL WORK, HOUSEWORK OR SCHOOL ACTIVITIES: A LITTLE BIT OF DIFFICULTY

## 2024-11-27 ASSESSMENT — ENCOUNTER SYMPTOMS: PAIN SEVERITY NOW: 2

## 2024-12-02 ENCOUNTER — HOSPITAL ENCOUNTER (OUTPATIENT)
Dept: REHABILITATION | Age: 64
Discharge: STILL ADMITTED OR ADMINISTRATIVE | End: 2024-12-02
Attending: FAMILY MEDICINE

## 2024-12-02 PROCEDURE — 97140 MANUAL THERAPY 1/> REGIONS: CPT | Performed by: PHYSICAL THERAPIST

## 2024-12-02 PROCEDURE — 10004173 HB COUNTER-THERAPY VISIT PT: Performed by: PHYSICAL THERAPIST

## 2024-12-02 PROCEDURE — 97112 NEUROMUSCULAR REEDUCATION: CPT | Performed by: PHYSICAL THERAPIST

## 2024-12-02 PROCEDURE — 97110 THERAPEUTIC EXERCISES: CPT | Performed by: PHYSICAL THERAPIST

## 2024-12-02 ASSESSMENT — ENCOUNTER SYMPTOMS: PAIN SEVERITY NOW: 2

## 2024-12-05 ENCOUNTER — APPOINTMENT (OUTPATIENT)
Dept: LAB | Age: 64
End: 2024-12-05

## 2024-12-05 ENCOUNTER — HOSPITAL ENCOUNTER (OUTPATIENT)
Dept: REHABILITATION | Age: 64
Discharge: STILL ADMITTED OR ADMINISTRATIVE | End: 2024-12-05
Attending: FAMILY MEDICINE

## 2024-12-05 DIAGNOSIS — N18.32 STAGE 3B CHRONIC KIDNEY DISEASE  (CMD): ICD-10-CM

## 2024-12-05 DIAGNOSIS — N25.81 HYPERPARATHYROIDISM, SECONDARY  (CMD): ICD-10-CM

## 2024-12-05 DIAGNOSIS — D64.9 ANEMIA, UNSPECIFIED TYPE: ICD-10-CM

## 2024-12-05 LAB
25(OH)D3+25(OH)D2 SERPL-MCNC: 30.5 NG/ML (ref 30–100)
ALBUMIN SERPL-MCNC: 3.6 G/DL (ref 3.4–5)
ALBUMIN/GLOB SERPL: 1.2 {RATIO} (ref 1–2.4)
ALP SERPL-CCNC: 82 UNITS/L (ref 45–117)
ALT SERPL-CCNC: 21 UNITS/L
ANION GAP SERPL CALC-SCNC: 11 MMOL/L (ref 7–19)
APPEARANCE UR: CLEAR
AST SERPL-CCNC: 20 UNITS/L
BACTERIA #/AREA URNS HPF: ABNORMAL /HPF
BASOPHILS # BLD: 0.1 K/MCL (ref 0–0.3)
BASOPHILS NFR BLD: 1 %
BILIRUB SERPL-MCNC: 0.4 MG/DL (ref 0.2–1)
BILIRUB UR QL STRIP: NEGATIVE
BUN SERPL-MCNC: 32 MG/DL (ref 6–20)
BUN/CREAT SERPL: 24 (ref 7–25)
CALCIUM SERPL-MCNC: 8.9 MG/DL (ref 8.4–10.2)
CHLORIDE SERPL-SCNC: 111 MMOL/L (ref 97–110)
CO2 SERPL-SCNC: 23 MMOL/L (ref 21–32)
COLOR UR: YELLOW
CREAT SERPL-MCNC: 1.32 MG/DL (ref 0.51–0.95)
DEPRECATED RDW RBC: 45.6 FL (ref 39–50)
EGFRCR SERPLBLD CKD-EPI 2021: 45 ML/MIN/{1.73_M2}
EOSINOPHIL # BLD: 0.1 K/MCL (ref 0–0.5)
EOSINOPHIL NFR BLD: 2 %
ERYTHROCYTE [DISTWIDTH] IN BLOOD: 13.5 % (ref 11–15)
FASTING DURATION TIME PATIENT: ABNORMAL H
FERRITIN SERPL-MCNC: 9 NG/ML (ref 8–252)
GLOBULIN SER-MCNC: 3 G/DL (ref 2–4)
GLUCOSE SERPL-MCNC: 88 MG/DL (ref 70–99)
GLUCOSE UR STRIP-MCNC: NEGATIVE MG/DL
HCT VFR BLD CALC: 36.8 % (ref 36–46.5)
HGB BLD-MCNC: 11.7 G/DL (ref 12–15.5)
HGB UR QL STRIP: NEGATIVE
HYALINE CASTS #/AREA URNS LPF: ABNORMAL /LPF
IMM GRANULOCYTES # BLD AUTO: 0 K/MCL (ref 0–0.2)
IMM GRANULOCYTES # BLD: 0 %
IRON SATN MFR SERPL: 16 % (ref 15–45)
IRON SERPL-MCNC: 68 MCG/DL (ref 50–170)
KETONES UR STRIP-MCNC: NEGATIVE MG/DL
LEUKOCYTE ESTERASE UR QL STRIP: NEGATIVE
LYMPHOCYTES # BLD: 1 K/MCL (ref 1–4)
LYMPHOCYTES NFR BLD: 21 %
MCH RBC QN AUTO: 29.3 PG (ref 26–34)
MCHC RBC AUTO-ENTMCNC: 31.8 G/DL (ref 32–36.5)
MCV RBC AUTO: 92.2 FL (ref 78–100)
MONOCYTES # BLD: 0.5 K/MCL (ref 0.3–0.9)
MONOCYTES NFR BLD: 11 %
MUCOUS THREADS URNS QL MICRO: PRESENT
NEUTROPHILS # BLD: 3.3 K/MCL (ref 1.8–7.7)
NEUTROPHILS NFR BLD: 65 %
NITRITE UR QL STRIP: NEGATIVE
NRBC BLD MANUAL-RTO: 0 /100 WBC
PH UR STRIP: 6 [PH] (ref 5–7)
PHOSPHATE SERPL-MCNC: 2.9 MG/DL (ref 2.4–4.7)
PLATELET # BLD AUTO: 186 K/MCL (ref 140–450)
POTASSIUM SERPL-SCNC: 4 MMOL/L (ref 3.4–5.1)
PROT SERPL-MCNC: 6.6 G/DL (ref 6.4–8.2)
PROT UR STRIP-MCNC: ABNORMAL MG/DL
RBC # BLD: 3.99 MIL/MCL (ref 4–5.2)
RBC #/AREA URNS HPF: ABNORMAL /HPF
SODIUM SERPL-SCNC: 141 MMOL/L (ref 135–145)
SP GR UR STRIP: 1.03 (ref 1–1.03)
SQUAMOUS #/AREA URNS HPF: ABNORMAL /HPF
TIBC SERPL-MCNC: 418 MCG/DL (ref 250–450)
UROBILINOGEN UR STRIP-MCNC: 0.2 MG/DL
WBC # BLD: 5 K/MCL (ref 4.2–11)
WBC #/AREA URNS HPF: ABNORMAL /HPF

## 2024-12-05 PROCEDURE — 83540 ASSAY OF IRON: CPT | Performed by: CLINICAL MEDICAL LABORATORY

## 2024-12-05 PROCEDURE — 82043 UR ALBUMIN QUANTITATIVE: CPT | Performed by: CLINICAL MEDICAL LABORATORY

## 2024-12-05 PROCEDURE — 10004173 HB COUNTER-THERAPY VISIT PT

## 2024-12-05 PROCEDURE — 97140 MANUAL THERAPY 1/> REGIONS: CPT

## 2024-12-05 PROCEDURE — 36415 COLL VENOUS BLD VENIPUNCTURE: CPT | Performed by: INTERNAL MEDICINE

## 2024-12-05 PROCEDURE — 85025 COMPLETE CBC W/AUTO DIFF WBC: CPT | Performed by: CLINICAL MEDICAL LABORATORY

## 2024-12-05 PROCEDURE — 84156 ASSAY OF PROTEIN URINE: CPT | Performed by: CLINICAL MEDICAL LABORATORY

## 2024-12-05 PROCEDURE — 83970 ASSAY OF PARATHORMONE: CPT | Performed by: CLINICAL MEDICAL LABORATORY

## 2024-12-05 PROCEDURE — 82570 ASSAY OF URINE CREATININE: CPT | Performed by: CLINICAL MEDICAL LABORATORY

## 2024-12-05 PROCEDURE — 84100 ASSAY OF PHOSPHORUS: CPT | Performed by: CLINICAL MEDICAL LABORATORY

## 2024-12-05 PROCEDURE — 97112 NEUROMUSCULAR REEDUCATION: CPT

## 2024-12-05 PROCEDURE — 83550 IRON BINDING TEST: CPT | Performed by: CLINICAL MEDICAL LABORATORY

## 2024-12-05 PROCEDURE — 82306 VITAMIN D 25 HYDROXY: CPT | Performed by: CLINICAL MEDICAL LABORATORY

## 2024-12-05 PROCEDURE — 81001 URINALYSIS AUTO W/SCOPE: CPT | Performed by: CLINICAL MEDICAL LABORATORY

## 2024-12-05 PROCEDURE — 82728 ASSAY OF FERRITIN: CPT | Performed by: CLINICAL MEDICAL LABORATORY

## 2024-12-05 PROCEDURE — 80053 COMPREHEN METABOLIC PANEL: CPT | Performed by: CLINICAL MEDICAL LABORATORY

## 2024-12-05 PROCEDURE — 97110 THERAPEUTIC EXERCISES: CPT

## 2024-12-05 ASSESSMENT — ENCOUNTER SYMPTOMS: PAIN SEVERITY NOW: 3

## 2024-12-06 LAB
CREAT UR-MCNC: 235 MG/DL
CREAT UR-MCNC: 242 MG/DL
MICROALBUMIN UR-MCNC: 0.94 MG/DL
MICROALBUMIN/CREAT UR: 4 MG/G
PROT UR-MCNC: 18 MG/DL
PROT/CREAT UR: 74 MGPR/GCR
PTH-INTACT SERPL-MCNC: 127 PG/ML (ref 19–88)

## 2024-12-09 ENCOUNTER — HOSPITAL ENCOUNTER (OUTPATIENT)
Dept: REHABILITATION | Age: 64
Discharge: STILL ADMITTED OR ADMINISTRATIVE | End: 2024-12-09
Attending: FAMILY MEDICINE

## 2024-12-09 PROCEDURE — 97112 NEUROMUSCULAR REEDUCATION: CPT

## 2024-12-09 PROCEDURE — 97110 THERAPEUTIC EXERCISES: CPT

## 2024-12-09 PROCEDURE — 97140 MANUAL THERAPY 1/> REGIONS: CPT

## 2024-12-09 PROCEDURE — 10004173 HB COUNTER-THERAPY VISIT PT

## 2024-12-09 ASSESSMENT — ENCOUNTER SYMPTOMS: PAIN SEVERITY NOW: 3

## 2024-12-12 ENCOUNTER — HOSPITAL ENCOUNTER (OUTPATIENT)
Dept: REHABILITATION | Age: 64
Discharge: STILL ADMITTED OR ADMINISTRATIVE | End: 2024-12-12
Attending: FAMILY MEDICINE

## 2024-12-12 ENCOUNTER — APPOINTMENT (OUTPATIENT)
Dept: NEPHROLOGY | Age: 64
End: 2024-12-12

## 2024-12-12 VITALS
WEIGHT: 201.6 LBS | HEIGHT: 63 IN | RESPIRATION RATE: 16 BRPM | OXYGEN SATURATION: 98 % | DIASTOLIC BLOOD PRESSURE: 70 MMHG | SYSTOLIC BLOOD PRESSURE: 124 MMHG | BODY MASS INDEX: 35.72 KG/M2 | HEART RATE: 65 BPM

## 2024-12-12 DIAGNOSIS — N18.31 STAGE 3A CHRONIC KIDNEY DISEASE  (CMD): Primary | ICD-10-CM

## 2024-12-12 DIAGNOSIS — I12.9 HYPERTENSIVE KIDNEY DISEASE: ICD-10-CM

## 2024-12-12 DIAGNOSIS — N25.81 HYPERPARATHYROIDISM, SECONDARY  (CMD): ICD-10-CM

## 2024-12-12 PROCEDURE — 10004173 HB COUNTER-THERAPY VISIT PT

## 2024-12-12 PROCEDURE — 99214 OFFICE O/P EST MOD 30 MIN: CPT | Performed by: INTERNAL MEDICINE

## 2024-12-12 PROCEDURE — G2211 COMPLEX E/M VISIT ADD ON: HCPCS | Performed by: INTERNAL MEDICINE

## 2024-12-12 PROCEDURE — 97110 THERAPEUTIC EXERCISES: CPT

## 2024-12-12 ASSESSMENT — MOVEMENT AND STRENGTH ASSESSMENTS
RUNNING ON UNEVEN GROUND: EXTREME DIFFICULTY OR UNABLE TO PERFORM ACTIVITY
SQUATTING: MODERATE DIFFICULTY
YOUR USUAL HOBBIES, RECREATIONAL OR SPORTING ACTIVIITIES: MODERATE DIFFICULTY
PERFORMING LIGHT ACTIVITES AROUND YOUR HOME: A LITTLE BIT OF DIFFICULTY
WALKING BETWEEN ROOMS: A LITTLE BIT OF DIFFICULTY
PERFORMING HEAVY ACTIVITIES AROUND YOUR HOME: QUITE A BIT OF DIFFICULTY
WALKING 2 BLOCKS: A LITTLE BIT OF DIFFICULTY
ANY OF YOUR USUAL WORK, HOUSEWORK OR SCHOOL ACTIVITIES: A LITTLE BIT OF DIFFICULTY
MAKING SHARP TURNS WHILE RUNNING FAST: QUITE A BIT OF DIFFICULTY
WALKING A MILE: MODERATE DIFFICULTY
ROLLING OVER IN BED: MODERATE DIFFICULTY
TOTAL SCORE: 50
HOPPING: QUITE A BIT OF DIFFICULTY
RUNNING ON EVEN GROUND: EXTREME DIFFICULTY OR UNABLE TO PERFORM ACTIVITY
GETTING INTO OR OUT OF THE BATH: A LITTLE BIT OF DIFFICULTY
GOING UP OR DOWN 10 STAIRS (ABOUT 1 FLIGHT OF STAIRS): A LITTLE BIT OF DIFFICULTY
GETTING INTO OR OUT OF A CAR: A LITTLE BIT OF DIFFICULTY
PUTTING ON YOUR SHOES OR SOCKS: MODERATE DIFFICULTY
STANDING FOR 1 HOUR: QUITE A BIT OF DIFFICULTY
SITTING FOR 1 HOUR: MODERATE DIFFICULTY
LIFTING AN OBJECT, LIKE A BAG OF GROCERIES, FROM THE FLOOR: A LITTLE BIT OF DIFFICULTY

## 2024-12-12 ASSESSMENT — ENCOUNTER SYMPTOMS: PAIN SEVERITY NOW: 2

## 2025-01-15 ENCOUNTER — HOSPITAL ENCOUNTER (OUTPATIENT)
Dept: MAMMOGRAPHY | Age: 65
Discharge: HOME | End: 2025-01-15
Attending: FAMILY MEDICINE

## 2025-01-15 DIAGNOSIS — M85.80 OSTEOPENIA, UNSPECIFIED LOCATION: ICD-10-CM

## 2025-01-15 DIAGNOSIS — Z78.0 ASYMPTOMATIC MENOPAUSAL STATE: ICD-10-CM

## 2025-01-15 PROCEDURE — 77080 DXA BONE DENSITY AXIAL: CPT

## 2025-01-22 LAB
DEXA DS2 HIP FRACTURE RISK WO PERCENT: 1.5
DEXA DS2 MAJ OST FRACTURE RISK WO PERCENT: 10
DEXA FRACTURE RISK HIP: 1.5
DEXA FRACTURE RISK MAJOR: 10
DEXA HIP FRAC RISK ADJ TBS: 0.6
DEXA LT FEMNECK TSCORE: -2
DEXA LT FEMNECK ZSCORE: -1.1
DEXA LT FOREARM T-SCORE: 0.1
DEXA LT FOREARM Z-SCORE: 1.5
DEXA LT TOTFEM TSCORE: -1.2
DEXA LT TOTFEM ZSCORE: -0.6
DEXA MAJ OST FRAC RISK ADJ TBS: 5.8
DEXA RT FEMNECK TSCORE: -1.4
DEXA RT FEMNECK ZSCORE: -0.5
DEXA RT TOTFEM TSCORE: -1.7
DEXA RT TOTFEM ZSCORE: -1.1
DEXA SPINE L1-L2 T-SCORE: 0.8
DEXA SPINE L1-L2 Z-SCORE: 1.5
DEXA SPINE L1-L3 T-SCORE: 1
DEXA SPINE L1-L3 Z-SCORE: 1.7
DEXA SPINE L1-L4 (L2,L3) T-SCORE: 0.5
DEXA SPINE L1-L4 (L2,L3) Z-SCORE: 1.2
DEXA SPINE L1-L4 T-SCORE: 0.8
DEXA SPINE L1-L4 Z-SCORE: 1.5
DEXA SPINE L2-L3 T-SCORE: 1.1
DEXA SPINE L2-L3 Z-SCORE: 1.8
DEXA SPINE L2-L4 T-SCORE: 0.8
DEXA SPINE L2-L4 Z-SCORE: 1.6
DEXA SPINE L3-L4 T-SCORE: 0.7
DEXA SPINE L3-L4 Z-SCORE: 1.5

## 2025-01-27 ENCOUNTER — APPOINTMENT (OUTPATIENT)
Dept: AUDIOLOGY | Age: 65
End: 2025-01-27
Attending: FAMILY MEDICINE

## 2025-01-27 DIAGNOSIS — H93.13 TINNITUS OF BOTH EARS: ICD-10-CM

## 2025-01-27 DIAGNOSIS — H90.3 BILATERAL SENSORINEURAL HEARING LOSS: Primary | ICD-10-CM

## 2025-01-27 PROCEDURE — 92567 TYMPANOMETRY: CPT | Performed by: AUDIOLOGIST

## 2025-01-27 PROCEDURE — 92557 COMPREHENSIVE HEARING TEST: CPT | Performed by: AUDIOLOGIST

## 2025-01-29 ENCOUNTER — NURSE TRIAGE (OUTPATIENT)
Dept: FAMILY MEDICINE | Age: 65
End: 2025-01-29

## 2025-01-31 ENCOUNTER — OFFICE VISIT (OUTPATIENT)
Dept: FAMILY MEDICINE | Age: 65
End: 2025-01-31

## 2025-01-31 VITALS
WEIGHT: 197 LBS | HEART RATE: 73 BPM | BODY MASS INDEX: 34.91 KG/M2 | OXYGEN SATURATION: 98 % | SYSTOLIC BLOOD PRESSURE: 112 MMHG | HEIGHT: 63 IN | DIASTOLIC BLOOD PRESSURE: 82 MMHG

## 2025-01-31 DIAGNOSIS — R21 RASH: Primary | ICD-10-CM

## 2025-01-31 PROCEDURE — 99213 OFFICE O/P EST LOW 20 MIN: CPT | Performed by: FAMILY MEDICINE

## 2025-01-31 RX ORDER — TRIAMCINOLONE ACETONIDE 1 MG/G
CREAM TOPICAL 2 TIMES DAILY
Qty: 30 G | Refills: 0 | Status: SHIPPED | OUTPATIENT
Start: 2025-01-31 | End: 2025-02-14

## 2025-04-07 DIAGNOSIS — E78.5 HYPERLIPIDEMIA LDL GOAL <100: ICD-10-CM

## 2025-04-07 RX ORDER — ROSUVASTATIN CALCIUM 5 MG/1
5 TABLET, COATED ORAL DAILY
Qty: 90 TABLET | Refills: 0 | Status: SHIPPED | OUTPATIENT
Start: 2025-04-07

## 2025-04-10 DIAGNOSIS — E03.9 ACQUIRED HYPOTHYROIDISM: ICD-10-CM

## 2025-04-10 RX ORDER — LEVOTHYROXINE SODIUM 75 UG/1
75 TABLET ORAL DAILY
Qty: 90 TABLET | Refills: 1 | Status: SHIPPED | OUTPATIENT
Start: 2025-04-10

## 2025-04-22 ENCOUNTER — APPOINTMENT (OUTPATIENT)
Dept: FAMILY MEDICINE | Age: 65
End: 2025-04-22

## 2025-04-22 ENCOUNTER — IMAGING SERVICES (OUTPATIENT)
Dept: GENERAL RADIOLOGY | Age: 65
End: 2025-04-22
Attending: FAMILY MEDICINE

## 2025-04-22 VITALS
WEIGHT: 201 LBS | HEART RATE: 77 BPM | BODY MASS INDEX: 35.61 KG/M2 | RESPIRATION RATE: 16 BRPM | OXYGEN SATURATION: 98 % | HEIGHT: 63 IN | DIASTOLIC BLOOD PRESSURE: 74 MMHG | SYSTOLIC BLOOD PRESSURE: 116 MMHG

## 2025-04-22 DIAGNOSIS — E66.812 CLASS 2 SEVERE OBESITY WITH BODY MASS INDEX (BMI) OF 35 TO 39.9 WITH SERIOUS COMORBIDITY (CMD): ICD-10-CM

## 2025-04-22 DIAGNOSIS — K21.9 GASTROESOPHAGEAL REFLUX DISEASE, UNSPECIFIED WHETHER ESOPHAGITIS PRESENT: ICD-10-CM

## 2025-04-22 DIAGNOSIS — E78.5 HYPERLIPIDEMIA LDL GOAL <100: ICD-10-CM

## 2025-04-22 DIAGNOSIS — Z23 NEED FOR VACCINATION: Primary | ICD-10-CM

## 2025-04-22 DIAGNOSIS — R05.3 CHRONIC COUGH: ICD-10-CM

## 2025-04-22 DIAGNOSIS — N18.31 STAGE 3A CHRONIC KIDNEY DISEASE  (CMD): ICD-10-CM

## 2025-04-22 DIAGNOSIS — E03.9 ACQUIRED HYPOTHYROIDISM: ICD-10-CM

## 2025-04-22 DIAGNOSIS — N18.32 STAGE 3B CHRONIC KIDNEY DISEASE  (CMD): ICD-10-CM

## 2025-04-22 DIAGNOSIS — E66.01 CLASS 2 SEVERE OBESITY WITH BODY MASS INDEX (BMI) OF 35 TO 39.9 WITH SERIOUS COMORBIDITY (CMD): ICD-10-CM

## 2025-04-22 PROCEDURE — 71046 X-RAY EXAM CHEST 2 VIEWS: CPT | Performed by: RADIOLOGY

## 2025-04-22 PROCEDURE — 91322 SARSCOV2 VAC 50 MCG/0.5ML IM: CPT | Performed by: FAMILY MEDICINE

## 2025-04-22 PROCEDURE — 90480 ADMN SARSCOV2 VAC 1/ONLY CMP: CPT | Performed by: FAMILY MEDICINE

## 2025-04-22 PROCEDURE — 99214 OFFICE O/P EST MOD 30 MIN: CPT | Performed by: FAMILY MEDICINE

## 2025-04-22 RX ORDER — PANTOPRAZOLE SODIUM 40 MG/1
40 TABLET, DELAYED RELEASE ORAL DAILY
Qty: 90 TABLET | Refills: 0 | Status: SHIPPED | OUTPATIENT
Start: 2025-04-22

## 2025-04-22 ASSESSMENT — ENCOUNTER SYMPTOMS
DIARRHEA: 0
COUGH: 1
SHORTNESS OF BREATH: 0
LIGHT-HEADEDNESS: 0
RHINORRHEA: 0
HEADACHES: 0
CONSTIPATION: 0
FEVER: 0
SORE THROAT: 0

## 2025-04-23 ENCOUNTER — LAB SERVICES (OUTPATIENT)
Dept: LAB | Age: 65
End: 2025-04-23

## 2025-04-23 DIAGNOSIS — E78.5 HYPERLIPIDEMIA LDL GOAL <100: ICD-10-CM

## 2025-04-23 DIAGNOSIS — E03.9 ACQUIRED HYPOTHYROIDISM: ICD-10-CM

## 2025-04-23 LAB
CHOLEST SERPL-MCNC: 191 MG/DL
CHOLEST/HDLC SERPL: 2.9 {RATIO}
HDLC SERPL-MCNC: 67 MG/DL
LDLC SERPL CALC-MCNC: 102 MG/DL
NONHDLC SERPL-MCNC: 124 MG/DL
TRIGL SERPL-MCNC: 109 MG/DL
TSH SERPL-ACNC: 1.79 MCUNITS/ML (ref 0.35–5)

## 2025-04-23 PROCEDURE — 84443 ASSAY THYROID STIM HORMONE: CPT | Performed by: CLINICAL MEDICAL LABORATORY

## 2025-04-23 PROCEDURE — 36415 COLL VENOUS BLD VENIPUNCTURE: CPT | Performed by: INTERNAL MEDICINE

## 2025-04-23 PROCEDURE — 80061 LIPID PANEL: CPT | Performed by: CLINICAL MEDICAL LABORATORY

## 2025-04-24 ENCOUNTER — RESULTS FOLLOW-UP (OUTPATIENT)
Dept: FAMILY MEDICINE | Age: 65
End: 2025-04-24

## 2025-06-05 ENCOUNTER — APPOINTMENT (OUTPATIENT)
Dept: LAB | Age: 65
End: 2025-06-05

## 2025-06-05 DIAGNOSIS — N18.31 STAGE 3A CHRONIC KIDNEY DISEASE  (CMD): ICD-10-CM

## 2025-06-05 DIAGNOSIS — N25.81 HYPERPARATHYROIDISM, SECONDARY  (CMD): ICD-10-CM

## 2025-06-05 LAB
ANION GAP SERPL CALC-SCNC: 10 MMOL/L (ref 7–19)
BASOPHILS # BLD: 0.1 K/MCL (ref 0–0.3)
BASOPHILS NFR BLD: 2 %
BUN SERPL-MCNC: 29 MG/DL (ref 6–20)
BUN/CREAT SERPL: 18 (ref 7–25)
CALCIUM SERPL-MCNC: 8.9 MG/DL (ref 8.4–10.2)
CHLORIDE SERPL-SCNC: 110 MMOL/L (ref 97–110)
CO2 SERPL-SCNC: 26 MMOL/L (ref 21–32)
CREAT SERPL-MCNC: 1.57 MG/DL (ref 0.51–0.95)
DEPRECATED RDW RBC: 45.8 FL (ref 39–50)
EGFRCR SERPLBLD CKD-EPI 2021: 36 ML/MIN/{1.73_M2}
EOSINOPHIL # BLD: 0.1 K/MCL (ref 0–0.5)
EOSINOPHIL NFR BLD: 3 %
ERYTHROCYTE [DISTWIDTH] IN BLOOD: 13.6 % (ref 11–15)
FASTING DURATION TIME PATIENT: 12 HOURS (ref 0–999)
FERRITIN SERPL-MCNC: 10 NG/ML (ref 8–252)
GLUCOSE SERPL-MCNC: 89 MG/DL (ref 70–99)
HCT VFR BLD CALC: 36 % (ref 36–46.5)
HGB BLD-MCNC: 11.5 G/DL (ref 12–15.5)
IMM GRANULOCYTES # BLD AUTO: 0 K/MCL (ref 0–0.2)
IMM GRANULOCYTES # BLD: 0 %
IRON SATN MFR SERPL: 23 % (ref 15–45)
IRON SERPL-MCNC: 88 MCG/DL (ref 50–170)
LYMPHOCYTES # BLD: 0.9 K/MCL (ref 1–4)
LYMPHOCYTES NFR BLD: 27 %
MCH RBC QN AUTO: 29.3 PG (ref 26–34)
MCHC RBC AUTO-ENTMCNC: 31.9 G/DL (ref 32–36.5)
MCV RBC AUTO: 91.8 FL (ref 78–100)
MONOCYTES # BLD: 0.3 K/MCL (ref 0.3–0.9)
MONOCYTES NFR BLD: 10 %
NEUTROPHILS # BLD: 2 K/MCL (ref 1.8–7.7)
NEUTROPHILS NFR BLD: 58 %
NRBC BLD MANUAL-RTO: 0 /100 WBC
PLATELET # BLD AUTO: 186 K/MCL (ref 140–450)
POTASSIUM SERPL-SCNC: 4.1 MMOL/L (ref 3.4–5.1)
PTH-INTACT SERPL-MCNC: 88 PG/ML (ref 19–88)
RBC # BLD: 3.92 MIL/MCL (ref 4–5.2)
SODIUM SERPL-SCNC: 142 MMOL/L (ref 135–145)
TIBC SERPL-MCNC: 386 MCG/DL (ref 250–450)
WBC # BLD: 3.4 K/MCL (ref 4.2–11)

## 2025-06-05 PROCEDURE — 80048 BASIC METABOLIC PNL TOTAL CA: CPT | Performed by: CLINICAL MEDICAL LABORATORY

## 2025-06-05 PROCEDURE — 36415 COLL VENOUS BLD VENIPUNCTURE: CPT | Performed by: INTERNAL MEDICINE

## 2025-06-05 PROCEDURE — 82570 ASSAY OF URINE CREATININE: CPT | Performed by: CLINICAL MEDICAL LABORATORY

## 2025-06-05 PROCEDURE — 83970 ASSAY OF PARATHORMONE: CPT | Performed by: CLINICAL MEDICAL LABORATORY

## 2025-06-05 PROCEDURE — 83550 IRON BINDING TEST: CPT | Performed by: CLINICAL MEDICAL LABORATORY

## 2025-06-05 PROCEDURE — 85025 COMPLETE CBC W/AUTO DIFF WBC: CPT | Performed by: CLINICAL MEDICAL LABORATORY

## 2025-06-05 PROCEDURE — 83540 ASSAY OF IRON: CPT | Performed by: CLINICAL MEDICAL LABORATORY

## 2025-06-05 PROCEDURE — 82043 UR ALBUMIN QUANTITATIVE: CPT | Performed by: CLINICAL MEDICAL LABORATORY

## 2025-06-05 PROCEDURE — 82728 ASSAY OF FERRITIN: CPT | Performed by: CLINICAL MEDICAL LABORATORY

## 2025-06-06 LAB
CREAT UR-MCNC: 127 MG/DL
MICROALBUMIN UR-MCNC: 0.52 MG/DL
MICROALBUMIN/CREAT UR: 4.1 MG/G

## 2025-06-11 ENCOUNTER — RESULTS FOLLOW-UP (OUTPATIENT)
Dept: FAMILY MEDICINE | Age: 65
End: 2025-06-11

## 2025-06-11 ENCOUNTER — HOSPITAL ENCOUNTER (OUTPATIENT)
Dept: MAMMOGRAPHY | Age: 65
Discharge: HOME | End: 2025-06-11
Attending: FAMILY MEDICINE

## 2025-06-11 VITALS — HEIGHT: 63 IN | WEIGHT: 201 LBS | BODY MASS INDEX: 35.61 KG/M2

## 2025-06-11 DIAGNOSIS — Z12.31 ENCOUNTER FOR SCREENING MAMMOGRAM FOR MALIGNANT NEOPLASM OF BREAST: ICD-10-CM

## 2025-06-11 PROCEDURE — 77063 BREAST TOMOSYNTHESIS BI: CPT

## 2025-06-11 PROCEDURE — 77063 BREAST TOMOSYNTHESIS BI: CPT | Performed by: RADIOLOGY

## 2025-06-11 PROCEDURE — 77067 SCR MAMMO BI INCL CAD: CPT | Performed by: RADIOLOGY

## 2025-06-12 ENCOUNTER — APPOINTMENT (OUTPATIENT)
Dept: NEPHROLOGY | Age: 65
End: 2025-06-12

## 2025-06-12 VITALS
RESPIRATION RATE: 16 BRPM | HEIGHT: 63 IN | DIASTOLIC BLOOD PRESSURE: 64 MMHG | OXYGEN SATURATION: 94 % | HEART RATE: 76 BPM | WEIGHT: 204.4 LBS | SYSTOLIC BLOOD PRESSURE: 110 MMHG | BODY MASS INDEX: 36.21 KG/M2

## 2025-06-12 DIAGNOSIS — I12.9 HYPERTENSIVE KIDNEY DISEASE: ICD-10-CM

## 2025-06-12 DIAGNOSIS — D63.1 ANEMIA DUE TO STAGE 3B CHRONIC KIDNEY DISEASE  (CMD): ICD-10-CM

## 2025-06-12 DIAGNOSIS — N18.32 ANEMIA DUE TO STAGE 3B CHRONIC KIDNEY DISEASE  (CMD): ICD-10-CM

## 2025-06-12 DIAGNOSIS — N18.32 STAGE 3B CHRONIC KIDNEY DISEASE  (CMD): Primary | ICD-10-CM

## 2025-06-12 PROCEDURE — 99214 OFFICE O/P EST MOD 30 MIN: CPT | Performed by: INTERNAL MEDICINE

## 2025-06-12 PROCEDURE — G2211 COMPLEX E/M VISIT ADD ON: HCPCS | Performed by: INTERNAL MEDICINE

## 2025-07-07 DIAGNOSIS — E78.5 HYPERLIPIDEMIA LDL GOAL <100: ICD-10-CM

## 2025-07-07 RX ORDER — ROSUVASTATIN CALCIUM 5 MG/1
5 TABLET, COATED ORAL DAILY
Qty: 90 TABLET | Refills: 0 | Status: SHIPPED | OUTPATIENT
Start: 2025-07-07

## 2025-07-18 DIAGNOSIS — K21.9 GASTROESOPHAGEAL REFLUX DISEASE, UNSPECIFIED WHETHER ESOPHAGITIS PRESENT: ICD-10-CM

## 2025-07-18 RX ORDER — PANTOPRAZOLE SODIUM 40 MG/1
40 TABLET, DELAYED RELEASE ORAL DAILY
Qty: 90 TABLET | Refills: 0 | Status: SHIPPED | OUTPATIENT
Start: 2025-07-18

## 2025-08-29 ENCOUNTER — TELEPHONE (OUTPATIENT)
Dept: FAMILY MEDICINE | Age: 65
End: 2025-08-29

## 2025-10-14 ENCOUNTER — APPOINTMENT (OUTPATIENT)
Dept: FAMILY MEDICINE | Age: 65
End: 2025-10-14

## 2025-12-04 ENCOUNTER — APPOINTMENT (OUTPATIENT)
Dept: LAB | Age: 65
End: 2025-12-04

## 2025-12-11 ENCOUNTER — APPOINTMENT (OUTPATIENT)
Dept: NEPHROLOGY | Age: 65
End: 2025-12-11

## (undated) DEVICE — SU WND CLOSURE VLOC 180 ABS 0 9" GS-21 VLOCL0346

## (undated) DEVICE — TUBING ARTHRO CONMED/LINVATEC PUMP BLUE INFLOW 10K100

## (undated) DEVICE — BAG CLEAR TRASH 1.3M 39X33" P4040C

## (undated) DEVICE — SYR 30ML LL W/O NDL 302832

## (undated) DEVICE — PACK ARTHROSCOPY KNEE

## (undated) DEVICE — GOWN IMPERVIOUS SPECIALTY XLG/XLONG 32474

## (undated) DEVICE — GLOVE PROTEXIS BLUE W/NEU-THERA 8.0  2D73EB80

## (undated) DEVICE — LINEN ORTHO ACL PACK 5447

## (undated) DEVICE — SU VICRYL 0 UR-6 27" J603H

## (undated) DEVICE — GLOVE PROTEXIS MICRO 8.0  2D73PM80

## (undated) DEVICE — KIT PATIENT POSITIONING PIGAZZI LATEX FREE 40580

## (undated) DEVICE — LINEN FULL SHEET 5511

## (undated) DEVICE — NDL INSUFFLATION 13GA 120MM C2201

## (undated) DEVICE — DRSG BANDAID 1X3" FABRIC

## (undated) DEVICE — SU MONOCRYL 4-0 PS-2 27" UND Y426H

## (undated) DEVICE — ENDO TROCAR SLEEVE KII ADV FIXATION 05X100MM CFS02

## (undated) DEVICE — VIA GI PROCEDURE KIT -CUSTOM

## (undated) DEVICE — SU VICRYL 0 CT-2 27" J334H

## (undated) DEVICE — EVAC SYSTEM CLEAR FLOW SC082500

## (undated) DEVICE — GLOVE PROTEXIS MICRO 7.0  2D73PM70

## (undated) DEVICE — PACK LAP HYST RIDGES

## (undated) DEVICE — SOL NACL 0.9% IRRIG 3000ML BAG 2B7477

## (undated) DEVICE — DRSG STERI STRIP 1/2X4" R1547

## (undated) DEVICE — GLOVE PROTEXIS MICRO 7.5  2D73PM75

## (undated) DEVICE — DECANTER VIAL 2006S

## (undated) DEVICE — SUCTION CANISTER MEDIVAC LINER 3000ML W/LID 65651-530

## (undated) DEVICE — LINEN TOWEL PACK X5 5464

## (undated) DEVICE — NDL BLUNT 18GA 1" W/O FILTER 305181

## (undated) DEVICE — NDL 22GA 1.5"

## (undated) DEVICE — PAD CHUX UNDERPAD 30X36" P3036C

## (undated) DEVICE — SOL NACL 0.9% IRRIG 1000ML BOTTLE 2F7124

## (undated) DEVICE — PROTECTOR ARM ONE-STEP TRENDELENBURG 40418

## (undated) DEVICE — GLOVE PROTEXIS POWDER FREE 7.5 ORTHOPEDIC 2D73ET75

## (undated) DEVICE — LINEN POUCH DBL 5427

## (undated) DEVICE — PREP CHLORAPREP 26ML TINTED GREEN 260825

## (undated) DEVICE — ENDO TROCAR FIRST ENTRY KII FIOS ADV FIX 05X100MM CFF03

## (undated) DEVICE — LINEN HALF SHEET 5512

## (undated) DEVICE — SYR 03ML LL W/O NDL 309657

## (undated) DEVICE — GLOVE PROTEXIS POWDER FREE 8.0 ORTHOPEDIC 2D73ET80

## (undated) DEVICE — SUCTION MANIFOLD NEPTUNE 2 SYS 4 PORT 0702-020-000

## (undated) DEVICE — ENDO TROCAR FIRST ENTRY KII FIOS Z-THRD 12X100MM CTF73

## (undated) DEVICE — BLADE SHAVER ARTHRO 4.2MM GATOR 9263A

## (undated) DEVICE — RETR ELEV / UTERINE MANIPULATOR V-CARE LG CUP 60-6085-202A

## (undated) DEVICE — CAST PADDING 6" STERILE 9046S

## (undated) DEVICE — GOWN SURG LG L3 NONREINFORCE SET IN SLV STRL LF DISP BLUE

## (undated) DEVICE — SU ETHILON 3-0 PS-2 18" 1669H

## (undated) RX ORDER — PROPOFOL 10 MG/ML
INJECTION, EMULSION INTRAVENOUS
Status: DISPENSED
Start: 2017-03-20

## (undated) RX ORDER — HYDROCODONE BITARTRATE AND ACETAMINOPHEN 5; 325 MG/1; MG/1
TABLET ORAL
Status: DISPENSED
Start: 2017-03-20

## (undated) RX ORDER — DEXAMETHASONE SODIUM PHOSPHATE 4 MG/ML
INJECTION, SOLUTION INTRA-ARTICULAR; INTRALESIONAL; INTRAMUSCULAR; INTRAVENOUS; SOFT TISSUE
Status: DISPENSED
Start: 2017-03-20

## (undated) RX ORDER — PROPOFOL 10 MG/ML
INJECTION, EMULSION INTRAVENOUS
Status: DISPENSED
Start: 2021-05-11

## (undated) RX ORDER — FENTANYL CITRATE 50 UG/ML
INJECTION, SOLUTION INTRAMUSCULAR; INTRAVENOUS
Status: DISPENSED
Start: 2017-03-20

## (undated) RX ORDER — ONDANSETRON 2 MG/ML
INJECTION INTRAMUSCULAR; INTRAVENOUS
Status: DISPENSED
Start: 2017-03-20

## (undated) RX ORDER — FENTANYL CITRATE-0.9 % NACL/PF 10 MCG/ML
PLASTIC BAG, INJECTION (ML) INTRAVENOUS
Status: DISPENSED
Start: 2021-05-11

## (undated) RX ORDER — ACETAMINOPHEN 325 MG/1
TABLET ORAL
Status: DISPENSED
Start: 2021-05-11

## (undated) RX ORDER — CEFAZOLIN SODIUM 2 G/100ML
INJECTION, SOLUTION INTRAVENOUS
Status: DISPENSED
Start: 2021-05-11

## (undated) RX ORDER — FENTANYL CITRATE 50 UG/ML
INJECTION, SOLUTION INTRAMUSCULAR; INTRAVENOUS
Status: DISPENSED
Start: 2021-05-11

## (undated) RX ORDER — ACETAMINOPHEN 10 MG/ML
INJECTION, SOLUTION INTRAVENOUS
Status: DISPENSED
Start: 2017-03-20

## (undated) RX ORDER — OXYCODONE HYDROCHLORIDE 5 MG/1
TABLET ORAL
Status: DISPENSED
Start: 2021-05-11

## (undated) RX ORDER — EPHEDRINE SULFATE 50 MG/ML
INJECTION, SOLUTION INTRAMUSCULAR; INTRAVENOUS; SUBCUTANEOUS
Status: DISPENSED
Start: 2021-05-11

## (undated) RX ORDER — ACETAMINOPHEN 500 MG
TABLET ORAL
Status: DISPENSED
Start: 2021-05-11

## (undated) RX ORDER — FENTANYL CITRATE 50 UG/ML
INJECTION, SOLUTION INTRAMUSCULAR; INTRAVENOUS
Status: DISPENSED
Start: 2018-12-07

## (undated) RX ORDER — GLYCOPYRROLATE 0.2 MG/ML
INJECTION INTRAMUSCULAR; INTRAVENOUS
Status: DISPENSED
Start: 2017-03-20

## (undated) RX ORDER — LIDOCAINE HYDROCHLORIDE 10 MG/ML
INJECTION, SOLUTION EPIDURAL; INFILTRATION; INTRACAUDAL; PERINEURAL
Status: DISPENSED
Start: 2017-03-20

## (undated) RX ORDER — NEOSTIGMINE METHYLSULFATE 1 MG/ML
VIAL (ML) INJECTION
Status: DISPENSED
Start: 2021-05-11

## (undated) RX ORDER — GLYCOPYRROLATE 0.2 MG/ML
INJECTION INTRAMUSCULAR; INTRAVENOUS
Status: DISPENSED
Start: 2021-05-11

## (undated) RX ORDER — CEFAZOLIN SODIUM 2 G/100ML
INJECTION, SOLUTION INTRAVENOUS
Status: DISPENSED
Start: 2017-03-20

## (undated) RX ORDER — HYDROMORPHONE HYDROCHLORIDE 1 MG/ML
INJECTION, SOLUTION INTRAMUSCULAR; INTRAVENOUS; SUBCUTANEOUS
Status: DISPENSED
Start: 2021-05-11

## (undated) RX ORDER — BUPIVACAINE HYDROCHLORIDE 2.5 MG/ML
INJECTION, SOLUTION EPIDURAL; INFILTRATION; INTRACAUDAL
Status: DISPENSED
Start: 2021-05-11